# Patient Record
Sex: FEMALE | Race: WHITE | NOT HISPANIC OR LATINO | URBAN - METROPOLITAN AREA
[De-identification: names, ages, dates, MRNs, and addresses within clinical notes are randomized per-mention and may not be internally consistent; named-entity substitution may affect disease eponyms.]

---

## 2022-01-21 ENCOUNTER — INPATIENT (INPATIENT)
Facility: HOSPITAL | Age: 82
LOS: 23 days | Discharge: NURSING, REHAB OR LONG-TERM CARE | DRG: 177 | End: 2022-02-14
Attending: INTERNAL MEDICINE | Admitting: INTERNAL MEDICINE
Payer: COMMERCIAL

## 2022-01-21 VITALS
HEART RATE: 84 BPM | WEIGHT: 110.01 LBS | DIASTOLIC BLOOD PRESSURE: 52 MMHG | SYSTOLIC BLOOD PRESSURE: 148 MMHG | TEMPERATURE: 98 F | OXYGEN SATURATION: 98 % | HEIGHT: 62 IN | RESPIRATION RATE: 14 BRPM

## 2022-01-21 DIAGNOSIS — R06.00 DYSPNEA, UNSPECIFIED: ICD-10-CM

## 2022-01-21 LAB
ALBUMIN SERPL ELPH-MCNC: 3.3 G/DL — SIGNIFICANT CHANGE UP (ref 3.3–5)
ALP SERPL-CCNC: 55 U/L — SIGNIFICANT CHANGE UP (ref 40–120)
ALT FLD-CCNC: 19 U/L — SIGNIFICANT CHANGE UP (ref 10–45)
ANION GAP SERPL CALC-SCNC: 15 MMOL/L — SIGNIFICANT CHANGE UP (ref 5–17)
APPEARANCE UR: CLEAR — SIGNIFICANT CHANGE UP
APTT BLD: 35.6 SEC — HIGH (ref 27.5–35.5)
AST SERPL-CCNC: 44 U/L — HIGH (ref 10–40)
BACTERIA # UR AUTO: NEGATIVE — SIGNIFICANT CHANGE UP
BASE EXCESS BLDV CALC-SCNC: 6.5 MMOL/L — HIGH (ref -2–2)
BASOPHILS # BLD AUTO: 0.03 K/UL — SIGNIFICANT CHANGE UP (ref 0–0.2)
BASOPHILS NFR BLD AUTO: 0.4 % — SIGNIFICANT CHANGE UP (ref 0–2)
BILIRUB SERPL-MCNC: 0.9 MG/DL — SIGNIFICANT CHANGE UP (ref 0.2–1.2)
BILIRUB UR-MCNC: NEGATIVE — SIGNIFICANT CHANGE UP
BUN SERPL-MCNC: 14 MG/DL — SIGNIFICANT CHANGE UP (ref 7–23)
CA-I SERPL-SCNC: 1.11 MMOL/L — LOW (ref 1.15–1.33)
CALCIUM SERPL-MCNC: 8.7 MG/DL — SIGNIFICANT CHANGE UP (ref 8.4–10.5)
CHLORIDE BLDV-SCNC: 98 MMOL/L — SIGNIFICANT CHANGE UP (ref 96–108)
CHLORIDE SERPL-SCNC: 98 MMOL/L — SIGNIFICANT CHANGE UP (ref 96–108)
CO2 BLDV-SCNC: 33 MMOL/L — HIGH (ref 22–26)
CO2 SERPL-SCNC: 26 MMOL/L — SIGNIFICANT CHANGE UP (ref 22–31)
COLOR SPEC: YELLOW — SIGNIFICANT CHANGE UP
CREAT SERPL-MCNC: 0.54 MG/DL — SIGNIFICANT CHANGE UP (ref 0.5–1.3)
DIFF PNL FLD: ABNORMAL
DIGOXIN SERPL-MCNC: <0.4 NG/ML — LOW (ref 0.8–2)
EOSINOPHIL # BLD AUTO: 0.03 K/UL — SIGNIFICANT CHANGE UP (ref 0–0.5)
EOSINOPHIL NFR BLD AUTO: 0.4 % — SIGNIFICANT CHANGE UP (ref 0–6)
EPI CELLS # UR: 4 /HPF — SIGNIFICANT CHANGE UP
GAS PNL BLDV: 136 MMOL/L — SIGNIFICANT CHANGE UP (ref 136–145)
GAS PNL BLDV: SIGNIFICANT CHANGE UP
GAS PNL BLDV: SIGNIFICANT CHANGE UP
GLUCOSE BLDV-MCNC: 152 MG/DL — HIGH (ref 70–99)
GLUCOSE SERPL-MCNC: 158 MG/DL — HIGH (ref 70–99)
GLUCOSE UR QL: NEGATIVE — SIGNIFICANT CHANGE UP
HCO3 BLDV-SCNC: 32 MMOL/L — HIGH (ref 22–29)
HCT VFR BLD CALC: 37.8 % — SIGNIFICANT CHANGE UP (ref 34.5–45)
HCT VFR BLDA CALC: 37 % — SIGNIFICANT CHANGE UP (ref 34.5–46.5)
HGB BLD CALC-MCNC: 12.3 G/DL — SIGNIFICANT CHANGE UP (ref 11.7–16.1)
HGB BLD-MCNC: 12 G/DL — SIGNIFICANT CHANGE UP (ref 11.5–15.5)
HYALINE CASTS # UR AUTO: 5 /LPF — HIGH (ref 0–2)
IMM GRANULOCYTES NFR BLD AUTO: 0.4 % — SIGNIFICANT CHANGE UP (ref 0–1.5)
INR BLD: 1.21 RATIO — HIGH (ref 0.88–1.16)
KETONES UR-MCNC: ABNORMAL
LACTATE BLDV-MCNC: 2.1 MMOL/L — HIGH (ref 0.7–2)
LEUKOCYTE ESTERASE UR-ACNC: NEGATIVE — SIGNIFICANT CHANGE UP
LYMPHOCYTES # BLD AUTO: 0.61 K/UL — LOW (ref 1–3.3)
LYMPHOCYTES # BLD AUTO: 8.2 % — LOW (ref 13–44)
MAGNESIUM SERPL-MCNC: 2.1 MG/DL — SIGNIFICANT CHANGE UP (ref 1.6–2.6)
MCHC RBC-ENTMCNC: 27.8 PG — SIGNIFICANT CHANGE UP (ref 27–34)
MCHC RBC-ENTMCNC: 31.7 GM/DL — LOW (ref 32–36)
MCV RBC AUTO: 87.5 FL — SIGNIFICANT CHANGE UP (ref 80–100)
MONOCYTES # BLD AUTO: 0.72 K/UL — SIGNIFICANT CHANGE UP (ref 0–0.9)
MONOCYTES NFR BLD AUTO: 9.7 % — SIGNIFICANT CHANGE UP (ref 2–14)
NEUTROPHILS # BLD AUTO: 6 K/UL — SIGNIFICANT CHANGE UP (ref 1.8–7.4)
NEUTROPHILS NFR BLD AUTO: 80.9 % — HIGH (ref 43–77)
NITRITE UR-MCNC: NEGATIVE — SIGNIFICANT CHANGE UP
NRBC # BLD: 0 /100 WBCS — SIGNIFICANT CHANGE UP (ref 0–0)
NT-PROBNP SERPL-SCNC: HIGH PG/ML (ref 0–300)
PCO2 BLDV: 48 MMHG — HIGH (ref 39–42)
PH BLDV: 7.43 — SIGNIFICANT CHANGE UP (ref 7.32–7.43)
PH UR: 6 — SIGNIFICANT CHANGE UP (ref 5–8)
PLATELET # BLD AUTO: 151 K/UL — SIGNIFICANT CHANGE UP (ref 150–400)
PO2 BLDV: 28 MMHG — SIGNIFICANT CHANGE UP (ref 25–45)
POTASSIUM BLDV-SCNC: 3.5 MMOL/L — SIGNIFICANT CHANGE UP (ref 3.5–5.1)
POTASSIUM SERPL-MCNC: 4.1 MMOL/L — SIGNIFICANT CHANGE UP (ref 3.5–5.3)
POTASSIUM SERPL-SCNC: 4.1 MMOL/L — SIGNIFICANT CHANGE UP (ref 3.5–5.3)
PROT SERPL-MCNC: 7 G/DL — SIGNIFICANT CHANGE UP (ref 6–8.3)
PROT UR-MCNC: ABNORMAL
PROTHROM AB SERPL-ACNC: 14.4 SEC — HIGH (ref 10.6–13.6)
RBC # BLD: 4.32 M/UL — SIGNIFICANT CHANGE UP (ref 3.8–5.2)
RBC # FLD: 14.3 % — SIGNIFICANT CHANGE UP (ref 10.3–14.5)
RBC CASTS # UR COMP ASSIST: 2 /HPF — SIGNIFICANT CHANGE UP (ref 0–4)
SAO2 % BLDV: 38.2 % — LOW (ref 67–88)
SARS-COV-2 RNA SPEC QL NAA+PROBE: DETECTED
SODIUM SERPL-SCNC: 139 MMOL/L — SIGNIFICANT CHANGE UP (ref 135–145)
SP GR SPEC: 1.02 — SIGNIFICANT CHANGE UP (ref 1.01–1.02)
TROPONIN T, HIGH SENSITIVITY RESULT: 42 NG/L — SIGNIFICANT CHANGE UP (ref 0–51)
TROPONIN T, HIGH SENSITIVITY RESULT: 44 NG/L — SIGNIFICANT CHANGE UP (ref 0–51)
TSH SERPL-MCNC: 0.47 UIU/ML — SIGNIFICANT CHANGE UP (ref 0.27–4.2)
UROBILINOGEN FLD QL: ABNORMAL
WBC # BLD: 7.42 K/UL — SIGNIFICANT CHANGE UP (ref 3.8–10.5)
WBC # FLD AUTO: 7.42 K/UL — SIGNIFICANT CHANGE UP (ref 3.8–10.5)
WBC UR QL: 4 /HPF — SIGNIFICANT CHANGE UP (ref 0–5)

## 2022-01-21 PROCEDURE — 71045 X-RAY EXAM CHEST 1 VIEW: CPT | Mod: 26

## 2022-01-21 PROCEDURE — 71275 CT ANGIOGRAPHY CHEST: CPT | Mod: 26,MA

## 2022-01-21 PROCEDURE — 99285 EMERGENCY DEPT VISIT HI MDM: CPT

## 2022-01-21 RX ORDER — METOPROLOL TARTRATE 50 MG
25 TABLET ORAL DAILY
Refills: 0 | Status: DISCONTINUED | OUTPATIENT
Start: 2022-01-21 | End: 2022-01-25

## 2022-01-21 RX ORDER — FUROSEMIDE 40 MG
20 TABLET ORAL DAILY
Refills: 0 | Status: DISCONTINUED | OUTPATIENT
Start: 2022-01-21 | End: 2022-01-22

## 2022-01-21 RX ORDER — ACETAMINOPHEN 500 MG
650 TABLET ORAL EVERY 6 HOURS
Refills: 0 | Status: DISCONTINUED | OUTPATIENT
Start: 2022-01-21 | End: 2022-02-14

## 2022-01-21 RX ORDER — REMDESIVIR 5 MG/ML
200 INJECTION INTRAVENOUS EVERY 24 HOURS
Refills: 0 | Status: COMPLETED | OUTPATIENT
Start: 2022-01-21 | End: 2022-01-21

## 2022-01-21 RX ORDER — PANTOPRAZOLE SODIUM 20 MG/1
40 TABLET, DELAYED RELEASE ORAL
Refills: 0 | Status: COMPLETED | OUTPATIENT
Start: 2022-01-21 | End: 2022-01-31

## 2022-01-21 RX ORDER — REMDESIVIR 5 MG/ML
INJECTION INTRAVENOUS
Refills: 0 | Status: COMPLETED | OUTPATIENT
Start: 2022-01-21 | End: 2022-01-25

## 2022-01-21 RX ORDER — ENOXAPARIN SODIUM 100 MG/ML
40 INJECTION SUBCUTANEOUS DAILY
Refills: 0 | Status: DISCONTINUED | OUTPATIENT
Start: 2022-01-21 | End: 2022-01-25

## 2022-01-21 RX ORDER — REMDESIVIR 5 MG/ML
100 INJECTION INTRAVENOUS EVERY 24 HOURS
Refills: 0 | Status: COMPLETED | OUTPATIENT
Start: 2022-01-22 | End: 2022-01-25

## 2022-01-21 RX ORDER — DEXAMETHASONE 0.5 MG/5ML
6 ELIXIR ORAL DAILY
Refills: 0 | Status: COMPLETED | OUTPATIENT
Start: 2022-01-21 | End: 2022-01-31

## 2022-01-21 RX ADMIN — REMDESIVIR 200 MILLIGRAM(S): 5 INJECTION INTRAVENOUS at 18:56

## 2022-01-21 NOTE — H&P ADULT - NSHPLABSRESULTS_GEN_ALL_CORE
LABS:                        12.0   7.42  )-----------( 151      ( 2022 12:11 )             37.8         139  |  98  |  14  ----------------------------<  158<H>  4.1   |  26  |  0.54    Ca    8.7      2022 12:11  Mg     2.1         TPro  7.0  /  Alb  3.3  /  TBili  0.9  /  DBili  x   /  AST  44<H>  /  ALT  19  /  AlkPhos  55      PT/INR - ( 2022 12:11 )   PT: 14.4 sec;   INR: 1.21 ratio         PTT - ( 2022 12:11 )  PTT:35.6 sec      Urinalysis Basic - ( 2022 12:09 )    Color: Yellow / Appearance: Clear / S.021 / pH: x  Gluc: x / Ketone: Small  / Bili: Negative / Urobili: 8 mg/dL   Blood: x / Protein: 100 mg/dL / Nitrite: Negative   Leuk Esterase: Negative / RBC: 2 /hpf / WBC 4 /HPF   Sq Epi: x / Non Sq Epi: 4 /hpf / Bacteria: Negative           @ 12:09  3.5  28

## 2022-01-21 NOTE — H&P ADULT - ASSESSMENT
2 yo F     h/o   dementia and   ? CHF per EMS,/  no othe r hx  is  available      presenting via EMS from home for few days of weakness, followed by development of SOB and episode of substernal CP this morning.     Pt is a poor historian and unable to provide significant history    all ROS negative upon questioning.     Lives at home with brother - he reports concern for pt's increased work of breathing.      8  weakness/  sob  from  covid/  pna   CT  chest, with  GOO   on decadron  ID  eval   *  Dementia   *    HTN,/  >  chf  on   lasix  on  dvt ppx.  bmi  is  20     rad< from: CT Angio Chest PE Protocol w/ IV Cont (01.21.22 @ 16:01) >  IMPRESSION:  No main, right, left, lobar, or proximal segmental pulmonary embolus.  Patchy bilateral groundglass opacities noted throughout both lungs likely   representing infection or alveolar component of edema. COVID 19 can also   have this appearance. Correlate with RT PCR    --- End of Report --  < end of copied text >     2 yo F     h/o   dementia and   ? CHF per EMS,/  no othe r hx  is  available      presenting via EMS from home for few days of weakness, followed by development of SOB and episode of substernal CP this morning.     Pt is a poor historian and unable to provide significant history    all ROS negative upon questioning.     Lives at home with brother - he reports concern for pt's increased work of breathing.      8  weakness/  sob  from  covid/  pna   CT  chest, with  GOO   on decadron/  remdisivir  ID  eval   *  Dementia   *    HTN,/  ?  chf  on   lasix  card  d r susan    echo  on  dvt ppx.  bmi  is  20     rad< from: CT Angio Chest PE Protocol w/ IV Cont (01.21.22 @ 16:01) >  IMPRESSION:  No main, right, left, lobar, or proximal segmental pulmonary embolus.  Patchy bilateral groundglass opacities noted throughout both lungs likely   representing infection or alveolar component of edema. COVID 19 can also   have this appearance. Correlate with RT PCR    --- End of Report --  < end of copied text >

## 2022-01-21 NOTE — ED PROVIDER NOTE - PROGRESS NOTE DETAILS
Kenny Gill PGY2: Work up thus far suggestive of pna, viral vs bacterial. CTA negative for PE. Given hypoxia and increased work of breathing, will admit for further mangment. Accepted for admission to Dr. Araujo. Attending MD Washburn: CTA Chest without PE, will admit.

## 2022-01-21 NOTE — CONSULT NOTE ADULT - ASSESSMENT
80 yo F  h/o   dementia and  <CHF per EMS,/  no othe r hx  is  available presenting via EMS from home for few days of weakness, followed by development of SOB and episode of substernal CP this morning.     Pt is a poor historian and unable to provide significant history    all ROS negative upon questioning.   Lives at home with brother - he reports concern for pt's increased work of breathing.   pt is a 80 yo female with hx of htn, chf, cad, with increasing sob and COVID with  abnormal  cta and chest x ray  cta negative for PE  echo to evaluated ef  repeat ecg  trop to r/o ischemia, pt with chest pain.  lipid panel  continue covid therapy  fu renal function

## 2022-01-21 NOTE — ED PROVIDER NOTE - OBJECTIVE STATEMENT
80 yo F of dementia, CHF per EMS 80 yo F of dementia and CHF per EMS, presenting via EMS from home for few days of weakness, followed by development of SOB and episode of substernal CP this morning. Pt is a poor historian and unable to provide significant PMHx - all ROS negative upon questioning. Lives at home with brother - he reports concern for pt's increased work of breathing. Unknown if fevers. Med list provided by EMS includes digoxin and lasix.

## 2022-01-21 NOTE — ED ADULT NURSE NOTE - OBJECTIVE STATEMENT
Pt was BIBEMS from home for chest pain and SOB. PMH dementia, HTN, CHF as per EMS. Pt is AOx1. Breathing labored, abdominal breathing symmetrical rise and fall of the chest, lung sounds clear bilaterally. Abdomen is soft and nondistended. Skin is warm and dry to touch. Pulses strong in all extremities. EMS said family member were not good historian. Pt was BIBEMS from home for chest pain and SOB. PMH dementia, HTN, CHF as per EMS. Pt is AOx1. Breathing labored, abdominal breathing symmetrical rise and fall of the chest, lung sounds clear bilaterally on RA [id number]%, on 2l nasal cannula 100% . Abdomen is soft and nondistended. Skin is warm and dry to touch. Pulses strong in all extremities. EMS said family member were not good historian. Pt was BIBEMS from home for chest pain and SOB. PMH dementia, HTN, CHF as per EMS. Pt is AOx1. Breathing labored, abdominal breathing symmetrical rise and fall of the chest, lung sounds clear bilaterally on RA [id number]%, on 2l nasal cannula 100% . Abdomen is soft and nondistended. Skin is warm and dry to touch. Left lower extremity swollen, no pitting edema. EMS said family member were not good historian.

## 2022-01-21 NOTE — ED PROVIDER NOTE - ATTENDING CONTRIBUTION TO CARE
I, Marty Jordan, performed a history and physical exam of the patient and discussed their management with the resident and /or advanced care provider. I reviewed the resident and /or ACP's note and agree with the documented findings and plan of care. I was present and available for all procedures.  Patient is a poor historian with report of sob and history of CHF. Will evaluate labx, cxr, ekg and awaiting further history from family member. Patient most likely requires diuresis.

## 2022-01-21 NOTE — H&P ADULT - NSHPPHYSICALEXAM_GEN_ALL_CORE
PHYSICAL EXAMINATION:  Vital Signs Last 24 Hrs  T(C): 37.6 (21 Jan 2022 17:39), Max: 37.6 (21 Jan 2022 12:17)  T(F): 99.6 (21 Jan 2022 17:39), Max: 99.6 (21 Jan 2022 12:17)  HR: 69 (21 Jan 2022 17:39) (69 - 84)  BP: 154/55 (21 Jan 2022 17:39) (123/103 - 159/72)  BP(mean): --  RR: 24 (21 Jan 2022 17:39) (14 - 27)  SpO2: 98% (21 Jan 2022 17:39) (98% - 100%)  CAPILLARY BLOOD GLUCOSE            GENERAL: NAD, well-groomed,  HEAD:  atraumatic, normocephalic  EYES: sclera anicteric  ENMT: mucous membranes moist  NECK: supple, No JVD  CHEST/LUNG: clear to auscultation bilaterally;    no      rales   ,   no rhonchi,   HEART: normal S1, S2  ABDOMEN: BS+, soft, ND, NT   EXTREMITIES:    no    edema    b/l LEs  NEURO: awake, ,     SKIN: no     rash

## 2022-01-21 NOTE — ED ADULT NURSE REASSESSMENT NOTE - NS ED NURSE REASSESS COMMENT FT1
Pt found in position of comfort, vitals signs reassessed, within normal limits. Pt on 2L NC, spO2 98%. Bed in lowest position, appropriate siderails up, wheels locked. Call bell and belongins within reach. Safety maintained, isolation precautions maintained, will continue to monitor.

## 2022-01-21 NOTE — ED PROVIDER NOTE - CLINICAL SUMMARY MEDICAL DECISION MAKING FREE TEXT BOX
80 yo F of dementia and CHF per EMS, presenting via EMS from home for few days of weakness, followed by development of SOB and episode of substernal CP this morning. Pt is a poor historian and unable to provide significant PMHx - all ROS negative upon questioning. Lives at home with brother. Med list provided by EMS includes digoxin and lasix. Exam as above. No focal neuro deficits appreciated. Consider cardiac CP, PE, CHF exacerbation, pna viral vs bacterial, occult infectious process. Labs, CTA chest, ecg, will reassess.

## 2022-01-21 NOTE — ED PROVIDER NOTE - PHYSICAL EXAMINATION
Gen: Alert, oriented to self  HEENT: Atraumatic. Mucous membranes moist, no scleral icterus.  CV: RRR. No significant lower extremity edema.   Resp: Respirations unlabored. Scattered crackles bilaterally, no wheezing.  GI: Abdomen non tender to palpation, soft and non-distended.   Skin/MSK: Pelvis stable. Extremities atraumatic x 4. No open wounds appreciated. Spine non ttp in midline throughout w/o deformity.   Neuro: Following commands. No facial drooping. Moving extremities spontaneously x 4. 1+ edema of LLE - lower leg initially in bandages - removed and no wounds appreciated.   Psych: Appropriate mood, cooperative

## 2022-01-21 NOTE — ED ADULT NURSE NOTE - NSIMPLEMENTINTERV_GEN_ALL_ED
Implemented All Fall with Harm Risk Interventions:  [place] to call system. Call bell, personal items and telephone within reach. Instruct patient to call for assistance. Room bathroom lighting operational. Non-slip footwear when patient is off stretcher. Physically safe environment: no spills, clutter or unnecessary equipment. Stretcher in lowest position, wheels locked, appropriate side rails in place. Provide visual cue, wrist band, yellow gown, etc. Monitor gait and stability. Monitor for mental status changes and reorient to person, place, and time. Review medications for side effects contributing to fall risk. Reinforce activity limits and safety measures with patient and family. Provide visual clues: red socks.

## 2022-01-21 NOTE — H&P ADULT - HISTORY OF PRESENT ILLNESS
80 yo F     h/o   dementia and  <CHF per EMS,/  no othe r hx  is  available      presenting via EMS from home for few days of weakness, followed by development of SOB and episode of substernal CP this morning.     Pt is a poor historian and unable to provide significant history    all ROS negative upon questioning.     Lives at home with brother - he reports concern for pt's increased work of breathing.

## 2022-01-21 NOTE — ED ADULT TRIAGE NOTE - HISTORY OF COVID-19 VACCINATION
How Severe Is Your Skin Lesion?: moderate Have Your Skin Lesions Been Treated?: not been treated Is This A New Presentation, Or A Follow-Up?: Skin Lesions Vaccine status unknown

## 2022-01-22 LAB
ALBUMIN SERPL ELPH-MCNC: 2.9 G/DL — LOW (ref 3.3–5)
ALP SERPL-CCNC: 46 U/L — SIGNIFICANT CHANGE UP (ref 40–120)
ALT FLD-CCNC: 14 U/L — SIGNIFICANT CHANGE UP (ref 10–45)
ANION GAP SERPL CALC-SCNC: 16 MMOL/L — SIGNIFICANT CHANGE UP (ref 5–17)
AST SERPL-CCNC: 24 U/L — SIGNIFICANT CHANGE UP (ref 10–40)
BILIRUB DIRECT SERPL-MCNC: 0.3 MG/DL — SIGNIFICANT CHANGE UP (ref 0–0.3)
BILIRUB INDIRECT FLD-MCNC: 0.4 MG/DL — SIGNIFICANT CHANGE UP (ref 0.2–1)
BILIRUB SERPL-MCNC: 0.7 MG/DL — SIGNIFICANT CHANGE UP (ref 0.2–1.2)
BUN SERPL-MCNC: 14 MG/DL — SIGNIFICANT CHANGE UP (ref 7–23)
CALCIUM SERPL-MCNC: 8.2 MG/DL — LOW (ref 8.4–10.5)
CHLORIDE SERPL-SCNC: 101 MMOL/L — SIGNIFICANT CHANGE UP (ref 96–108)
CO2 SERPL-SCNC: 24 MMOL/L — SIGNIFICANT CHANGE UP (ref 22–31)
CREAT SERPL-MCNC: 0.67 MG/DL — SIGNIFICANT CHANGE UP (ref 0.5–1.3)
CREAT SERPL-MCNC: 0.67 MG/DL — SIGNIFICANT CHANGE UP (ref 0.5–1.3)
GLUCOSE SERPL-MCNC: 102 MG/DL — HIGH (ref 70–99)
HCT VFR BLD CALC: 34.8 % — SIGNIFICANT CHANGE UP (ref 34.5–45)
HGB BLD-MCNC: 11.4 G/DL — LOW (ref 11.5–15.5)
INR BLD: SIGNIFICANT CHANGE UP RATIO (ref 0.88–1.16)
MCHC RBC-ENTMCNC: 28 PG — SIGNIFICANT CHANGE UP (ref 27–34)
MCHC RBC-ENTMCNC: 32.8 GM/DL — SIGNIFICANT CHANGE UP (ref 32–36)
MCV RBC AUTO: 85.5 FL — SIGNIFICANT CHANGE UP (ref 80–100)
NRBC # BLD: 0 /100 WBCS — SIGNIFICANT CHANGE UP (ref 0–0)
PLATELET # BLD AUTO: 116 K/UL — LOW (ref 150–400)
POTASSIUM SERPL-MCNC: 2.8 MMOL/L — CRITICAL LOW (ref 3.5–5.3)
POTASSIUM SERPL-SCNC: 2.8 MMOL/L — CRITICAL LOW (ref 3.5–5.3)
PROT SERPL-MCNC: 6.1 G/DL — SIGNIFICANT CHANGE UP (ref 6–8.3)
PROTHROM AB SERPL-ACNC: >200 SEC — HIGH (ref 10.6–13.6)
RBC # BLD: 4.07 M/UL — SIGNIFICANT CHANGE UP (ref 3.8–5.2)
RBC # FLD: 14.1 % — SIGNIFICANT CHANGE UP (ref 10.3–14.5)
SODIUM SERPL-SCNC: 141 MMOL/L — SIGNIFICANT CHANGE UP (ref 135–145)
WBC # BLD: 4.26 K/UL — SIGNIFICANT CHANGE UP (ref 3.8–10.5)
WBC # FLD AUTO: 4.26 K/UL — SIGNIFICANT CHANGE UP (ref 3.8–10.5)

## 2022-01-22 PROCEDURE — 99223 1ST HOSP IP/OBS HIGH 75: CPT

## 2022-01-22 RX ORDER — FUROSEMIDE 40 MG
20 TABLET ORAL
Refills: 0 | Status: DISCONTINUED | OUTPATIENT
Start: 2022-01-22 | End: 2022-01-25

## 2022-01-22 RX ORDER — POTASSIUM CHLORIDE 20 MEQ
10 PACKET (EA) ORAL
Refills: 0 | Status: COMPLETED | OUTPATIENT
Start: 2022-01-22 | End: 2022-01-22

## 2022-01-22 RX ORDER — POTASSIUM CHLORIDE 20 MEQ
40 PACKET (EA) ORAL ONCE
Refills: 0 | Status: COMPLETED | OUTPATIENT
Start: 2022-01-22 | End: 2022-01-22

## 2022-01-22 RX ADMIN — PANTOPRAZOLE SODIUM 40 MILLIGRAM(S): 20 TABLET, DELAYED RELEASE ORAL at 05:11

## 2022-01-22 RX ADMIN — Medication 6 MILLIGRAM(S): at 05:11

## 2022-01-22 RX ADMIN — REMDESIVIR 500 MILLIGRAM(S): 5 INJECTION INTRAVENOUS at 17:11

## 2022-01-22 RX ADMIN — Medication 100 MILLIEQUIVALENT(S): at 08:56

## 2022-01-22 RX ADMIN — Medication 20 MILLIGRAM(S): at 17:11

## 2022-01-22 RX ADMIN — Medication 100 MILLIEQUIVALENT(S): at 11:16

## 2022-01-22 RX ADMIN — Medication 20 MILLIGRAM(S): at 05:11

## 2022-01-22 RX ADMIN — Medication 100 MILLIEQUIVALENT(S): at 10:08

## 2022-01-22 RX ADMIN — Medication 40 MILLIEQUIVALENT(S): at 08:56

## 2022-01-22 RX ADMIN — Medication 25 MILLIGRAM(S): at 05:11

## 2022-01-22 RX ADMIN — ENOXAPARIN SODIUM 40 MILLIGRAM(S): 100 INJECTION SUBCUTANEOUS at 11:16

## 2022-01-22 NOTE — PROGRESS NOTE ADULT - ASSESSMENT
2 yo F     h/o   dementia and   ? CHF per EMS,/  no othe r hx  is  available      presenting via EMS from home for few days of weakness, followed by development of SOB and episode of substernal CP this morning.     Pt is a poor historian and unable to provide significant history    all ROS negative upon questioning.     Lives at home with brother - he reports concern for pt's increased work of breathing.      8*  weakness/  sob  from  covid/  pna   CT  chest, with  GOO   on decadron/  remdisivir   *  Dementia   *    HTN,/  ?  chf  on   lasix  card  d r golyan    echo  on  dvt ppx.  bmi  is  20  continue  supportive care      rad< from: CT Angio Chest PE Protocol w/ IV Cont (01.21.22 @ 16:01) >  IMPRESSION:  No main, right, left, lobar, or proximal segmental pulmonary embolus.  Patchy bilateral groundglass opacities noted throughout both lungs likely   representing infection or alveolar component of edema. COVID 19 can also   have this appearance. Correlate with RT PCR    --- End of Report --  < end of copied text >

## 2022-01-22 NOTE — PATIENT PROFILE ADULT - FALL HARM RISK - HARM RISK INTERVENTIONS
Assistance OOB with selected safe patient handling equipment/Communicate Risk of Fall with Harm to all staff/Discuss with provider need for PT consult/Monitor for mental status changes/Monitor gait and stability/Move patient closer to nurses' station/Provide patient with walking aids - walker, cane, crutches/Reinforce activity limits and safety measures with patient and family/Reorient to person, place and time as needed/Tailored Fall Risk Interventions/Toileting schedule using arm’s reach rule for commode and bathroom/Use of alarms - bed, chair and/or voice tab/Visual Cue: Yellow wristband and red socks/Bed in lowest position, wheels locked, appropriate side rails in place/Call bell, personal items and telephone in reach/Instruct patient to call for assistance before getting out of bed or chair/Non-slip footwear when patient is out of bed/[place] to call system/Physically safe environment - no spills, clutter or unnecessary equipment/Purposeful Proactive Rounding/Room/bathroom lighting operational, light cord in reach

## 2022-01-22 NOTE — PROGRESS NOTE ADULT - SUBJECTIVE AND OBJECTIVE BOX
febrile    REVIEW OF SYSTEMS:  GEN: no fever,    no chills  RESP: no SOB,   no cough  CVS: no chest pain,   no palpitations  GI: no abdominal pain,   no nausea,   no vomiting,   no constipation,   no diarrhea  : no dysuria,   no frequency  NEURO: no headache,   no dizziness  PSYCH: no depression,   not anxious  Derm : no rash    MEDICATIONS  (STANDING):  dexAMETHasone     Tablet 6 milliGRAM(s) Oral daily  enoxaparin Injectable 40 milliGRAM(s) SubCutaneous daily  furosemide    Tablet 20 milliGRAM(s) Oral daily  metoprolol succinate ER 25 milliGRAM(s) Oral daily  pantoprazole    Tablet 40 milliGRAM(s) Oral before breakfast  remdesivir  IVPB   IV Intermittent   remdesivir  IVPB 100 milliGRAM(s) IV Intermittent every 24 hours    MEDICATIONS  (PRN):  acetaminophen     Tablet .. 650 milliGRAM(s) Oral every 6 hours PRN Temp greater or equal to 38C (100.4F), Mild Pain (1 - 3), Moderate Pain (4 - 6), Severe Pain (7 - 10)      Vital Signs Last 24 Hrs  T(C): 36.7 (2022 05:07), Max: 38.2 (2022 21:43)  T(F): 98.1 (2022 05:07), Max: 100.8 (2022 21:43)  HR: 66 (2022 05:07) (65 - 84)  BP: 155/74 (2022 05:07) (123/103 - 159/72)  BP(mean): --  RR: 20 (2022 05:07) (14 - 27)  SpO2: 98% (2022 05:07) (96% - 100%)  CAPILLARY BLOOD GLUCOSE        I&O's Summary    2022 07:01  -  2022 07:00  --------------------------------------------------------  IN: 360 mL / OUT: 250 mL / NET: 110 mL        PHYSICAL EXAM:  HEAD:  Atraumatic, Normocephalic  NECK: Supple, No   JVD  CHEST/LUNG:   no     rales,     no,    rhonchi  HEART: Regular rate and rhythm;         murmur  ABDOMEN: Soft, Nontender, ;   EXTREMITIES:    no    edema  NEUROLOGY:  alert    LABS:                        12.0   7.42  )-----------( 151      ( 2022 12:11 )             37.8         139  |  98  |  14  ----------------------------<  158<H>  4.1   |  26  |  0.54    Ca    8.7      2022 12:11  Mg     2.1         TPro  7.0  /  Alb  3.3  /  TBili  0.9  /  DBili  x   /  AST  44<H>  /  ALT  19  /  AlkPhos  55      PT/INR - ( 2022 12:11 )   PT: 14.4 sec;   INR: 1.21 ratio         PTT - ( 2022 12:11 )  PTT:35.6 sec      Urinalysis Basic - ( 2022 12:09 )    Color: Yellow / Appearance: Clear / S.021 / pH: x  Gluc: x / Ketone: Small  / Bili: Negative / Urobili: 8 mg/dL   Blood: x / Protein: 100 mg/dL / Nitrite: Negative   Leuk Esterase: Negative / RBC: 2 /hpf / WBC 4 /HPF   Sq Epi: x / Non Sq Epi: 4 /hpf / Bacteria: Negative           @ 12:09  3.5  28      Thyroid Stimulating Hormone, Serum: 0.47 uIU/mL ( @ 17:02)          Consultant(s) Notes Reviewed:      Care Discussed with Consultants/Other Providers:

## 2022-01-22 NOTE — PROGRESS NOTE ADULT - SUBJECTIVE AND OBJECTIVE BOX
CARDIOLOGY     PROGRESS  NOTE   ________________________________________________    CHIEF COMPLAINT:Patient is a 81y old  Female who presents with a chief complaint of Dyspnea (22 Jan 2022 08:51)  no complain.  	  REVIEW OF SYSTEMS:  CONSTITUTIONAL: No fever, weight loss, or fatigue  EYES: No eye pain, visual disturbances, or discharge  ENT:  No difficulty hearing, tinnitus, vertigo; No sinus or throat pain  NECK: No pain or stiffness  RESPIRATORY: No cough, wheezing, chills or hemoptysis; No Shortness of Breath  CARDIOVASCULAR: No chest pain, palpitations, passing out, dizziness, or leg swelling  GASTROINTESTINAL: No abdominal or epigastric pain. No nausea, vomiting, or hematemesis; No diarrhea or constipation. No melena or hematochezia.  GENITOURINARY: No dysuria, frequency, hematuria, or incontinence  NEUROLOGICAL: No headaches, memory loss, loss of strength, numbness, or tremors  SKIN: No itching, burning, rashes, or lesions   LYMPH Nodes: No enlarged glands  ENDOCRINE: No heat or cold intolerance; No hair loss  MUSCULOSKELETAL: No joint pain or swelling; No muscle, back, or extremity pain  PSYCHIATRIC: No depression, anxiety, mood swings, or difficulty sleeping  HEME/LYMPH: No easy bruising, or bleeding gums  ALLERGY AND IMMUNOLOGIC: No hives or eczema	    [ ] All others negative	  [ ] Unable to obtain    PHYSICAL EXAM:  T(C): 36.7 (01-22-22 @ 13:06), Max: 38.2 (01-21-22 @ 21:43)  HR: 55 (01-22-22 @ 13:06) (52 - 76)  BP: 133/73 (01-22-22 @ 13:06) (133/73 - 159/72)  RR: 20 (01-22-22 @ 13:06) (20 - 24)  SpO2: 94% (01-22-22 @ 13:06) (94% - 98%)  Wt(kg): --  I&O's Summary    21 Jan 2022 07:01  -  22 Jan 2022 07:00  --------------------------------------------------------  IN: 360 mL / OUT: 250 mL / NET: 110 mL    22 Jan 2022 07:01  -  22 Jan 2022 13:33  --------------------------------------------------------  IN: 240 mL / OUT: 450 mL / NET: -210 mL        Appearance: Normal	  HEENT:   Normal oral mucosa, PERRL, EOMI	  Lymphatic: No lymphadenopathy  Cardiovascular: Normal S1 S2, No JVD, + murmurs, No edema  Respiratory: rhonchi  Psychiatry: A & O x 3, Mood & affect appropriate  Gastrointestinal:  Soft, Non-tender, + BS	  Skin: No rashes, No ecchymoses, No cyanosis	  Neurologic: Non-focal  Extremities: Normal range of motion, No clubbing, cyanosis or edema  Vascular: Peripheral pulses palpable 2+ bilaterally    MEDICATIONS  (STANDING):  dexAMETHasone     Tablet 6 milliGRAM(s) Oral daily  enoxaparin Injectable 40 milliGRAM(s) SubCutaneous daily  furosemide    Tablet 20 milliGRAM(s) Oral daily  metoprolol succinate ER 25 milliGRAM(s) Oral daily  pantoprazole    Tablet 40 milliGRAM(s) Oral before breakfast  remdesivir  IVPB 100 milliGRAM(s) IV Intermittent every 24 hours  remdesivir  IVPB   IV Intermittent       TELEMETRY: 	    ECG:  	  RADIOLOGY:  OTHER: 	  	  LABS:	 	    CARDIAC MARKERS:                                11.4   4.26  )-----------( 116      ( 22 Jan 2022 06:55 )             34.8     01-22    141  |  101  |  14  ----------------------------<  102<H>  2.8<LL>   |  24  |  0.67    Ca    8.2<L>      22 Jan 2022 06:56  Mg     2.1     01-21    TPro  6.1  /  Alb  2.9<L>  /  TBili  0.7  /  DBili  0.3  /  AST  24  /  ALT  14  /  AlkPhos  46  01-22    proBNP: Serum Pro-Brain Natriuretic Peptide: 14598 pg/mL (01-21 @ 12:11)    Lipid Profile:   HgA1c:   TSH: Thyroid Stimulating Hormone, Serum: 0.47 uIU/mL (01-21 @ 17:02)    PT/INR - ( 22 Jan 2022 06:54 )   PT: >200.0 sec;   INR: SEE NOTE ratio         PTT - ( 21 Jan 2022 12:11 )  PTT:35.6 sec    < from: CT Angio Chest PE Protocol w/ IV Cont (01.21.22 @ 16:01) >  No main, right, left, lobar, or proximal segmental pulmonary embolus.    Patchy bilateral groundglass opacities noted throughout both lungs likely   representing infection or alveolar component of edema. COVID 19 can also   have this appearance. Correlate with RT PCR.        Assessment and plan  ---------------------------  82 yo F  h/o   dementia and  <CHF per EMS,/  no othe r hx  is  available presenting via EMS from home for few days of weakness, followed by development of SOB and episode of substernal CP this morning.     Pt is a poor historian and unable to provide significant history    all ROS negative upon questioning.   Lives at home with brother - he reports concern for pt's increased work of breathing.   pt is a 82 yo female with hx of htn, chf, cad, with increasing sob and COVID with  abnormal  cta and chest x ray  cta negative for PE  echo to evaluated ef  repeat ecg  trop to r/o ischemia, pt with chest pain.  lipid panel  continue covid therapy  fu renal function  increase lasix to bid  keep K>4

## 2022-01-22 NOTE — CONSULT NOTE ADULT - ASSESSMENT
Assessment:  The patient is an 81 year old female w/ PMHx of dementia and CHF who presented from home for several days of weakness followed by development of dyspnea and episode of substernal chest pain in the morning. Patient is a poor historian and history is obtained from chart review. She lives at home w/ her brother and he was concerned for patient's increased work of breathing. He called EMS and patient was subsequently brought to [facility]. Tmax was 100.8. She was found to be positive for COVID-19 and was subsequently admitted for COVID-19 pneumonia.      Plan:  # COVID-19 disease  - C/w intravenous remdesevir total 5 days  - C/w PO dexamethasone total 10 days  - Trend CRP, D-dimer, and Ferritin q48 hours  - Recommend proning for further oxygenation  - F/U UCx w/ sensitivities   - Trend CBC and CMP daily   - Monitor fever curve  - Maintain airborne and contact precautions  - ID will continue to follow    Case not yet discussed w/ attending      Rodolfo Sorto MD PGY-5  Fellow, Infectious Diseases   Pager: [phone]  If no response, after 5pm and on weekends: Call [phone]   Assessment:  The patient is an 81 year old female w/ PMHx of dementia and CHF who presented from home for several days of weakness followed by development of dyspnea and episode of substernal chest pain in the morning. Patient is a poor historian and history is obtained from chart review. She lives at home w/ her brother and he was concerned for patient's increased work of breathing. He called EMS and patient was subsequently brought to [facility]. Tmax was 100.8. She was found to be positive for COVID-19 and was subsequently admitted for COVID-19 pneumonia.      Plan:  # COVID-19 disease  - c/w intravenous remdesevir total 5 days  - c/w PO dexamethasone total 10 days  - trend CRP, D-dimer, and Ferritin q48 hours, check in AM   - recommend proning for further oxygenation  - f/u UCx w/ sensitivities   - trend CBC and CMP daily   - monitor fever curve  - maintain airborne and contact precautions  - ID will continue to follow    Case discussed w/ attending MD Rodolfo Ferrer MD PGY-5  Fellow, Infectious Diseases   Pager: [phone]  If no response, after 5pm and on weekends: Call [phone]   Assessment:  The patient is an 81 year old female w/ PMHx of dementia and CHF who presented from home for several days of weakness followed by development of dyspnea and episode of substernal chest pain in the morning. Patient is a poor historian and history is obtained from chart review. She lives at home w/ her brother and he was concerned for patient's increased work of breathing. He called EMS and patient was subsequently brought to [facility]. Tmax was 100.8. She was found to be positive for COVID-19 and was subsequently admitted for COVID-19 pneumonia.      Plan:,   # COVID-19 disease, Fever, Hypoxic Respiratory Failure   - c/w intravenous remdesevir total 5 days  - c/w PO dexamethasone total 10 days  - trend CRP, D-dimer, and Ferritin q48 hours, check in AM   - f/u UCx w/ sensitivities   - trend CBC and CMP daily   - monitor fever curve  - maintain airborne and contact precautions  - ID will continue to follow    Case discussed w/ attending MD Rodolfo Ferrer MD PGY-5  Fellow, Infectious Diseases   Pager: [phone]  If no response, after 5pm and on weekends: Call [phone]

## 2022-01-22 NOTE — CONSULT NOTE ADULT - SUBJECTIVE AND OBJECTIVE BOX
Patient is an 81 year old female who presents with a chief complaint of dyspnea (2022 07:28)    HPI:  The patient is an 81 year old female w/ PMHx of dementia and CHF who presented from home for several days of weakness followed by development of dyspnea and episode of substernal chest pain in the morning. Patient is a poor historian and history is obtained from chart review. She lives at home w/ her brother and he was concerned for patient's increased work of breathing. He called EMS and patient was subsequently brought to [facility]. Tmax was 100.8. She is w/o chills, dizziness, palpitations, abdominal pain, N/V/D/C or urinary changes.    Labs showed Hgb 11.4, PLT 116K w/ lymphopenia w/ INR 1.21. K+ 2.6, glucose 102 w/ normal renal function. BNP was . UA showed hematuria w/ proteinuria. COVID-19 PCR was positive. CXR showed bilateral hilar prominence likely related to enlarged pulmonary arteries w/ degenerative changes. CTPA w/ IV contrast showed patchy bilateral ground-glass opacities noted throughout both lungs likely representing infection or alveolar component of edema. She was started on IV remdesevir and PO dexamethasone UCx in process. TTE was ordered.      prior hospital charts reviewed [x]  primary team notes reviewed [x]  other consultant notes reviewed [x]    PAST MEDICAL & SURGICAL HISTORY:  Dementia  CHF      Allergies  No Known Allergies    ANTIMICROBIALS (past 90 days)  MEDICATIONS  (STANDING):  remdesivir  IVPB   200 milliGRAM(s) IV Intermittent (22 @ 18:56)        remdesivir  IVPB 100 every 24 hours  remdesivir  IVPB      OTHER MEDS: MEDICATIONS  (STANDING):  acetaminophen     Tablet .. 650 every 6 hours PRN  dexAMETHasone     Tablet 6 daily  enoxaparin Injectable 40 daily  furosemide    Tablet 20 daily  metoprolol succinate ER 25 daily  pantoprazole    Tablet 40 before breakfast    SOCIAL HISTORY:  unknown    FAMILY HISTORY: unknown     REVIEW OF SYSTEMS  [x] ROS unobtainable because:  dementia   [  ] All other systems negative except as noted below:	    Constitutional:  [x] fever [ ] chills  [ ] weight loss  [x] weakness  Skin:  [ ] rash [ ] phlebitis	  Eyes: [ ] icterus [ ] pain  [ ] discharge	  ENMT: [ ] sore throat  [ ] thrush [ ] ulcers [ ] exudates  Respiratory: [x] dyspnea [ ] hemoptysis [x] cough [ ] sputum	  Cardiovascular:  [ ] chest pain [ ] palpitations [ ] edema	  Gastrointestinal:  [ ] nausea [ ] vomiting [ ] diarrhea [ ] constipation [ ] pain	  Genitourinary:  [ ] dysuria [ ] frequency [ ] hematuria [ ] discharge [ ] flank pain  [ ] incontinence  Musculoskeletal:  [ ] myalgias [ ] arthralgias [ ] arthritis  [ ] back pain  Neurological:  [ ] headache [ ] seizures  [ ] confusion/altered mental status  Psychiatric:  [ ] anxiety [ ] depression	  Hematology/Lymphatics:  [ ] lymphadenopathy  Endocrine:  [ ] adrenal [ ] thyroid  Allergic/Immunologic:	 [ ] transplant [ ] seasonal    Vital Signs Last 24 Hrs  T(F): 98.1 (22 @ 05:07), Max: 100.8 (22 @ 21:43)  Vital Signs Last 24 Hrs  HR: 66 (22 @ 05:07) (65 - 84)  BP: 155/74 (22 @ 05:07) (123/103 - 159/72)  RR: 20 (22 @ 05:07)  SpO2: 98% (22 @ 05:07) (96% - 100%)  Wt(kg): --    PHYSICAL EXAM:  Constitutional: non-toxic, no distress  HEAD/EYES: anicteric, no conjunctival injection  ENT:  supple, no thrush  Cardiovascular:   normal S1, S2, no murmur, + b/l LE edema   Respiratory: decreased BS bilaterally  GI:  soft, non-tender, normal bowel sounds  :  no calvin  Musculoskeletal:  decreased ROM d/t weakness   Neurologic: awake and alert, decreased strength   Skin:  no rash, no erythema, no phlebitis  Heme/Onc: no lymphadenopathy                             11.4   4.26  )-----------( 116      ( 2022 06:55 )             34.8       141  |  101  |  14  ----------------------------<  102<H>  2.8<LL>   |  24  |  0.67    Ca    8.2<L>      2022 06:56  Mg     2.1         TPro  6.1  /  Alb  2.9<L>  /  TBili  0.7  /  DBili  0.3  /  AST  24  /  ALT  14  /  AlkPhos  46      Urinalysis Basic - ( 2022 12:09 )    Color: Yellow / Appearance: Clear / S.021 / pH: x  Gluc: x / Ketone: Small  / Bili: Negative / Urobili: 8 mg/dL   Blood: x / Protein: 100 mg/dL / Nitrite: Negative   Leuk Esterase: Negative / RBC: 2 /hpf / WBC 4 /HPF   Sq Epi: x / Non Sq Epi: 4 /hpf / Bacteria: Negative    MICROBIOLOGY:    + COVID-19 PCR     UCx in process           RADIOLOGY:    r< from: CT Angio Chest PE Protocol w/ IV Cont (22 @ 16:01) >    IMPRESSION:    No main, right, left, lobar, or proximal segmental pulmonary embolus.    Patchy bilateral groundglass opacities noted throughout both lungs likely   representing infection or alveolar component of edema. COVID 19 can also   have this appearance. Correlate with RT PCR.      --- End of Report ---    < end of copied text >    < from: Xray Chest 1 View- PORTABLE-Urgent (Xray Chest 1 View- PORTABLE-Urgent .) (22 @ 12:49) >    FINDINGS:  The heart is enlarged. Bilateral hilar prominence likely related to   enlarged pulmonary arteries. No focal consolidation.. The apices and   hemidiaphragms are unremarkable. Degenerative changes.        IMPRESSION:  No focal consolidation    --- End of Report ---    < end of copied text >   Patient is an 81 year old female who presents with a chief complaint of dyspnea (2022 07:28)    HPI:  The patient is an 81 year old female w/ PMHx of dementia and CHF who presented from home for several days of weakness followed by development of dyspnea and episode of substernal chest pain in the morning. Patient is a poor historian and history is obtained from chart review. She lives at home w/ her brother and he was concerned for patient's increased work of breathing. He called EMS and patient was subsequently brought to [facility]. She denies obtaining the COVID-19 vaccine. Tmax was 100.8. She is w/o chills, dizziness, palpitations, abdominal pain, N/V/D/C or urinary changes.    Labs showed Hgb 11.4, PLT 116K w/ lymphopenia w/ INR 1.21. K+ 2.6, glucose 102 w/ normal renal function. BNP was 92245. UA showed hematuria w/ proteinuria. COVID-19 PCR was positive. CXR showed bilateral hilar prominence likely related to enlarged pulmonary arteries w/ degenerative changes. CTPA w/ IV contrast showed patchy bilateral ground-glass opacities noted throughout both lungs likely representing infection or alveolar component of edema. She was started on IV remdesevir and PO dexamethasone UCx in process. TTE was ordered.      prior hospital charts reviewed [x]  primary team notes reviewed [x]  other consultant notes reviewed [x]    PAST MEDICAL & SURGICAL HISTORY:  Dementia  CHF      Allergies  No Known Allergies    ANTIMICROBIALS (past 90 days)  MEDICATIONS  (STANDING):  remdesivir  IVPB   200 milliGRAM(s) IV Intermittent (22 @ 18:56)        remdesivir  IVPB 100 every 24 hours  remdesivir  IVPB      OTHER MEDS: MEDICATIONS  (STANDING):  acetaminophen     Tablet .. 650 every 6 hours PRN  dexAMETHasone     Tablet 6 daily  enoxaparin Injectable 40 daily  furosemide    Tablet 20 daily  metoprolol succinate ER 25 daily  pantoprazole    Tablet 40 before breakfast    SOCIAL HISTORY:  denies smoking, alcohol, or recreational drug use, born in [facility], lives with brother     FAMILY HISTORY: unknown     REVIEW OF SYSTEMS  [  ] ROS unobtainable because:   [x] All other systems negative except as noted below:	    Constitutional:  [x] fever [ ] chills  [ ] weight loss  [x] weakness  Skin:  [ ] rash [ ] phlebitis	  Eyes: [ ] icterus [ ] pain  [ ] discharge	  ENMT: [ ] sore throat  [ ] thrush [ ] ulcers [ ] exudates  Respiratory: [x] dyspnea [ ] hemoptysis [x] cough [x] sputum	  Cardiovascular:  [ ] chest pain [ ] palpitations [ ] edema	  Gastrointestinal:  [ ] nausea [ ] vomiting [ ] diarrhea [ ] constipation [ ] pain	  Genitourinary:  [ ] dysuria [ ] frequency [ ] hematuria [ ] discharge [ ] flank pain  [ ] incontinence  Musculoskeletal:  [ ] myalgias [ ] arthralgias [ ] arthritis  [ ] back pain  Neurological:  [ ] headache [ ] seizures  [ ] confusion/altered mental status  Psychiatric:  [ ] anxiety [ ] depression	  Hematology/Lymphatics:  [ ] lymphadenopathy  Endocrine:  [ ] adrenal [ ] thyroid  Allergic/Immunologic:	 [ ] transplant [ ] seasonal    Vital Signs Last 24 Hrs  T(F): 98.1 (22 @ 05:07), Max: 100.8 (22 @ 21:43)  Vital Signs Last 24 Hrs  HR: 66 (22 @ 05:07) (65 - 84)  BP: 155/74 (22 @ 05:07) (123/103 - 159/72)  RR: 20 (22 @ 05:07)  SpO2: 98% (22 @ 05:07) (96% - 100%)  Wt(kg): --    PHYSICAL EXAM:  Constitutional: non-toxic, in mild respiratory distress   HEAD/EYES: anicteric, no conjunctival injection  ENT:  supple, no thrush, + nasal cannula in place   Cardiovascular:   normal S1, S2, no murmur, + b/l LE edema   Respiratory: decreased BS bilaterally  GI:  soft, non-tender, normal bowel sounds  :  no calvin  Musculoskeletal:  decreased ROM d/t weakness   Neurologic: awake and alert, responds to commands, decreased strength   Skin:  no rash, no erythema, no phlebitis  Heme/Onc: no lymphadenopathy                             11.4   4.26  )-----------( 116      ( 2022 06:55 )             34.8       141  |  101  |  14  ----------------------------<  102<H>  2.8<LL>   |  24  |  0.67    Ca    8.2<L>      2022 06:56  Mg     2.1         TPro  6.1  /  Alb  2.9<L>  /  TBili  0.7  /  DBili  0.3  /  AST  24  /  ALT  14  /  AlkPhos  46      Urinalysis Basic - ( 2022 12:09 )    Color: Yellow / Appearance: Clear / S.021 / pH: x  Gluc: x / Ketone: Small  / Bili: Negative / Urobili: 8 mg/dL   Blood: x / Protein: 100 mg/dL / Nitrite: Negative   Leuk Esterase: Negative / RBC: 2 /hpf / WBC 4 /HPF   Sq Epi: x / Non Sq Epi: 4 /hpf / Bacteria: Negative    MICROBIOLOGY:    + COVID-19 PCR     UCx in process           RADIOLOGY:    r< from: CT Angio Chest PE Protocol w/ IV Cont (22 @ 16:01) >    IMPRESSION:    No main, right, left, lobar, or proximal segmental pulmonary embolus.    Patchy bilateral groundglass opacities noted throughout both lungs likely   representing infection or alveolar component of edema. COVID 19 can also   have this appearance. Correlate with RT PCR.      --- End of Report ---    < end of copied text >    < from: Xray Chest 1 View- PORTABLE-Urgent (Xray Chest 1 View- PORTABLE-Urgent .) (22 @ 12:49) >    FINDINGS:  The heart is enlarged. Bilateral hilar prominence likely related to   enlarged pulmonary arteries. No focal consolidation.. The apices and   hemidiaphragms are unremarkable. Degenerative changes.        IMPRESSION:  No focal consolidation    --- End of Report ---    < end of copied text >

## 2022-01-23 ENCOUNTER — TRANSCRIPTION ENCOUNTER (OUTPATIENT)
Age: 82
End: 2022-01-23

## 2022-01-23 LAB
ALBUMIN SERPL ELPH-MCNC: 3 G/DL — LOW (ref 3.3–5)
ALP SERPL-CCNC: 46 U/L — SIGNIFICANT CHANGE UP (ref 40–120)
ALT FLD-CCNC: 12 U/L — SIGNIFICANT CHANGE UP (ref 10–45)
ANION GAP SERPL CALC-SCNC: 12 MMOL/L — SIGNIFICANT CHANGE UP (ref 5–17)
ANION GAP SERPL CALC-SCNC: 14 MMOL/L — SIGNIFICANT CHANGE UP (ref 5–17)
AST SERPL-CCNC: 19 U/L — SIGNIFICANT CHANGE UP (ref 10–40)
BILIRUB DIRECT SERPL-MCNC: 0.1 MG/DL — SIGNIFICANT CHANGE UP (ref 0–0.3)
BILIRUB INDIRECT FLD-MCNC: 0.4 MG/DL — SIGNIFICANT CHANGE UP (ref 0.2–1)
BILIRUB SERPL-MCNC: 0.5 MG/DL — SIGNIFICANT CHANGE UP (ref 0.2–1.2)
BUN SERPL-MCNC: 26 MG/DL — HIGH (ref 7–23)
BUN SERPL-MCNC: 34 MG/DL — HIGH (ref 7–23)
CALCIUM SERPL-MCNC: 8.5 MG/DL — SIGNIFICANT CHANGE UP (ref 8.4–10.5)
CALCIUM SERPL-MCNC: 8.6 MG/DL — SIGNIFICANT CHANGE UP (ref 8.4–10.5)
CHLORIDE SERPL-SCNC: 100 MMOL/L — SIGNIFICANT CHANGE UP (ref 96–108)
CHLORIDE SERPL-SCNC: 97 MMOL/L — SIGNIFICANT CHANGE UP (ref 96–108)
CO2 SERPL-SCNC: 28 MMOL/L — SIGNIFICANT CHANGE UP (ref 22–31)
CO2 SERPL-SCNC: 28 MMOL/L — SIGNIFICANT CHANGE UP (ref 22–31)
CREAT SERPL-MCNC: 0.72 MG/DL — SIGNIFICANT CHANGE UP (ref 0.5–1.3)
CREAT SERPL-MCNC: 0.74 MG/DL — SIGNIFICANT CHANGE UP (ref 0.5–1.3)
CREAT SERPL-MCNC: 0.86 MG/DL — SIGNIFICANT CHANGE UP (ref 0.5–1.3)
CRP SERPL-MCNC: 94 MG/L — HIGH (ref 0–4)
CULTURE RESULTS: SIGNIFICANT CHANGE UP
D DIMER BLD IA.RAPID-MCNC: 443 NG/ML DDU — HIGH
FERRITIN SERPL-MCNC: 608 NG/ML — HIGH (ref 15–150)
GLUCOSE SERPL-MCNC: 113 MG/DL — HIGH (ref 70–99)
GLUCOSE SERPL-MCNC: 220 MG/DL — HIGH (ref 70–99)
INR BLD: 1.16 RATIO — SIGNIFICANT CHANGE UP (ref 0.88–1.16)
MAGNESIUM SERPL-MCNC: 2 MG/DL — SIGNIFICANT CHANGE UP (ref 1.6–2.6)
POTASSIUM SERPL-MCNC: 3.4 MMOL/L — LOW (ref 3.5–5.3)
POTASSIUM SERPL-MCNC: 4.9 MMOL/L — SIGNIFICANT CHANGE UP (ref 3.5–5.3)
POTASSIUM SERPL-SCNC: 3.4 MMOL/L — LOW (ref 3.5–5.3)
POTASSIUM SERPL-SCNC: 4.9 MMOL/L — SIGNIFICANT CHANGE UP (ref 3.5–5.3)
PROCALCITONIN SERPL-MCNC: 0.11 NG/ML — HIGH (ref 0.02–0.1)
PROT SERPL-MCNC: 6.1 G/DL — SIGNIFICANT CHANGE UP (ref 6–8.3)
PROTHROM AB SERPL-ACNC: 13.8 SEC — HIGH (ref 10.6–13.6)
SODIUM SERPL-SCNC: 137 MMOL/L — SIGNIFICANT CHANGE UP (ref 135–145)
SODIUM SERPL-SCNC: 142 MMOL/L — SIGNIFICANT CHANGE UP (ref 135–145)
SPECIMEN SOURCE: SIGNIFICANT CHANGE UP

## 2022-01-23 RX ORDER — POTASSIUM CHLORIDE 20 MEQ
40 PACKET (EA) ORAL ONCE
Refills: 0 | Status: DISCONTINUED | OUTPATIENT
Start: 2022-01-23 | End: 2022-01-23

## 2022-01-23 RX ORDER — POTASSIUM CHLORIDE 20 MEQ
40 PACKET (EA) ORAL
Refills: 0 | Status: COMPLETED | OUTPATIENT
Start: 2022-01-23 | End: 2022-01-23

## 2022-01-23 RX ORDER — ASPIRIN/CALCIUM CARB/MAGNESIUM 324 MG
81 TABLET ORAL DAILY
Refills: 0 | Status: DISCONTINUED | OUTPATIENT
Start: 2022-01-23 | End: 2022-02-14

## 2022-01-23 RX ADMIN — PANTOPRAZOLE SODIUM 40 MILLIGRAM(S): 20 TABLET, DELAYED RELEASE ORAL at 05:47

## 2022-01-23 RX ADMIN — Medication 81 MILLIGRAM(S): at 13:14

## 2022-01-23 RX ADMIN — Medication 6 MILLIGRAM(S): at 05:46

## 2022-01-23 RX ADMIN — Medication 25 MILLIGRAM(S): at 05:47

## 2022-01-23 RX ADMIN — Medication 40 MILLIEQUIVALENT(S): at 13:14

## 2022-01-23 RX ADMIN — Medication 40 MILLIEQUIVALENT(S): at 08:36

## 2022-01-23 RX ADMIN — ENOXAPARIN SODIUM 40 MILLIGRAM(S): 100 INJECTION SUBCUTANEOUS at 11:05

## 2022-01-23 RX ADMIN — REMDESIVIR 500 MILLIGRAM(S): 5 INJECTION INTRAVENOUS at 17:11

## 2022-01-23 RX ADMIN — Medication 20 MILLIGRAM(S): at 17:11

## 2022-01-23 RX ADMIN — Medication 20 MILLIGRAM(S): at 05:47

## 2022-01-23 NOTE — CONSULT NOTE ADULT - SUBJECTIVE AND OBJECTIVE BOX
CHIEF COMPLAINT:Patient is a 81y old  Female who presents with a chief complaint of sob/cp (23 Jan 2022 10:59)      HPI:  82 yo F h/o   dementia and CHF   no other    hx  is  available presenting via EMS from home for few days of weakness, followed by development of SOB and episode of substernal CP this morning.     Pt is a poor historian and unable to provide significant history    all ROS negative upon questioning.     Lives at home with brother - he reports concern for pt's increased work of breathing.    PMHX  Dementia  HTN        MEDICATIONS  (STANDING):  aspirin enteric coated 81 milliGRAM(s) Oral daily  dexAMETHasone     Tablet 6 milliGRAM(s) Oral daily  enoxaparin Injectable 40 milliGRAM(s) SubCutaneous daily  furosemide   Injectable 20 milliGRAM(s) IV Push two times a day  metoprolol succinate ER 25 milliGRAM(s) Oral daily  pantoprazole    Tablet 40 milliGRAM(s) Oral before breakfast  potassium chloride    Tablet ER 40 milliEquivalent(s) Oral four times a day  remdesivir  IVPB 100 milliGRAM(s) IV Intermittent every 24 hours  remdesivir  IVPB   IV Intermittent     MEDICATIONS  (PRN):  acetaminophen     Tablet .. 650 milliGRAM(s) Oral every 6 hours PRN Temp greater or equal to 38C (100.4F), Mild Pain (1 - 3), Moderate Pain (4 - 6), Severe Pain (7 - 10)      FAMILY HISTORY:      SOCIAL HISTORY:    [x ] Non-smoker  [ ] Smoker  [ ] Alcohol    Allergies    No Known Allergies    Intolerances    	    REVIEW OF SYSTEMS:  CONSTITUTIONAL: No fever, weight loss, or fatigue  EYES: No eye pain, visual disturbances, or discharge  ENT:  No difficulty hearing, tinnitus, vertigo; No sinus or throat pain  NECK: No pain or stiffness  RESPIRATORY: No cough, wheezing, chills or hemoptysis; + Shortness of Breath  CARDIOVASCULAR: + chest pain, no palpitations, passing out, dizziness, or leg swelling  GASTROINTESTINAL: No abdominal or epigastric pain. No nausea, vomiting, or hematemesis; No diarrhea or constipation. No melena or hematochezia.  GENITOURINARY: No dysuria, frequency, hematuria, or incontinence  NEUROLOGICAL: No headaches, memory loss, loss of strength, numbness, or tremors  SKIN: No itching, burning, rashes, or lesions   LYMPH Nodes: No enlarged glands  ENDOCRINE: No heat or cold intolerance; No hair loss  MUSCULOSKELETAL: No joint pain or swelling; No muscle, back, or extremity pain  PSYCHIATRIC: No depression, anxiety, mood swings, or difficulty sleeping  HEME/LYMPH: No easy bruising, or bleeding gums  ALLERGY AND IMMUNOLOGIC: No hives or eczema	    [ ] All others negative	  [ ] Unable to obtain    PHYSICAL EXAM:  T(C): 36.9 (01-23-22 @ 09:05), Max: 36.9 (01-22-22 @ 21:45)  HR: 63 (01-23-22 @ 09:05) (55 - 85)  BP: 128/67 (01-23-22 @ 09:05) (117/63 - 140/64)  RR: 16 (01-23-22 @ 09:05) (16 - 20)  SpO2: 99% (01-23-22 @ 09:05) (94% - 99%)  Wt(kg): --  I&O's Summary    22 Jan 2022 07:01  -  23 Jan 2022 07:00  --------------------------------------------------------  IN: 920 mL / OUT: 1400 mL / NET: -480 mL        Appearance: Normal	  HEENT:   Normal oral mucosa, PERRL, EOMI	  Lymphatic: No lymphadenopathy  Cardiovascular: Normal S1 S2, No JVD, + murmurs, No edema  Respiratory: rhonchi  Psychiatry: A & O x 3, Mood & affect appropriate  Gastrointestinal:  Soft, Non-tender, + BS	  Skin: No rashes, No ecchymoses, No cyanosis	  Neurologic: Non-focal  Extremities: Normal range of motion, No clubbing, cyanosis or edema  Vascular: Peripheral pulses palpable 2+ bilaterally    TELEMETRY: 	    ECG:  	  RADIOLOGY:  OTHER: 	  	  LABS:	 	    CARDIAC MARKERS:                              11.4   4.26  )-----------( 116      ( 22 Jan 2022 06:55 )             34.8     01-23    142  |  100  |  26<H>  ----------------------------<  113<H>  3.4<L>   |  28  |  0.74    Ca    8.5      23 Jan 2022 07:07  Mg     2.0     01-23    TPro  6.1  /  Alb  3.0<L>  /  TBili  0.5  /  DBili  0.1  /  AST  19  /  ALT  12  /  AlkPhos  46  01-23    proBNP:   Lipid Profile:   HgA1c:   TSH:   PT/INR - ( 23 Jan 2022 07:09 )   PT: 13.8 sec;   INR: 1.16 ratio         PTT - ( 21 Jan 2022 12:11 )  PTT:35.6 sec    PREVIOUS DIAGNOSTIC TESTING:    < from: 12 Lead ECG (01.21.22 @ 18:43) >  Diagnosis Line SINUS RHYTHM WITH PREMATURE SUPRAVENTRICULAR COMPLEXES AND WITH OCCASIONAL PREMATURE VENTRICULAR COMPLEXES  *** POOR DATA QUALITY, INTERPRETATION MAY BE ADVERSELY AFFECTED  LEFT AXIS DEVIATION  LEFT BUNDLE BRANCH BLOCK  type IVCD  MARKEDLY PROLONGED QT  ABNORMAL ECG  WHEN COMPARED WITH ECG OF 21-JAN-2022 11:31, (UNCONFIRMED)  QT prolonged, VPD's and APD's now present    < from: CT Angio Chest PE Protocol w/ IV Cont (01.21.22 @ 16:01) >  No main, right, left, lobar, or proximal segmental pulmonary embolus.    Patchy bilateral groundglass opacities noted throughout both lungs likely   representing infection or alveolar component of edema. COVID 19 can also   have this appearance. Correlate with RT PCR.    COVID-19 PCR (01.21.22 @ 12:08)    COVID-19 PCR: Detected: You can help in the fight against COVID-19. [facility] may contact  you to see if you are interested in voluntarily participating in one of  our clinical trials.  Testing is performed using polymerase chain reaction (PCR) or  transcription mediated amplification (TMA). This COVID-19 (SARS-CoV-2)  nucleic acid amplification test was validated by [employer] and is  in use under the FDA Emergency Use Authorization (EUA) for clinical labs  CLIA-certified to perform high complexity testing. Test results should be  correlated with clinical presentation, patient history, and epidemiology.    Troponin T, High Sensitivity (01.21.22 @ 23:02)    Troponin T, High Sensitivity Result: 44: Specimen not hemolyzed  *  *  Rapid upward or downward changes in high-sensitivity troponin levels  suggest acute myocardial injury. Renal impairment may cause sustained  troponin elevations.  Normal: <6 - 14 ng/L  Indeterminate: 15-51 ng/L  Elevated: > 51 ng/L  See http://labs/test/[email or web address] on the [facility] intranet for more  information ng/L

## 2022-01-23 NOTE — DISCHARGE NOTE PROVIDER - NSDCCPCAREPLAN_GEN_ALL_CORE_FT
PRINCIPAL DISCHARGE DIAGNOSIS  Diagnosis: Pneumonia due to COVID-19 virus  Assessment and Plan of Treatment: COVID19 Positive with hypoxic respiratory failure  Unvaccinated for COVID19  CTA Chest (1/21) with no PE and GGO consistent with COVID19  Completed 5 days of Remdesivir 1/21 - 1/25  Completed 10 days of Dexamethasone 1/22 - 1/31        SECONDARY DISCHARGE DIAGNOSES  Diagnosis: E coli bacteremia  Assessment and Plan of Treatment: Secondary to UTI - completed course of Cefriaxone    Diagnosis: NSVT (nonsustained ventricular tachycardia)  Assessment and Plan of Treatment: NSVT- continue Lopressor,   No plan for cath  Follow up with cardiology    Diagnosis: Acute diastolic congestive heart failure  Assessment and Plan of Treatment: Treated with IV Lasix  TTE normal EF/no WMA  Follow up with cardiology      Diagnosis: Atrial fibrillation  Assessment and Plan of Treatment: New onset AFib- s/p Heparin gtt;   Now on Eliquis low dose and metoprolol    Diagnosis: Dementia  Assessment and Plan of Treatment: Supportive care  Fall precaution    Diagnosis: Hypertension  Assessment and Plan of Treatment: Conitnue Metoprol, Statin  Eat a heart healthy diet that is low in saturated fats and salt, and includes whole grains, fruits, vegetables and lean protein   Exercise regularly (consult with your physician or cardiologist first); maintain a heart healthy weight.   Continue to follow with your primary physician or cardiologist.   Follow up with your medical doctor to establish long term blood pressure treatment goals.      Diagnosis: Hematoma  Assessment and Plan of Treatment: Right thigh Hematoma  H/H stable  Monitor on Eliquis   CBC in 2 - 3 days    Diagnosis: Anemia  Assessment and Plan of Treatment: Anemia/rectal bleeding- Hydrocortisone suppository  Conitnue PO PPI

## 2022-01-23 NOTE — DISCHARGE NOTE PROVIDER - HOSPITAL COURSE
81  YR o F     h/o   dementia and   ? CHF per EMS,/  no othe r hx  is  available      presenting via EMS from home for few days of weakness, followed by development of SOB and episode of substernal CP this morning.     Pt is a poor historian and unable to provide significant history    all ROS negative upon questioning.     Lives at home with brother - he reports concern for pt's increased work of breathing.      8*  weakness/  sob  from  covid/  pna   CT  chest, with  GOO   on decadron/  remdisivir   *  Dementia   *    HTN,/   acute diastolic  chf,  chf  on   lasix  card  d wisam davis     on  dvt ppx.  bmi  is  20  continue  supportive care    f/p  pmd  allie lane     81  YR o F     h/o   dementia and   ? CHF per EMS,/  no othe r hx  is  available      presenting via EMS from home for few days of weakness, followed by development of SOB and episode of substernal CP this morning.     Pt is a poor historian and unable to provide significant history    all ROS negative upon questioning.     Lives at home with brother - he reports concern for pt's increased work of breathing.      8*  weakness/  sob  from  covid/  pna   CT  chest, with  GOO   on decadron/  remdisivir   *  Dementia   *    HTN,/   acute diastolic  chf,  chf  on   lasix  card  d r susan     on  dvt ppx.  bmi  is  20  continue  supportive care    f/p  pmd  allie lane    COVID PNA (+1/21)- s/p RDV (1/21 - 1/25)  E. coli bacteremia likely 2/2 UTI- s/p Ceftriaxone  Acute diastolic  CHF - S/P on   lasix  Chest pain- HsT 42/44, resolved; TTE normal EF/no WMA  NSVT- monitor on tele, c/w Lopressor, no plan for cath  New onset AFib- s/p Heparin gtt; now on Eliquis       Right thigh hematoma- no surgical intervention - H/H stable on Eliquis  Anemia/rectal bleeding- hydrocortisone suppository, PO PPI

## 2022-01-23 NOTE — PROGRESS NOTE ADULT - SUBJECTIVE AND OBJECTIVE BOX
afberile    REVIEW OF SYSTEMS:  GEN: no fever,    no chills  RESP: no SOB,   no cough  CVS: no chest pain,   no palpitations  GI: no abdominal pain,   no nausea,   no vomiting,   no constipation,   no diarrhea  : no dysuria,   no frequency  NEURO: no headache,   no dizziness  PSYCH: no depression,   not anxious  Derm : no rash    MEDICATIONS  (STANDING):  dexAMETHasone     Tablet 6 milliGRAM(s) Oral daily  enoxaparin Injectable 40 milliGRAM(s) SubCutaneous daily  furosemide   Injectable 20 milliGRAM(s) IV Push two times a day  metoprolol succinate ER 25 milliGRAM(s) Oral daily  pantoprazole    Tablet 40 milliGRAM(s) Oral before breakfast  remdesivir  IVPB 100 milliGRAM(s) IV Intermittent every 24 hours  remdesivir  IVPB   IV Intermittent     MEDICATIONS  (PRN):  acetaminophen     Tablet .. 650 milliGRAM(s) Oral every 6 hours PRN Temp greater or equal to 38C (100.4F), Mild Pain (1 - 3), Moderate Pain (4 - 6), Severe Pain (7 - 10)      Vital Signs Last 24 Hrs  T(C): 36.7 (2022 05:50), Max: 36.9 (2022 21:45)  T(F): 98 (2022 05:50), Max: 98.5 (2022 21:45)  HR: 63 (2022 05:50) (52 - 85)  BP: 135/70 (2022 05:50) (117/63 - 144/69)  BP(mean): --  RR: 18 (2022 05:50) (18 - 20)  SpO2: 98% (2022 05:50) (94% - 98%)  CAPILLARY BLOOD GLUCOSE        I&O's Summary    2022 07:01  -  2022 07:00  --------------------------------------------------------  IN: 920 mL / OUT: 1400 mL / NET: -480 mL        PHYSICAL EXAM:  HEAD:  Atraumatic, Normocephalic  NECK: Supple, No   JVD  CHEST/LUNG:   no     rales,     no,    rhonchi  HEART: Regular rate and rhythm;         murmur  ABDOMEN: Soft, Nontender, ;   EXTREMITIES:    no    edema  NEUROLOGY:  alert    LABS:                        11.4   4.26  )-----------( 116      ( 2022 06:55 )             34.8         141  |  101  |  14  ----------------------------<  102<H>  2.8<LL>   |  24  |  0.67    Ca    8.2<L>      2022 06:56  Mg     2.1         TPro  6.1  /  Alb  2.9<L>  /  TBili  0.7  /  DBili  0.3  /  AST  24  /  ALT  14  /  AlkPhos  46      PT/INR - ( 2022 06:54 )   PT: >200.0 sec;   INR: SEE NOTE ratio         PTT - ( 2022 12:11 )  PTT:35.6 sec      Urinalysis Basic - ( 2022 12:09 )    Color: Yellow / Appearance: Clear / S.021 / pH: x  Gluc: x / Ketone: Small  / Bili: Negative / Urobili: 8 mg/dL   Blood: x / Protein: 100 mg/dL / Nitrite: Negative   Leuk Esterase: Negative / RBC: 2 /hpf / WBC 4 /HPF   Sq Epi: x / Non Sq Epi: 4 /hpf / Bacteria: Negative           @ 12:09  3.5  28      Thyroid Stimulating Hormone, Serum: 0.47 uIU/mL ( @ 17:02)          Consultant(s) Notes Reviewed:      Care Discussed with Consultants/Other Providers:

## 2022-01-23 NOTE — DISCHARGE NOTE PROVIDER - NSDCMRMEDTOKEN_GEN_ALL_CORE_FT
acetaminophen 325 mg oral tablet: 2 tab(s) orally every 6 hours, As needed, Temp greater or equal to 38C (100.4F), Mild Pain (1 - 3), Moderate Pain (4 - 6), Severe Pain (7 - 10)  apixaban 2.5 mg oral tablet: 1 tab(s) orally every 12 hours  aspirin 81 mg oral delayed release tablet: 1 tab(s) orally once a day  atorvastatin 10 mg oral tablet: 1 tab(s) orally once a day (at bedtime)  metoprolol tartrate 50 mg oral tablet: 1 tab(s) orally 2 times a day

## 2022-01-23 NOTE — CONSULT NOTE ADULT - ASSESSMENT
80 yo F h/o   dementia and CHF   no other    hx  is  available presenting via EMS from home for few days of weakness, followed by development of SOB and episode of substernal CP this morning.     Pt is a poor historian and unable to provide significant history    all ROS negative upon questioning.  pt wuith hx of htn ?cad, LBBB with chest pain and sob and COVID.  cherst x ray noted  pt on 1/21/2022, with negative trop  asa daily  echo  tx for covid  lipid panel  will call Dr Blank about her other cardiac hx  dvt prophylaxis  fu markers  add beta blocker as tolerated

## 2022-01-23 NOTE — PROGRESS NOTE ADULT - ASSESSMENT
2 yo F     h/o   dementia and   ? CHF per EMS,/  no othe r hx  is  available      presenting via EMS from home for few days of weakness, followed by development of SOB and episode of substernal CP this morning.     Pt is a poor historian and unable to provide significant history    all ROS negative upon questioning.     Lives at home with brother - he reports concern for pt's increased work of breathing.      8*  weakness/  sob  from  covid/  pna   CT  chest, with  GOO   on decadron/  remdisivir   *  Dementia   *    HTN,/  ?  chf  on   lasix  card  d r golyan    echo  on  dvt ppx.  bmi  is  20  continue  supportive care   on iv remdisivir    hypokalemia     rad< from: CT Angio Chest PE Protocol w/ IV Cont (01.21.22 @ 16:01) >  IMPRESSION:  No main, right, left, lobar, or proximal segmental pulmonary embolus.  Patchy bilateral groundglass opacities noted throughout both lungs likely   representing infection or alveolar component of edema. COVID 19 can also   have this appearance. Correlate with RT PCR    --- End of Report --  < end of copied text >     82 yo F     h/o   dementia and   ? CHF per EMS,/  no othe r hx  is  available      presenting via EMS from home for few days of weakness, followed by development of SOB and episode of substernal CP this morning.     Pt is a poor historian and unable to provide significant history    all ROS negative upon questioning.     Lives at home with brother - he reports concern for pt's increased work of breathing.      8*  weakness/  sob  from  covid/  pna   CT  chest, with  GOO   on decadron/  remdisivir   *  Dementia   *    HTN,  acute  diastolic chf  on   lasix  card  d r golyan    echo  on  dvt ppx.  bmi  is  20  continue  supportive care   on iv remdisivir    hypokalemia    pmd  dr sheppard, allie     rad< from: CT Angio Chest PE Protocol w/ IV Cont (01.21.22 @ 16:01) >  IMPRESSION:  No main, right, left, lobar, or proximal segmental pulmonary embolus.  Patchy bilateral groundglass opacities noted throughout both lungs likely   representing infection or alveolar component of edema. COVID 19 can also   have this appearance. Correlate with RT PCR    --- End of Report --  < end of copied text >

## 2022-01-24 LAB
ALBUMIN SERPL ELPH-MCNC: 3 G/DL — LOW (ref 3.3–5)
ALP SERPL-CCNC: 45 U/L — SIGNIFICANT CHANGE UP (ref 40–120)
ALT FLD-CCNC: 13 U/L — SIGNIFICANT CHANGE UP (ref 10–45)
ANION GAP SERPL CALC-SCNC: 17 MMOL/L — SIGNIFICANT CHANGE UP (ref 5–17)
AST SERPL-CCNC: 20 U/L — SIGNIFICANT CHANGE UP (ref 10–40)
BILIRUB DIRECT SERPL-MCNC: 0.1 MG/DL — SIGNIFICANT CHANGE UP (ref 0–0.3)
BILIRUB INDIRECT FLD-MCNC: 0.3 MG/DL — SIGNIFICANT CHANGE UP (ref 0.2–1)
BILIRUB SERPL-MCNC: 0.4 MG/DL — SIGNIFICANT CHANGE UP (ref 0.2–1.2)
BUN SERPL-MCNC: 37 MG/DL — HIGH (ref 7–23)
CALCIUM SERPL-MCNC: 8.6 MG/DL — SIGNIFICANT CHANGE UP (ref 8.4–10.5)
CHLORIDE SERPL-SCNC: 95 MMOL/L — LOW (ref 96–108)
CO2 SERPL-SCNC: 28 MMOL/L — SIGNIFICANT CHANGE UP (ref 22–31)
CREAT SERPL-MCNC: 0.85 MG/DL — SIGNIFICANT CHANGE UP (ref 0.5–1.3)
CREAT SERPL-MCNC: 0.86 MG/DL — SIGNIFICANT CHANGE UP (ref 0.5–1.3)
GLUCOSE SERPL-MCNC: 112 MG/DL — HIGH (ref 70–99)
HCT VFR BLD CALC: 37.4 % — SIGNIFICANT CHANGE UP (ref 34.5–45)
HGB BLD-MCNC: 12 G/DL — SIGNIFICANT CHANGE UP (ref 11.5–15.5)
INR BLD: 1.11 RATIO — SIGNIFICANT CHANGE UP (ref 0.88–1.16)
MCHC RBC-ENTMCNC: 27.5 PG — SIGNIFICANT CHANGE UP (ref 27–34)
MCHC RBC-ENTMCNC: 32.1 GM/DL — SIGNIFICANT CHANGE UP (ref 32–36)
MCV RBC AUTO: 85.6 FL — SIGNIFICANT CHANGE UP (ref 80–100)
NRBC # BLD: 0 /100 WBCS — SIGNIFICANT CHANGE UP (ref 0–0)
PLATELET # BLD AUTO: 204 K/UL — SIGNIFICANT CHANGE UP (ref 150–400)
POTASSIUM SERPL-MCNC: 4.4 MMOL/L — SIGNIFICANT CHANGE UP (ref 3.5–5.3)
POTASSIUM SERPL-SCNC: 4.4 MMOL/L — SIGNIFICANT CHANGE UP (ref 3.5–5.3)
PROT SERPL-MCNC: 6.3 G/DL — SIGNIFICANT CHANGE UP (ref 6–8.3)
PROTHROM AB SERPL-ACNC: 13.3 SEC — SIGNIFICANT CHANGE UP (ref 10.6–13.6)
RBC # BLD: 4.37 M/UL — SIGNIFICANT CHANGE UP (ref 3.8–5.2)
RBC # FLD: 14 % — SIGNIFICANT CHANGE UP (ref 10.3–14.5)
SODIUM SERPL-SCNC: 140 MMOL/L — SIGNIFICANT CHANGE UP (ref 135–145)
WBC # BLD: 8.13 K/UL — SIGNIFICANT CHANGE UP (ref 3.8–10.5)
WBC # FLD AUTO: 8.13 K/UL — SIGNIFICANT CHANGE UP (ref 3.8–10.5)

## 2022-01-24 PROCEDURE — 99232 SBSQ HOSP IP/OBS MODERATE 35: CPT

## 2022-01-24 RX ADMIN — Medication 20 MILLIGRAM(S): at 17:33

## 2022-01-24 RX ADMIN — REMDESIVIR 500 MILLIGRAM(S): 5 INJECTION INTRAVENOUS at 17:04

## 2022-01-24 RX ADMIN — PANTOPRAZOLE SODIUM 40 MILLIGRAM(S): 20 TABLET, DELAYED RELEASE ORAL at 05:25

## 2022-01-24 RX ADMIN — Medication 25 MILLIGRAM(S): at 05:26

## 2022-01-24 RX ADMIN — Medication 6 MILLIGRAM(S): at 05:25

## 2022-01-24 RX ADMIN — Medication 81 MILLIGRAM(S): at 12:17

## 2022-01-24 RX ADMIN — ENOXAPARIN SODIUM 40 MILLIGRAM(S): 100 INJECTION SUBCUTANEOUS at 12:17

## 2022-01-24 RX ADMIN — Medication 20 MILLIGRAM(S): at 05:25

## 2022-01-24 NOTE — PROGRESS NOTE ADULT - ASSESSMENT
82 yo F     h/o   dementia and   ? CHF per EMS,/  no othe r hx  is  available      presenting via EMS from home for few days of weakness, followed by development of SOB and episode of substernal CP this morning.     Pt is a poor historian and unable to provide significant history    all ROS negative upon questioning.     Lives at home with brother - he reports concern for pt's increased work of breathing.      pt with   weakness/  sob  from  covid/  pna   CT  chest, with  GOO   on decadron/  remdisivir   *  Dementia   *    HTN,  acute  diastolic chf  on   lasix  card  d r golyan  on  dvt ppx.  bmi  is  20  continue  supportive care   on iv remdisivir    hypokalemia. pt  doing  better  ambulate  and check O2  sat    pmd  dr sheppard, allie chan< from: CT Angio Chest PE Protocol w/ IV Cont (01.21.22 @ 16:01) >  IMPRESSION:  No main, right, left, lobar, or proximal segmental pulmonary embolus.  Patchy bilateral groundglass opacities noted throughout both lungs likely   representing infection or alveolar component of edema. COVID 19 can also   have this appearance. Correlate with RT PCR    --- End of Report --  < end of copied text >     82 yo F     h/o   dementia and   ? CHF per EMS,/  no othe r hx  is  available      presenting via EMS from home for few days of weakness, followed by development of SOB and episode of substernal CP this morning.     Pt is a poor historian and unable to provide significant history    all ROS negative upon questioning.     Lives at home with brother - he reports concern for pt's increased work of breathing.      pt with   weakness/  sob  from  covid/  pna   CT  chest, with  GOO   on decadron/  remdisivir   *  Dementia   *    HTN,  acute  diastolic chf  on   lasix  card  d r golyan  on  dvt ppx.  bmi  is  20  continue  supportive care   on iv remdisivir    hypokalemia. pt  doing  better  ambulate  and check O2  sat  tele, vtach/ on  toprol  obsevre  on tele    pmd  allie longoria< from: CT Angio Chest PE Protocol w/ IV Cont (01.21.22 @ 16:01) >  IMPRESSION:  No main, right, left, lobar, or proximal segmental pulmonary embolus.  Patchy bilateral groundglass opacities noted throughout both lungs likely   representing infection or alveolar component of edema. COVID 19 can also   have this appearance. Correlate with RT PCR    --- End of Report --  < end of copied text >

## 2022-01-24 NOTE — PROGRESS NOTE ADULT - SUBJECTIVE AND OBJECTIVE BOX
Follow Up:  COVID19    Interval History:    REVIEW OF SYSTEMS  [  ] ROS unobtainable because:    [  ] All other systems negative except as noted below:     Constitutional:  [ ] fever [ ] chills  [ ] weight loss  [ ] weakness  Skin:  [ ] rash [ ] phlebitis	  Eyes: [ ] icterus [ ] pain  [ ] discharge	  ENMT: [ ] sore throat  [ ] thrush [ ] ulcers [ ] exudates  Respiratory: [  ] dyspnea [ ] hemoptysis [ ] cough [ ] sputum	  Cardiovascular:  [ ] chest pain [ ] palpitations [ ] edema	  Gastrointestinal:  [ ] nausea [ ] vomiting [ ] diarrhea [ ] constipation [ ] pain	  Genitourinary:  [ ] dysuria [ ] frequency [ ] hematuria [ ] discharge [ ] flank pain  [ ] incontinence  Musculoskeletal:  [ ] myalgias [ ] arthralgias [ ] arthritis  [ ] back pain  Neurological:  [ ] headache [ ] seizures  [ ] confusion/altered mental status    Allergies  No Known Allergies        ANTIMICROBIALS:  remdesivir  IVPB 100 every 24 hours  remdesivir  IVPB        OTHER MEDS:  MEDICATIONS  (STANDING):  acetaminophen     Tablet .. 650 every 6 hours PRN  aspirin enteric coated 81 daily  dexAMETHasone     Tablet 6 daily  enoxaparin Injectable 40 daily  furosemide   Injectable 20 two times a day  metoprolol succinate ER 25 daily  pantoprazole    Tablet 40 before breakfast      Vital Signs Last 24 Hrs  T(C): 36.8 (24 Jan 2022 17:29), Max: 36.8 (24 Jan 2022 17:29)  T(F): 98.3 (24 Jan 2022 17:29), Max: 98.3 (24 Jan 2022 17:29)  HR: 68 (24 Jan 2022 18:21) (60 - 82)  BP: 113/91 (24 Jan 2022 17:29) (113/91 - 135/70)  BP(mean): --  RR: 18 (24 Jan 2022 17:29) (17 - 18)  SpO2: 94% (24 Jan 2022 18:21) (90% - 98%)    PHYSICAL EXAMINATION:    General: Alert and Awake, NAD  HEENT: PERRL, EOMI  Neck: Supple  Cardiac: RRR, No M/R/G  Resp: CTAB, No Wh/Rh/Ra  Abdomen: NBS, NT/ND, No HSM, No rigidity or guarding  MSK: No LE edema. No Calf tenderness  : No calvin  Skin: No rashes or lesions. Skin is warm and dry to the touch.   Neuro: Alert and Awake. CN 2-12 Grossly intact. Moves all four extremities spontaneously.  Psych: Calm, Pleasant, Cooperative    LABORATORY:                          12.0   8.13  )-----------( 204      ( 24 Jan 2022 07:12 )             37.4       01-24    140  |  95<L>  |  37<H>  ----------------------------<  112<H>  4.4   |  28  |  0.85    Ca    8.6      24 Jan 2022 07:06  Mg     2.0     01-23    TPro  6.3  /  Alb  3.0<L>  /  TBili  0.4  /  DBili  0.1  /  AST  20  /  ALT  13  /  AlkPhos  45  01-24    C-Reactive Protein, Serum: 94 mg/L (01-23-22 @ 07:07)    Ferritin, Serum: 608 ng/mL (01-23-22 @ 09:27)    D-Dimer Assay, Quantitative: 443 ng/mL DDU (01-23-22 @ 07:08)    Procalcitonin, Serum: 0.11 ng/mL (01-23-22 @ 07:07)      MICROBIOLOGY:  v  Clean Catch Clean Catch (Midstream)  01-21-22   >=3 organisms. Probable collection contamination.  --  --                RADIOLOGY:    <The imaging below has been reviewed and visualized by me independently. Findings as detailed in report below> Follow Up:  COVID19    Interval History: deescalated to room air today. afebrile.     REVIEW OF SYSTEMS  [  ] ROS unobtainable because:    [ x ] All other systems negative except as noted below:     Constitutional:  [ ] fever [ ] chills  [ ] weight loss  [ ] weakness  Skin:  [ ] rash [ ] phlebitis	  Eyes: [ ] icterus [ ] pain  [ ] discharge	  ENMT: [ ] sore throat  [ ] thrush [ ] ulcers [ ] exudates  Respiratory: [ x ] dyspnea [ ] hemoptysis [ x] cough [ ] sputum	  Cardiovascular:  [ ] chest pain [ ] palpitations [ ] edema	  Gastrointestinal:  [ ] nausea [ ] vomiting [ ] diarrhea [ ] constipation [ ] pain	  Genitourinary:  [ ] dysuria [ ] frequency [ ] hematuria [ ] discharge [ ] flank pain  [ ] incontinence  Musculoskeletal:  [ ] myalgias [ ] arthralgias [ ] arthritis  [ ] back pain  Neurological:  [ ] headache [ ] seizures  [ ] confusion/altered mental status    Allergies  No Known Allergies        ANTIMICROBIALS:  remdesivir  IVPB 100 every 24 hours  remdesivir  IVPB        OTHER MEDS:  MEDICATIONS  (STANDING):  acetaminophen     Tablet .. 650 every 6 hours PRN  aspirin enteric coated 81 daily  dexAMETHasone     Tablet 6 daily  enoxaparin Injectable 40 daily  furosemide   Injectable 20 two times a day  metoprolol succinate ER 25 daily  pantoprazole    Tablet 40 before breakfast      Vital Signs Last 24 Hrs  T(C): 36.8 (24 Jan 2022 17:29), Max: 36.8 (24 Jan 2022 17:29)  T(F): 98.3 (24 Jan 2022 17:29), Max: 98.3 (24 Jan 2022 17:29)  HR: 68 (24 Jan 2022 18:21) (60 - 82)  BP: 113/91 (24 Jan 2022 17:29) (113/91 - 135/70)  BP(mean): --  RR: 18 (24 Jan 2022 17:29) (17 - 18)  SpO2: 94% (24 Jan 2022 18:21) (90% - 98%)    PHYSICAL EXAMINATION:    General: Alert and Awake, NAD  Cardiac: RRR, No M/R/G  Resp: Rales at lung bases, no wheezes or rhonchi  Abdomen: NBS, NT/ND, No HSM, No rigidity or guarding  MSK: No LE edema. No Calf tenderness  : No calvin  Skin: No rashes or lesions. Skin is warm and dry to the touch.   Neuro: Alert and Awake. CN 2-12 Grossly intact. Moves all four extremities spontaneously.  Psych: Calm, Pleasant, Cooperative    LABORATORY:                          12.0   8.13  )-----------( 204      ( 24 Jan 2022 07:12 )             37.4       01-24    140  |  95<L>  |  37<H>  ----------------------------<  112<H>  4.4   |  28  |  0.85    Ca    8.6      24 Jan 2022 07:06  Mg     2.0     01-23    TPro  6.3  /  Alb  3.0<L>  /  TBili  0.4  /  DBili  0.1  /  AST  20  /  ALT  13  /  AlkPhos  45  01-24    C-Reactive Protein, Serum: 94 mg/L (01-23-22 @ 07:07)    Ferritin, Serum: 608 ng/mL (01-23-22 @ 09:27)    D-Dimer Assay, Quantitative: 443 ng/mL DDU (01-23-22 @ 07:08)    Procalcitonin, Serum: 0.11 ng/mL (01-23-22 @ 07:07)      MICROBIOLOGY:    Clean Catch Clean Catch (Midstream)  01-21-22   >=3 organisms. Probable collection contamination.  --  --    RADIOLOGY:    <The imaging below has been reviewed and visualized by me independently. Findings as detailed in report below>    < from: CT Angio Chest PE Protocol w/ IV Cont (01.21.22 @ 16:01) >  IMPRESSION:    No main, right, left, lobar, or proximal segmental pulmonary embolus.    Patchy bilateral groundglass opacities noted throughout both lungs likely   representing infection or alveolar component of edema. COVID 19 can also   have this appearance. Correlate with RT PCR.    < end of copied text >

## 2022-01-24 NOTE — CHART NOTE - NSCHARTNOTEFT_GEN_A_CORE
Informed by RN. WCT 7 beats.  per RN. VS stable. Pt on Toprol.  Dw Dr. Andrade. BB as tolerated. Echo pending. Continue on Tele. Informed by RN. WCT 7 beats.  per RN. VS stable. Pt on Toprol.  Dw Dr. Andrade. BB as tolerated. Echo pending. Continue on Tele.  D/w Marty.

## 2022-01-24 NOTE — PROGRESS NOTE ADULT - ASSESSMENT
81 year old female w/ PMHx of dementia and CHF who presented from home for several days of weakness followed by development of dyspnea and episode of substernal chest pain.    COVID19 Positive with hypoxic respiratory failure  Unvaccinated for COVID19  CTA Chest (1/21) with no PE and GGO consistent with COVID19    Doubt bacterial pneumonia / superinfection as WBC not elevated, CT Chest infiltrates not consistent with bacterial PNA    # COVID-19 disease, Fever, Hypoxic Respiratory Failure   --Favor completing 5 day course of Remdesivir given unvaccinated status and patient age  --Continue Dexamethasone 6 mg IV/PO Q24H x 10 days total   --Maintain COVID19 Isolation Precautions    I will sign off at this time. Please feel free to contact me with any further questions or concerns.    Sincere Camp M.D.  [facility] Division of Infectious Disease  8AM-5PM: Pager Number [phone]  After Hours (or if no response): Please contact the Infectious Diseases Office at [phone]

## 2022-01-24 NOTE — PROGRESS NOTE ADULT - SUBJECTIVE AND OBJECTIVE BOX
CARDIOLOGY     PROGRESS  NOTE   ________________________________________________    CHIEF COMPLAINT:Patient is a 81y old  Female who presents with a chief complaint of sob/cp (24 Jan 2022 07:43)  no complain.  	  REVIEW OF SYSTEMS:  CONSTITUTIONAL: No fever, weight loss, or fatigue  EYES: No eye pain, visual disturbances, or discharge  ENT:  No difficulty hearing, tinnitus, vertigo; No sinus or throat pain  NECK: No pain or stiffness  RESPIRATORY: No cough, wheezing, chills or hemoptysis; No Shortness of Breath  CARDIOVASCULAR: No chest pain, palpitations, passing out, dizziness, or leg swelling  GASTROINTESTINAL: No abdominal or epigastric pain. No nausea, vomiting, or hematemesis; No diarrhea or constipation. No melena or hematochezia.  GENITOURINARY: No dysuria, frequency, hematuria, or incontinence  NEUROLOGICAL: No headaches, memory loss, loss of strength, numbness, or tremors  SKIN: No itching, burning, rashes, or lesions   LYMPH Nodes: No enlarged glands  ENDOCRINE: No heat or cold intolerance; No hair loss  MUSCULOSKELETAL: No joint pain or swelling; No muscle, back, or extremity pain  PSYCHIATRIC: No depression, anxiety, mood swings, or difficulty sleeping  HEME/LYMPH: No easy bruising, or bleeding gums  ALLERGY AND IMMUNOLOGIC: No hives or eczema	    [ ] All others negative	  [ ] Unable to obtain    PHYSICAL EXAM:  T(C): 36.5 (01-24-22 @ 08:50), Max: 36.8 (01-23-22 @ 17:10)  HR: 66 (01-24-22 @ 08:50) (60 - 82)  BP: 120/63 (01-24-22 @ 08:50) (100/58 - 142/74)  RR: 18 (01-24-22 @ 08:50) (17 - 18)  SpO2: 95% (01-24-22 @ 08:50) (95% - 100%)  Wt(kg): --  I&O's Summary    23 Jan 2022 07:01  -  24 Jan 2022 07:00  --------------------------------------------------------  IN: 1200 mL / OUT: 1800 mL / NET: -600 mL    24 Jan 2022 07:01  -  24 Jan 2022 10:04  --------------------------------------------------------  IN: 300 mL / OUT: 700 mL / NET: -400 mL        Appearance: Normal	  HEENT:   Normal oral mucosa, PERRL, EOMI	  Lymphatic: No lymphadenopathy  Cardiovascular: Normal S1 S2, No JVD, + murmurs, No edema  Respiratory: rhonchi  Psychiatry: A & O x 3, Mood & affect appropriate  Gastrointestinal:  Soft, Non-tender, + BS	  Skin: No rashes, No ecchymoses, No cyanosis	  Neurologic: Non-focal  Extremities: Normal range of motion, No clubbing, cyanosis or edema  Vascular: Peripheral pulses palpable 2+ bilaterally    MEDICATIONS  (STANDING):  aspirin enteric coated 81 milliGRAM(s) Oral daily  dexAMETHasone     Tablet 6 milliGRAM(s) Oral daily  enoxaparin Injectable 40 milliGRAM(s) SubCutaneous daily  furosemide   Injectable 20 milliGRAM(s) IV Push two times a day  metoprolol succinate ER 25 milliGRAM(s) Oral daily  pantoprazole    Tablet 40 milliGRAM(s) Oral before breakfast  remdesivir  IVPB 100 milliGRAM(s) IV Intermittent every 24 hours  remdesivir  IVPB   IV Intermittent       TELEMETRY: 	    ECG:  	  RADIOLOGY:  OTHER: 	  	  LABS:	 	    CARDIAC MARKERS:                                12.0   8.13  )-----------( 204      ( 24 Jan 2022 07:12 )             37.4     01-24    140  |  95<L>  |  37<H>  ----------------------------<  112<H>  4.4   |  28  |  0.85    Ca    8.6      24 Jan 2022 07:06  Mg     2.0     01-23    TPro  6.3  /  Alb  3.0<L>  /  TBili  0.4  /  DBili  0.1  /  AST  20  /  ALT  13  /  AlkPhos  45  01-24    proBNP: Serum Pro-Brain Natriuretic Peptide: 74583 pg/mL (01-21 @ 12:11)    Lipid Profile:   HgA1c:   TSH: Thyroid Stimulating Hormone, Serum: 0.47 uIU/mL (01-21 @ 17:02)    PT/INR - ( 24 Jan 2022 07:08 )   PT: 13.3 sec;   INR: 1.11 ratio               Assessment and plan  ---------------------------  80 yo F  h/o   dementia and  <CHF per EMS,/  no othe r hx  is  available presenting via EMS from home for few days of weakness, followed by development of SOB and episode of substernal CP this morning.     Pt is a poor historian and unable to provide significant history    all ROS negative upon questioning.   Lives at home with brother - he reports concern for pt's increased work of breathing.   pt is a 80 yo female with hx of htn, chf, cad, with increasing sob and COVID with  abnormal  cta and chest x ray  cta negative for PE  echo to evaluated ef  trop to r/o ischemia, pt with chest pain.  lipid panel  continue covid therapy  fu renal function  increase lasix to bid  keep K>4  ecg with LBBB, r/o carduiomyopathy  WCT on tele  will add beta blocker as tolerated  echo,, awaiting

## 2022-01-25 LAB
ALBUMIN SERPL ELPH-MCNC: 3.3 G/DL — SIGNIFICANT CHANGE UP (ref 3.3–5)
ALBUMIN SERPL ELPH-MCNC: 3.4 G/DL — SIGNIFICANT CHANGE UP (ref 3.3–5)
ALP SERPL-CCNC: 53 U/L — SIGNIFICANT CHANGE UP (ref 40–120)
ALP SERPL-CCNC: 56 U/L — SIGNIFICANT CHANGE UP (ref 40–120)
ALT FLD-CCNC: 17 U/L — SIGNIFICANT CHANGE UP (ref 10–45)
ALT FLD-CCNC: 17 U/L — SIGNIFICANT CHANGE UP (ref 10–45)
ANION GAP SERPL CALC-SCNC: 18 MMOL/L — HIGH (ref 5–17)
AST SERPL-CCNC: 22 U/L — SIGNIFICANT CHANGE UP (ref 10–40)
AST SERPL-CCNC: 25 U/L — SIGNIFICANT CHANGE UP (ref 10–40)
BILIRUB DIRECT SERPL-MCNC: <0.1 MG/DL — SIGNIFICANT CHANGE UP (ref 0–0.3)
BILIRUB INDIRECT FLD-MCNC: >0.2 MG/DL — SIGNIFICANT CHANGE UP (ref 0.2–1)
BILIRUB SERPL-MCNC: 0.3 MG/DL — SIGNIFICANT CHANGE UP (ref 0.2–1.2)
BILIRUB SERPL-MCNC: 0.4 MG/DL — SIGNIFICANT CHANGE UP (ref 0.2–1.2)
BUN SERPL-MCNC: 44 MG/DL — HIGH (ref 7–23)
CALCIUM SERPL-MCNC: 8.9 MG/DL — SIGNIFICANT CHANGE UP (ref 8.4–10.5)
CHLORIDE SERPL-SCNC: 89 MMOL/L — LOW (ref 96–108)
CO2 SERPL-SCNC: 26 MMOL/L — SIGNIFICANT CHANGE UP (ref 22–31)
CREAT SERPL-MCNC: 0.92 MG/DL — SIGNIFICANT CHANGE UP (ref 0.5–1.3)
CREAT SERPL-MCNC: 0.93 MG/DL — SIGNIFICANT CHANGE UP (ref 0.5–1.3)
GLUCOSE SERPL-MCNC: 236 MG/DL — HIGH (ref 70–99)
HCT VFR BLD CALC: 41.3 % — SIGNIFICANT CHANGE UP (ref 34.5–45)
HGB BLD-MCNC: 13.2 G/DL — SIGNIFICANT CHANGE UP (ref 11.5–15.5)
INR BLD: 1.09 RATIO — SIGNIFICANT CHANGE UP (ref 0.88–1.16)
MCHC RBC-ENTMCNC: 27.4 PG — SIGNIFICANT CHANGE UP (ref 27–34)
MCHC RBC-ENTMCNC: 32 GM/DL — SIGNIFICANT CHANGE UP (ref 32–36)
MCV RBC AUTO: 85.9 FL — SIGNIFICANT CHANGE UP (ref 80–100)
NRBC # BLD: 0 /100 WBCS — SIGNIFICANT CHANGE UP (ref 0–0)
PLATELET # BLD AUTO: 273 K/UL — SIGNIFICANT CHANGE UP (ref 150–400)
POTASSIUM SERPL-MCNC: 4.2 MMOL/L — SIGNIFICANT CHANGE UP (ref 3.5–5.3)
POTASSIUM SERPL-SCNC: 4.2 MMOL/L — SIGNIFICANT CHANGE UP (ref 3.5–5.3)
PROT SERPL-MCNC: 6.8 G/DL — SIGNIFICANT CHANGE UP (ref 6–8.3)
PROT SERPL-MCNC: 7.1 G/DL — SIGNIFICANT CHANGE UP (ref 6–8.3)
PROTHROM AB SERPL-ACNC: 13 SEC — SIGNIFICANT CHANGE UP (ref 10.6–13.6)
RBC # BLD: 4.81 M/UL — SIGNIFICANT CHANGE UP (ref 3.8–5.2)
RBC # FLD: 14 % — SIGNIFICANT CHANGE UP (ref 10.3–14.5)
SODIUM SERPL-SCNC: 133 MMOL/L — LOW (ref 135–145)
WBC # BLD: 9.85 K/UL — SIGNIFICANT CHANGE UP (ref 3.8–10.5)
WBC # FLD AUTO: 9.85 K/UL — SIGNIFICANT CHANGE UP (ref 3.8–10.5)

## 2022-01-25 PROCEDURE — 93321 DOPPLER ECHO F-UP/LMTD STD: CPT | Mod: 26

## 2022-01-25 PROCEDURE — 71045 X-RAY EXAM CHEST 1 VIEW: CPT | Mod: 26

## 2022-01-25 PROCEDURE — 93010 ELECTROCARDIOGRAM REPORT: CPT

## 2022-01-25 PROCEDURE — 93308 TTE F-UP OR LMTD: CPT | Mod: 26

## 2022-01-25 RX ORDER — FUROSEMIDE 40 MG
20 TABLET ORAL DAILY
Refills: 0 | Status: DISCONTINUED | OUTPATIENT
Start: 2022-01-25 | End: 2022-01-26

## 2022-01-25 RX ORDER — ENOXAPARIN SODIUM 100 MG/ML
50 INJECTION SUBCUTANEOUS
Refills: 0 | Status: DISCONTINUED | OUTPATIENT
Start: 2022-01-25 | End: 2022-01-30

## 2022-01-25 RX ORDER — LANOLIN ALCOHOL/MO/W.PET/CERES
3 CREAM (GRAM) TOPICAL ONCE
Refills: 0 | Status: COMPLETED | OUTPATIENT
Start: 2022-01-25 | End: 2022-01-25

## 2022-01-25 RX ORDER — METOPROLOL TARTRATE 50 MG
25 TABLET ORAL
Refills: 0 | Status: DISCONTINUED | OUTPATIENT
Start: 2022-01-25 | End: 2022-01-28

## 2022-01-25 RX ORDER — METOPROLOL TARTRATE 50 MG
5 TABLET ORAL ONCE
Refills: 0 | Status: COMPLETED | OUTPATIENT
Start: 2022-01-25 | End: 2022-01-25

## 2022-01-25 RX ORDER — METOPROLOL TARTRATE 50 MG
25 TABLET ORAL THREE TIMES A DAY
Refills: 0 | Status: DISCONTINUED | OUTPATIENT
Start: 2022-01-25 | End: 2022-01-25

## 2022-01-25 RX ADMIN — Medication 25 MILLIGRAM(S): at 17:25

## 2022-01-25 RX ADMIN — Medication 5 MILLIGRAM(S): at 11:06

## 2022-01-25 RX ADMIN — ENOXAPARIN SODIUM 50 MILLIGRAM(S): 100 INJECTION SUBCUTANEOUS at 17:09

## 2022-01-25 RX ADMIN — REMDESIVIR 500 MILLIGRAM(S): 5 INJECTION INTRAVENOUS at 17:08

## 2022-01-25 RX ADMIN — Medication 3 MILLIGRAM(S): at 21:57

## 2022-01-25 RX ADMIN — Medication 6 MILLIGRAM(S): at 05:24

## 2022-01-25 RX ADMIN — Medication 81 MILLIGRAM(S): at 11:06

## 2022-01-25 RX ADMIN — Medication 25 MILLIGRAM(S): at 05:23

## 2022-01-25 RX ADMIN — PANTOPRAZOLE SODIUM 40 MILLIGRAM(S): 20 TABLET, DELAYED RELEASE ORAL at 05:23

## 2022-01-25 RX ADMIN — Medication 20 MILLIGRAM(S): at 05:23

## 2022-01-25 NOTE — PHYSICAL THERAPY INITIAL EVALUATION ADULT - BED MOBILITY LIMITATIONS, REHAB EVAL
sat EOB x 6 min cg of 1/decreased ability to use arms for pushing/pulling/decreased ability to use legs for bridging/pushing

## 2022-01-25 NOTE — PHYSICAL THERAPY INITIAL EVALUATION ADULT - LEVEL OF INDEPENDENCE: SIT/SUPINE, REHAB EVAL
Annel is the caller,    She is calling looking for our tax id# as she is filling out insurance paperwork for the patient.    Ph#[phone]   contact guard/minimum assist (75% patients effort)

## 2022-01-25 NOTE — PHYSICAL THERAPY INITIAL EVALUATION ADULT - ADDITIONAL COMMENTS
pt lives alone in apt + elevator access and uses std cane to amb independent PTA ; pt brother Willie Lovell ID as emergency contact [phone] , recent pvt hire [employer] 2 days x 4 hrs each day pt lives alone in apt + elevator access and uses no AD PTA independent PTA ; pt owns a std cane but has not needed to use per pt ; pt brother Willie Lovell ID as emergency contact [phone] , recent pvt hire [employer] 2 days x 4 hrs each day , pt w/ dementia per brother , brother agreeable for pt to go to [facility] per PT conversation with pt brother (per pt ok to speak with brother re session and eval and d/c planning ); pt has a tub shower

## 2022-01-25 NOTE — PHYSICAL THERAPY INITIAL EVALUATION ADULT - IMPAIRMENTS CONTRIBUTING IMPAIRED BED MOBILITY, REHAB EVAL
vc to sit upright , decreased endurance pulse ox 94% RA/impaired postural control/decreased strength

## 2022-01-25 NOTE — PHYSICAL THERAPY INITIAL EVALUATION ADULT - IMPAIRMENTS CONTRIBUTING TO GAIT DEVIATIONS, PT EVAL
decreased endurance ; see gait comments above in GAIT/impaired balance/cognition/impaired postural control/decreased strength

## 2022-01-25 NOTE — PROGRESS NOTE ADULT - SUBJECTIVE AND OBJECTIVE BOX
afberile    REVIEW OF SYSTEMS:  GEN: no fever,    no chills  RESP: no SOB,   no cough  CVS: no chest pain,   no palpitations  GI: no abdominal pain,   no nausea,   no vomiting,   no constipation,   no diarrhea  : no dysuria,   no frequency  NEURO: no headache,   no dizziness  PSYCH: no depression,   not anxious  Derm : no rash    MEDICATIONS  (STANDING):  aspirin enteric coated 81 milliGRAM(s) Oral daily  dexAMETHasone     Tablet 6 milliGRAM(s) Oral daily  enoxaparin Injectable 40 milliGRAM(s) SubCutaneous daily  furosemide   Injectable 20 milliGRAM(s) IV Push two times a day  metoprolol succinate ER 25 milliGRAM(s) Oral daily  pantoprazole    Tablet 40 milliGRAM(s) Oral before breakfast  remdesivir  IVPB 100 milliGRAM(s) IV Intermittent every 24 hours  remdesivir  IVPB   IV Intermittent     MEDICATIONS  (PRN):  acetaminophen     Tablet .. 650 milliGRAM(s) Oral every 6 hours PRN Temp greater or equal to 38C (100.4F), Mild Pain (1 - 3), Moderate Pain (4 - 6), Severe Pain (7 - 10)      Vital Signs Last 24 Hrs  T(C): 36.7 (25 Jan 2022 05:00), Max: 36.9 (24 Jan 2022 21:50)  T(F): 98.1 (25 Jan 2022 05:00), Max: 98.4 (24 Jan 2022 21:50)  HR: 89 (25 Jan 2022 05:00) (63 - 89)  BP: 132/90 (25 Jan 2022 05:00) (113/91 - 153/76)  BP(mean): --  RR: 16 (25 Jan 2022 05:00) (16 - 18)  SpO2: 94% (25 Jan 2022 05:00) (90% - 97%)  CAPILLARY BLOOD GLUCOSE        I&O's Summary    24 Jan 2022 07:01  -  25 Jan 2022 07:00  --------------------------------------------------------  IN: 1390 mL / OUT: 3060 mL / NET: -1670 mL        PHYSICAL EXAM:  HEAD:  Atraumatic, Normocephalic  NECK: Supple, No   JVD  CHEST/LUNG:   no     rales,     no,    rhonchi  HEART: Regular rate and rhythm;         murmur  ABDOMEN: Soft, Nontender, ;   EXTREMITIES:   no    edema  NEUROLOGY:  alert    LABS:                        12.0   8.13  )-----------( 204      ( 24 Jan 2022 07:12 )             37.4     01-25    x   |  x   |  x   ----------------------------<  x   x    |  x   |  0.92    Ca    8.6      24 Jan 2022 07:06    TPro  6.8  /  Alb  3.3  /  TBili  0.3  /  DBili  <0.1  /  AST  25  /  ALT  17  /  AlkPhos  53  01-25    PT/INR - ( 25 Jan 2022 06:56 )   PT: 13.0 sec;   INR: 1.09 ratio                         Thyroid Stimulating Hormone, Serum: 0.47 uIU/mL (01-21 @ 17:02)          Consultant(s) Notes Reviewed:      Care Discussed with Consultants/Other Providers:

## 2022-01-25 NOTE — PHYSICAL THERAPY INITIAL EVALUATION ADULT - PERSONAL SAFETY AND JUDGMENT, REHAB EVAL
pt looking to hold onto furntiure for support when sit-stand and amb w/ PT mod unsteady/at risk behaviors demonstrated

## 2022-01-25 NOTE — CHART NOTE - NSCHARTNOTEFT_GEN_A_CORE
pt converted to iris johns this am aprox 0530, currently 108, on metoprolol 25mg po, will give 5mg IV X 1 to improve rate control, lovenox increased to full AC, TTE pending, D/W Dr Andrade

## 2022-01-25 NOTE — PHYSICAL THERAPY INITIAL EVALUATION ADULT - ASSISTIVE DEVICE FOR TRANSFER: GAIT, REHAB EVAL
pt initailly try to amb w/o AD with mod of1 mod unsteady holding onto bed and desk in room short distance then amb with rolling walker 25-30 ft w/ min unsteadiness to mod w/ min/mod of 1 vc for proper hand placement on walker

## 2022-01-25 NOTE — PHYSICAL THERAPY INITIAL EVALUATION ADULT - IMPAIRED TRANSFERS: SIT/STAND, REHAB EVAL
decreased endurance , sit-stand x 2 once w/o Ad mod of 1 mod unsteady , at RW mod/min of1 mod/min unsteady/impaired balance/cognition/impaired postural control/decreased strength

## 2022-01-25 NOTE — PHYSICAL THERAPY INITIAL EVALUATION ADULT - GENERAL OBSERVATIONS, REHAB EVAL
pt received in bed all siderails up hOB 35 degrees call bell,phone and table in reach bed alarm active and bed in lowest position + primafit to wall suction and pt tele box intact w/ pulse oximeter

## 2022-01-25 NOTE — PHYSICAL THERAPY INITIAL EVALUATION ADULT - GAIT DEVIATIONS NOTED, PT EVAL
decreased bon/increased time in double stance/decreased step length/decreased stride length/decreased weight-shifting ability Psych/Behavioral (Pediatric)

## 2022-01-25 NOTE — PHYSICAL THERAPY INITIAL EVALUATION ADULT - PERTINENT HX OF CURRENT PROBLEM, REHAB EVAL
NOTE : 1/24/22 on tele 7 beats WCT HR up to 159 ; 1/25/22 pt in [facility] since 5:40 per provider note fluctuate HR 90's-120

## 2022-01-25 NOTE — PHYSICAL THERAPY INITIAL EVALUATION ADULT - GAIT TRAINING, PT EVAL
GOAL; pt will amb with rolling walker with supervision for 200 ft x 2 in 3 weeks w/ improved steadiness

## 2022-01-25 NOTE — PHYSICAL THERAPY INITIAL EVALUATION ADULT - IMPAIRMENTS FOUND, PT EVAL
pulse ox 94 % at Rest on Room air 92-93 % RA when amb w/ assist of 1/aerobic capacity/endurance/cognitive impairment/gait, locomotion, and balance/muscle strength

## 2022-01-25 NOTE — PROGRESS NOTE ADULT - SUBJECTIVE AND OBJECTIVE BOX
CARDIOLOGY     PROGRESS  NOTE   ________________________________________________    CHIEF COMPLAINT:Patient is a 81y old  Female who presents with a chief complaint of sob/cp (25 Jan 2022 08:08)    	  REVIEW OF SYSTEMS:  CONSTITUTIONAL: No fever, weight loss, or fatigue  EYES: No eye pain, visual disturbances, or discharge  ENT:  No difficulty hearing, tinnitus, vertigo; No sinus or throat pain  NECK: No pain or stiffness  RESPIRATORY: No cough, wheezing, chills or hemoptysis; No Shortness of Breath  CARDIOVASCULAR: No chest pain, palpitations, passing out, dizziness, or leg swelling  GASTROINTESTINAL: No abdominal or epigastric pain. No nausea, vomiting, or hematemesis; No diarrhea or constipation. No melena or hematochezia.  GENITOURINARY: No dysuria, frequency, hematuria, or incontinence  NEUROLOGICAL: No headaches, memory loss, loss of strength, numbness, or tremors  SKIN: No itching, burning, rashes, or lesions   LYMPH Nodes: No enlarged glands  ENDOCRINE: No heat or cold intolerance; No hair loss  MUSCULOSKELETAL: No joint pain or swelling; No muscle, back, or extremity pain  PSYCHIATRIC: No depression, anxiety, mood swings, or difficulty sleeping  HEME/LYMPH: No easy bruising, or bleeding gums  ALLERGY AND IMMUNOLOGIC: No hives or eczema	    [ ] All others negative	  [ ] Unable to obtain    PHYSICAL EXAM:  T(C): 36.9 (01-25-22 @ 09:14), Max: 36.9 (01-24-22 @ 21:50)  HR: 93 (01-25-22 @ 09:14) (66 - 93)  BP: 152/86 (01-25-22 @ 09:14) (113/91 - 153/76)  RR: 17 (01-25-22 @ 09:14) (16 - 18)  SpO2: 97% (01-25-22 @ 09:14) (90% - 97%)  Wt(kg): --  I&O's Summary    24 Jan 2022 07:01  -  25 Jan 2022 07:00  --------------------------------------------------------  IN: 1390 mL / OUT: 3060 mL / NET: -1670 mL        Appearance: Normal	  HEENT:   Normal oral mucosa, PERRL, EOMI	  Lymphatic: No lymphadenopathy  Cardiovascular: Normal S1 S2, No JVD, + murmurs, No edema  Respiratory: rhonchi  Psychiatry: A & O x 3, Mood & affect appropriate  Gastrointestinal:  Soft, Non-tender, + BS	  Skin: No rashes, No ecchymoses, No cyanosis	  Neurologic: Non-focal  Extremities: Normal range of motion, No clubbing, cyanosis or edema  Vascular: Peripheral pulses palpable 2+ bilaterally    MEDICATIONS  (STANDING):  aspirin enteric coated 81 milliGRAM(s) Oral daily  dexAMETHasone     Tablet 6 milliGRAM(s) Oral daily  enoxaparin Injectable 40 milliGRAM(s) SubCutaneous daily  furosemide   Injectable 20 milliGRAM(s) IV Push two times a day  metoprolol succinate ER 25 milliGRAM(s) Oral daily  pantoprazole    Tablet 40 milliGRAM(s) Oral before breakfast  remdesivir  IVPB 100 milliGRAM(s) IV Intermittent every 24 hours  remdesivir  IVPB   IV Intermittent       TELEMETRY: 	    ECG:  	  RADIOLOGY:  OTHER: 	  	  LABS:	 	    CARDIAC MARKERS:                                12.0   8.13  )-----------( 204      ( 24 Jan 2022 07:12 )             37.4     01-25    x   |  x   |  x   ----------------------------<  x   x    |  x   |  0.92    Ca    8.6      24 Jan 2022 07:06    TPro  6.8  /  Alb  3.3  /  TBili  0.3  /  DBili  <0.1  /  AST  25  /  ALT  17  /  AlkPhos  53  01-25    proBNP: Serum Pro-Brain Natriuretic Peptide: 21308 pg/mL (01-21 @ 12:11)    Lipid Profile:   HgA1c:   TSH: Thyroid Stimulating Hormone, Serum: 0.47 uIU/mL (01-21 @ 17:02)    PT/INR - ( 25 Jan 2022 06:56 )   PT: 13.0 sec;   INR: 1.09 ratio          Assessment and plan  ---------------------------  82 yo F  h/o   dementia and  <CHF per EMS,/  no othe r hx  is  available presenting via EMS from home for few days of weakness, followed by development of SOB and episode of substernal CP this morning.     Pt is a poor historian and unable to provide significant history    all ROS negative upon questioning.   Lives at home with brother - he reports concern for pt's increased work of breathing.   pt is a 82 yo female with hx of htn, chf, cad, with increasing sob and COVID with  abnormal  cta and chest x ray  cta negative for PE  echo to evaluated ef  trop to r/o ischemia, pt with chest pain.  lipid panel  continue covid therapy  fu renal function  increase lasix to bid  keep K>4  ecg with LBBB, r/o carduiomyopathy  WCT on tele yesterday pt converted to a. flutter this am  will increase beta blocker as tolerated  ac  echo,, awaiting still  needs lytes stat

## 2022-01-25 NOTE — PHYSICAL THERAPY INITIAL EVALUATION ADULT - DISCHARGE DISPOSITION, PT EVAL
Subacute rehab to increase fxl mobility , strength, balance, endurance , fall prevention education continued ; if pt and brother decide to take pt home pt needs assist all fxl activity and adl's brother understood and could beenfit from Home PT would need rolling walker , shower seat with tub extension , 3:1 commode and w/c for longer distances/rehabilitation facility

## 2022-01-25 NOTE — PHYSICAL THERAPY INITIAL EVALUATION ADULT - PRECAUTIONS/LIMITATIONS, REHAB EVAL
81 y.o F history of dementia & HTN. Patient admitted 2/2 c/o weakness & SOC, found to be COVID positive 1/21/22 Remdesivir to be completed 1/25/22 , DDimer 443, Ferritin 608 1/23/22 ; EKG SR with PSVC w/ occasional PVCs /LAD/LBBB; CTA CHEST 1/21/22 w/o pulmonary embolism , w/ground glass opacities ; serum pro brain 20071/fall precautions

## 2022-01-25 NOTE — PHYSICAL THERAPY INITIAL EVALUATION ADULT - REFERRING PHYSICIAN, REHAB EVAL
Tawny Scott MD ORDER PT EVAl and TREAT Tawny Scott MD ORDER PT EVAl and TREAT [spoke with rn Ria prior to session as pt Afib /Aflutter in 80's per rn can work with pt for PT EVAL ]

## 2022-01-25 NOTE — PROGRESS NOTE ADULT - ASSESSMENT
82 yo F     h/o   dementia and   ? CHF per EMS,/  no othe r hx  is  available      presenting via EMS from home for few days of weakness, followed by development of SOB and episode of substernal CP this morning.     Pt is a poor historian and unable to provide significant history    all ROS negative upon questioning.     Lives at home with brother - he reports concern for pt's increased work of breathing.      pt with   weakness/  sob  from  covid/  pna   CT  chest, with  GOO   on decadron/  remdisivir   *  Dementia   *    HTN,  acute  diastolic chf  on   lasix  card  d r golyan  on  dvt ppx.  bmi  is  20  continue  supportive care   on iv remdisivir    hypokalemia. pt  doing  better  ambulate  and check O2  sat  tele,   s/p vtach/ on  toprol,  card to  f/p      pmd  allie longoria< from: CT Angio Chest PE Protocol w/ IV Cont (01.21.22 @ 16:01) >  IMPRESSION:  No main, right, left, lobar, or proximal segmental pulmonary embolus.  Patchy bilateral groundglass opacities noted throughout both lungs likely   representing infection or alveolar component of edema. COVID 19 can also   have this appearance. Correlate with RT PCR    --- End of Report --  < end of copied text >     82 yo F     h/o   dementia and   ? CHF per EMS,/  no othe r hx  is  available      presenting via EMS from home for few days of weakness, followed by development of SOB and episode of substernal CP this morning.     Pt is a poor historian and unable to provide significant history    all ROS negative upon questioning.     Lives at home with brother - he reports concern for pt's increased work of breathing.      pt with   weakness/  sob  from  covid/  pna   CT  chest, with  GOO   on decadron/  remdisivir   *  Dementia   *    HTN,  acute  diastolic chf  on   lasix  card  d r golyan  on  dvt ppx.  bmi  is  20  continue  supportive care   on iv remdisivir  ambulate  and check O2  sat  tele,   s/p vtach/ on  toprol,  card to  f/p  afib, on lovenox  bid/  switch to noac  in am      pmd  dr sheppard, allie chan< from: CT Angio Chest PE Protocol w/ IV Cont (01.21.22 @ 16:01) >  IMPRESSION:  No main, right, left, lobar, or proximal segmental pulmonary embolus.  Patchy bilateral groundglass opacities noted throughout both lungs likely   representing infection or alveolar component of edema. COVID 19 can also   have this appearance. Correlate with RT PCR    --- End of Report --  < end of copied text >

## 2022-01-26 DIAGNOSIS — E11.9 TYPE 2 DIABETES MELLITUS WITHOUT COMPLICATIONS: ICD-10-CM

## 2022-01-26 DIAGNOSIS — I10 ESSENTIAL (PRIMARY) HYPERTENSION: ICD-10-CM

## 2022-01-26 DIAGNOSIS — F03.90 UNSPECIFIED DEMENTIA WITHOUT BEHAVIORAL DISTURBANCE: ICD-10-CM

## 2022-01-26 DIAGNOSIS — U07.1 COVID-19: ICD-10-CM

## 2022-01-26 LAB
A1C WITH ESTIMATED AVERAGE GLUCOSE RESULT: 6.5 % — HIGH (ref 4–5.6)
ALBUMIN SERPL ELPH-MCNC: 3.1 G/DL — LOW (ref 3.3–5)
ALBUMIN SERPL ELPH-MCNC: 3.1 G/DL — LOW (ref 3.3–5)
ALP SERPL-CCNC: 48 U/L — SIGNIFICANT CHANGE UP (ref 40–120)
ALP SERPL-CCNC: 49 U/L — SIGNIFICANT CHANGE UP (ref 40–120)
ALT FLD-CCNC: 17 U/L — SIGNIFICANT CHANGE UP (ref 10–45)
ALT FLD-CCNC: 17 U/L — SIGNIFICANT CHANGE UP (ref 10–45)
ANION GAP SERPL CALC-SCNC: 15 MMOL/L — SIGNIFICANT CHANGE UP (ref 5–17)
APTT BLD: 28.7 SEC — SIGNIFICANT CHANGE UP (ref 27.5–35.5)
AST SERPL-CCNC: 20 U/L — SIGNIFICANT CHANGE UP (ref 10–40)
AST SERPL-CCNC: 20 U/L — SIGNIFICANT CHANGE UP (ref 10–40)
BILIRUB DIRECT SERPL-MCNC: <0.1 MG/DL — SIGNIFICANT CHANGE UP (ref 0–0.3)
BILIRUB DIRECT SERPL-MCNC: <0.1 MG/DL — SIGNIFICANT CHANGE UP (ref 0–0.3)
BILIRUB INDIRECT FLD-MCNC: >0.2 MG/DL — SIGNIFICANT CHANGE UP (ref 0.2–1)
BILIRUB INDIRECT FLD-MCNC: >0.2 MG/DL — SIGNIFICANT CHANGE UP (ref 0.2–1)
BILIRUB SERPL-MCNC: 0.3 MG/DL — SIGNIFICANT CHANGE UP (ref 0.2–1.2)
BILIRUB SERPL-MCNC: 0.3 MG/DL — SIGNIFICANT CHANGE UP (ref 0.2–1.2)
BUN SERPL-MCNC: 42 MG/DL — HIGH (ref 7–23)
CALCIUM SERPL-MCNC: 8.8 MG/DL — SIGNIFICANT CHANGE UP (ref 8.4–10.5)
CHLORIDE SERPL-SCNC: 98 MMOL/L — SIGNIFICANT CHANGE UP (ref 96–108)
CO2 SERPL-SCNC: 27 MMOL/L — SIGNIFICANT CHANGE UP (ref 22–31)
CREAT SERPL-MCNC: 0.7 MG/DL — SIGNIFICANT CHANGE UP (ref 0.5–1.3)
CREAT SERPL-MCNC: 0.72 MG/DL — SIGNIFICANT CHANGE UP (ref 0.5–1.3)
ESTIMATED AVERAGE GLUCOSE: 140 MG/DL — HIGH (ref 68–114)
GLUCOSE BLDC GLUCOMTR-MCNC: 109 MG/DL — HIGH (ref 70–99)
GLUCOSE BLDC GLUCOMTR-MCNC: 145 MG/DL — HIGH (ref 70–99)
GLUCOSE BLDC GLUCOMTR-MCNC: 189 MG/DL — HIGH (ref 70–99)
GLUCOSE BLDC GLUCOMTR-MCNC: 233 MG/DL — HIGH (ref 70–99)
GLUCOSE SERPL-MCNC: 117 MG/DL — HIGH (ref 70–99)
HCT VFR BLD CALC: 40.7 % — SIGNIFICANT CHANGE UP (ref 34.5–45)
HGB BLD-MCNC: 13 G/DL — SIGNIFICANT CHANGE UP (ref 11.5–15.5)
INR BLD: 1.14 RATIO — SIGNIFICANT CHANGE UP (ref 0.88–1.16)
MAGNESIUM SERPL-MCNC: 2 MG/DL — SIGNIFICANT CHANGE UP (ref 1.6–2.6)
MCHC RBC-ENTMCNC: 27.5 PG — SIGNIFICANT CHANGE UP (ref 27–34)
MCHC RBC-ENTMCNC: 31.9 GM/DL — LOW (ref 32–36)
MCV RBC AUTO: 86.2 FL — SIGNIFICANT CHANGE UP (ref 80–100)
NRBC # BLD: 0 /100 WBCS — SIGNIFICANT CHANGE UP (ref 0–0)
PHOSPHATE SERPL-MCNC: 3.3 MG/DL — SIGNIFICANT CHANGE UP (ref 2.5–4.5)
PLATELET # BLD AUTO: 235 K/UL — SIGNIFICANT CHANGE UP (ref 150–400)
POTASSIUM SERPL-MCNC: 3.7 MMOL/L — SIGNIFICANT CHANGE UP (ref 3.5–5.3)
POTASSIUM SERPL-SCNC: 3.7 MMOL/L — SIGNIFICANT CHANGE UP (ref 3.5–5.3)
PROT SERPL-MCNC: 6.3 G/DL — SIGNIFICANT CHANGE UP (ref 6–8.3)
PROT SERPL-MCNC: 6.4 G/DL — SIGNIFICANT CHANGE UP (ref 6–8.3)
PROTHROM AB SERPL-ACNC: 13.6 SEC — SIGNIFICANT CHANGE UP (ref 10.6–13.6)
RBC # BLD: 4.72 M/UL — SIGNIFICANT CHANGE UP (ref 3.8–5.2)
RBC # FLD: 14.1 % — SIGNIFICANT CHANGE UP (ref 10.3–14.5)
SODIUM SERPL-SCNC: 140 MMOL/L — SIGNIFICANT CHANGE UP (ref 135–145)
WBC # BLD: 9.81 K/UL — SIGNIFICANT CHANGE UP (ref 3.8–10.5)
WBC # FLD AUTO: 9.81 K/UL — SIGNIFICANT CHANGE UP (ref 3.8–10.5)

## 2022-01-26 RX ORDER — INSULIN LISPRO 100/ML
VIAL (ML) SUBCUTANEOUS AT BEDTIME
Refills: 0 | Status: DISCONTINUED | OUTPATIENT
Start: 2022-01-26 | End: 2022-02-14

## 2022-01-26 RX ORDER — DEXTROSE 50 % IN WATER 50 %
12.5 SYRINGE (ML) INTRAVENOUS ONCE
Refills: 0 | Status: DISCONTINUED | OUTPATIENT
Start: 2022-01-26 | End: 2022-02-14

## 2022-01-26 RX ORDER — GLUCAGON INJECTION, SOLUTION 0.5 MG/.1ML
1 INJECTION, SOLUTION SUBCUTANEOUS ONCE
Refills: 0 | Status: DISCONTINUED | OUTPATIENT
Start: 2022-01-26 | End: 2022-02-14

## 2022-01-26 RX ORDER — DEXTROSE 50 % IN WATER 50 %
15 SYRINGE (ML) INTRAVENOUS ONCE
Refills: 0 | Status: DISCONTINUED | OUTPATIENT
Start: 2022-01-26 | End: 2022-02-14

## 2022-01-26 RX ORDER — INSULIN LISPRO 100/ML
VIAL (ML) SUBCUTANEOUS
Refills: 0 | Status: DISCONTINUED | OUTPATIENT
Start: 2022-01-26 | End: 2022-02-14

## 2022-01-26 RX ORDER — DEXTROSE 50 % IN WATER 50 %
25 SYRINGE (ML) INTRAVENOUS ONCE
Refills: 0 | Status: DISCONTINUED | OUTPATIENT
Start: 2022-01-26 | End: 2022-02-14

## 2022-01-26 RX ADMIN — Medication 6 MILLIGRAM(S): at 05:13

## 2022-01-26 RX ADMIN — Medication 2: at 13:08

## 2022-01-26 RX ADMIN — Medication 25 MILLIGRAM(S): at 05:12

## 2022-01-26 RX ADMIN — ENOXAPARIN SODIUM 50 MILLIGRAM(S): 100 INJECTION SUBCUTANEOUS at 18:04

## 2022-01-26 RX ADMIN — PANTOPRAZOLE SODIUM 40 MILLIGRAM(S): 20 TABLET, DELAYED RELEASE ORAL at 05:13

## 2022-01-26 RX ADMIN — ENOXAPARIN SODIUM 50 MILLIGRAM(S): 100 INJECTION SUBCUTANEOUS at 05:12

## 2022-01-26 RX ADMIN — Medication 81 MILLIGRAM(S): at 11:30

## 2022-01-26 RX ADMIN — Medication 25 MILLIGRAM(S): at 18:02

## 2022-01-26 NOTE — PROGRESS NOTE ADULT - SUBJECTIVE AND OBJECTIVE BOX
afberile    REVIEW OF SYSTEMS:  GEN: no fever,    no chills  RESP: no SOB,   no cough  CVS: no chest pain,   no palpitations  GI: no abdominal pain,   no nausea,   no vomiting,   no constipation,   no diarrhea  : no dysuria,   no frequency  NEURO: no headache,   no dizziness  PSYCH: no depression,   not anxious  Derm : no rash    MEDICATIONS  (STANDING):  aspirin enteric coated 81 milliGRAM(s) Oral daily  dexAMETHasone     Tablet 6 milliGRAM(s) Oral daily  enoxaparin Injectable 50 milliGRAM(s) SubCutaneous two times a day  metoprolol tartrate 25 milliGRAM(s) Oral two times a day  pantoprazole    Tablet 40 milliGRAM(s) Oral before breakfast    MEDICATIONS  (PRN):  acetaminophen     Tablet .. 650 milliGRAM(s) Oral every 6 hours PRN Temp greater or equal to 38C (100.4F), Mild Pain (1 - 3), Moderate Pain (4 - 6), Severe Pain (7 - 10)      Vital Signs Last 24 Hrs  T(C): 36.2 (26 Jan 2022 05:10), Max: 36.9 (25 Jan 2022 09:14)  T(F): 97.2 (26 Jan 2022 05:10), Max: 98.5 (25 Jan 2022 09:14)  HR: 62 (26 Jan 2022 05:10) (62 - 93)  BP: 148/83 (26 Jan 2022 05:10) (108/67 - 152/86)  BP(mean): --  RR: 16 (26 Jan 2022 05:10) (16 - 18)  SpO2: 98% (26 Jan 2022 05:10) (93% - 98%)  CAPILLARY BLOOD GLUCOSE        I&O's Summary    24 Jan 2022 07:01  -  25 Jan 2022 07:00  --------------------------------------------------------  IN: 1390 mL / OUT: 3060 mL / NET: -1670 mL    25 Jan 2022 07:01  -  26 Jan 2022 06:51  --------------------------------------------------------  IN: 1080 mL / OUT: 826 mL / NET: 254 mL        PHYSICAL EXAM:  HEAD:  Atraumatic, Normocephalic  NECK: Supple, No   JVD  CHEST/LUNG:   no     rales,     no,    rhonchi  HEART: Regular rate and rhythm;         murmur  ABDOMEN: Soft, Nontender, ;   EXTREMITIES:   no     edema  NEUROLOGY:  alert    LABS:                        13.2   9.85  )-----------( 273      ( 25 Jan 2022 11:16 )             41.3     01-25    133<L>  |  89<L>  |  44<H>  ----------------------------<  236<H>  4.2   |  26  |  0.93    Ca    8.9      25 Jan 2022 11:18    TPro  7.1  /  Alb  3.4  /  TBili  0.4  /  DBili  x   /  AST  22  /  ALT  17  /  AlkPhos  56  01-25    PT/INR - ( 25 Jan 2022 06:56 )   PT: 13.0 sec;   INR: 1.09 ratio                         Thyroid Stimulating Hormone, Serum: 0.47 uIU/mL (01-21 @ 17:02)          Consultant(s) Notes Reviewed:      Care Discussed with Consultants/Other Providers:

## 2022-01-26 NOTE — PROGRESS NOTE ADULT - ASSESSMENT
80 yo F     h/o   dementia and   ? CHF per EMS,/  no othe r hx  is  available      presenting via EMS from home for few days of weakness, followed by development of SOB and episode of substernal CP this morning.     Pt is a poor historian and unable to provide significant history    all ROS negative upon questioning.     Lives at home with brother - he reports concern for pt's increased work of breathing.      pt with   weakness/  sob  from  covid/  pna   CT  chest, with  GOO   on decadron/  remdisivir   *  Dementia   *    HTN,  acute  diastolic chf  on   lasix  card  d r susan  on  dvt ppx.  bmi  is  20  continue  supportive care   on iv remdisivir  ambulate  and check O2  sat  tele,   s/p vtach/ on  toprol,  card to  f/p  afib, on lovenox  bid/  vtach  on tele.  card following    pmd  allie longoria< from: CT Angio Chest PE Protocol w/ IV Cont (01.21.22 @ 16:01) >  IMPRESSION:  No main, right, left, lobar, or proximal segmental pulmonary embolus.  Patchy bilateral groundglass opacities noted throughout both lungs likely   representing infection or alveolar component of edema. COVID 19 can also   have this appearance. Correlate with RT PCR    --- End of Report --  < end of copied text >     80 yo F     h/o   dementia and   ? CHF per EMS,/  no othe r hx  is  available      presenting via EMS from home for few days of weakness, followed by development of SOB and episode of substernal CP this morning.     Pt is a poor historian and unable to provide significant history    all ROS negative upon questioning.     Lives at home with brother - he reports concern for pt's increased work of breathing.      pt with   weakness/  sob  from  covid/  pna   CT  chest, with  GOO   on decadron/  remdisivir   *  Dementia   *    HTN,  acute  diastolic chf  on   lasix  card  d r susan  on  dvt ppx.  bmi  is  20  continue  supportive care   on iv remdisivir  ambulate  and check O2  sat  tele,   s/p vtach/ on  toprol,  card to  f/p  afib, on lovenox  bid/  vtach  on tele.  card following  will need  cardiac cath, on lovenox  bid    pmd  dr sheppard, allie chan< from: CT Angio Chest PE Protocol w/ IV Cont (01.21.22 @ 16:01) >  IMPRESSION:  No main, right, left, lobar, or proximal segmental pulmonary embolus.  Patchy bilateral groundglass opacities noted throughout both lungs likely   representing infection or alveolar component of edema. COVID 19 can also   have this appearance. Correlate with RT PCR    --- End of Report --  < end of copied text >

## 2022-01-26 NOTE — CONSULT NOTE ADULT - ASSESSMENT
Assessment  DMT2: 81y Female with DM T2 with hyperglycemia, A1C 6.5%, she is unsure of her outpatient medications, now on insulin coverage, blood sugars are fluctuating/running slightly high this afternoon, no hypoglycemias, on dexamethasone, diet orders noted to be nondiabetic.  Covid: on medications,  stable, monitored.  HTN: Controlled,  on antihypertensive medications.  Dementia: stable, monitored.      Brian Huynh MD  Cell: [phone]  Office: [phone]     Assessment  DMT2: 81y Female with DM T2 with hyperglycemia, A1C 6.5%, she is unsure of her outpatient medications, now on insulin coverage,  blood sugars are fluctuating/running slightly high this afternoon, no hypoglycemias, on dexamethasone, diet orders noted to be nondiabetic.  Covid: on medications,  stable, monitored.  HTN: Controlled,  on antihypertensive medications.  Dementia: stable, monitored.      Brian Huynh MD  Cell: [phone]  Office: [phone]

## 2022-01-26 NOTE — PROVIDER CONTACT NOTE (OTHER) - ASSESSMENT
pt currently resting conformably, with no complaints of chest pain or any other symptoms. pt currently resting conformably, with no complaints of chest pain or any other symptoms. pt vitals stable with /82, temp 98.2, HR 68 and o2 saturation 99%

## 2022-01-26 NOTE — CHART NOTE - NSCHARTNOTEFT_GEN_A_CORE
Called by RN for 12 beats of WCT.  No first event.  Sustained for 2.5 seconds.  VS as documented. Pt was sleeping at the time.  Offers no complaints.  Pt ordered for STAT labs. Called by RN for 12 beats of WCT.  Sustained for 2.5 seconds.  VS as documented. Pt was sleeping at the time.  Offers no complaints.  Pt ordered for STAT labs. f/u AM labs    ~Betty LINN-C

## 2022-01-26 NOTE — CONSULT NOTE ADULT - PROBLEM SELECTOR RECOMMENDATION 9
Will adjust diet orders to be consistent carb.  Will continue current insulin regimen for now. Will continue monitoring FS, log, and FU.  Patient counseled for compliance with consistent low carb diet.

## 2022-01-26 NOTE — PROGRESS NOTE ADULT - SUBJECTIVE AND OBJECTIVE BOX
CARDIOLOGY     PROGRESS  NOTE   ________________________________________________    CHIEF COMPLAINT:Patient is a 81y old  Female who presents with a chief complaint of sob/cp (25 Jan 2022 10:09)  no complain.  	  REVIEW OF SYSTEMS:  CONSTITUTIONAL: No fever, weight loss, or fatigue  EYES: No eye pain, visual disturbances, or discharge  ENT:  No difficulty hearing, tinnitus, vertigo; No sinus or throat pain  NECK: No pain or stiffness  RESPIRATORY: No cough, wheezing, chills or hemoptysis; No Shortness of Breath  CARDIOVASCULAR: No chest pain, palpitations, passing out, dizziness, or leg swelling  GASTROINTESTINAL: No abdominal or epigastric pain. No nausea, vomiting, or hematemesis; No diarrhea or constipation. No melena or hematochezia.  GENITOURINARY: No dysuria, frequency, hematuria, or incontinence  NEUROLOGICAL: No headaches, memory loss, loss of strength, numbness, or tremors  SKIN: No itching, burning, rashes, or lesions   LYMPH Nodes: No enlarged glands  ENDOCRINE: No heat or cold intolerance; No hair loss  MUSCULOSKELETAL: No joint pain or swelling; No muscle, back, or extremity pain  PSYCHIATRIC: No depression, anxiety, mood swings, or difficulty sleeping  HEME/LYMPH: No easy bruising, or bleeding gums  ALLERGY AND IMMUNOLOGIC: No hives or eczema	    [ ] All others negative	  [ ] Unable to obtain    PHYSICAL EXAM:  T(C): 36.4 (01-26-22 @ 01:35), Max: 36.9 (01-25-22 @ 09:14)  HR: 72 (01-26-22 @ 01:35) (72 - 93)  BP: 136/74 (01-26-22 @ 01:35) (108/67 - 152/86)  RR: 18 (01-26-22 @ 01:35) (16 - 18)  SpO2: 93% (01-26-22 @ 01:35) (93% - 97%)  Wt(kg): --  I&O's Summary    24 Jan 2022 07:01  -  25 Jan 2022 07:00  --------------------------------------------------------  IN: 1390 mL / OUT: 3060 mL / NET: -1670 mL    25 Jan 2022 07:01  -  26 Jan 2022 04:55  --------------------------------------------------------  IN: 1080 mL / OUT: 826 mL / NET: 254 mL        Appearance: Normal	  HEENT:   Normal oral mucosa, PERRL, EOMI	  Lymphatic: No lymphadenopathy  Cardiovascular: Normal S1 S2, No JVD, + murmurs, No edema  Respiratory: Lungs clear to auscultation	  Psychiatry: A & O x 3, Mood & affect appropriate  Gastrointestinal:  Soft, Non-tender, + BS	  Skin: No rashes, No ecchymoses, No cyanosis	  Neurologic: Non-focal  Extremities: Normal range of motion, No clubbing, cyanosis or edema  Vascular: Peripheral pulses palpable 2+ bilaterally    MEDICATIONS  (STANDING):  aspirin enteric coated 81 milliGRAM(s) Oral daily  dexAMETHasone     Tablet 6 milliGRAM(s) Oral daily  enoxaparin Injectable 50 milliGRAM(s) SubCutaneous two times a day  furosemide   Injectable 20 milliGRAM(s) IV Push daily  metoprolol tartrate 25 milliGRAM(s) Oral two times a day  pantoprazole    Tablet 40 milliGRAM(s) Oral before breakfast      TELEMETRY: 	    ECG:  	  RADIOLOGY:  OTHER: 	  	  LABS:	 	    CARDIAC MARKERS:                                13.2   9.85  )-----------( 273      ( 25 Jan 2022 11:16 )             41.3     01-25    133<L>  |  89<L>  |  44<H>  ----------------------------<  236<H>  4.2   |  26  |  0.93    Ca    8.9      25 Jan 2022 11:18    TPro  7.1  /  Alb  3.4  /  TBili  0.4  /  DBili  x   /  AST  22  /  ALT  17  /  AlkPhos  56  01-25    proBNP: Serum Pro-Brain Natriuretic Peptide: 20975 pg/mL (01-21 @ 12:11)    Lipid Profile:   HgA1c:   TSH: Thyroid Stimulating Hormone, Serum: 0.47 uIU/mL (01-21 @ 17:02)    PT/INR - ( 25 Jan 2022 06:56 )   PT: 13.0 sec;   INR: 1.09 ratio       < from: Limited Transthoracic Echo (01.25.22 @ 13:20) >  Mitral Valve: Mitral annular calcification and calcified  mitral leaflets.  Aortic Valve/Aorta: Aortic valve not well visualized;  appears calcified.  Normal aortic root, aortic arch and descending thoracic  aorta.  Left Atrium: Normal left atrium.  Left Ventricle: Endocardium not well visualized; grossly  normal left ventricular systolic function. Normal left  ventricular internal dimensions and wall thicknesses.  Right Heart: Normal right atrium. The right ventricle is  not well visualized; grossly normal right ventricular  systolic function. Tricuspid valve not well visualized,  probably normal.  Pericardium/Pleura: Normal pericardium with no pericardial  effusion.  Hemodynamic: Estimated right atrial pressure is 8 mm Hg.  ------------------------------------------------------------------------  Conclusions:  1. Endocardium not well visualized; grossly normal left  ventricular systolic function.  2. The right ventricle is not well visualized; grossly  normal right ventricular systolic function.    Assessment and plan  ---------------------------  82 yo F  h/o   dementia and  <CHF per EMS,/  no othe r hx  is  available presenting via EMS from home for few days of weakness, followed by development of SOB and episode of substernal CP this morning.     Pt is a poor historian and unable to provide significant history    all ROS negative upon questioning.   Lives at home with brother - he reports concern for pt's increased work of breathing.   pt is a 82 yo female with hx of htn, chf, cad, with increasing sob and COVID with  abnormal  cta and chest x ray  cta negative for PE  lipid panel  continue covid therapy  fu renal function  keep K>4  WCT on tele yesterday pt converted to a. flutter this am  will increase beta blocker as tolerated  ac  echo noted with normal EF, no WMA  needs lytes stat  dc lasix  continue beta blocker  needs ischemia work up, observe on tele

## 2022-01-26 NOTE — CONSULT NOTE ADULT - SUBJECTIVE AND OBJECTIVE BOX
HPI:  82 yo F     h/o   dementia and  <CHF per EMS,/  no othe r hx  is  available      presenting via EMS from home for few days of weakness, followed by development of SOB and episode of substernal CP this morning.     Pt is a poor historian and unable to provide significant history    all ROS negative upon questioning.     Lives at home with brother - he reports concern for pt's increased work of breathing. (21 Jan 2022 18:07)    Patient is a poor historian, has ?history of diabetes, A1C 6.5%, unsure of her outpatient medications.  Endo was consulted for glycemic control.    PAST MEDICAL & SURGICAL HISTORY:  Dementia        FAMILY HISTORY:      Social History:    Outpatient Medications:    MEDICATIONS  (STANDING):  aspirin enteric coated 81 milliGRAM(s) Oral daily  dexAMETHasone     Tablet 6 milliGRAM(s) Oral daily  dextrose 40% Gel 15 Gram(s) Oral once  dextrose 50% Injectable 25 Gram(s) IV Push once  dextrose 50% Injectable 12.5 Gram(s) IV Push once  dextrose 50% Injectable 25 Gram(s) IV Push once  enoxaparin Injectable 50 milliGRAM(s) SubCutaneous two times a day  glucagon  Injectable 1 milliGRAM(s) IntraMuscular once  insulin lispro (ADMELOG) corrective regimen sliding scale   SubCutaneous three times a day before meals  insulin lispro (ADMELOG) corrective regimen sliding scale   SubCutaneous at bedtime  metoprolol tartrate 25 milliGRAM(s) Oral two times a day  pantoprazole    Tablet 40 milliGRAM(s) Oral before breakfast    MEDICATIONS  (PRN):  acetaminophen     Tablet .. 650 milliGRAM(s) Oral every 6 hours PRN Temp greater or equal to 38C (100.4F), Mild Pain (1 - 3), Moderate Pain (4 - 6), Severe Pain (7 - 10)      Allergies    No Known Allergies    Intolerances      Review of Systems:  Constitutional: No fever, no chills  Eyes: No blurry vision  Neuro: No tremors  HEENT: No pain, no neck swelling  Cardiovascular: No chest pain, no palpitations  Respiratory: Has SOB, no cough  GI: No nausea, vomiting, abdominal pain  : No dysuria  Skin: no rash  MSK: Has leg swelling.  Psych: no depression  Endocrine: no polyuria, polydipsia    ALL OTHER SYSTEMS REVIEWED AND NEGATIVE    UNABLE TO OBTAIN    PHYSICAL EXAM:  VITALS: T(C): 36.4 (01-26-22 @ 11:13)  T(F): 97.6 (01-26-22 @ 11:13), Max: 98 (01-25-22 @ 16:51)  HR: 73 (01-26-22 @ 12:38) (57 - 81)  BP: 111/65 (01-26-22 @ 11:13) (108/67 - 148/83)  RR:  (16 - 18)  SpO2:  (93% - 98%)  Wt(kg): --  GENERAL: NAD, well-groomed, well-developed  EYES: No proptosis, no lid lag  HEENT:  Atraumatic, Normocephalic  THYROID: Normal size, no palpable nodules  RESPIRATORY: Clear to auscultation bilaterally; No rales, rhonchi, wheezing  CARDIOVASCULAR: Si S2, No murmurs;  GI: Soft, non distended, normal bowel sounds  SKIN: Dry, intact, No rashes or lesions  MUSCULOSKELETAL: Has BL lower extremity edema.  NEURO:  no tremor, sensation decreased in feet BL,    POCT Blood Glucose.: 233 mg/dL (01-26-22 @ 12:19)  POCT Blood Glucose.: 109 mg/dL (01-26-22 @ 08:26)                            13.0   9.81  )-----------( 235      ( 26 Jan 2022 07:26 )             40.7       01-26    140  |  98  |  42<H>  ----------------------------<  117<H>  3.7   |  27  |  0.72    EGFR if : 91  EGFR if non : 79    Ca    8.8      01-26  Mg     2.0     01-26  Phos  3.3     01-26    TPro  6.4  /  Alb  3.1<L>  /  TBili  0.3  /  DBili  <0.1  /  AST  20  /  ALT  17  /  AlkPhos  49  01-26      Thyroid Function Tests:  01-21 @ 17:02 TSH 0.47 FreeT4 -- T3 -- Anti TPO -- Anti Thyroglobulin Ab -- TSI --              Radiology:                HPI:  82 yo F     h/o   dementia and  <CHF per EMS,/  no othe r hx  is  available      presenting via EMS from home for few days of weakness, followed by development of SOB and episode of substernal CP this morning.     Pt is a poor historian and unable to provide significant history    all ROS negative upon questioning.     Lives at home with brother - he reports concern for pt's increased work of breathing. (21 Jan 2022 18:07)    Patient is a poor historian, has ?history of diabetes, A1C 6.5%, unsure of her outpatient medications.  Endo was consulted for glycemic control.    PAST MEDICAL & SURGICAL HISTORY:  Dementia        FAMILY HISTORY:      Social History:    Outpatient Medications:    MEDICATIONS  (STANDING):  aspirin enteric coated 81 milliGRAM(s) Oral daily  dexAMETHasone     Tablet 6 milliGRAM(s) Oral daily  dextrose 40% Gel 15 Gram(s) Oral once  dextrose 50% Injectable 25 Gram(s) IV Push once  dextrose 50% Injectable 12.5 Gram(s) IV Push once  dextrose 50% Injectable 25 Gram(s) IV Push once  enoxaparin Injectable 50 milliGRAM(s) SubCutaneous two times a day  glucagon  Injectable 1 milliGRAM(s) IntraMuscular once  insulin lispro (ADMELOG) corrective regimen sliding scale   SubCutaneous three times a day before meals  insulin lispro (ADMELOG) corrective regimen sliding scale   SubCutaneous at bedtime  metoprolol tartrate 25 milliGRAM(s) Oral two times a day  pantoprazole    Tablet 40 milliGRAM(s) Oral before breakfast    MEDICATIONS  (PRN):  acetaminophen     Tablet .. 650 milliGRAM(s) Oral every 6 hours PRN Temp greater or equal to 38C (100.4F), Mild Pain (1 - 3), Moderate Pain (4 - 6), Severe Pain (7 - 10)      Allergies    No Known Allergies    Intolerances      Review of Systems:  Constitutional: No fever, no chills  Eyes: No blurry vision  Neuro: No tremors  HEENT: No pain, no neck swelling  Cardiovascular: No chest pain, no palpitations  Respiratory: Has SOB, no cough  GI: No nausea, vomiting, abdominal pain  : No dysuria  Skin: no rash  MSK: Has leg swelling.  Psych: no depression  Endocrine: no polyuria, polydipsia    ALL OTHER SYSTEMS REVIEWED AND NEGATIVE    UNABLE TO OBTAIN    PHYSICAL EXAM:  VITALS: T(C): 36.4 (01-26-22 @ 11:13)  T(F): 97.6 (01-26-22 @ 11:13), Max: 98 (01-25-22 @ 16:51)  HR: 73 (01-26-22 @ 12:38) (57 - 81)  BP: 111/65 (01-26-22 @ 11:13) (108/67 - 148/83)  RR:  (16 - 18)  SpO2:  (93% - 98%)  Wt(kg): --  GENERAL: NAD, well-groomed, well-developed  EYES: No proptosis, no lid lag  HEENT:  Atraumatic, Normocephalic  THYROID: Normal size, no palpable nodules  RESPIRATORY: Clear to auscultation bilaterally; No rales, rhonchi, wheezing  CARDIOVASCULAR: Si S2, No murmurs;  GI: Soft, non distended, normal bowel sounds  SKIN: Dry, intact, No rashes or lesions  MUSCULOSKELETAL: Has BL lower extremity edema.  NEURO:  no tremor, sensation decreased in feet BL,    POCT Blood Glucose.: 233 mg/dL (01-26-22 @ 12:19)  POCT Blood Glucose.: 109 mg/dL (01-26-22 @ 08:26)                            13.0   9.81  )-----------( 235      ( 26 Jan 2022 07:26 )             40.7       01-26    140  |  98  |  42<H>  ----------------------------<  117<H>  3.7   |  27  |  0.72    EGFR if : 91  EGFR if non : 79    Ca    8.8      01-26  Mg     2.0     01-26  Phos  3.3     01-26    TPro  6.4  /  Alb  3.1<L>  /  TBili  0.3  /  DBili  <0.1  /  AST  20  /  ALT  17  /  AlkPhos  49  01-26      Thyroid Function Tests:  01-21 @ 17:02 TSH 0.47 FreeT4 -- T3 -- Anti TPO -- Anti Thyroglobulin Ab -- TSI --              Radiology:

## 2022-01-27 LAB
A1C WITH ESTIMATED AVERAGE GLUCOSE RESULT: 6.3 % — HIGH (ref 4–5.6)
ALBUMIN SERPL ELPH-MCNC: 3.1 G/DL — LOW (ref 3.3–5)
ALP SERPL-CCNC: 45 U/L — SIGNIFICANT CHANGE UP (ref 40–120)
ALT FLD-CCNC: 17 U/L — SIGNIFICANT CHANGE UP (ref 10–45)
ANION GAP SERPL CALC-SCNC: 13 MMOL/L — SIGNIFICANT CHANGE UP (ref 5–17)
ANION GAP SERPL CALC-SCNC: 13 MMOL/L — SIGNIFICANT CHANGE UP (ref 5–17)
AST SERPL-CCNC: 17 U/L — SIGNIFICANT CHANGE UP (ref 10–40)
BILIRUB DIRECT SERPL-MCNC: <0.1 MG/DL — SIGNIFICANT CHANGE UP (ref 0–0.3)
BILIRUB INDIRECT FLD-MCNC: >0.2 MG/DL — SIGNIFICANT CHANGE UP (ref 0.2–1)
BILIRUB SERPL-MCNC: 0.3 MG/DL — SIGNIFICANT CHANGE UP (ref 0.2–1.2)
BUN SERPL-MCNC: 44 MG/DL — HIGH (ref 7–23)
BUN SERPL-MCNC: 50 MG/DL — HIGH (ref 7–23)
CALCIUM SERPL-MCNC: 8.5 MG/DL — SIGNIFICANT CHANGE UP (ref 8.4–10.5)
CALCIUM SERPL-MCNC: 9 MG/DL — SIGNIFICANT CHANGE UP (ref 8.4–10.5)
CHLORIDE SERPL-SCNC: 101 MMOL/L — SIGNIFICANT CHANGE UP (ref 96–108)
CHLORIDE SERPL-SCNC: 101 MMOL/L — SIGNIFICANT CHANGE UP (ref 96–108)
CO2 SERPL-SCNC: 26 MMOL/L — SIGNIFICANT CHANGE UP (ref 22–31)
CO2 SERPL-SCNC: 27 MMOL/L — SIGNIFICANT CHANGE UP (ref 22–31)
CREAT SERPL-MCNC: 0.72 MG/DL — SIGNIFICANT CHANGE UP (ref 0.5–1.3)
CREAT SERPL-MCNC: 0.73 MG/DL — SIGNIFICANT CHANGE UP (ref 0.5–1.3)
CREAT SERPL-MCNC: 1.01 MG/DL — SIGNIFICANT CHANGE UP (ref 0.5–1.3)
ESTIMATED AVERAGE GLUCOSE: 134 MG/DL — HIGH (ref 68–114)
GLUCOSE BLDC GLUCOMTR-MCNC: 136 MG/DL — HIGH (ref 70–99)
GLUCOSE BLDC GLUCOMTR-MCNC: 149 MG/DL — HIGH (ref 70–99)
GLUCOSE BLDC GLUCOMTR-MCNC: 156 MG/DL — HIGH (ref 70–99)
GLUCOSE BLDC GLUCOMTR-MCNC: 176 MG/DL — HIGH (ref 70–99)
GLUCOSE SERPL-MCNC: 115 MG/DL — HIGH (ref 70–99)
GLUCOSE SERPL-MCNC: 131 MG/DL — HIGH (ref 70–99)
INR BLD: 1.1 RATIO — SIGNIFICANT CHANGE UP (ref 0.88–1.16)
MAGNESIUM SERPL-MCNC: 2.2 MG/DL — SIGNIFICANT CHANGE UP (ref 1.6–2.6)
PHOSPHATE SERPL-MCNC: 3.3 MG/DL — SIGNIFICANT CHANGE UP (ref 2.5–4.5)
POTASSIUM SERPL-MCNC: 3.9 MMOL/L — SIGNIFICANT CHANGE UP (ref 3.5–5.3)
POTASSIUM SERPL-MCNC: 4.1 MMOL/L — SIGNIFICANT CHANGE UP (ref 3.5–5.3)
POTASSIUM SERPL-SCNC: 3.9 MMOL/L — SIGNIFICANT CHANGE UP (ref 3.5–5.3)
POTASSIUM SERPL-SCNC: 4.1 MMOL/L — SIGNIFICANT CHANGE UP (ref 3.5–5.3)
PROT SERPL-MCNC: 6.2 G/DL — SIGNIFICANT CHANGE UP (ref 6–8.3)
PROTHROM AB SERPL-ACNC: 13.1 SEC — SIGNIFICANT CHANGE UP (ref 10.6–13.6)
SODIUM SERPL-SCNC: 140 MMOL/L — SIGNIFICANT CHANGE UP (ref 135–145)
SODIUM SERPL-SCNC: 141 MMOL/L — SIGNIFICANT CHANGE UP (ref 135–145)

## 2022-01-27 RX ADMIN — Medication 81 MILLIGRAM(S): at 13:13

## 2022-01-27 RX ADMIN — Medication 1: at 13:13

## 2022-01-27 RX ADMIN — ENOXAPARIN SODIUM 50 MILLIGRAM(S): 100 INJECTION SUBCUTANEOUS at 17:43

## 2022-01-27 RX ADMIN — Medication 25 MILLIGRAM(S): at 17:46

## 2022-01-27 RX ADMIN — Medication 25 MILLIGRAM(S): at 05:57

## 2022-01-27 RX ADMIN — Medication 1: at 17:43

## 2022-01-27 RX ADMIN — ENOXAPARIN SODIUM 50 MILLIGRAM(S): 100 INJECTION SUBCUTANEOUS at 05:58

## 2022-01-27 RX ADMIN — Medication 6 MILLIGRAM(S): at 05:57

## 2022-01-27 RX ADMIN — PANTOPRAZOLE SODIUM 40 MILLIGRAM(S): 20 TABLET, DELAYED RELEASE ORAL at 05:57

## 2022-01-27 NOTE — PROGRESS NOTE ADULT - ASSESSMENT
Assessment  DMT2: 81y Female with DM T2 with hyperglycemia, A1C 6.5%, she is unsure of her outpatient medications, started on basal bolus insulin,  blood sugars are improving, no hypoglycemias, on dexamethasone, eating meals.  Covid: on medications,  stable, monitored.  HTN: Controlled,  on antihypertensive medications.  Dementia: stable, monitored.      Brian Huynh MD  Cell: [phone]  Office: [phone]     Assessment  DMT2: 81y Female with DM T2 with hyperglycemia, A1C 6.5%, she is unsure of her outpatient medications, now on insulin coverage only, blood sugars are improving and in acceptable range, no hypoglycemias, on dexamethasone, eating meals.  Covid: on medications,  stable, monitored.  HTN: Controlled,  on antihypertensive medications.  Dementia: stable, monitored.      Brian Huynh MD  Cell: [phone]  Office: [phone]     Assessment  DMT2: 81y Female with DM T2 with hyperglycemia, A1C 6.5%, she is unsure of her outpatient medications, now on insulin coverage only, blood sugars are improving and in  acceptable range, no hypoglycemias, on dexamethasone, eating meals.  Covid: on medications,  stable, monitored.  HTN: Controlled,  on antihypertensive medications.  Dementia: stable, monitored.      Brian Huynh MD  Cell: [phone]  Office: [phone]

## 2022-01-27 NOTE — PROVIDER CONTACT NOTE (OTHER) - ASSESSMENT
Pt A&Ox0-1. Pt comfortably sitting in chair at this time. Pt does not appear to be in any signs of distress or pain. Pt has been having multiple episodes of wide complex. VSS.

## 2022-01-27 NOTE — CHART NOTE - NSCHARTNOTEFT_GEN_A_CORE
Advanced Care Planning:  I have had goals of care discussion, advanced directive and code status with patient/family    and  in  coordinating   patient care.     all questions answered and expressed understanding of the plan.    pe r brother,  pt  is  full  code

## 2022-01-27 NOTE — PROGRESS NOTE ADULT - SUBJECTIVE AND OBJECTIVE BOX
afberile  REVIEW OF SYSTEMS:  GEN: no fever,    no chills  RESP: no SOB,   no cough  CVS: no chest pain,   no palpitations  GI: no abdominal pain,   no nausea,   no vomiting,   no constipation,   no diarrhea  : no dysuria,   no frequency  NEURO: no headache,   no dizziness  PSYCH: no depression,   not anxious  Derm : no rash    MEDICATIONS  (STANDING):  aspirin enteric coated 81 milliGRAM(s) Oral daily  dexAMETHasone     Tablet 6 milliGRAM(s) Oral daily  dextrose 40% Gel 15 Gram(s) Oral once  dextrose 50% Injectable 25 Gram(s) IV Push once  dextrose 50% Injectable 12.5 Gram(s) IV Push once  dextrose 50% Injectable 25 Gram(s) IV Push once  enoxaparin Injectable 50 milliGRAM(s) SubCutaneous two times a day  glucagon  Injectable 1 milliGRAM(s) IntraMuscular once  insulin lispro (ADMELOG) corrective regimen sliding scale   SubCutaneous three times a day before meals  insulin lispro (ADMELOG) corrective regimen sliding scale   SubCutaneous at bedtime  metoprolol tartrate 25 milliGRAM(s) Oral two times a day  pantoprazole    Tablet 40 milliGRAM(s) Oral before breakfast    MEDICATIONS  (PRN):  acetaminophen     Tablet .. 650 milliGRAM(s) Oral every 6 hours PRN Temp greater or equal to 38C (100.4F), Mild Pain (1 - 3), Moderate Pain (4 - 6), Severe Pain (7 - 10)      Vital Signs Last 24 Hrs  T(C): 36.6 (27 Jan 2022 05:21), Max: 36.8 (26 Jan 2022 22:04)  T(F): 97.8 (27 Jan 2022 05:21), Max: 98.3 (26 Jan 2022 22:04)  HR: 68 (27 Jan 2022 07:19) (57 - 84)  BP: 138/75 (27 Jan 2022 05:21) (111/65 - 138/75)  BP(mean): --  RR: 17 (27 Jan 2022 07:19) (17 - 18)  SpO2: 97% (27 Jan 2022 07:19) (96% - 99%)  CAPILLARY BLOOD GLUCOSE      POCT Blood Glucose.: 189 mg/dL (26 Jan 2022 21:26)  POCT Blood Glucose.: 145 mg/dL (26 Jan 2022 16:54)  POCT Blood Glucose.: 233 mg/dL (26 Jan 2022 12:19)  POCT Blood Glucose.: 109 mg/dL (26 Jan 2022 08:26)    I&O's Summary    26 Jan 2022 07:01  -  27 Jan 2022 07:00  --------------------------------------------------------  IN: 140 mL / OUT: 1350 mL / NET: -1210 mL        PHYSICAL EXAM:  HEAD:  Atraumatic, Normocephalic  NECK: Supple, No   JVD  CHEST/LUNG:   no     rales,     no,    rhonchi  HEART: Regular rate and rhythm;         murmur  ABDOMEN: Soft, Nontender, ;   EXTREMITIES:   no     edema  NEUROLOGY:  alert    LABS:                        13.0   9.81  )-----------( 235      ( 26 Jan 2022 07:26 )             40.7     01-26    140  |  98  |  42<H>  ----------------------------<  117<H>  3.7   |  27  |  0.72    Ca    8.8      26 Jan 2022 07:26  Phos  3.3     01-26  Mg     2.0     01-26    TPro  6.4  /  Alb  3.1<L>  /  TBili  0.3  /  DBili  <0.1  /  AST  20  /  ALT  17  /  AlkPhos  49  01-26    PT/INR - ( 26 Jan 2022 07:26 )   PT: 13.6 sec;   INR: 1.14 ratio         PTT - ( 26 Jan 2022 07:26 )  PTT:28.7 sec                Thyroid Stimulating Hormone, Serum: 0.47 uIU/mL (01-21 @ 17:02)          Consultant(s) Notes Reviewed:      Care Discussed with Consultants/Other Providers:

## 2022-01-27 NOTE — PROGRESS NOTE ADULT - SUBJECTIVE AND OBJECTIVE BOX
Chief complaint  Patient is a 81y old  Female who presents with a chief complaint of sob/cp (27 Jan 2022 07:36)   Review of systems  Patient in bed, looks comfortable, no hypoglycemic episodes.    Labs and Fingersticks  CAPILLARY BLOOD GLUCOSE      POCT Blood Glucose.: 176 mg/dL (27 Jan 2022 12:45)  POCT Blood Glucose.: 136 mg/dL (27 Jan 2022 09:09)  POCT Blood Glucose.: 189 mg/dL (26 Jan 2022 21:26)      Anion Gap, Serum: 13 (01-27 @ 07:05)  Anion Gap, Serum: 15 (01-26 @ 07:26)      Calcium, Total Serum: 9.0 (01-27 @ 07:05)  Calcium, Total Serum: 8.8 (01-26 @ 07:26)  Albumin, Serum: 3.1 *L* (01-27 @ 07:05)  Albumin, Serum: 3.1 *L* (01-26 @ 07:26)  Albumin, Serum: 3.1 *L* (01-26 @ 07:26)    Alanine Aminotransferase (ALT/SGPT): 17 (01-27 @ 07:05)  Alanine Aminotransferase (ALT/SGPT): 17 (01-26 @ 07:26)  Alanine Aminotransferase (ALT/SGPT): 17 (01-26 @ 07:26)  Alkaline Phosphatase, Serum: 45 (01-27 @ 07:05)  Alkaline Phosphatase, Serum: 49 (01-26 @ 07:26)  Alkaline Phosphatase, Serum: 48 (01-26 @ 07:26)  Aspartate Aminotransferase (AST/SGOT): 17 (01-27 @ 07:05)  Aspartate Aminotransferase (AST/SGOT): 20 (01-26 @ 07:26)  Aspartate Aminotransferase (AST/SGOT): 20 (01-26 @ 07:26)        01-27    141  |  101  |  44<H>  ----------------------------<  115<H>  3.9   |  27  |  0.72    Ca    9.0      27 Jan 2022 07:05  Phos  3.3     01-26  Mg     2.0     01-26    TPro  6.2  /  Alb  3.1<L>  /  TBili  0.3  /  DBili  <0.1  /  AST  17  /  ALT  17  /  AlkPhos  45  01-27                        13.0   9.81  )-----------( 235      ( 26 Jan 2022 07:26 )             40.7     Medications  MEDICATIONS  (STANDING):  aspirin enteric coated 81 milliGRAM(s) Oral daily  dexAMETHasone     Tablet 6 milliGRAM(s) Oral daily  dextrose 40% Gel 15 Gram(s) Oral once  dextrose 50% Injectable 25 Gram(s) IV Push once  dextrose 50% Injectable 12.5 Gram(s) IV Push once  dextrose 50% Injectable 25 Gram(s) IV Push once  enoxaparin Injectable 50 milliGRAM(s) SubCutaneous two times a day  glucagon  Injectable 1 milliGRAM(s) IntraMuscular once  insulin lispro (ADMELOG) corrective regimen sliding scale   SubCutaneous three times a day before meals  insulin lispro (ADMELOG) corrective regimen sliding scale   SubCutaneous at bedtime  metoprolol tartrate 25 milliGRAM(s) Oral two times a day  pantoprazole    Tablet 40 milliGRAM(s) Oral before breakfast      Physical Exam  General: Patient comfortable in bed  Vital Signs Last 12 Hrs  T(F): 97.5 (01-27-22 @ 13:22), Max: 97.8 (01-27-22 @ 05:21)  HR: 90 (01-27-22 @ 13:22) (68 - 90)  BP: 107/73 (01-27-22 @ 13:22) (102/65 - 138/75)  BP(mean): --  RR: 17 (01-27-22 @ 13:22) (17 - 17)  SpO2: 94% (01-27-22 @ 13:22) (94% - 97%)  Neck: No palpable thyroid nodules.  CVS: S1S2, No murmurs  Respiratory: No wheezing, no crepitations  GI: Abdomen soft, bowel sounds positive  Musculoskeletal:  edema lower extremities.   Skin: No skin rashes, no ecchymosis    Diagnostics    Diet, Regular:   Consistent Carbohydrate No Snacks (CSTCHO) (01-26 @ 15:27)           Chief complaint  Patient is a 81y old  Female who presents with a chief complaint of sob/cp (27 Jan 2022 07:36)   Review of systems  Patient in bed, looks comfortable, no hypoglycemic episodes.    Labs and Fingersticks  CAPILLARY BLOOD GLUCOSE      POCT Blood Glucose.: 176 mg/dL (27 Jan 2022 12:45)  POCT Blood Glucose.: 136 mg/dL (27 Jan 2022 09:09)  POCT Blood Glucose.: 189 mg/dL (26 Jan 2022 21:26)      Anion Gap, Serum: 13 (01-27 @ 07:05)  Anion Gap, Serum: 15 (01-26 @ 07:26)      Calcium, Total Serum: 9.0 (01-27 @ 07:05)  Calcium, Total Serum: 8.8 (01-26 @ 07:26)  Albumin, Serum: 3.1 *L* (01-27 @ 07:05)  Albumin, Serum: 3.1 *L* (01-26 @ 07:26)  Albumin, Serum: 3.1 *L* (01-26 @ 07:26)    Alanine Aminotransferase (ALT/SGPT): 17 (01-27 @ 07:05)  Alanine Aminotransferase (ALT/SGPT): 17 (01-26 @ 07:26)  Alanine Aminotransferase (ALT/SGPT): 17 (01-26 @ 07:26)  Alkaline Phosphatase, Serum: 45 (01-27 @ 07:05)  Alkaline Phosphatase, Serum: 49 (01-26 @ 07:26)  Alkaline Phosphatase, Serum: 48 (01-26 @ 07:26)  Aspartate Aminotransferase (AST/SGOT): 17 (01-27 @ 07:05)  Aspartate Aminotransferase (AST/SGOT): 20 (01-26 @ 07:26)  Aspartate Aminotransferase (AST/SGOT): 20 (01-26 @ 07:26)        01-27    141  |  101  |  44<H>  ----------------------------<  115<H>  3.9   |  27  |  0.72    Ca    9.0      27 Jan 2022 07:05  Phos  3.3     01-26  Mg     2.0     01-26    TPro  6.2  /  Alb  3.1<L>  /  TBili  0.3  /  DBili  <0.1  /  AST  17  /  ALT  17  /  AlkPhos  45  01-27                        13.0   9.81  )-----------( 235      ( 26 Jan 2022 07:26 )             40.7     Medications  MEDICATIONS  (STANDING):  aspirin enteric coated 81 milliGRAM(s) Oral daily  dexAMETHasone     Tablet 6 milliGRAM(s) Oral daily  dextrose 40% Gel 15 Gram(s) Oral once  dextrose 50% Injectable 25 Gram(s) IV Push once  dextrose 50% Injectable 12.5 Gram(s) IV Push once  dextrose 50% Injectable 25 Gram(s) IV Push once  enoxaparin Injectable 50 milliGRAM(s) SubCutaneous two times a day  glucagon  Injectable 1 milliGRAM(s) IntraMuscular once  insulin lispro (ADMELOG) corrective regimen sliding scale   SubCutaneous three times a day before meals  insulin lispro (ADMELOG) corrective regimen sliding scale   SubCutaneous at bedtime  metoprolol tartrate 25 milliGRAM(s) Oral two times a day  pantoprazole    Tablet 40 milliGRAM(s) Oral before breakfast      Physical Exam  General: Patient comfortable in bed  Vital Signs Last 12 Hrs  T(F): 97.5 (01-27-22 @ 13:22), Max: 97.8 (01-27-22 @ 05:21)  HR: 90 (01-27-22 @ 13:22) (68 - 90)  BP: 107/73 (01-27-22 @ 13:22) (102/65 - 138/75)  BP(mean): --  RR: 17 (01-27-22 @ 13:22) (17 - 17)  SpO2: 94% (01-27-22 @ 13:22) (94% - 97%)  Neck: No palpable thyroid nodules.  CVS: S1S2, No murmurs  Respiratory: No wheezing, no crepitations  GI: Abdomen soft, bowel sounds positive  Musculoskeletal:  edema lower extremities.   Skin: No skin rashes, no ecchymosis    Diagnostics    Diet, Regular:   Consistent Carbohydrate No Snacks (CSTCHO) (01-26 @ 15:27)

## 2022-01-27 NOTE — CHART NOTE - NSCHARTNOTEFT_GEN_A_CORE
· Assessment	  82 yo F h/o   dementia and   ? CHF per EMS,/  no other  hx  is  available      presenting via EMS from home for few days of weakness, followed by development of SOB and episode of substernal CP this morning.     Pt is a poor historian and unable to provide significant history    all ROS negative upon questioning.     Lives at home with brother - he reports concern for pt's increased work of breathing. Medicine NP note    CC:    80 yo F h/o   dementia and   ? CHF per EMS,/  no other  hx  is  available      presenting via EMS from home for few days of weakness, followed by development of SOB and episode of substernal CP this morning.     Pt is a poor historian and unable to provide significant history    all ROS negative upon questioning.     Lives at home with brother - he reports concern for pt's increased work of breathing. Medicine NP note    CC:  9 Beats of WCT  Pt asymptomatic    Vital Signs Last 24 Hrs  T(C): 36.4 (27 Jan 2022 21:21), Max: 36.6 (27 Jan 2022 05:21)  T(F): 97.5 (27 Jan 2022 21:21), Max: 97.8 (27 Jan 2022 05:21)  HR: 78 (27 Jan 2022 21:21) (68 - 90)  BP: 121/75 (27 Jan 2022 21:21) (102/65 - 138/75)  BP(mean): --  RR: 17 (27 Jan 2022 21:21) (17 - 18)  SpO2: 97% (27 Jan 2022 21:21) (94% - 99%)    A/P    82 yo F h/o dementia and  ? CHF admitted for covid NA  now with 9 beats of WCT, patient with unsustained WCT earlier also  Asymptomatic, currently  on metoprolol 25MG pO BID  Electrolytes stat resuled in range  Am dose of BB for now  Will contact Cardiology am   Consider increasing BB dose pending conversation with cardiology am  Keep k+> 4.0, Mg> 2.0, phos> 3.0  F/U cardiology am  C/W tele  Will continue to monitor    Romelia aguirre [facility] [place]  [id number]

## 2022-01-27 NOTE — PROGRESS NOTE ADULT - ASSESSMENT
82 yo F     h/o   dementia and   ? CHF per EMS,/  no othe r hx  is  available      presenting via EMS from home for few days of weakness, followed by development of SOB and episode of substernal CP this morning.     Pt is a poor historian and unable to provide significant history    all ROS negative upon questioning.     Lives at home with brother - he reports concern for pt's increased work of breathing.      pt with   weakness/  sob  from  covid/  pna   CT  chest, with  GOO   on decadron/  remdisivir   *  Dementia   *    HTN,   *  acute  diastolic chf  on   lasix  card  d r susan  on  dvt ppx.  bmi  is  20  continue  supportive care   on iv remdisivir  tele,   s/p vtach/ on  toprol,  card to  f/p   *  Afib,  on lovenox  bid  vtach  on tele.  card following  will need  cardiac cath,,  when stable   on lovenox  bid    pmd  allie longoria< from: CT Angio Chest PE Protocol w/ IV Cont (01.21.22 @ 16:01) >  IMPRESSION:  No main, right, left, lobar, or proximal segmental pulmonary embolus.  Patchy bilateral groundglass opacities noted throughout both lungs likely   representing infection or alveolar component of edema. COVID 19 can also   have this appearance. Correlate with RT PCR    --- End of Report --  < end of copied text >     80 yo F     h/o   dementia and   ? CHF per EMS,/  no other  hx  is  available      presenting via EMS from home for few days of weakness, followed by development of SOB and episode of substernal CP this morning.     Pt is a poor historian and unable to provide significant history    all ROS negative upon questioning.     Lives at home with brother - he reports concern for pt's increased work of breathing.      pt with   weakness/  sob  from  covid/  pna   CT  chest, with  GOO   on decadron/  remdisivir   *  Dementia   *    HTN,   *  acute  diastolic chf  on   lasix  card  dr davis  on  dvt ppx.  bmi  is  20  continue  supportive care   on iv remdisivir  tele,   s/p vtach/ on  toprol,  card to  f/p   *  Afib,  on lovenox  bid  v tach  on tele.  card following  will need  cardiac cath,,  when stable  on lovenox  bid     spoke  with  brothersammi/ pt  is , has  no  children/ states,   pt  is  full code    pmd  allie longoria     rad< from: CT Angio Chest PE Protocol w/ IV Cont (01.21.22 @ 16:01) >  IMPRESSION:  No main, right, left, lobar, or proximal segmental pulmonary embolus.  Patchy bilateral groundglass opacities noted throughout both lungs likely   representing infection or alveolar component of edema. COVID 19 can also   have this appearance. Correlate with RT PCR    --- End of Report --  < end of copied text >

## 2022-01-28 LAB
ALBUMIN SERPL ELPH-MCNC: 3 G/DL — LOW (ref 3.3–5)
ALP SERPL-CCNC: 44 U/L — SIGNIFICANT CHANGE UP (ref 40–120)
ALT FLD-CCNC: 15 U/L — SIGNIFICANT CHANGE UP (ref 10–45)
AST SERPL-CCNC: 15 U/L — SIGNIFICANT CHANGE UP (ref 10–40)
BILIRUB DIRECT SERPL-MCNC: <0.1 MG/DL — SIGNIFICANT CHANGE UP (ref 0–0.3)
BILIRUB INDIRECT FLD-MCNC: >0.2 MG/DL — SIGNIFICANT CHANGE UP (ref 0.2–1)
BILIRUB SERPL-MCNC: 0.3 MG/DL — SIGNIFICANT CHANGE UP (ref 0.2–1.2)
CREAT SERPL-MCNC: 0.76 MG/DL — SIGNIFICANT CHANGE UP (ref 0.5–1.3)
GLUCOSE BLDC GLUCOMTR-MCNC: 123 MG/DL — HIGH (ref 70–99)
GLUCOSE BLDC GLUCOMTR-MCNC: 135 MG/DL — HIGH (ref 70–99)
GLUCOSE BLDC GLUCOMTR-MCNC: 139 MG/DL — HIGH (ref 70–99)
GLUCOSE BLDC GLUCOMTR-MCNC: 167 MG/DL — HIGH (ref 70–99)
INR BLD: 1.07 RATIO — SIGNIFICANT CHANGE UP (ref 0.88–1.16)
PROT SERPL-MCNC: 5.9 G/DL — LOW (ref 6–8.3)
PROTHROM AB SERPL-ACNC: 12.8 SEC — SIGNIFICANT CHANGE UP (ref 10.6–13.6)

## 2022-01-28 RX ORDER — METOPROLOL TARTRATE 50 MG
25 TABLET ORAL ONCE
Refills: 0 | Status: COMPLETED | OUTPATIENT
Start: 2022-01-28 | End: 2022-01-28

## 2022-01-28 RX ORDER — METOPROLOL TARTRATE 50 MG
50 TABLET ORAL
Refills: 0 | Status: DISCONTINUED | OUTPATIENT
Start: 2022-01-28 | End: 2022-02-03

## 2022-01-28 RX ADMIN — Medication 1: at 12:46

## 2022-01-28 RX ADMIN — Medication 25 MILLIGRAM(S): at 01:13

## 2022-01-28 RX ADMIN — PANTOPRAZOLE SODIUM 40 MILLIGRAM(S): 20 TABLET, DELAYED RELEASE ORAL at 05:16

## 2022-01-28 RX ADMIN — ENOXAPARIN SODIUM 50 MILLIGRAM(S): 100 INJECTION SUBCUTANEOUS at 18:03

## 2022-01-28 RX ADMIN — Medication 25 MILLIGRAM(S): at 10:11

## 2022-01-28 RX ADMIN — Medication 6 MILLIGRAM(S): at 05:17

## 2022-01-28 RX ADMIN — Medication 50 MILLIGRAM(S): at 18:04

## 2022-01-28 RX ADMIN — Medication 81 MILLIGRAM(S): at 12:45

## 2022-01-28 RX ADMIN — ENOXAPARIN SODIUM 50 MILLIGRAM(S): 100 INJECTION SUBCUTANEOUS at 05:18

## 2022-01-28 NOTE — PROGRESS NOTE ADULT - ASSESSMENT
80 yo F     h/o   dementia and   ? CHF per EMS,/  no other  hx  is  available      presenting via EMS from home for few days of weakness, followed by development of SOB and episode of substernal CP this morning.     Pt is a poor historian and unable to provide significant history    all ROS negative upon questioning.     Lives at home with brother - he reports concern for pt's increased work of breathing.      pt with   weakness/  sob  from  covid/  pna   CT  chest, with  GOO   on decadron/  remdisivir   *  Dementia   *    HTN,   *  acute  diastolic chf  on   lasix  card  dr davis  on  dvt ppx.  bmi  is  20  continue  supportive care   on iv remdisivir  tele,   s/p vtach/ on  toprol,  card to  f/p   *  Afib,  on lovenox  bid  v tach  on tele.  card following  will need  cardiac cath,,  when stable  on lovenox  bid  tele, afib,  no  vtach     spoke  with  brother, rosasin/ pt  is , has  no  children/ states,   pt  is  full code    pmd  dr sheppard, allie     rad< from: CT Angio Chest PE Protocol w/ IV Cont (01.21.22 @ 16:01) >  IMPRESSION:  No main, right, left, lobar, or proximal segmental pulmonary embolus.  Patchy bilateral groundglass opacities noted throughout both lungs likely   representing infection or alveolar component of edema. COVID 19 can also   have this appearance. Correlate with RT PCR    --- End of Report --  < end of copied text >

## 2022-01-28 NOTE — PROGRESS NOTE ADULT - SUBJECTIVE AND OBJECTIVE BOX
Chief complaint  Patient is a 81y old  Female who presents with a chief complaint of sob/cp (28 Jan 2022 07:27)   Review of systems  Patient in bed, looks comfortable, no hypoglycemic episodes.    Labs and Fingersticks  CAPILLARY BLOOD GLUCOSE      POCT Blood Glucose.: 135 mg/dL (28 Jan 2022 08:56)  POCT Blood Glucose.: 149 mg/dL (27 Jan 2022 22:00)  POCT Blood Glucose.: 156 mg/dL (27 Jan 2022 17:42)  POCT Blood Glucose.: 176 mg/dL (27 Jan 2022 12:45)      Anion Gap, Serum: 13 (01-27 @ 23:09)  Anion Gap, Serum: 13 (01-27 @ 07:05)      Calcium, Total Serum: 8.5 (01-27 @ 23:09)  Calcium, Total Serum: 9.0 (01-27 @ 07:05)  Albumin, Serum: 3.0 *L* (01-28 @ 07:25)  Albumin, Serum: 3.1 *L* (01-27 @ 07:05)    Alanine Aminotransferase (ALT/SGPT): 15 (01-28 @ 07:25)  Alanine Aminotransferase (ALT/SGPT): 17 (01-27 @ 07:05)  Alkaline Phosphatase, Serum: 44 (01-28 @ 07:25)  Alkaline Phosphatase, Serum: 45 (01-27 @ 07:05)  Aspartate Aminotransferase (AST/SGOT): 15 (01-28 @ 07:25)  Aspartate Aminotransferase (AST/SGOT): 17 (01-27 @ 07:05)        01-28    x   |  x   |  x   ----------------------------<  x   x    |  x   |  0.76    Ca    8.5      27 Jan 2022 23:09  Phos  3.3     01-27  Mg     2.2     01-27    TPro  5.9<L>  /  Alb  3.0<L>  /  TBili  0.3  /  DBili  <0.1  /  AST  15  /  ALT  15  /  AlkPhos  44  01-28    Medications  MEDICATIONS  (STANDING):  aspirin enteric coated 81 milliGRAM(s) Oral daily  dexAMETHasone     Tablet 6 milliGRAM(s) Oral daily  dextrose 40% Gel 15 Gram(s) Oral once  dextrose 50% Injectable 25 Gram(s) IV Push once  dextrose 50% Injectable 12.5 Gram(s) IV Push once  dextrose 50% Injectable 25 Gram(s) IV Push once  enoxaparin Injectable 50 milliGRAM(s) SubCutaneous two times a day  glucagon  Injectable 1 milliGRAM(s) IntraMuscular once  insulin lispro (ADMELOG) corrective regimen sliding scale   SubCutaneous three times a day before meals  insulin lispro (ADMELOG) corrective regimen sliding scale   SubCutaneous at bedtime  metoprolol tartrate 50 milliGRAM(s) Oral two times a day  pantoprazole    Tablet 40 milliGRAM(s) Oral before breakfast      Physical Exam  General: Patient comfortable in bed  Vital Signs Last 12 Hrs  T(F): 98.1 (01-28-22 @ 10:20), Max: 98.3 (01-28-22 @ 01:02)  HR: 93 (01-28-22 @ 10:20) (69 - 93)  BP: 105/53 (01-28-22 @ 10:20) (105/53 - 129/70)  BP(mean): --  RR: 18 (01-28-22 @ 10:20) (18 - 18)  SpO2: 96% (01-28-22 @ 10:20) (95% - 96%)  Neck: No palpable thyroid nodules.  CVS: S1S2, No murmurs  Respiratory: No wheezing, no crepitations  GI: Abdomen soft, bowel sounds positive  Musculoskeletal:  edema lower extremities.   Skin: No skin rashes, no ecchymosis    Diagnostics    Diet, Regular:   Consistent Carbohydrate No Snacks (CSTCHO) (01-26 @ 15:27)           Chief complaint  Patient is a 81y old  Female who presents with a chief complaint of sob/cp (28 Jan 2022 07:27)   Review of systems  Patient in bed, looks comfortable, no hypoglycemic episodes.    Labs and Fingersticks  CAPILLARY BLOOD GLUCOSE      POCT Blood Glucose.: 135 mg/dL (28 Jan 2022 08:56)  POCT Blood Glucose.: 149 mg/dL (27 Jan 2022 22:00)  POCT Blood Glucose.: 156 mg/dL (27 Jan 2022 17:42)  POCT Blood Glucose.: 176 mg/dL (27 Jan 2022 12:45)      Anion Gap, Serum: 13 (01-27 @ 23:09)  Anion Gap, Serum: 13 (01-27 @ 07:05)      Calcium, Total Serum: 8.5 (01-27 @ 23:09)  Calcium, Total Serum: 9.0 (01-27 @ 07:05)  Albumin, Serum: 3.0 *L* (01-28 @ 07:25)  Albumin, Serum: 3.1 *L* (01-27 @ 07:05)    Alanine Aminotransferase (ALT/SGPT): 15 (01-28 @ 07:25)  Alanine Aminotransferase (ALT/SGPT): 17 (01-27 @ 07:05)  Alkaline Phosphatase, Serum: 44 (01-28 @ 07:25)  Alkaline Phosphatase, Serum: 45 (01-27 @ 07:05)  Aspartate Aminotransferase (AST/SGOT): 15 (01-28 @ 07:25)  Aspartate Aminotransferase (AST/SGOT): 17 (01-27 @ 07:05)        01-28    x   |  x   |  x   ----------------------------<  x   x    |  x   |  0.76    Ca    8.5      27 Jan 2022 23:09  Phos  3.3     01-27  Mg     2.2     01-27    TPro  5.9<L>  /  Alb  3.0<L>  /  TBili  0.3  /  DBili  <0.1  /  AST  15  /  ALT  15  /  AlkPhos  44  01-28    Medications  MEDICATIONS  (STANDING):  aspirin enteric coated 81 milliGRAM(s) Oral daily  dexAMETHasone     Tablet 6 milliGRAM(s) Oral daily  dextrose 40% Gel 15 Gram(s) Oral once  dextrose 50% Injectable 25 Gram(s) IV Push once  dextrose 50% Injectable 12.5 Gram(s) IV Push once  dextrose 50% Injectable 25 Gram(s) IV Push once  enoxaparin Injectable 50 milliGRAM(s) SubCutaneous two times a day  glucagon  Injectable 1 milliGRAM(s) IntraMuscular once  insulin lispro (ADMELOG) corrective regimen sliding scale   SubCutaneous three times a day before meals  insulin lispro (ADMELOG) corrective regimen sliding scale   SubCutaneous at bedtime  metoprolol tartrate 50 milliGRAM(s) Oral two times a day  pantoprazole    Tablet 40 milliGRAM(s) Oral before breakfast      Physical Exam  General: Patient comfortable in bed  Vital Signs Last 12 Hrs  T(F): 98.1 (01-28-22 @ 10:20), Max: 98.3 (01-28-22 @ 01:02)  HR: 93 (01-28-22 @ 10:20) (69 - 93)  BP: 105/53 (01-28-22 @ 10:20) (105/53 - 129/70)  BP(mean): --  RR: 18 (01-28-22 @ 10:20) (18 - 18)  SpO2: 96% (01-28-22 @ 10:20) (95% - 96%)  Neck: No palpable thyroid nodules.  CVS: S1S2, No murmurs  Respiratory: No wheezing, no crepitations  GI: Abdomen soft, bowel sounds positive  Musculoskeletal:  edema lower extremities.   Skin: No skin rashes, no ecchymosis    Diagnostics    Diet, Regular:   Consistent Carbohydrate No Snacks (CSTCHO) (01-26 @ 15:27)

## 2022-01-28 NOTE — PROGRESS NOTE ADULT - ASSESSMENT
Assessment  DMT2: 81y Female with DM T2 with hyperglycemia, A1C 6.3%, she is unsure of her outpatient medications, now on insulin coverage only, blood sugars are stable and trending within acceptable range, no hypoglycemias. Patient is eating meals, NAD, remains on dexamethasone.  Covid: on medications,  stable, monitored.  HTN: Controlled,  on antihypertensive medications.  Dementia: stable, monitored.      Brian Huynh MD  Cell: [phone]  Office: [phone]     Assessment  DMT2: 81y Female with DM T2 with hyperglycemia, A1C 6.3%, she is unsure of her outpatient medications, now on insulin coverage only, blood sugars are stable and trending within acceptable range,  no hypoglycemias. Patient is eating meals, NAD, remains on dexamethasone.  Covid: on medications,  stable, monitored.  HTN: Controlled,  on antihypertensive medications.  Dementia: stable, monitored.      Brian Huynh MD  Cell: [phone]  Office: [phone]

## 2022-01-28 NOTE — PROGRESS NOTE ADULT - SUBJECTIVE AND OBJECTIVE BOX
afberile    REVIEW OF SYSTEMS:  GEN: no fever,    no chills  RESP: no SOB,   no cough  CVS: no chest pain,   no palpitations  GI: no abdominal pain,   no nausea,   no vomiting,   no constipation,   no diarrhea  : no dysuria,   no frequency  NEURO: no headache,   no dizziness  PSYCH: no depression,   not anxious  Derm : no rash    MEDICATIONS  (STANDING):  aspirin enteric coated 81 milliGRAM(s) Oral daily  dexAMETHasone     Tablet 6 milliGRAM(s) Oral daily  dextrose 40% Gel 15 Gram(s) Oral once  dextrose 50% Injectable 25 Gram(s) IV Push once  dextrose 50% Injectable 12.5 Gram(s) IV Push once  dextrose 50% Injectable 25 Gram(s) IV Push once  enoxaparin Injectable 50 milliGRAM(s) SubCutaneous two times a day  glucagon  Injectable 1 milliGRAM(s) IntraMuscular once  insulin lispro (ADMELOG) corrective regimen sliding scale   SubCutaneous three times a day before meals  insulin lispro (ADMELOG) corrective regimen sliding scale   SubCutaneous at bedtime  metoprolol tartrate 25 milliGRAM(s) Oral two times a day  pantoprazole    Tablet 40 milliGRAM(s) Oral before breakfast    MEDICATIONS  (PRN):  acetaminophen     Tablet .. 650 milliGRAM(s) Oral every 6 hours PRN Temp greater or equal to 38C (100.4F), Mild Pain (1 - 3), Moderate Pain (4 - 6), Severe Pain (7 - 10)      Vital Signs Last 24 Hrs  T(C): 36.8 (28 Jan 2022 05:17), Max: 36.8 (28 Jan 2022 01:02)  T(F): 98.2 (28 Jan 2022 05:17), Max: 98.3 (28 Jan 2022 01:02)  HR: 71 (28 Jan 2022 05:17) (71 - 90)  BP: 116/63 (28 Jan 2022 05:17) (102/65 - 129/70)  BP(mean): --  RR: 18 (28 Jan 2022 05:17) (17 - 18)  SpO2: 95% (28 Jan 2022 05:17) (94% - 97%)  CAPILLARY BLOOD GLUCOSE      POCT Blood Glucose.: 149 mg/dL (27 Jan 2022 22:00)  POCT Blood Glucose.: 156 mg/dL (27 Jan 2022 17:42)  POCT Blood Glucose.: 176 mg/dL (27 Jan 2022 12:45)  POCT Blood Glucose.: 136 mg/dL (27 Jan 2022 09:09)    I&O's Summary    27 Jan 2022 07:01  -  28 Jan 2022 07:00  --------------------------------------------------------  IN: 690 mL / OUT: 950 mL / NET: -260 mL        PHYSICAL EXAM:  HEAD:  Atraumatic, Normocephalic  NECK: Supple, No   JVD  CHEST/LUNG:   no     rales,     no,    rhonchi  HEART: Regular rate and rhythm;         murmur  ABDOMEN: Soft, Nontender, ;   EXTREMITIES:    no    edema  NEUROLOGY:  alert    LABS:    01-27    140  |  101  |  50<H>  ----------------------------<  131<H>  4.1   |  26  |  1.01    Ca    8.5      27 Jan 2022 23:09  Phos  3.3     01-27  Mg     2.2     01-27    TPro  6.2  /  Alb  3.1<L>  /  TBili  0.3  /  DBili  <0.1  /  AST  17  /  ALT  17  /  AlkPhos  45  01-27    PT/INR - ( 27 Jan 2022 07:10 )   PT: 13.1 sec;   INR: 1.10 ratio                         Thyroid Stimulating Hormone, Serum: 0.47 uIU/mL (01-21 @ 17:02)          Consultant(s) Notes Reviewed:      Care Discussed with Consultants/Other Providers:

## 2022-01-29 LAB
ALBUMIN SERPL ELPH-MCNC: 3 G/DL — LOW (ref 3.3–5)
ALP SERPL-CCNC: 43 U/L — SIGNIFICANT CHANGE UP (ref 40–120)
ALT FLD-CCNC: 12 U/L — SIGNIFICANT CHANGE UP (ref 10–45)
ANION GAP SERPL CALC-SCNC: 12 MMOL/L — SIGNIFICANT CHANGE UP (ref 5–17)
AST SERPL-CCNC: 14 U/L — SIGNIFICANT CHANGE UP (ref 10–40)
BILIRUB DIRECT SERPL-MCNC: <0.1 MG/DL — SIGNIFICANT CHANGE UP (ref 0–0.3)
BILIRUB INDIRECT FLD-MCNC: >0.2 MG/DL — SIGNIFICANT CHANGE UP (ref 0.2–1)
BILIRUB SERPL-MCNC: 0.3 MG/DL — SIGNIFICANT CHANGE UP (ref 0.2–1.2)
BUN SERPL-MCNC: 42 MG/DL — HIGH (ref 7–23)
CALCIUM SERPL-MCNC: 8.8 MG/DL — SIGNIFICANT CHANGE UP (ref 8.4–10.5)
CHLORIDE SERPL-SCNC: 104 MMOL/L — SIGNIFICANT CHANGE UP (ref 96–108)
CO2 SERPL-SCNC: 24 MMOL/L — SIGNIFICANT CHANGE UP (ref 22–31)
CREAT SERPL-MCNC: 0.71 MG/DL — SIGNIFICANT CHANGE UP (ref 0.5–1.3)
CREAT SERPL-MCNC: 0.75 MG/DL — SIGNIFICANT CHANGE UP (ref 0.5–1.3)
GLUCOSE BLDC GLUCOMTR-MCNC: 116 MG/DL — HIGH (ref 70–99)
GLUCOSE BLDC GLUCOMTR-MCNC: 124 MG/DL — HIGH (ref 70–99)
GLUCOSE BLDC GLUCOMTR-MCNC: 141 MG/DL — HIGH (ref 70–99)
GLUCOSE BLDC GLUCOMTR-MCNC: 146 MG/DL — HIGH (ref 70–99)
GLUCOSE SERPL-MCNC: 105 MG/DL — HIGH (ref 70–99)
INR BLD: 1.08 RATIO — SIGNIFICANT CHANGE UP (ref 0.88–1.16)
MAGNESIUM SERPL-MCNC: 2.3 MG/DL — SIGNIFICANT CHANGE UP (ref 1.6–2.6)
PHOSPHATE SERPL-MCNC: 3.5 MG/DL — SIGNIFICANT CHANGE UP (ref 2.5–4.5)
POTASSIUM SERPL-MCNC: 3.8 MMOL/L — SIGNIFICANT CHANGE UP (ref 3.5–5.3)
POTASSIUM SERPL-SCNC: 3.8 MMOL/L — SIGNIFICANT CHANGE UP (ref 3.5–5.3)
PROT SERPL-MCNC: 5.9 G/DL — LOW (ref 6–8.3)
PROTHROM AB SERPL-ACNC: 12.9 SEC — SIGNIFICANT CHANGE UP (ref 10.6–13.6)
SODIUM SERPL-SCNC: 140 MMOL/L — SIGNIFICANT CHANGE UP (ref 135–145)

## 2022-01-29 RX ADMIN — Medication 50 MILLIGRAM(S): at 16:33

## 2022-01-29 RX ADMIN — Medication 6 MILLIGRAM(S): at 05:34

## 2022-01-29 RX ADMIN — Medication 50 MILLIGRAM(S): at 05:35

## 2022-01-29 RX ADMIN — PANTOPRAZOLE SODIUM 40 MILLIGRAM(S): 20 TABLET, DELAYED RELEASE ORAL at 05:34

## 2022-01-29 RX ADMIN — Medication 81 MILLIGRAM(S): at 13:36

## 2022-01-29 RX ADMIN — ENOXAPARIN SODIUM 50 MILLIGRAM(S): 100 INJECTION SUBCUTANEOUS at 05:35

## 2022-01-29 RX ADMIN — ENOXAPARIN SODIUM 50 MILLIGRAM(S): 100 INJECTION SUBCUTANEOUS at 16:33

## 2022-01-29 NOTE — PROGRESS NOTE ADULT - SUBJECTIVE AND OBJECTIVE BOX
Chief complaint    Patient is a 81y old  Female who presents with a chief complaint of sob/cp (29 Jan 2022 15:05)   Review of systems  Patient in bed, appears comfortable.    Labs and Fingersticks  CAPILLARY BLOOD GLUCOSE      POCT Blood Glucose.: 146 mg/dL (29 Jan 2022 21:35)  POCT Blood Glucose.: 116 mg/dL (29 Jan 2022 17:57)  POCT Blood Glucose.: 141 mg/dL (29 Jan 2022 13:30)  POCT Blood Glucose.: 124 mg/dL (29 Jan 2022 08:46)      Anion Gap, Serum: 12 (01-29 @ 07:20)  Anion Gap, Serum: 13 (01-27 @ 23:09)      Calcium, Total Serum: 8.8 (01-29 @ 07:20)  Calcium, Total Serum: 8.5 (01-27 @ 23:09)  Albumin, Serum: 3.0 *L* (01-29 @ 07:20)  Albumin, Serum: 3.0 *L* (01-28 @ 07:25)    Alanine Aminotransferase (ALT/SGPT): 12 (01-29 @ 07:20)  Alanine Aminotransferase (ALT/SGPT): 15 (01-28 @ 07:25)  Alkaline Phosphatase, Serum: 43 (01-29 @ 07:20)  Alkaline Phosphatase, Serum: 44 (01-28 @ 07:25)  Aspartate Aminotransferase (AST/SGOT): 14 (01-29 @ 07:20)  Aspartate Aminotransferase (AST/SGOT): 15 (01-28 @ 07:25)        01-29    140  |  104  |  42<H>  ----------------------------<  105<H>  3.8   |  24  |  0.71    Ca    8.8      29 Jan 2022 07:20  Phos  3.5     01-29  Mg     2.3     01-29    TPro  5.9<L>  /  Alb  3.0<L>  /  TBili  0.3  /  DBili  <0.1  /  AST  14  /  ALT  12  /  AlkPhos  43  01-29    Medications  MEDICATIONS  (STANDING):  aspirin enteric coated 81 milliGRAM(s) Oral daily  dexAMETHasone     Tablet 6 milliGRAM(s) Oral daily  dextrose 40% Gel 15 Gram(s) Oral once  dextrose 50% Injectable 25 Gram(s) IV Push once  dextrose 50% Injectable 12.5 Gram(s) IV Push once  dextrose 50% Injectable 25 Gram(s) IV Push once  enoxaparin Injectable 50 milliGRAM(s) SubCutaneous two times a day  glucagon  Injectable 1 milliGRAM(s) IntraMuscular once  insulin lispro (ADMELOG) corrective regimen sliding scale   SubCutaneous three times a day before meals  insulin lispro (ADMELOG) corrective regimen sliding scale   SubCutaneous at bedtime  metoprolol tartrate 50 milliGRAM(s) Oral two times a day  pantoprazole    Tablet 40 milliGRAM(s) Oral before breakfast      Physical Exam  General: Patient comfortable in bed  Vital Signs Last 12 Hrs  T(F): 97.3 (01-29-22 @ 18:44), Max: 97.5 (01-29-22 @ 13:20)  HR: 62 (01-29-22 @ 18:44) (62 - 72)  BP: 132/79 (01-29-22 @ 18:44) (131/62 - 132/79)  BP(mean): --  RR: 18 (01-29-22 @ 18:44) (18 - 18)  SpO2: 96% (01-29-22 @ 18:44) (96% - 96%)  Neck: No palpable thyroid nodules.  CVS: S1S2, No murmurs  Respiratory: No wheezing, no crepitations  GI: Abdomen soft, bowel sounds positive  Musculoskeletal:  edema lower extremities.     Diagnostics    A1C with Estimated Average Glucose: AM Sched. Collection: 27-Jan-2022 06:00 (01-26 @ 08:11)  A1C with Estimated Average Glucose: Routine (01-26 @ 08:11)

## 2022-01-29 NOTE — PROGRESS NOTE ADULT - ASSESSMENT
Assessment  DMT2: 81y Female with DM T2 with hyperglycemia, A1C 6.3%, she is unsure of her outpatient medications, now on insulin coverage only, blood sugars are improving,  no hypoglycemias. Patient is eating meals, NAD, remains on dexamethasone.  Covid: on medications,  stable, monitored.  HTN: Controlled,  on antihypertensive medications.  Dementia: stable, monitored.      Brian Huynh MD  Cell: [phone]  Office: [phone]

## 2022-01-29 NOTE — PROGRESS NOTE ADULT - ASSESSMENT
80 yo F     h/o   dementia and   ? CHF per EMS,/  no other  hx  is  available      presenting via EMS from home for few days of weakness, followed by development of SOB and episode of substernal CP this morning.     Pt is a poor historian and unable to provide significant history    all ROS negative upon questioning.     Lives at home with brother - he reports concern for pt's increased work of breathing.      pt with   weakness/  sob  from  covid/  pna   CT  chest, with  GOO   on decadron/  remdisivir   *  Dementia   *    HTN,   *  acute  diastolic chf  on   lasix  card  dr davis  on  dvt ppx.  bmi  is  20  tele,   s/p vtach/ on  toprol,  card to  f/p   *  Afib,  on lovenox  bid  v tach  on tele.  card following  will need  cardiac cath,,  when stable  on lovenox  bid  tele, afib,plan,  cardiac cath,  next  week     spoke  with  brothersammi/ pt  is , has  no  children/ states,   pt  is  full code    pmd  dr sheppard, allie     rad< from: CT Angio Chest PE Protocol w/ IV Cont (01.21.22 @ 16:01) >  IMPRESSION:  No main, right, left, lobar, or proximal segmental pulmonary embolus.  Patchy bilateral groundglass opacities noted throughout both lungs likely   representing infection or alveolar component of edema. COVID 19 can also   have this appearance. Correlate with RT PCR    --- End of Report --  < end of copied text >

## 2022-01-29 NOTE — PROGRESS NOTE ADULT - SUBJECTIVE AND OBJECTIVE BOX
afberile  REVIEW OF SYSTEMS:  GEN: no fever,    no chills  RESP: no SOB,   no cough  CVS: no chest pain,   no palpitations  GI: no abdominal pain,   no nausea,   no vomiting,   no constipation,   no diarrhea  : no dysuria,   no frequency  NEURO: no headache,   no dizziness  PSYCH: no depression,   not anxious  Derm : no rash    MEDICATIONS  (STANDING):  aspirin enteric coated 81 milliGRAM(s) Oral daily  dexAMETHasone     Tablet 6 milliGRAM(s) Oral daily  dextrose 40% Gel 15 Gram(s) Oral once  dextrose 50% Injectable 25 Gram(s) IV Push once  dextrose 50% Injectable 12.5 Gram(s) IV Push once  dextrose 50% Injectable 25 Gram(s) IV Push once  enoxaparin Injectable 50 milliGRAM(s) SubCutaneous two times a day  glucagon  Injectable 1 milliGRAM(s) IntraMuscular once  insulin lispro (ADMELOG) corrective regimen sliding scale   SubCutaneous three times a day before meals  insulin lispro (ADMELOG) corrective regimen sliding scale   SubCutaneous at bedtime  metoprolol tartrate 50 milliGRAM(s) Oral two times a day  pantoprazole    Tablet 40 milliGRAM(s) Oral before breakfast    MEDICATIONS  (PRN):  acetaminophen     Tablet .. 650 milliGRAM(s) Oral every 6 hours PRN Temp greater or equal to 38C (100.4F), Mild Pain (1 - 3), Moderate Pain (4 - 6), Severe Pain (7 - 10)      Vital Signs Last 24 Hrs  T(C): 36.4 (29 Jan 2022 13:20), Max: 36.8 (28 Jan 2022 16:55)  T(F): 97.5 (29 Jan 2022 13:20), Max: 98.2 (28 Jan 2022 16:55)  HR: 72 (29 Jan 2022 13:20) (54 - 73)  BP: 131/62 (29 Jan 2022 13:20) (111/59 - 149/73)  BP(mean): --  RR: 18 (29 Jan 2022 13:20) (18 - 18)  SpO2: 96% (29 Jan 2022 13:20) (94% - 97%)  CAPILLARY BLOOD GLUCOSE      POCT Blood Glucose.: 141 mg/dL (29 Jan 2022 13:30)  POCT Blood Glucose.: 124 mg/dL (29 Jan 2022 08:46)  POCT Blood Glucose.: 139 mg/dL (28 Jan 2022 21:37)  POCT Blood Glucose.: 123 mg/dL (28 Jan 2022 18:01)    I&O's Summary    28 Jan 2022 07:01  -  29 Jan 2022 07:00  --------------------------------------------------------  IN: 1250 mL / OUT: 625 mL / NET: 625 mL    29 Jan 2022 07:01  -  29 Jan 2022 15:05  --------------------------------------------------------  IN: 240 mL / OUT: 400 mL / NET: -160 mL        PHYSICAL EXAMnoo     rales,     no,    rhonchi  HEART: Regular rate and rhythm;         murmur  ABDOMEN: Soft, Nontender, ;   EXTREMITIES:        edema  NEUROLOGY:  alert    LABS:    01-29    140  |  104  |  42<H>  ----------------------------<  105<H>  3.8   |  24  |  0.71    Ca    8.8      29 Jan 2022 07:20  Phos  3.5     01-29  Mg     2.3     01-29    TPro  5.9<L>  /  Alb  3.0<L>  /  TBili  0.3  /  DBili  <0.1  /  AST  14  /  ALT  12  /  AlkPhos  43  01-29    PT/INR - ( 29 Jan 2022 07:22 )   PT: 12.9 sec;   INR: 1.08 ratio                         Thyroid Stimulating Hormone, Serum: 0.47 uIU/mL (01-21 @ 17:02)          Consultant(s) Notes Reviewed:      Care Discussed with Consultants/Other Providers:

## 2022-01-30 LAB
ALBUMIN SERPL ELPH-MCNC: 3 G/DL — LOW (ref 3.3–5)
ALP SERPL-CCNC: 43 U/L — SIGNIFICANT CHANGE UP (ref 40–120)
ALT FLD-CCNC: 13 U/L — SIGNIFICANT CHANGE UP (ref 10–45)
AST SERPL-CCNC: 12 U/L — SIGNIFICANT CHANGE UP (ref 10–40)
BILIRUB DIRECT SERPL-MCNC: <0.1 MG/DL — SIGNIFICANT CHANGE UP (ref 0–0.3)
BILIRUB INDIRECT FLD-MCNC: >0.3 MG/DL — SIGNIFICANT CHANGE UP (ref 0.2–1)
BILIRUB SERPL-MCNC: 0.4 MG/DL — SIGNIFICANT CHANGE UP (ref 0.2–1.2)
CREAT SERPL-MCNC: 0.85 MG/DL — SIGNIFICANT CHANGE UP (ref 0.5–1.3)
GLUCOSE BLDC GLUCOMTR-MCNC: 113 MG/DL — HIGH (ref 70–99)
GLUCOSE BLDC GLUCOMTR-MCNC: 134 MG/DL — HIGH (ref 70–99)
GLUCOSE BLDC GLUCOMTR-MCNC: 146 MG/DL — HIGH (ref 70–99)
GLUCOSE BLDC GLUCOMTR-MCNC: 147 MG/DL — HIGH (ref 70–99)
HCT VFR BLD CALC: 37.8 % — SIGNIFICANT CHANGE UP (ref 34.5–45)
HGB BLD-MCNC: 11.9 G/DL — SIGNIFICANT CHANGE UP (ref 11.5–15.5)
INR BLD: 1.02 RATIO — SIGNIFICANT CHANGE UP (ref 0.88–1.16)
MCHC RBC-ENTMCNC: 27.7 PG — SIGNIFICANT CHANGE UP (ref 27–34)
MCHC RBC-ENTMCNC: 31.5 GM/DL — LOW (ref 32–36)
MCV RBC AUTO: 88.1 FL — SIGNIFICANT CHANGE UP (ref 80–100)
NRBC # BLD: 0 /100 WBCS — SIGNIFICANT CHANGE UP (ref 0–0)
PLATELET # BLD AUTO: 309 K/UL — SIGNIFICANT CHANGE UP (ref 150–400)
PROT SERPL-MCNC: 5.8 G/DL — LOW (ref 6–8.3)
PROTHROM AB SERPL-ACNC: 12.2 SEC — SIGNIFICANT CHANGE UP (ref 10.6–13.6)
RBC # BLD: 4.29 M/UL — SIGNIFICANT CHANGE UP (ref 3.8–5.2)
RBC # FLD: 14.8 % — HIGH (ref 10.3–14.5)
SARS-COV-2 RNA SPEC QL NAA+PROBE: DETECTED
WBC # BLD: 13.38 K/UL — HIGH (ref 3.8–10.5)
WBC # FLD AUTO: 13.38 K/UL — HIGH (ref 3.8–10.5)

## 2022-01-30 RX ADMIN — Medication 50 MILLIGRAM(S): at 17:42

## 2022-01-30 RX ADMIN — ENOXAPARIN SODIUM 50 MILLIGRAM(S): 100 INJECTION SUBCUTANEOUS at 06:58

## 2022-01-30 RX ADMIN — Medication 6 MILLIGRAM(S): at 06:58

## 2022-01-30 RX ADMIN — Medication 81 MILLIGRAM(S): at 12:17

## 2022-01-30 RX ADMIN — Medication 50 MILLIGRAM(S): at 07:00

## 2022-01-30 RX ADMIN — PANTOPRAZOLE SODIUM 40 MILLIGRAM(S): 20 TABLET, DELAYED RELEASE ORAL at 06:57

## 2022-01-30 NOTE — PROGRESS NOTE ADULT - ASSESSMENT
82 yo F     h/o   dementia and   ? CHF per EMS,/  no other  hx  is  available      presenting via EMS from home for few days of weakness, followed by development of SOB and episode of substernal CP this morning.     Pt is a poor historian and unable to provide significant history    all ROS negative upon questioning.     Lives at home with brother - he reports concern for pt's increased work of breathing.      pt with   weakness/  sob  from  covid/  pna   CT  chest, with  GOO   on decadron/  remdisivir   *  Dementia   *    HTN,   *  acute  diastolic chf  on   lasix  card  dr davis  on  dvt ppx.  bmi  is  20  tele,   s/p vtach/ on  toprol,  card to  f/p   *  Afib,  on lovenox  bid  v tach  on tele.  card following  on lovenox  bid  tele, afib  ,plan,  cardiac cath,  next  week    will hold lovenox, in anticipation of cath per  card     spoke  with  brother, sammi/ pt  is , has  no  children/ states,   pt  is  full code    pmd  dr sheppard, allie     rad< from: CT Angio Chest PE Protocol w/ IV Cont (01.21.22 @ 16:01) >  IMPRESSION:  No main, right, left, lobar, or proximal segmental pulmonary embolus.  Patchy bilateral groundglass opacities noted throughout both lungs likely   representing infection or alveolar component of edema. COVID 19 can also   have this appearance. Correlate with RT PCR    --- End of Report --  < end of copied text >

## 2022-01-30 NOTE — PROGRESS NOTE ADULT - SUBJECTIVE AND OBJECTIVE BOX
Chief complaint    Patient is a 81y old  Female who presents with a chief complaint of sob/cp (30 Jan 2022 08:01)   Review of systems  Patient in bed, appears comfortable.    Labs and Fingersticks  CAPILLARY BLOOD GLUCOSE      POCT Blood Glucose.: 146 mg/dL (30 Jan 2022 22:03)  POCT Blood Glucose.: 134 mg/dL (30 Jan 2022 17:27)  POCT Blood Glucose.: 147 mg/dL (30 Jan 2022 12:13)  POCT Blood Glucose.: 113 mg/dL (30 Jan 2022 08:29)      Anion Gap, Serum: 12 (01-29 @ 07:20)      Calcium, Total Serum: 8.8 (01-29 @ 07:20)  Albumin, Serum: 3.0 *L* (01-30 @ 07:51)  Albumin, Serum: 3.0 *L* (01-29 @ 07:20)    Alanine Aminotransferase (ALT/SGPT): 13 (01-30 @ 07:51)  Alanine Aminotransferase (ALT/SGPT): 12 (01-29 @ 07:20)  Alkaline Phosphatase, Serum: 43 (01-30 @ 07:51)  Alkaline Phosphatase, Serum: 43 (01-29 @ 07:20)  Aspartate Aminotransferase (AST/SGOT): 12 (01-30 @ 07:51)  Aspartate Aminotransferase (AST/SGOT): 14 (01-29 @ 07:20)        01-30    x   |  x   |  x   ----------------------------<  x   x    |  x   |  0.85    Ca    8.8      29 Jan 2022 07:20  Phos  3.5     01-29  Mg     2.3     01-29    TPro  5.8<L>  /  Alb  3.0<L>  /  TBili  0.4  /  DBili  <0.1  /  AST  12  /  ALT  13  /  AlkPhos  43  01-30                        11.9   13.38 )-----------( 309      ( 30 Jan 2022 09:43 )             37.8     Medications  MEDICATIONS  (STANDING):  aspirin enteric coated 81 milliGRAM(s) Oral daily  dexAMETHasone     Tablet 6 milliGRAM(s) Oral daily  dextrose 40% Gel 15 Gram(s) Oral once  dextrose 50% Injectable 25 Gram(s) IV Push once  dextrose 50% Injectable 12.5 Gram(s) IV Push once  dextrose 50% Injectable 25 Gram(s) IV Push once  glucagon  Injectable 1 milliGRAM(s) IntraMuscular once  insulin lispro (ADMELOG) corrective regimen sliding scale   SubCutaneous three times a day before meals  insulin lispro (ADMELOG) corrective regimen sliding scale   SubCutaneous at bedtime  metoprolol tartrate 50 milliGRAM(s) Oral two times a day  pantoprazole    Tablet 40 milliGRAM(s) Oral before breakfast      Physical Exam  General: Patient comfortable in bed  Vital Signs Last 12 Hrs  T(F): 97.9 (01-30-22 @ 22:04), Max: 98 (01-30-22 @ 17:43)  HR: 82 (01-30-22 @ 22:04) (73 - 82)  BP: 129/58 (01-30-22 @ 22:04) (107/59 - 129/58)  BP(mean): --  RR: 20 (01-30-22 @ 22:04) (18 - 20)  SpO2: 96% (01-30-22 @ 22:04) (96% - 99%)  Neck: No palpable thyroid nodules.  CVS: S1S2, No murmurs  Respiratory: No wheezing, no crepitations  GI: Abdomen soft, bowel sounds positive  Musculoskeletal:  edema lower extremities.     Diagnostics    A1C with Estimated Average Glucose: AM Sched. Collection: 27-Jan-2022 06:00 (01-26 @ 08:11)  A1C with Estimated Average Glucose: Routine (01-26 @ 08:11)

## 2022-01-30 NOTE — PROGRESS NOTE ADULT - SUBJECTIVE AND OBJECTIVE BOX
afberile  REVIEW OF SYSTEMS:  GEN: no fever,    no chills  RESP: no SOB,   no cough  CVS: no chest pain,   no palpitations  GI: no abdominal pain,   no nausea,   no vomiting,   no constipation,   no diarrhea  : no dysuria,   no frequency  NEURO: no headache,   no dizziness  PSYCH: no depression,   not anxious  Derm : no rash    MEDICATIONS  (STANDING):  aspirin enteric coated 81 milliGRAM(s) Oral daily  dexAMETHasone     Tablet 6 milliGRAM(s) Oral daily  dextrose 40% Gel 15 Gram(s) Oral once  dextrose 50% Injectable 25 Gram(s) IV Push once  dextrose 50% Injectable 12.5 Gram(s) IV Push once  dextrose 50% Injectable 25 Gram(s) IV Push once  enoxaparin Injectable 50 milliGRAM(s) SubCutaneous two times a day  glucagon  Injectable 1 milliGRAM(s) IntraMuscular once  insulin lispro (ADMELOG) corrective regimen sliding scale   SubCutaneous three times a day before meals  insulin lispro (ADMELOG) corrective regimen sliding scale   SubCutaneous at bedtime  metoprolol tartrate 50 milliGRAM(s) Oral two times a day  pantoprazole    Tablet 40 milliGRAM(s) Oral before breakfast    MEDICATIONS  (PRN):  acetaminophen     Tablet .. 650 milliGRAM(s) Oral every 6 hours PRN Temp greater or equal to 38C (100.4F), Mild Pain (1 - 3), Moderate Pain (4 - 6), Severe Pain (7 - 10)      Vital Signs Last 24 Hrs  T(C): 36.4 (30 Jan 2022 02:00), Max: 36.4 (29 Jan 2022 13:20)  T(F): 97.6 (30 Jan 2022 02:00), Max: 97.6 (29 Jan 2022 21:38)  HR: 60 (30 Jan 2022 02:00) (60 - 72)  BP: 129/80 (30 Jan 2022 02:00) (129/80 - 132/79)  BP(mean): --  RR: 18 (30 Jan 2022 02:00) (18 - 18)  SpO2: 97% (30 Jan 2022 02:00) (96% - 97%)  CAPILLARY BLOOD GLUCOSE      POCT Blood Glucose.: 146 mg/dL (29 Jan 2022 21:35)  POCT Blood Glucose.: 116 mg/dL (29 Jan 2022 17:57)  POCT Blood Glucose.: 141 mg/dL (29 Jan 2022 13:30)  POCT Blood Glucose.: 124 mg/dL (29 Jan 2022 08:46)    I&O's Summary    29 Jan 2022 07:01  -  30 Jan 2022 07:00  --------------------------------------------------------  IN: 360 mL / OUT: 950 mL / NET: -590 mL        PHYSICAL EXAM:  HEAD:  Atraumatic, Normocephalic  NECK: Supple, No   JVD  CHEST/LUNG:   no     rales,     no,    rhonchi  HEART: Regular rate and rhythm;         murmur  ABDOMEN: Soft, Nontender, ;   EXTREMITIES:    no    edema  NEUROLOGY:  alert    LABS:    01-29    140  |  104  |  42<H>  ----------------------------<  105<H>  3.8   |  24  |  0.71    Ca    8.8      29 Jan 2022 07:20  Phos  3.5     01-29  Mg     2.3     01-29    TPro  5.9<L>  /  Alb  3.0<L>  /  TBili  0.3  /  DBili  <0.1  /  AST  14  /  ALT  12  /  AlkPhos  43  01-29    PT/INR - ( 29 Jan 2022 07:22 )   PT: 12.9 sec;   INR: 1.08 ratio                         Thyroid Stimulating Hormone, Serum: 0.47 uIU/mL (01-21 @ 17:02)          Consultant(s) Notes Reviewed:      Care Discussed with Consultants/Other Providers:

## 2022-01-30 NOTE — PROGRESS NOTE ADULT - ASSESSMENT
Assessment  DMT2: 81y Female with DM T2 with hyperglycemia, A1C 6.3%, she is unsure of her outpatient medications, now on insulin coverage, blood sugars  trending within acceptable range, no hypoglycemias. Patient is eating meals, NAD, remains on dexamethasone.  Covid: on medications,  stable, monitored.  HTN: Controlled,  on antihypertensive medications.  Dementia: stable, monitored.      Brian Huynh MD  Cell: [phone]  Office: [phone]

## 2022-01-31 LAB
ANION GAP SERPL CALC-SCNC: 10 MMOL/L — SIGNIFICANT CHANGE UP (ref 5–17)
BUN SERPL-MCNC: 43 MG/DL — HIGH (ref 7–23)
CALCIUM SERPL-MCNC: 8.5 MG/DL — SIGNIFICANT CHANGE UP (ref 8.4–10.5)
CHLORIDE SERPL-SCNC: 101 MMOL/L — SIGNIFICANT CHANGE UP (ref 96–108)
CO2 SERPL-SCNC: 24 MMOL/L — SIGNIFICANT CHANGE UP (ref 22–31)
CREAT SERPL-MCNC: 1.1 MG/DL — SIGNIFICANT CHANGE UP (ref 0.5–1.3)
GLUCOSE BLDC GLUCOMTR-MCNC: 103 MG/DL — HIGH (ref 70–99)
GLUCOSE BLDC GLUCOMTR-MCNC: 125 MG/DL — HIGH (ref 70–99)
GLUCOSE BLDC GLUCOMTR-MCNC: 133 MG/DL — HIGH (ref 70–99)
GLUCOSE BLDC GLUCOMTR-MCNC: 242 MG/DL — HIGH (ref 70–99)
GLUCOSE SERPL-MCNC: 126 MG/DL — HIGH (ref 70–99)
HCT VFR BLD CALC: 34.8 % — SIGNIFICANT CHANGE UP (ref 34.5–45)
HGB BLD-MCNC: 11.1 G/DL — LOW (ref 11.5–15.5)
MAGNESIUM SERPL-MCNC: 2 MG/DL — SIGNIFICANT CHANGE UP (ref 1.6–2.6)
MCHC RBC-ENTMCNC: 27.5 PG — SIGNIFICANT CHANGE UP (ref 27–34)
MCHC RBC-ENTMCNC: 31.9 GM/DL — LOW (ref 32–36)
MCV RBC AUTO: 86.1 FL — SIGNIFICANT CHANGE UP (ref 80–100)
NRBC # BLD: 0 /100 WBCS — SIGNIFICANT CHANGE UP (ref 0–0)
PHOSPHATE SERPL-MCNC: 3.4 MG/DL — SIGNIFICANT CHANGE UP (ref 2.5–4.5)
PLATELET # BLD AUTO: 312 K/UL — SIGNIFICANT CHANGE UP (ref 150–400)
POTASSIUM SERPL-MCNC: 4.3 MMOL/L — SIGNIFICANT CHANGE UP (ref 3.5–5.3)
POTASSIUM SERPL-SCNC: 4.3 MMOL/L — SIGNIFICANT CHANGE UP (ref 3.5–5.3)
RBC # BLD: 4.04 M/UL — SIGNIFICANT CHANGE UP (ref 3.8–5.2)
RBC # FLD: 14.9 % — HIGH (ref 10.3–14.5)
SODIUM SERPL-SCNC: 135 MMOL/L — SIGNIFICANT CHANGE UP (ref 135–145)
WBC # BLD: 13.52 K/UL — HIGH (ref 3.8–10.5)
WBC # FLD AUTO: 13.52 K/UL — HIGH (ref 3.8–10.5)

## 2022-01-31 RX ORDER — ENOXAPARIN SODIUM 100 MG/ML
50 INJECTION SUBCUTANEOUS ONCE
Refills: 0 | Status: COMPLETED | OUTPATIENT
Start: 2022-01-31 | End: 2022-01-31

## 2022-01-31 RX ORDER — ASPIRIN/CALCIUM CARB/MAGNESIUM 324 MG
81 TABLET ORAL ONCE
Refills: 0 | Status: COMPLETED | OUTPATIENT
Start: 2022-01-31 | End: 2022-01-31

## 2022-01-31 RX ORDER — ENOXAPARIN SODIUM 100 MG/ML
40 INJECTION SUBCUTANEOUS ONCE
Refills: 0 | Status: COMPLETED | OUTPATIENT
Start: 2022-01-31 | End: 2022-01-31

## 2022-01-31 RX ADMIN — Medication 50 MILLIGRAM(S): at 06:17

## 2022-01-31 RX ADMIN — ENOXAPARIN SODIUM 50 MILLIGRAM(S): 100 INJECTION SUBCUTANEOUS at 08:56

## 2022-01-31 RX ADMIN — Medication 50 MILLIGRAM(S): at 17:29

## 2022-01-31 RX ADMIN — PANTOPRAZOLE SODIUM 40 MILLIGRAM(S): 20 TABLET, DELAYED RELEASE ORAL at 06:17

## 2022-01-31 RX ADMIN — Medication 6 MILLIGRAM(S): at 06:17

## 2022-01-31 RX ADMIN — Medication 2: at 12:50

## 2022-01-31 RX ADMIN — Medication 81 MILLIGRAM(S): at 22:35

## 2022-01-31 RX ADMIN — ENOXAPARIN SODIUM 40 MILLIGRAM(S): 100 INJECTION SUBCUTANEOUS at 22:35

## 2022-01-31 NOTE — PROGRESS NOTE ADULT - SUBJECTIVE AND OBJECTIVE BOX
CARDIOLOGY     PROGRESS  NOTE   ________________________________________________    CHIEF COMPLAINT:Patient is a 81y old  Female who presents with a chief complaint of sob/cp (31 Jan 2022 16:21)  no complain.  	  REVIEW OF SYSTEMS:  CONSTITUTIONAL: No fever, weight loss, or fatigue  EYES: No eye pain, visual disturbances, or discharge  ENT:  No difficulty hearing, tinnitus, vertigo; No sinus or throat pain  NECK: No pain or stiffness  RESPIRATORY: No cough, wheezing, chills or hemoptysis; No Shortness of Breath  CARDIOVASCULAR: No chest pain, palpitations, passing out, dizziness, or leg swelling  GASTROINTESTINAL: No abdominal or epigastric pain. No nausea, vomiting, or hematemesis; No diarrhea or constipation. No melena or hematochezia.  GENITOURINARY: No dysuria, frequency, hematuria, or incontinence  NEUROLOGICAL: No headaches, memory loss, loss of strength, numbness, or tremors  SKIN: No itching, burning, rashes, or lesions   LYMPH Nodes: No enlarged glands  ENDOCRINE: No heat or cold intolerance; No hair loss  MUSCULOSKELETAL: No joint pain or swelling; No muscle, back, or extremity pain  PSYCHIATRIC: No depression, anxiety, mood swings, or difficulty sleeping  HEME/LYMPH: No easy bruising, or bleeding gums  ALLERGY AND IMMUNOLOGIC: No hives or eczema	    [ ] All others negative	  [ ] Unable to obtain    PHYSICAL EXAM:  T(C): 37 (01-31-22 @ 17:37), Max: 37 (01-31-22 @ 17:37)  HR: 80 (01-31-22 @ 17:37) (60 - 88)  BP: 108/64 (01-31-22 @ 17:37) (103/60 - 129/58)  RR: 20 (01-31-22 @ 17:37) (17 - 20)  SpO2: 95% (01-31-22 @ 17:37) (95% - 98%)  Wt(kg): --  I&O's Summary    30 Jan 2022 07:01  -  31 Jan 2022 07:00  --------------------------------------------------------  IN: 960 mL / OUT: 700 mL / NET: 260 mL    31 Jan 2022 07:01  -  31 Jan 2022 21:21  --------------------------------------------------------  IN: 240 mL / OUT: 200 mL / NET: 40 mL        Appearance: Normal	  HEENT:   Normal oral mucosa, PERRL, EOMI	  Lymphatic: No lymphadenopathy  Cardiovascular: Normal S1 S2, No JVD, + murmurs, No edema  Respiratory: Lungs clear to auscultation	  Psychiatry: A & O x 3, Mood & affect appropriate  Gastrointestinal:  Soft, Non-tender, + BS	  Skin: No rashes, No ecchymoses, No cyanosis	  Neurologic: Non-focal  Extremities: Normal range of motion, No clubbing, cyanosis or edema  Vascular: Peripheral pulses palpable 2+ bilaterally    MEDICATIONS  (STANDING):  aspirin enteric coated 81 milliGRAM(s) Oral daily  dextrose 40% Gel 15 Gram(s) Oral once  dextrose 50% Injectable 25 Gram(s) IV Push once  dextrose 50% Injectable 12.5 Gram(s) IV Push once  dextrose 50% Injectable 25 Gram(s) IV Push once  glucagon  Injectable 1 milliGRAM(s) IntraMuscular once  insulin lispro (ADMELOG) corrective regimen sliding scale   SubCutaneous three times a day before meals  insulin lispro (ADMELOG) corrective regimen sliding scale   SubCutaneous at bedtime  metoprolol tartrate 50 milliGRAM(s) Oral two times a day      TELEMETRY: 	    ECG:  	  RADIOLOGY:  OTHER: 	  	  LABS:	 	    CARDIAC MARKERS:                                11.1   13.52 )-----------( 312      ( 31 Jan 2022 07:14 )             34.8     01-31    135  |  101  |  43<H>  ----------------------------<  126<H>  4.3   |  24  |  1.10    Ca    8.5      31 Jan 2022 19:46  Phos  3.4     01-31  Mg     2.0     01-31    TPro  5.8<L>  /  Alb  3.0<L>  /  TBili  0.4  /  DBili  <0.1  /  AST  12  /  ALT  13  /  AlkPhos  43  01-30    proBNP: Serum Pro-Brain Natriuretic Peptide: 20071 pg/mL (01-21 @ 12:11)    Lipid Profile:   HgA1c:   TSH: Thyroid Stimulating Hormone, Serum: 0.47 uIU/mL (01-21 @ 17:02)    PT/INR - ( 30 Jan 2022 07:51 )   PT: 12.2 sec;   INR: 1.02 ratio       < from: Limited Transthoracic Echo (01.25.22 @ 13:20) >  Mitral Valve: Mitral annular calcification and calcified  mitral leaflets.  Aortic Valve/Aorta: Aortic valve not well visualized;  appears calcified.  Normal aortic root, aortic arch and descending thoracic  aorta.  Left Atrium: Normal left atrium.  Left Ventricle: Endocardium not well visualized; grossly  normal left ventricular systolic function. Normal left  ventricular internal dimensions and wall thicknesses.  Right Heart: Normal right atrium. The right ventricle is  not well visualized; grossly normal right ventricular  systolic function. Tricuspid valve not well visualized,  probably normal.  Pericardium/Pleura: Normal pericardium with no pericardial  effusion.  Hemodynamic: Estimated right atrial pressure is 8 mm Hg.  ------------------------------------------------------------------------  Conclusions:  1. Endocardium not well visualized; grossly normal left  ventricular systolic function.  2. The right ventricle is not well visualized; grossly  normal right ventricular systolic function.    < from: 12 Lead ECG (01.25.22 @ 09:55) >  Diagnosis Line ATRIAL FIBRILLATION WITH RAPID VENTRICULAR RESPONSE  LEFT AXIS DEVIATION  MINIMALVOLTAGE CRITERIA FOR LVH, MAY BE NORMAL VARIANT ( [employer] product )  SEPTAL INFARCT , AGE UNDETERMINED  MARKED ST ABNORMALITY, POSSIBLE LATERAL SUBENDOCARDIAL INJURY  ABNORMAL ECG  WHEN COMPARED WITH ECG OF 21-JAN-2022 18:43,  SIGNIFICANT CHANGES HAVE OCCURRED        Assessment and plan  ---------------------------  80 yo F  h/o   dementia and  <CHF per EMS,/  no othe r hx  is  available presenting via EMS from home for few days of weakness, followed by development of SOB and episode of substernal CP this morning.     Pt is a poor historian and unable to provide significant history    all ROS negative upon questioning.   Lives at home with brother - he reports concern for pt's increased work of breathing.   pt is a 80 yo female with hx of htn, chf, cad, with increasing sob and COVID with  abnormal  cta and chest x ray  cta negative for PE  lipid panel  continue covid therapy  fu renal function  keep K>  will increase beta blocker as tolerated  ac  echo noted with normal EF, no WMA  continue beta blocker  continued with WCT, plan for cath tomorrow if off isolation

## 2022-01-31 NOTE — PROGRESS NOTE ADULT - ASSESSMENT
82 yo F     h/o   dementia and   ? CHF per EMS,/  no other  hx  is  available      presenting via EMS from home for few days of weakness, followed by development of SOB and episode of substernal CP this morning.     Pt is a poor historian and unable to provide significant history    all ROS negative upon questioning.     Lives at home with brother - he reports concern for pt's increased work of breathing.      pt with   weakness/  sob  from  covid/  pna   CT  chest, with  GOO   on decadron/  remdisivir   *  Dementia   *    HTN,   *  acute  diastolic chf  on   lasix  card  dr davis  on  dvt ppx.  bmi  is  20  tele,   s/p vtach/ on  toprol,  card to  f/p   *  Afib,  on lovenox  bid  v tach  on tele.  card following  on lovenox  bid  tele, afib  ,plan,  cardiac cath,  in am   will hold  lovenox.  after am  dose     spoke  with  brothersammi/ pt  is , has  no  children/ states,   pt  is  full code    pmd  dr sheppard, allie     rad< from: CT Angio Chest PE Protocol w/ IV Cont (01.21.22 @ 16:01) >  IMPRESSION:  No main, right, left, lobar, or proximal segmental pulmonary embolus.  Patchy bilateral groundglass opacities noted throughout both lungs likely   representing infection or alveolar component of edema. COVID 19 can also   have this appearance. Correlate with RT PCR    --- End of Report --  < end of copied text >

## 2022-01-31 NOTE — PROGRESS NOTE ADULT - ASSESSMENT
Assessment  DMT2: 81y Female with DM T2 with hyperglycemia, A1C 6.3%, she is unsure of her outpatient medications, now on insulin coverage, blood sugars trending within overall acceptable range, no hypoglycemias, NAD, remains on dexamethasone, DC plan is for [facility].  Covid: on medications,  stable, monitored.  HTN: Controlled,  on antihypertensive medications.  Dementia: stable, monitored.      Brian Huynh MD  Cell: [phone]  Office: [phone]     Assessment  DMT2: 81y Female with DM T2 with hyperglycemia, A1C 6.3%, she is unsure of her outpatient medications, now on insulin coverage, blood sugars trending within overall  acceptable range, no hypoglycemias, NAD, remains on dexamethasone, DC plan is for [facility]RE[facility].  Covid: on medications,  stable, monitored.  HTN: Controlled,  on antihypertensive medications.  Dementia: stable, monitored.      Brian Huynh MD  Cell: [phone]  Office: [phone]

## 2022-01-31 NOTE — CHART NOTE - NSCHARTNOTEFT_GEN_A_CORE
Called by the nurse to report that patient has 13 beats WCT on Tele. Patient is asymptomatic. F/u BMP/mag/phos. Patient pending cardiac cath in AM. Dr Ferguson made aware

## 2022-01-31 NOTE — PROGRESS NOTE ADULT - SUBJECTIVE AND OBJECTIVE BOX
afberile  REVIEW OF SYSTEMS:  GEN: no fever,    no chills  RESP: no SOB,   no cough  CVS: no chest pain,   no palpitations  GI: no abdominal pain,   no nausea,   no vomiting,   no constipation,   no diarrhea  : no dysuria,   no frequency  NEURO: no headache,   no dizziness  PSYCH: no depression,   not anxious  Derm : no rash    MEDICATIONS  (STANDING):  aspirin enteric coated 81 milliGRAM(s) Oral daily  dextrose 40% Gel 15 Gram(s) Oral once  dextrose 50% Injectable 25 Gram(s) IV Push once  dextrose 50% Injectable 12.5 Gram(s) IV Push once  dextrose 50% Injectable 25 Gram(s) IV Push once  enoxaparin Injectable 50 milliGRAM(s) SubCutaneous once  glucagon  Injectable 1 milliGRAM(s) IntraMuscular once  insulin lispro (ADMELOG) corrective regimen sliding scale   SubCutaneous three times a day before meals  insulin lispro (ADMELOG) corrective regimen sliding scale   SubCutaneous at bedtime  metoprolol tartrate 50 milliGRAM(s) Oral two times a day    MEDICATIONS  (PRN):  acetaminophen     Tablet .. 650 milliGRAM(s) Oral every 6 hours PRN Temp greater or equal to 38C (100.4F), Mild Pain (1 - 3), Moderate Pain (4 - 6), Severe Pain (7 - 10)      Vital Signs Last 24 Hrs  T(C): 36.4 (31 Jan 2022 05:56), Max: 36.7 (30 Jan 2022 17:43)  T(F): 97.5 (31 Jan 2022 05:56), Max: 98 (30 Jan 2022 17:43)  HR: 76 (31 Jan 2022 05:56) (60 - 82)  BP: 117/75 (31 Jan 2022 05:56) (107/59 - 129/58)  BP(mean): --  RR: 18 (31 Jan 2022 05:56) (18 - 20)  SpO2: 98% (31 Jan 2022 05:56) (96% - 99%)  CAPILLARY BLOOD GLUCOSE      POCT Blood Glucose.: 146 mg/dL (30 Jan 2022 22:03)  POCT Blood Glucose.: 134 mg/dL (30 Jan 2022 17:27)  POCT Blood Glucose.: 147 mg/dL (30 Jan 2022 12:13)    I&O's Summary    30 Jan 2022 07:01  -  31 Jan 2022 07:00  --------------------------------------------------------  IN: 960 mL / OUT: 700 mL / NET: 260 mL        PHYSICAL EXAM:  HEAD:  Atraumatic, Normocephalic  NECK: Supple, No   JVD  CHEST/LUNG:   no     rales,     no,    rhonchi  HEART: Regular rate and rhythm;         murmur  ABDOMEN: Soft, Nontender, ;   EXTREMITIES:     no   edema  NEUROLOGY:  alert    LABS:                        11.1   13.52 )-----------( 312      ( 31 Jan 2022 07:14 )             34.8     01-30    x   |  x   |  x   ----------------------------<  x   x    |  x   |  0.85      TPro  5.8<L>  /  Alb  3.0<L>  /  TBili  0.4  /  DBili  <0.1  /  AST  12  /  ALT  13  /  AlkPhos  43  01-30    PT/INR - ( 30 Jan 2022 07:51 )   PT: 12.2 sec;   INR: 1.02 ratio                         Thyroid Stimulating Hormone, Serum: 0.47 uIU/mL (01-21 @ 17:02)          Consultant(s) Notes Reviewed:      Care Discussed with Consultants/Other Providers:

## 2022-01-31 NOTE — PROGRESS NOTE ADULT - SUBJECTIVE AND OBJECTIVE BOX
Chief complaint  Patient is a 81y old  Female who presents with a chief complaint of sob/cp (31 Jan 2022 08:29)   Review of systems  Patient in bed, looks comfortable, no hypoglycemic episodes.    Labs and Fingersticks  CAPILLARY BLOOD GLUCOSE      POCT Blood Glucose.: 242 mg/dL (31 Jan 2022 12:32)  POCT Blood Glucose.: 133 mg/dL (31 Jan 2022 08:38)  POCT Blood Glucose.: 146 mg/dL (30 Jan 2022 22:03)  POCT Blood Glucose.: 134 mg/dL (30 Jan 2022 17:27)          Albumin, Serum: 3.0 *L* (01-30 @ 07:51)    Alanine Aminotransferase (ALT/SGPT): 13 (01-30 @ 07:51)  Alkaline Phosphatase, Serum: 43 (01-30 @ 07:51)  Aspartate Aminotransferase (AST/SGOT): 12 (01-30 @ 07:51)        01-30    x   |  x   |  x   ----------------------------<  x   x    |  x   |  0.85      TPro  5.8<L>  /  Alb  3.0<L>  /  TBili  0.4  /  DBili  <0.1  /  AST  12  /  ALT  13  /  AlkPhos  43  01-30                        11.1   13.52 )-----------( 312      ( 31 Jan 2022 07:14 )             34.8     Medications  MEDICATIONS  (STANDING):  aspirin enteric coated 81 milliGRAM(s) Oral daily  dextrose 40% Gel 15 Gram(s) Oral once  dextrose 50% Injectable 25 Gram(s) IV Push once  dextrose 50% Injectable 12.5 Gram(s) IV Push once  dextrose 50% Injectable 25 Gram(s) IV Push once  glucagon  Injectable 1 milliGRAM(s) IntraMuscular once  insulin lispro (ADMELOG) corrective regimen sliding scale   SubCutaneous three times a day before meals  insulin lispro (ADMELOG) corrective regimen sliding scale   SubCutaneous at bedtime  metoprolol tartrate 50 milliGRAM(s) Oral two times a day      Physical Exam  General: Patient comfortable in bed  Vital Signs Last 12 Hrs  T(F): 97.8 (01-31-22 @ 14:08), Max: 98.2 (01-31-22 @ 11:47)  HR: 88 (01-31-22 @ 14:07) (74 - 88)  BP: 111/52 (01-31-22 @ 14:07) (103/60 - 117/75)  BP(mean): --  RR: 18 (01-31-22 @ 14:07) (17 - 18)  SpO2: 97% (01-31-22 @ 14:07) (95% - 98%)  Neck: No palpable thyroid nodules.  CVS: S1S2, No murmurs  Respiratory: No wheezing, no crepitations  GI: Abdomen soft, bowel sounds positive  Musculoskeletal:  edema lower extremities.   Skin: No skin rashes, no ecchymosis    Diagnostics    Diet, Regular:   Consistent Carbohydrate No Snacks (CSTCHO) (01-26 @ 15:27)           Chief complaint  Patient is a 81y old  Female who presents with a chief complaint of sob/cp (31 Jan 2022 08:29)   Review of systems  Patient in bed, looks comfortable, no hypoglycemic episodes.    Labs and Fingersticks  CAPILLARY BLOOD GLUCOSE      POCT Blood Glucose.: 242 mg/dL (31 Jan 2022 12:32)  POCT Blood Glucose.: 133 mg/dL (31 Jan 2022 08:38)  POCT Blood Glucose.: 146 mg/dL (30 Jan 2022 22:03)  POCT Blood Glucose.: 134 mg/dL (30 Jan 2022 17:27)          Albumin, Serum: 3.0 *L* (01-30 @ 07:51)    Alanine Aminotransferase (ALT/SGPT): 13 (01-30 @ 07:51)  Alkaline Phosphatase, Serum: 43 (01-30 @ 07:51)  Aspartate Aminotransferase (AST/SGOT): 12 (01-30 @ 07:51)        01-30    x   |  x   |  x   ----------------------------<  x   x    |  x   |  0.85      TPro  5.8<L>  /  Alb  3.0<L>  /  TBili  0.4  /  DBili  <0.1  /  AST  12  /  ALT  13  /  AlkPhos  43  01-30                        11.1   13.52 )-----------( 312      ( 31 Jan 2022 07:14 )             34.8     Medications  MEDICATIONS  (STANDING):  aspirin enteric coated 81 milliGRAM(s) Oral daily  dextrose 40% Gel 15 Gram(s) Oral once  dextrose 50% Injectable 25 Gram(s) IV Push once  dextrose 50% Injectable 12.5 Gram(s) IV Push once  dextrose 50% Injectable 25 Gram(s) IV Push once  glucagon  Injectable 1 milliGRAM(s) IntraMuscular once  insulin lispro (ADMELOG) corrective regimen sliding scale   SubCutaneous three times a day before meals  insulin lispro (ADMELOG) corrective regimen sliding scale   SubCutaneous at bedtime  metoprolol tartrate 50 milliGRAM(s) Oral two times a day      Physical Exam  General: Patient comfortable in bed  Vital Signs Last 12 Hrs  T(F): 97.8 (01-31-22 @ 14:08), Max: 98.2 (01-31-22 @ 11:47)  HR: 88 (01-31-22 @ 14:07) (74 - 88)  BP: 111/52 (01-31-22 @ 14:07) (103/60 - 117/75)  BP(mean): --  RR: 18 (01-31-22 @ 14:07) (17 - 18)  SpO2: 97% (01-31-22 @ 14:07) (95% - 98%)  Neck: No palpable thyroid nodules.  CVS: S1S2, No murmurs  Respiratory: No wheezing, no crepitations  GI: Abdomen soft, bowel sounds positive  Musculoskeletal:  edema lower extremities.   Skin: No skin rashes, no ecchymosis    Diagnostics    Diet, Regular:   Consistent Carbohydrate No Snacks (CSTCHO) (01-26 @ 15:27)

## 2022-02-01 LAB
ANION GAP SERPL CALC-SCNC: 12 MMOL/L — SIGNIFICANT CHANGE UP (ref 5–17)
APTT BLD: 55.8 SEC — HIGH (ref 27.5–35.5)
BUN SERPL-MCNC: 41 MG/DL — HIGH (ref 7–23)
CALCIUM SERPL-MCNC: 8.8 MG/DL — SIGNIFICANT CHANGE UP (ref 8.4–10.5)
CHLORIDE SERPL-SCNC: 104 MMOL/L — SIGNIFICANT CHANGE UP (ref 96–108)
CO2 SERPL-SCNC: 23 MMOL/L — SIGNIFICANT CHANGE UP (ref 22–31)
CREAT SERPL-MCNC: 0.88 MG/DL — SIGNIFICANT CHANGE UP (ref 0.5–1.3)
GLUCOSE BLDC GLUCOMTR-MCNC: 117 MG/DL — HIGH (ref 70–99)
GLUCOSE BLDC GLUCOMTR-MCNC: 125 MG/DL — HIGH (ref 70–99)
GLUCOSE BLDC GLUCOMTR-MCNC: 141 MG/DL — HIGH (ref 70–99)
GLUCOSE BLDC GLUCOMTR-MCNC: 148 MG/DL — HIGH (ref 70–99)
GLUCOSE SERPL-MCNC: 115 MG/DL — HIGH (ref 70–99)
HCT VFR BLD CALC: 33.3 % — LOW (ref 34.5–45)
HGB BLD-MCNC: 10.9 G/DL — LOW (ref 11.5–15.5)
MCHC RBC-ENTMCNC: 27.7 PG — SIGNIFICANT CHANGE UP (ref 27–34)
MCHC RBC-ENTMCNC: 32.7 GM/DL — SIGNIFICANT CHANGE UP (ref 32–36)
MCV RBC AUTO: 84.7 FL — SIGNIFICANT CHANGE UP (ref 80–100)
NRBC # BLD: 0 /100 WBCS — SIGNIFICANT CHANGE UP (ref 0–0)
PLATELET # BLD AUTO: 232 K/UL — SIGNIFICANT CHANGE UP (ref 150–400)
POTASSIUM SERPL-MCNC: 4 MMOL/L — SIGNIFICANT CHANGE UP (ref 3.5–5.3)
POTASSIUM SERPL-SCNC: 4 MMOL/L — SIGNIFICANT CHANGE UP (ref 3.5–5.3)
RBC # BLD: 3.93 M/UL — SIGNIFICANT CHANGE UP (ref 3.8–5.2)
RBC # FLD: 15.4 % — HIGH (ref 10.3–14.5)
SODIUM SERPL-SCNC: 139 MMOL/L — SIGNIFICANT CHANGE UP (ref 135–145)
WBC # BLD: 12.86 K/UL — HIGH (ref 3.8–10.5)
WBC # FLD AUTO: 12.86 K/UL — HIGH (ref 3.8–10.5)

## 2022-02-01 RX ORDER — ATORVASTATIN CALCIUM 80 MG/1
10 TABLET, FILM COATED ORAL AT BEDTIME
Refills: 0 | Status: DISCONTINUED | OUTPATIENT
Start: 2022-02-01 | End: 2022-02-14

## 2022-02-01 RX ORDER — HEPARIN SODIUM 5000 [USP'U]/ML
4000 INJECTION INTRAVENOUS; SUBCUTANEOUS EVERY 6 HOURS
Refills: 0 | Status: DISCONTINUED | OUTPATIENT
Start: 2022-02-01 | End: 2022-02-07

## 2022-02-01 RX ORDER — HEPARIN SODIUM 5000 [USP'U]/ML
2000 INJECTION INTRAVENOUS; SUBCUTANEOUS EVERY 6 HOURS
Refills: 0 | Status: DISCONTINUED | OUTPATIENT
Start: 2022-02-01 | End: 2022-02-07

## 2022-02-01 RX ORDER — ACETAMINOPHEN 500 MG
650 TABLET ORAL ONCE
Refills: 0 | Status: COMPLETED | OUTPATIENT
Start: 2022-02-01 | End: 2022-02-01

## 2022-02-01 RX ORDER — HEPARIN SODIUM 5000 [USP'U]/ML
INJECTION INTRAVENOUS; SUBCUTANEOUS
Qty: 25000 | Refills: 0 | Status: DISCONTINUED | OUTPATIENT
Start: 2022-02-01 | End: 2022-02-07

## 2022-02-01 RX ADMIN — HEPARIN SODIUM 900 UNIT(S)/HR: 5000 INJECTION INTRAVENOUS; SUBCUTANEOUS at 10:06

## 2022-02-01 RX ADMIN — Medication 650 MILLIGRAM(S): at 14:19

## 2022-02-01 RX ADMIN — Medication 81 MILLIGRAM(S): at 12:27

## 2022-02-01 RX ADMIN — Medication 260 MILLIGRAM(S): at 23:09

## 2022-02-01 RX ADMIN — Medication 50 MILLIGRAM(S): at 17:38

## 2022-02-01 RX ADMIN — HEPARIN SODIUM 1000 UNIT(S)/HR: 5000 INJECTION INTRAVENOUS; SUBCUTANEOUS at 17:43

## 2022-02-01 RX ADMIN — ATORVASTATIN CALCIUM 10 MILLIGRAM(S): 80 TABLET, FILM COATED ORAL at 21:31

## 2022-02-01 RX ADMIN — Medication 50 MILLIGRAM(S): at 06:44

## 2022-02-01 NOTE — PROGRESS NOTE ADULT - SUBJECTIVE AND OBJECTIVE BOX
afberile,  v tach  REVIEW OF SYSTEMS:  GEN: no fever,    no chills  RESP: no SOB,   no cough  CVS: no chest pain,   no palpitations  GI: no abdominal pain,   no nausea,   no vomiting,   no constipation,   no diarrhea  : no dysuria,   no frequency  NEURO: no headache,   no dizziness  PSYCH: no depression,   not anxious  Derm : no rash    MEDICATIONS  (STANDING):  aspirin enteric coated 81 milliGRAM(s) Oral daily  dextrose 40% Gel 15 Gram(s) Oral once  dextrose 50% Injectable 25 Gram(s) IV Push once  dextrose 50% Injectable 12.5 Gram(s) IV Push once  dextrose 50% Injectable 25 Gram(s) IV Push once  glucagon  Injectable 1 milliGRAM(s) IntraMuscular once  insulin lispro (ADMELOG) corrective regimen sliding scale   SubCutaneous three times a day before meals  insulin lispro (ADMELOG) corrective regimen sliding scale   SubCutaneous at bedtime  metoprolol tartrate 50 milliGRAM(s) Oral two times a day    MEDICATIONS  (PRN):  acetaminophen     Tablet .. 650 milliGRAM(s) Oral every 6 hours PRN Temp greater or equal to 38C (100.4F), Mild Pain (1 - 3), Moderate Pain (4 - 6), Severe Pain (7 - 10)      Vital Signs Last 24 Hrs  T(C): 37.1 (01 Feb 2022 05:55), Max: 37.1 (01 Feb 2022 05:55)  T(F): 98.7 (01 Feb 2022 05:55), Max: 98.7 (01 Feb 2022 05:55)  HR: 96 (01 Feb 2022 05:55) (67 - 96)  BP: 113/70 (01 Feb 2022 05:55) (100/63 - 113/70)  BP(mean): --  RR: 18 (01 Feb 2022 05:55) (17 - 20)  SpO2: 96% (01 Feb 2022 05:55) (95% - 98%)  CAPILLARY BLOOD GLUCOSE      POCT Blood Glucose.: 117 mg/dL (01 Feb 2022 08:47)  POCT Blood Glucose.: 125 mg/dL (31 Jan 2022 21:54)  POCT Blood Glucose.: 103 mg/dL (31 Jan 2022 17:15)  POCT Blood Glucose.: 242 mg/dL (31 Jan 2022 12:32)    I&O's Summary    31 Jan 2022 07:01  -  01 Feb 2022 07:00  --------------------------------------------------------  IN: 540 mL / OUT: 1050 mL / NET: -510 mL        PHYSICAL EXAM:  HEAD:  Atraumatic, Normocephalic  NECK: Supple, No   JVD  CHEST/LUNG:   no     rales,     no,    rhonchi  HEART: Regular rate and rhythm;         murmur  ABDOMEN: Soft, Nontender, ;   EXTREMITIES:   no     edema  NEUROLOGY:  alert    LABS:                        11.1   13.52 )-----------( 312      ( 31 Jan 2022 07:14 )             34.8     02-01    139  |  104  |  41<H>  ----------------------------<  115<H>  4.0   |  23  |  0.88    Ca    8.8      01 Feb 2022 07:56  Phos  3.4     01-31  Mg     2.0     01-31                      Thyroid Stimulating Hormone, Serum: 0.47 uIU/mL (01-21 @ 17:02)          Consultant(s) Notes Reviewed:      Care Discussed with Consultants/Other Providers:

## 2022-02-01 NOTE — PROVIDER CONTACT NOTE (OTHER) - ASSESSMENT
RN notified by tele tech that patient had 22 beats wide complex. Pt resting comfortably in bed, denies chest pain and SOB. VSS, see flowsheet.

## 2022-02-01 NOTE — PROGRESS NOTE ADULT - SUBJECTIVE AND OBJECTIVE BOX
CARDIOLOGY     PROGRESS  NOTE   ________________________________________________    CHIEF COMPLAINT:Patient is a 81y old  Female who presents with a chief complaint of sob/cp (01 Feb 2022 09:11)  sleeping.  	  REVIEW OF SYSTEMS:  CONSTITUTIONAL: No fever, weight loss, or fatigue  EYES: No eye pain, visual disturbances, or discharge  ENT:  No difficulty hearing, tinnitus, vertigo; No sinus or throat pain  NECK: No pain or stiffness  RESPIRATORY: No cough, wheezing, chills or hemoptysis; No Shortness of Breath  CARDIOVASCULAR: No chest pain, palpitations, passing out, dizziness, or leg swelling  GASTROINTESTINAL: No abdominal or epigastric pain. No nausea, vomiting, or hematemesis; No diarrhea or constipation. No melena or hematochezia.  GENITOURINARY: No dysuria, frequency, hematuria, or incontinence  NEUROLOGICAL: No headaches, memory loss, loss of strength, numbness, or tremors  SKIN: No itching, burning, rashes, or lesions   LYMPH Nodes: No enlarged glands  ENDOCRINE: No heat or cold intolerance; No hair loss  MUSCULOSKELETAL: No joint pain or swelling; No muscle, back, or extremity pain  PSYCHIATRIC: No depression, anxiety, mood swings, or difficulty sleeping  HEME/LYMPH: No easy bruising, or bleeding gums  ALLERGY AND IMMUNOLOGIC: No hives or eczema	    [ ] All others negative	  [ ] Unable to obtain    PHYSICAL EXAM:  T(C): 37.1 (02-01-22 @ 05:55), Max: 37.1 (02-01-22 @ 05:55)  HR: 96 (02-01-22 @ 05:55) (67 - 96)  BP: 113/70 (02-01-22 @ 05:55) (100/63 - 113/70)  RR: 18 (02-01-22 @ 05:55) (17 - 20)  SpO2: 96% (02-01-22 @ 05:55) (95% - 98%)  Wt(kg): --  I&O's Summary    31 Jan 2022 07:01  -  01 Feb 2022 07:00  --------------------------------------------------------  IN: 540 mL / OUT: 1050 mL / NET: -510 mL    01 Feb 2022 07:01  -  01 Feb 2022 10:40  --------------------------------------------------------  IN: 220 mL / OUT: 150 mL / NET: 70 mL        Appearance: Normal	  HEENT:   Normal oral mucosa, PERRL, EOMI	  Lymphatic: No lymphadenopathy  Cardiovascular: Normal S1 S2, No JVD, = murmurs, No edema  Respiratory: Lungs clear to auscultation	  Psychiatry: A & O x 3, Mood & affect appropriate  Gastrointestinal:  Soft, Non-tender, + BS	  Skin: No rashes, No ecchymoses, No cyanosis	  Neurologic: Non-focal  Extremities: Normal range of motion, No clubbing, cyanosis or edema  Vascular: Peripheral pulses palpable 2+ bilaterally    MEDICATIONS  (STANDING):  aspirin enteric coated 81 milliGRAM(s) Oral daily  atorvastatin 10 milliGRAM(s) Oral at bedtime  dextrose 40% Gel 15 Gram(s) Oral once  dextrose 50% Injectable 25 Gram(s) IV Push once  dextrose 50% Injectable 12.5 Gram(s) IV Push once  dextrose 50% Injectable 25 Gram(s) IV Push once  glucagon  Injectable 1 milliGRAM(s) IntraMuscular once  heparin  Infusion.  Unit(s)/Hr (9 mL/Hr) IV Continuous <Continuous>  insulin lispro (ADMELOG) corrective regimen sliding scale   SubCutaneous three times a day before meals  insulin lispro (ADMELOG) corrective regimen sliding scale   SubCutaneous at bedtime  metoprolol tartrate 50 milliGRAM(s) Oral two times a day      TELEMETRY: 	    ECG:  	  RADIOLOGY:  OTHER: 	  	  LABS:	 	    CARDIAC MARKERS:                                11.1   13.52 )-----------( 312      ( 31 Jan 2022 07:14 )             34.8     02-01    139  |  104  |  41<H>  ----------------------------<  115<H>  4.0   |  23  |  0.88    Ca    8.8      01 Feb 2022 07:56  Phos  3.4     01-31  Mg     2.0     01-31      proBNP: Serum Pro-Brain Natriuretic Peptide: 20071 pg/mL (01-21 @ 12:11)    Lipid Profile:   HgA1c:   TSH: Thyroid Stimulating Hormone, Serum: 0.47 uIU/mL (01-21 @ 17:02)          Assessment and plan  ---------------------------  82 yo F  h/o   dementia and  <CHF per EMS,/  no othe r hx  is  available presenting via EMS from home for few days of weakness, followed by development of SOB and episode of substernal CP this morning.     Pt is a poor historian and unable to provide significant history    all ROS negative upon questioning.   Lives at home with brother - he reports concern for pt's increased work of breathing.   pt is a 82 yo female with hx of htn, chf, cad, with increasing sob and COVID with  abnormal  cta and chest x ray  cta negative for PE  lipid panel  continue covid therapy  fu renal function  keep K>  will increase beta blocker as tolerated  ac  echo noted with normal EF, no WMA  continue beta blocker  continued with WCT, plan for cath today if off isolation

## 2022-02-01 NOTE — PROGRESS NOTE ADULT - ASSESSMENT
Assessment  DMT2: 81y Female with DM T2 with hyperglycemia, A1C 6.3%, she is unsure of her outpatient medications, now on insulin coverage, blood sugars trending within acceptable range, no hypoglycemias. Patient appears comfortable in NAD, eating meals, dexamethasone DC'd, planning DC to [facility].  Covid: on medications,  stable, monitored.  HTN: Controlled,  on antihypertensive medications.  Dementia: stable, monitored.      Brian Huynh MD  Cell: [phone]  Office: [phone]     Assessment  DMT2: 81y Female with DM T2 with hyperglycemia, A1C 6.3%, she is unsure of her outpatient medications, now on insulin coverage, blood sugars trending within acceptable range, no hypoglycemias. Patient appears comfortable in NAD, eating meals,  dexamethasone DC'd, planning DC to [facility].  Covid: on medications,  stable, monitored.  HTN: Controlled,  on antihypertensive medications.  Dementia: stable, monitored.      Brian Huynh MD  Cell: [phone]  Office: [phone]

## 2022-02-02 LAB
ANION GAP SERPL CALC-SCNC: 13 MMOL/L — SIGNIFICANT CHANGE UP (ref 5–17)
APPEARANCE UR: ABNORMAL
APTT BLD: 71.1 SEC — HIGH (ref 27.5–35.5)
APTT BLD: 72.3 SEC — HIGH (ref 27.5–35.5)
BACTERIA # UR AUTO: ABNORMAL
BILIRUB UR-MCNC: NEGATIVE — SIGNIFICANT CHANGE UP
BUN SERPL-MCNC: 32 MG/DL — HIGH (ref 7–23)
CALCIUM SERPL-MCNC: 8.7 MG/DL — SIGNIFICANT CHANGE UP (ref 8.4–10.5)
CHLORIDE SERPL-SCNC: 101 MMOL/L — SIGNIFICANT CHANGE UP (ref 96–108)
CO2 SERPL-SCNC: 23 MMOL/L — SIGNIFICANT CHANGE UP (ref 22–31)
COLOR SPEC: YELLOW — SIGNIFICANT CHANGE UP
CREAT SERPL-MCNC: 0.85 MG/DL — SIGNIFICANT CHANGE UP (ref 0.5–1.3)
D DIMER BLD IA.RAPID-MCNC: 399 NG/ML DDU — HIGH
DIFF PNL FLD: ABNORMAL
EPI CELLS # UR: 0 /HPF — SIGNIFICANT CHANGE UP
FERRITIN SERPL-MCNC: 347 NG/ML — HIGH (ref 15–150)
GLUCOSE BLDC GLUCOMTR-MCNC: 114 MG/DL — HIGH (ref 70–99)
GLUCOSE BLDC GLUCOMTR-MCNC: 119 MG/DL — HIGH (ref 70–99)
GLUCOSE BLDC GLUCOMTR-MCNC: 122 MG/DL — HIGH (ref 70–99)
GLUCOSE BLDC GLUCOMTR-MCNC: 131 MG/DL — HIGH (ref 70–99)
GLUCOSE SERPL-MCNC: 115 MG/DL — HIGH (ref 70–99)
GLUCOSE UR QL: NEGATIVE — SIGNIFICANT CHANGE UP
HCT VFR BLD CALC: 36.5 % — SIGNIFICANT CHANGE UP (ref 34.5–45)
HGB BLD-MCNC: 11.5 G/DL — SIGNIFICANT CHANGE UP (ref 11.5–15.5)
HYALINE CASTS # UR AUTO: 0 /LPF — SIGNIFICANT CHANGE UP (ref 0–2)
KETONES UR-MCNC: NEGATIVE — SIGNIFICANT CHANGE UP
LEUKOCYTE ESTERASE UR-ACNC: ABNORMAL
MCHC RBC-ENTMCNC: 28 PG — SIGNIFICANT CHANGE UP (ref 27–34)
MCHC RBC-ENTMCNC: 31.5 GM/DL — LOW (ref 32–36)
MCV RBC AUTO: 88.8 FL — SIGNIFICANT CHANGE UP (ref 80–100)
NITRITE UR-MCNC: POSITIVE
NRBC # BLD: 0 /100 WBCS — SIGNIFICANT CHANGE UP (ref 0–0)
PH UR: 6 — SIGNIFICANT CHANGE UP (ref 5–8)
PLATELET # BLD AUTO: 216 K/UL — SIGNIFICANT CHANGE UP (ref 150–400)
POTASSIUM SERPL-MCNC: 3.8 MMOL/L — SIGNIFICANT CHANGE UP (ref 3.5–5.3)
POTASSIUM SERPL-SCNC: 3.8 MMOL/L — SIGNIFICANT CHANGE UP (ref 3.5–5.3)
PROCALCITONIN SERPL-MCNC: 1.07 NG/ML — HIGH (ref 0.02–0.1)
PROT UR-MCNC: ABNORMAL
RBC # BLD: 4.11 M/UL — SIGNIFICANT CHANGE UP (ref 3.8–5.2)
RBC # FLD: 15.7 % — HIGH (ref 10.3–14.5)
RBC CASTS # UR COMP ASSIST: 1 /HPF — SIGNIFICANT CHANGE UP (ref 0–4)
SODIUM SERPL-SCNC: 137 MMOL/L — SIGNIFICANT CHANGE UP (ref 135–145)
SP GR SPEC: 1.02 — SIGNIFICANT CHANGE UP (ref 1.01–1.02)
UROBILINOGEN FLD QL: NEGATIVE — SIGNIFICANT CHANGE UP
WBC # BLD: 14.09 K/UL — HIGH (ref 3.8–10.5)
WBC # FLD AUTO: 14.09 K/UL — HIGH (ref 3.8–10.5)
WBC UR QL: 391 /HPF — HIGH (ref 0–5)

## 2022-02-02 PROCEDURE — 71045 X-RAY EXAM CHEST 1 VIEW: CPT | Mod: 26

## 2022-02-02 PROCEDURE — 93010 ELECTROCARDIOGRAM REPORT: CPT

## 2022-02-02 PROCEDURE — 99233 SBSQ HOSP IP/OBS HIGH 50: CPT

## 2022-02-02 RX ORDER — CEFTRIAXONE 500 MG/1
1000 INJECTION, POWDER, FOR SOLUTION INTRAMUSCULAR; INTRAVENOUS EVERY 24 HOURS
Refills: 0 | Status: COMPLETED | OUTPATIENT
Start: 2022-02-02 | End: 2022-02-11

## 2022-02-02 RX ORDER — ACETAMINOPHEN 500 MG
750 TABLET ORAL ONCE
Refills: 0 | Status: COMPLETED | OUTPATIENT
Start: 2022-02-02 | End: 2022-02-02

## 2022-02-02 RX ORDER — VANCOMYCIN HCL 1 G
750 VIAL (EA) INTRAVENOUS EVERY 24 HOURS
Refills: 0 | Status: DISCONTINUED | OUTPATIENT
Start: 2022-02-02 | End: 2022-02-03

## 2022-02-02 RX ADMIN — Medication 50 MILLIGRAM(S): at 17:15

## 2022-02-02 RX ADMIN — HEPARIN SODIUM 1000 UNIT(S)/HR: 5000 INJECTION INTRAVENOUS; SUBCUTANEOUS at 09:06

## 2022-02-02 RX ADMIN — Medication 250 MILLIGRAM(S): at 22:15

## 2022-02-02 RX ADMIN — CEFTRIAXONE 100 MILLIGRAM(S): 500 INJECTION, POWDER, FOR SOLUTION INTRAMUSCULAR; INTRAVENOUS at 18:49

## 2022-02-02 RX ADMIN — Medication 300 MILLIGRAM(S): at 15:36

## 2022-02-02 RX ADMIN — Medication 300 MILLIGRAM(S): at 23:11

## 2022-02-02 RX ADMIN — Medication 50 MILLIGRAM(S): at 06:05

## 2022-02-02 RX ADMIN — Medication 81 MILLIGRAM(S): at 12:33

## 2022-02-02 RX ADMIN — ATORVASTATIN CALCIUM 10 MILLIGRAM(S): 80 TABLET, FILM COATED ORAL at 21:52

## 2022-02-02 RX ADMIN — HEPARIN SODIUM 1000 UNIT(S)/HR: 5000 INJECTION INTRAVENOUS; SUBCUTANEOUS at 01:25

## 2022-02-02 NOTE — PROGRESS NOTE ADULT - ASSESSMENT
80 yo F     h/o   dementia and   ? CHF per EMS,/  no other  hx  is  available      presenting via EMS from home for few days of weakness, followed by development of SOB and episode of substernal CP this morning.     Pt is a poor historian and unable to provide significant history    all ROS negative upon questioning.     Lives at home with brother - he reports concern for pt's increased work of breathing.      pt with   weakness/  sob  from  covid/  pna   CT  chest, with  GOO   on decadron/  remdisivir   *  Dementia   *    HTN,   *  acute  diastolic chf  on   lasix  card  dr davis  on  dvt ppx.  bmi  is  20  tele,   s/p vtach/ on  toprol,  card to  f/p   *  Afib,  on lovenox  bid  v tach  on tele.  card following  tele, afib  tele, Vtach  22  beats.   afib, on iv heparin,   cardiac cath. pt  off  isolation per  ID        spoke  with  brothersammi/ pt  is , has  no  children/ states,   pt  is  full code    pmd  dr sheppard, allie     rad< from: CT Angio Chest PE Protocol w/ IV Cont (01.21.22 @ 16:01) >  IMPRESSION:  No main, right, left, lobar, or proximal segmental pulmonary embolus.  Patchy bilateral groundglass opacities noted throughout both lungs likely   representing infection or alveolar component of edema. COVID 19 can also   have this appearance. Correlate with RT PCR    --- End of Report --  < end of copied text >     80 yo F     h/o   dementia and   ? CHF per EMS,/  no other  hx  is  available      presenting via EMS from home for few days of weakness, followed by development of SOB and episode of substernal CP this morning.     Pt is a poor historian and unable to provide significant history    all ROS negative upon questioning.     Lives at home with brother - he reports concern for pt's increased work of breathing.      pt with   weakness/  sob  from  covid/  pna   CT  chest, with  GOO   on decadron/  remdisivir   *  Dementia   *    HTN,   *  acute  diastolic chf  on   lasix  card  dr davis  on  dvt ppx.  bmi  is  20  tele,   s/p vtach/ on  toprol,  card to  f/p   *  Afib,  on lovenox  bid  v tach  on tele.  card following  tele, afib  tele, Vtach  22  beats.   afib, on iv heparin,   cardiac cath. pt  off  isolation per  ID/  note  per iD  noted. pt cleraed for  cath        spoke  with  brothersammi/ pt  is , has  no  children/ states,   pt  is  full code    pmd  allie longoria< from: CT Angio Chest PE Protocol w/ IV Cont (01.21.22 @ 16:01) >  IMPRESSION:  No main, right, left, lobar, or proximal segmental pulmonary embolus.  Patchy bilateral groundglass opacities noted throughout both lungs likely   representing infection or alveolar component of edema. COVID 19 can also   have this appearance. Correlate with RT PCR    --- End of Report --  < end of copied text >

## 2022-02-02 NOTE — PROGRESS NOTE ADULT - SUBJECTIVE AND OBJECTIVE BOX
CARDIOLOGY     PROGRESS  NOTE   ________________________________________________    CHIEF COMPLAINT:Patient is a 81y old  Female who presents with a chief complaint of sob/cp (02 Feb 2022 07:33)  sleeping.  	  REVIEW OF SYSTEMS:  CONSTITUTIONAL: No fever, weight loss, or fatigue  EYES: No eye pain, visual disturbances, or discharge  ENT:  No difficulty hearing, tinnitus, vertigo; No sinus or throat pain  NECK: No pain or stiffness  RESPIRATORY: No cough, wheezing, chills or hemoptysis; No Shortness of Breath  CARDIOVASCULAR: No chest pain, palpitations, passing out, dizziness, or leg swelling  GASTROINTESTINAL: No abdominal or epigastric pain. No nausea, vomiting, or hematemesis; No diarrhea or constipation. No melena or hematochezia.  GENITOURINARY: No dysuria, frequency, hematuria, or incontinence  NEUROLOGICAL: No headaches, memory loss, loss of strength, numbness, or tremors  SKIN: No itching, burning, rashes, or lesions   LYMPH Nodes: No enlarged glands  ENDOCRINE: No heat or cold intolerance; No hair loss  MUSCULOSKELETAL: No joint pain or swelling; No muscle, back, or extremity pain  PSYCHIATRIC: No depression, anxiety, mood swings, or difficulty sleeping  HEME/LYMPH: No easy bruising, or bleeding gums  ALLERGY AND IMMUNOLOGIC: No hives or eczema	    [ ] All others negative	  [ ] Unable to obtain    PHYSICAL EXAM:  T(C): 37.1 (02-02-22 @ 09:58), Max: 37.8 (02-01-22 @ 13:45)  HR: 96 (02-02-22 @ 09:58) (84 - 120)  BP: 106/61 (02-02-22 @ 09:58) (94/57 - 156/98)  RR: 19 (02-02-22 @ 09:58) (18 - 26)  SpO2: 96% (02-02-22 @ 09:58) (95% - 100%)  Wt(kg): --  I&O's Summary    01 Feb 2022 07:01  -  02 Feb 2022 07:00  --------------------------------------------------------  IN: 971 mL / OUT: 1550 mL / NET: -579 mL    02 Feb 2022 07:01  -  02 Feb 2022 12:19  --------------------------------------------------------  IN: 280 mL / OUT: 250 mL / NET: 30 mL        Appearance: Normal	  HEENT:   Normal oral mucosa, PERRL, EOMI	  Lymphatic: No lymphadenopathy  Cardiovascular: Normal S1 S2, No JVD, + murmurs, No edema  Respiratory: Lungs clear to auscultation	  Psychiatry: A & O x 3, Mood & affect appropriate  Gastrointestinal:  Soft, Non-tender, + BS	  Skin: No rashes, No ecchymoses, No cyanosis	  Neurologic: Non-focal  Extremities: Normal range of motion, No clubbing, cyanosis or edema  Vascular: Peripheral pulses palpable 2+ bilaterally    MEDICATIONS  (STANDING):  aspirin enteric coated 81 milliGRAM(s) Oral daily  atorvastatin 10 milliGRAM(s) Oral at bedtime  dextrose 40% Gel 15 Gram(s) Oral once  dextrose 50% Injectable 25 Gram(s) IV Push once  dextrose 50% Injectable 12.5 Gram(s) IV Push once  dextrose 50% Injectable 25 Gram(s) IV Push once  glucagon  Injectable 1 milliGRAM(s) IntraMuscular once  heparin  Infusion.  Unit(s)/Hr (9 mL/Hr) IV Continuous <Continuous>  insulin lispro (ADMELOG) corrective regimen sliding scale   SubCutaneous at bedtime  insulin lispro (ADMELOG) corrective regimen sliding scale   SubCutaneous three times a day before meals  metoprolol tartrate 50 milliGRAM(s) Oral two times a day      TELEMETRY: 	    ECG:  	  RADIOLOGY:  OTHER: 	  	  LABS:	 	    CARDIAC MARKERS:                                11.5   14.09 )-----------( 216      ( 02 Feb 2022 07:20 )             36.5     02-02    137  |  101  |  32<H>  ----------------------------<  115<H>  3.8   |  23  |  0.85    Ca    8.7      02 Feb 2022 07:24  Phos  3.4     01-31  Mg     2.0     01-31      proBNP: Serum Pro-Brain Natriuretic Peptide: 20071 pg/mL (01-21 @ 12:11)    Lipid Profile:   HgA1c:   TSH: Thyroid Stimulating Hormone, Serum: 0.47 uIU/mL (01-21 @ 17:02)    PTT - ( 02 Feb 2022 07:20 )  PTT:72.3 sec  < from: Limited Transthoracic Echo (01.25.22 @ 13:20) >  Mitral Valve: Mitral annular calcification and calcified  mitral leaflets.  Aortic Valve/Aorta: Aortic valve not well visualized;  appears calcified.  Normal aortic root, aortic arch and descending thoracic  aorta.  Left Atrium: Normal left atrium.  Left Ventricle: Endocardium not well visualized; grossly  normal left ventricular systolic function. Normal left  ventricular internal dimensions and wall thicknesses.  Right Heart: Normal right atrium. The right ventricle is  not well visualized; grossly normal right ventricular  systolic function. Tricuspid valve not well visualized,  probably normal.  Pericardium/Pleura: Normal pericardium with no pericardial  effusion.  Hemodynamic: Estimated right atrial pressure is 8 mm Hg.  ------------------------------------------------------------------------  Conclusions:  1. Endocardium not well visualized; grossly normal left  ventricular systolic function.  2. The right ventricle is not well visualized; grossly  normal right ventricular systolic function.    < from: Xray Chest 1 View- PORTABLE-Urgent (Xray Chest 1 View- PORTABLE-Urgent .) (01.25.22 @ 12:02) >  Cannot accurately assess cardiac size in this projection.  The lungs are clear.  Trace left pleural effusion. No pneumothorax.    IMPRESSION:  Clear lungs.      Assessment and plan  ---------------------------  80 yo F  h/o   dementia and  <CHF per EMS,/  no othe r hx  is  available presenting via EMS from home for few days of weakness, followed by development of SOB and episode of substernal CP this morning.     Pt is a poor historian and unable to provide significant history    all ROS negative upon questioning.   Lives at home with brother - he reports concern for pt's increased work of breathing.   pt is a 80 yo female with hx of htn, chf, cad, with increasing sob and COVID with  abnormal  cta and chest x ray  cta negative for PE  lipid panel  continue covid therapy  fu renal function  keep K>  will increase beta blocker as tolerated  ac  echo noted with normal EF, no WMA  continue beta blocker  continued with WCT, plan for cath , cleared by ID  will consider Wilmer/ cardioversion post cath

## 2022-02-02 NOTE — PROGRESS NOTE ADULT - ASSESSMENT
81 year old female w/ PMHx of dementia and CHF who presented from home for several days of weakness followed by development of dyspnea and episode of substernal chest pain.    COVID19 Positive with hypoxic respiratory failure  Unvaccinated for COVID19  CTA Chest (1/21) with no PE and GGO consistent with COVID19    Completed 5 days of Remdesivir 1/21 - 1/25  Completed 10 days of Dexamethasone 1/22 - 1/31    Now with high grade fever and elevated procalcitonin  Concerning for bacterial infection, UTI vs. superimposed bacterial pneumonia    #Fever, Leukocytosis  --Recommend U/A and UCx  --Recommend MRSA/MSSA Nasal PCR  --After obtaining UCx would start Vancomycin 750 mg IV Q24H and Ceftriaxone 1g IV Q24H  --Follow up on prelim BCx    #COVID19  s/p Remdesivir 1/21 - 1/25  s/p Dexamethasone 1/22 - 1/31  Completed isolation for COVID19    I will continue to follow. Please feel free to contact me with any further questions.    Sincere Camp M.D.  [facility] Division of Infectious Disease  8AM-5PM: Pager Number [phone]  After Hours (or if no response): Please contact the Infectious Diseases Office at [phone]     The above assessment and plan were discussed with medicine NP

## 2022-02-02 NOTE — PROVIDER CONTACT NOTE (OTHER) - ASSESSMENT
6 Beats Wide complex tachycardia, vss. no s/s of chest pain/discomfort noted. satting well on room air.

## 2022-02-02 NOTE — PROGRESS NOTE ADULT - SUBJECTIVE AND OBJECTIVE BOX
afberile  REVIEW OF SYSTEMS:  GEN: no fever,    no chills  RESP: no SOB,   no cough  CVS: no chest pain,   no palpitations  GI: no abdominal pain,   no nausea,   no vomiting,   no constipation,   no diarrhea  : no dysuria,   no frequency  NEURO: no headache,   no dizziness  PSYCH: no depression,   not anxious  Derm : no rash    MEDICATIONS  (STANDING):  aspirin enteric coated 81 milliGRAM(s) Oral daily  atorvastatin 10 milliGRAM(s) Oral at bedtime  dextrose 40% Gel 15 Gram(s) Oral once  dextrose 50% Injectable 25 Gram(s) IV Push once  dextrose 50% Injectable 12.5 Gram(s) IV Push once  dextrose 50% Injectable 25 Gram(s) IV Push once  glucagon  Injectable 1 milliGRAM(s) IntraMuscular once  heparin  Infusion.  Unit(s)/Hr (9 mL/Hr) IV Continuous <Continuous>  insulin lispro (ADMELOG) corrective regimen sliding scale   SubCutaneous at bedtime  insulin lispro (ADMELOG) corrective regimen sliding scale   SubCutaneous three times a day before meals  metoprolol tartrate 50 milliGRAM(s) Oral two times a day    MEDICATIONS  (PRN):  acetaminophen     Tablet .. 650 milliGRAM(s) Oral every 6 hours PRN Temp greater or equal to 38C (100.4F), Mild Pain (1 - 3), Moderate Pain (4 - 6), Severe Pain (7 - 10)  heparin   Injectable 4000 Unit(s) IV Push every 6 hours PRN For aPTT less than 40  heparin   Injectable 2000 Unit(s) IV Push every 6 hours PRN For aPTT between 40 - 57      Vital Signs Last 24 Hrs  T(C): 37.2 (02 Feb 2022 06:04), Max: 37.8 (01 Feb 2022 13:45)  T(F): 99 (02 Feb 2022 06:04), Max: 100 (01 Feb 2022 13:45)  HR: 88 (02 Feb 2022 06:04) (84 - 120)  BP: 136/78 (02 Feb 2022 06:04) (94/57 - 156/98)  BP(mean): --  RR: 19 (02 Feb 2022 06:04) (18 - 26)  SpO2: 100% (02 Feb 2022 06:04) (95% - 100%)  CAPILLARY BLOOD GLUCOSE      POCT Blood Glucose.: 125 mg/dL (01 Feb 2022 21:30)  POCT Blood Glucose.: 141 mg/dL (01 Feb 2022 17:26)  POCT Blood Glucose.: 148 mg/dL (01 Feb 2022 12:26)  POCT Blood Glucose.: 117 mg/dL (01 Feb 2022 08:47)    I&O's Summary    01 Feb 2022 07:01  -  02 Feb 2022 07:00  --------------------------------------------------------  IN: 971 mL / OUT: 1550 mL / NET: -579 mL        PHYSICAL EXAM:  HEAD:  Atraumatic, Normocephalic  NECK: Supple, No   JVD  CHEST/LUNG:   no     rales,     no,    rhonchi  HEART: Regular rate and rhythm;         murmur  ABDOMEN: Soft, Nontender, ;   EXTREMITIES:    no    edema  NEUROLOGY:  alert    LABS:                        10.9   12.86 )-----------( 232      ( 01 Feb 2022 16:33 )             33.3     02-01    139  |  104  |  41<H>  ----------------------------<  115<H>  4.0   |  23  |  0.88    Ca    8.8      01 Feb 2022 07:56  Phos  3.4     01-31  Mg     2.0     01-31      PTT - ( 02 Feb 2022 00:34 )  PTT:71.1 sec                Thyroid Stimulating Hormone, Serum: 0.47 uIU/mL (01-21 @ 17:02)          Consultant(s) Notes Reviewed:      Care Discussed with Consultants/Other Providers:

## 2022-02-02 NOTE — PROGRESS NOTE ADULT - ASSESSMENT
Assessment  DMT2: 81y Female with DM T2 with hyperglycemia, A1C 6.3%, she is unsure of her outpatient medications, on low-scale insulin coverage only, blood sugars are stable and trending at target, no hypoglycemias, eating meals in NAD.  Covid: on medications,  stable, monitored.  HTN: Controlled,  on antihypertensive medications.  Dementia: stable, monitored.      Brian Huynh MD  Cell: [phone]  Office: [phone]     Assessment  DMT2: 81y Female with DM T2 with hyperglycemia, A1C 6.3%, she is unsure of her outpatient medications, on low-scale insulin coverage only, blood sugars are stable  and trending at target, no hypoglycemias, eating meals in NAD.  Covid: on medications,  stable, monitored.  HTN: Controlled,  on antihypertensive medications.  Dementia: stable, monitored.      Brian Huynh MD  Cell: [phone]  Office: [phone]

## 2022-02-02 NOTE — CHART NOTE - NSCHARTNOTEFT_GEN_A_CORE
Patient reported with temp of 102, Cardiac cath cancelled . Spoke with Dr Clark and requested in addition to blood cxs , procalcitonin and inflammatory markers .   Patient is alert and not toxic looking  ICU Vital Signs Last 24 Hrs  T(C): 39.2 (02 Feb 2022 14:31), Max: 39.2 (02 Feb 2022 14:31)  T(F): 102.5 (02 Feb 2022 14:31), Max: 102.5 (02 Feb 2022 14:31)  HR: 107 (02 Feb 2022 14:31) (67 - 120)  BP: 116/59 (02 Feb 2022 14:31) (94/57 - 156/98)  BP(mean): --  ABP: --  ABP(mean): --  RR: 18 (02 Feb 2022 14:31) (18 - 26)  SpO2: 95% (02 Feb 2022 14:31) (95% - 100%)  A/P 81 yrs old female with frebile , cath unable to be done due to fevers  blood cxs x2   cxr   inflammatory markers  Procalcitonin level  tylenol after cxs obtained

## 2022-02-02 NOTE — PROGRESS NOTE ADULT - SUBJECTIVE AND OBJECTIVE BOX
Chief complaint  Patient is a 81y old  Female who presents with a chief complaint of sob/cp (02 Feb 2022 12:19)   Review of systems  Patient in bed, looks comfortable, no hypoglycemic episodes.    Labs and Fingersticks  CAPILLARY BLOOD GLUCOSE      POCT Blood Glucose.: 114 mg/dL (02 Feb 2022 12:25)  POCT Blood Glucose.: 119 mg/dL (02 Feb 2022 08:14)  POCT Blood Glucose.: 125 mg/dL (01 Feb 2022 21:30)  POCT Blood Glucose.: 141 mg/dL (01 Feb 2022 17:26)      Anion Gap, Serum: 13 (02-02 @ 07:24)  Anion Gap, Serum: 12 (02-01 @ 07:56)  Anion Gap, Serum: 10 (01-31 @ 19:46)      Calcium, Total Serum: 8.7 (02-02 @ 07:24)  Calcium, Total Serum: 8.8 (02-01 @ 07:56)  Calcium, Total Serum: 8.5 (01-31 @ 19:46)          02-02    137  |  101  |  32<H>  ----------------------------<  115<H>  3.8   |  23  |  0.85    Ca    8.7      02 Feb 2022 07:24  Phos  3.4     01-31  Mg     2.0     01-31                          11.5   14.09 )-----------( 216      ( 02 Feb 2022 07:20 )             36.5     Medications  MEDICATIONS  (STANDING):  aspirin enteric coated 81 milliGRAM(s) Oral daily  atorvastatin 10 milliGRAM(s) Oral at bedtime  dextrose 40% Gel 15 Gram(s) Oral once  dextrose 50% Injectable 25 Gram(s) IV Push once  dextrose 50% Injectable 12.5 Gram(s) IV Push once  dextrose 50% Injectable 25 Gram(s) IV Push once  glucagon  Injectable 1 milliGRAM(s) IntraMuscular once  heparin  Infusion.  Unit(s)/Hr (9 mL/Hr) IV Continuous <Continuous>  insulin lispro (ADMELOG) corrective regimen sliding scale   SubCutaneous at bedtime  insulin lispro (ADMELOG) corrective regimen sliding scale   SubCutaneous three times a day before meals  metoprolol tartrate 50 milliGRAM(s) Oral two times a day      Physical Exam  General: Patient comfortable in bed  Vital Signs Last 12 Hrs  T(F): 102.5 (02-02-22 @ 14:31), Max: 102.5 (02-02-22 @ 14:31)  HR: 107 (02-02-22 @ 14:31) (67 - 107)  BP: 116/59 (02-02-22 @ 14:31) (106/61 - 136/78)  BP(mean): --  RR: 18 (02-02-22 @ 14:31) (18 - 19)  SpO2: 95% (02-02-22 @ 14:31) (95% - 100%)  Neck: No palpable thyroid nodules.  CVS: S1S2, No murmurs  Respiratory: No wheezing, no crepitations  GI: Abdomen soft, bowel sounds positive  Musculoskeletal:  edema lower extremities.   Skin: No skin rashes, no ecchymosis    Diagnostics    Diet, Regular:   Consistent Carbohydrate No Snacks (CSTCHO) (01-26 @ 15:27)               Chief complaint  Patient is a 81y old  Female who presents with a chief complaint of sob/cp (02 Feb 2022 12:19)   Review of systems  Patient in bed, looks comfortable, no hypoglycemic episodes.    Labs and Fingersticks  CAPILLARY BLOOD GLUCOSE      POCT Blood Glucose.: 114 mg/dL (02 Feb 2022 12:25)  POCT Blood Glucose.: 119 mg/dL (02 Feb 2022 08:14)  POCT Blood Glucose.: 125 mg/dL (01 Feb 2022 21:30)  POCT Blood Glucose.: 141 mg/dL (01 Feb 2022 17:26)      Anion Gap, Serum: 13 (02-02 @ 07:24)  Anion Gap, Serum: 12 (02-01 @ 07:56)  Anion Gap, Serum: 10 (01-31 @ 19:46)      Calcium, Total Serum: 8.7 (02-02 @ 07:24)  Calcium, Total Serum: 8.8 (02-01 @ 07:56)  Calcium, Total Serum: 8.5 (01-31 @ 19:46)          02-02    137  |  101  |  32<H>  ----------------------------<  115<H>  3.8   |  23  |  0.85    Ca    8.7      02 Feb 2022 07:24  Phos  3.4     01-31  Mg     2.0     01-31                          11.5   14.09 )-----------( 216      ( 02 Feb 2022 07:20 )             36.5     Medications  MEDICATIONS  (STANDING):  aspirin enteric coated 81 milliGRAM(s) Oral daily  atorvastatin 10 milliGRAM(s) Oral at bedtime  dextrose 40% Gel 15 Gram(s) Oral once  dextrose 50% Injectable 25 Gram(s) IV Push once  dextrose 50% Injectable 12.5 Gram(s) IV Push once  dextrose 50% Injectable 25 Gram(s) IV Push once  glucagon  Injectable 1 milliGRAM(s) IntraMuscular once  heparin  Infusion.  Unit(s)/Hr (9 mL/Hr) IV Continuous <Continuous>  insulin lispro (ADMELOG) corrective regimen sliding scale   SubCutaneous at bedtime  insulin lispro (ADMELOG) corrective regimen sliding scale   SubCutaneous three times a day before meals  metoprolol tartrate 50 milliGRAM(s) Oral two times a day      Physical Exam  General: Patient comfortable in bed  Vital Signs Last 12 Hrs  T(F): 102.5 (02-02-22 @ 14:31), Max: 102.5 (02-02-22 @ 14:31)  HR: 107 (02-02-22 @ 14:31) (67 - 107)  BP: 116/59 (02-02-22 @ 14:31) (106/61 - 136/78)  BP(mean): --  RR: 18 (02-02-22 @ 14:31) (18 - 19)  SpO2: 95% (02-02-22 @ 14:31) (95% - 100%)  Neck: No palpable thyroid nodules.  CVS: S1S2, No murmurs  Respiratory: No wheezing, no crepitations  GI: Abdomen soft, bowel sounds positive  Musculoskeletal:  edema lower extremities.   Skin: No skin rashes, no ecchymosis    Diagnostics    Diet, Regular:   Consistent Carbohydrate No Snacks (CSTCHO) (01-26 @ 15:27)

## 2022-02-02 NOTE — CHART NOTE - NSCHARTNOTEFT_GEN_A_CORE
81 year old female w/ PMHx of dementia and CHF who presented from home for several days of weakness followed by development of dyspnea and episode of substernal chest pain.    COVID19 Positive with hypoxic respiratory failure  Unvaccinated for COVID19  CTA Chest (1/21) with no PE and GGO consistent with COVID19    Patient swabbed positive for COVID19 on 1/21/22  Patient requires cath procedure  At this time patient is off of isolation since 1/30/22 (10 days from positive PCR)  Patient symptomatically improved and is NOT immunocompromised    Patient should proceed with her cath as planned and no longer requires isolation precautions for COVID19    Sincere Camp M.D.  [facility] Division of Infectious Disease  8AM-5PM: Pager Number [phone]  After Hours (or if no response): Please contact the Infectious Diseases Office at [phone]    The above assessment and plan were discussed with Dr Ferguson

## 2022-02-02 NOTE — PROGRESS NOTE ADULT - SUBJECTIVE AND OBJECTIVE BOX
Follow Up:      Interval History:    REVIEW OF SYSTEMS  [  ] ROS unobtainable because:    [  ] All other systems negative except as noted below    Constitutional:  [ ] fever [ ] chills  [ ] weight loss  [ ] weakness  Skin:  [ ] rash [ ] phlebitis	  Eyes: [ ] icterus [ ] pain  [ ] discharge	  ENMT: [ ] sore throat  [ ] thrush [ ] ulcers [ ] exudates  Respiratory: [ ] dyspnea [ ] hemoptysis [ ] cough [ ] sputum	  Cardiovascular:  [ ] chest pain [ ] palpitations [ ] edema	  Gastrointestinal:  [ ] nausea [ ] vomiting [ ] diarrhea [ ] constipation [ ] pain	  Genitourinary:  [ ] dysuria [ ] frequency [ ] hematuria [ ] discharge [ ] flank pain  [ ] incontinence  Musculoskeletal:  [ ] myalgias [ ] arthralgias [ ] arthritis  [ ] back pain  Neurological:  [ ] headache [ ] seizures  [ ] confusion/altered mental status    Allergies  No Known Allergies        ANTIMICROBIALS:      OTHER MEDS:  MEDICATIONS  (STANDING):  acetaminophen     Tablet .. 650 every 6 hours PRN  aspirin enteric coated 81 daily  atorvastatin 10 at bedtime  dextrose 40% Gel 15 once  dextrose 50% Injectable 25 once  dextrose 50% Injectable 12.5 once  dextrose 50% Injectable 25 once  glucagon  Injectable 1 once  heparin   Injectable 4000 every 6 hours PRN  heparin   Injectable 2000 every 6 hours PRN  heparin  Infusion.  <Continuous>  insulin lispro (ADMELOG) corrective regimen sliding scale  three times a day before meals  insulin lispro (ADMELOG) corrective regimen sliding scale  at bedtime  metoprolol tartrate 50 two times a day      Vital Signs Last 24 Hrs  T(C): 38.7 (02 Feb 2022 17:13), Max: 39.2 (02 Feb 2022 14:31)  T(F): 101.6 (02 Feb 2022 17:13), Max: 102.5 (02 Feb 2022 14:31)  HR: 98 (02 Feb 2022 17:13) (67 - 120)  BP: 119/79 (02 Feb 2022 17:13) (94/57 - 156/98)  BP(mean): --  RR: 18 (02 Feb 2022 17:13) (18 - 26)  SpO2: 96% (02 Feb 2022 17:13) (95% - 100%)    PHYSICAL EXAMINATION:  General: Alert and Awake, NAD  HEENT: PERRL, EOMI  Neck: Supple  Cardiac: RRR, No M/R/G  Resp: CTAB, No Wh/Rh/Ra  Abdomen: NBS, NT/ND, No HSM, No rigidity or guarding  MSK: No LE edema. No Calf tenderness  : No calvin  Skin: No rashes or lesions. Skin is warm and dry to the touch.   Neuro: Alert and Awake. CN 2-12 Grossly intact. Moves all four extremities spontaneously.  Psych: Calm, Pleasant, Cooperative                          11.5   14.09 )-----------( 216      ( 02 Feb 2022 07:20 )             36.5       02-02    137  |  101  |  32<H>  ----------------------------<  115<H>  3.8   |  23  |  0.85    Ca    8.7      02 Feb 2022 07:24  Phos  3.4     01-31  Mg     2.0     01-31            MICROBIOLOGY:  v  Clean Catch Clean Catch (Midstream)  01-21-22   >=3 organisms. Probable collection contamination.  --  --                RADIOLOGY:    <The imaging below has been reviewed and visualized by me independently. Findings as detailed in report below> Follow Up:  fever    Interval History: went to cath lab today and was found to have high grade fever. Patient's mental status is limiting but she denies cough. denies dysuria. denies acute events.     REVIEW OF SYSTEMS  [  ] ROS unobtainable because:    [ x ] All other systems negative except as noted below    Constitutional:  [x ] fever [ ] chills  [ ] weight loss  [ ] weakness  Skin:  [ ] rash [ ] phlebitis	  Eyes: [ ] icterus [ ] pain  [ ] discharge	  ENMT: [ ] sore throat  [ ] thrush [ ] ulcers [ ] exudates  Respiratory: [ ] dyspnea [ ] hemoptysis [ ] cough [ ] sputum	  Cardiovascular:  [ ] chest pain [ ] palpitations [ ] edema	  Gastrointestinal:  [ ] nausea [ ] vomiting [ ] diarrhea [ ] constipation [ ] pain	  Genitourinary:  [ ] dysuria [ ] frequency [ ] hematuria [ ] discharge [ ] flank pain  [ ] incontinence  Musculoskeletal:  [ ] myalgias [ ] arthralgias [ ] arthritis  [ ] back pain  Neurological:  [ ] headache [ ] seizures  [ ] confusion/altered mental status    Allergies  No Known Allergies        ANTIMICROBIALS:      OTHER MEDS:  MEDICATIONS  (STANDING):  acetaminophen     Tablet .. 650 every 6 hours PRN  aspirin enteric coated 81 daily  atorvastatin 10 at bedtime  dextrose 40% Gel 15 once  dextrose 50% Injectable 25 once  dextrose 50% Injectable 12.5 once  dextrose 50% Injectable 25 once  glucagon  Injectable 1 once  heparin   Injectable 4000 every 6 hours PRN  heparin   Injectable 2000 every 6 hours PRN  heparin  Infusion.  <Continuous>  insulin lispro (ADMELOG) corrective regimen sliding scale  three times a day before meals  insulin lispro (ADMELOG) corrective regimen sliding scale  at bedtime  metoprolol tartrate 50 two times a day      Vital Signs Last 24 Hrs  T(C): 38.7 (02 Feb 2022 17:13), Max: 39.2 (02 Feb 2022 14:31)  T(F): 101.6 (02 Feb 2022 17:13), Max: 102.5 (02 Feb 2022 14:31)  HR: 98 (02 Feb 2022 17:13) (67 - 120)  BP: 119/79 (02 Feb 2022 17:13) (94/57 - 156/98)  BP(mean): --  RR: 18 (02 Feb 2022 17:13) (18 - 26)  SpO2: 96% (02 Feb 2022 17:13) (95% - 100%)    PHYSICAL EXAMINATION:  General: Alert and Awake, NAD  Cardiac: RRR, No M/R/G  Resp: Rales at bilateral mid lung and lung bases. No wheezes or rhonchi.   Abdomen: NBS, NT/ND, No HSM, No rigidity or guarding  MSK: No LE edema. No Calf tenderness  Skin: No rashes or lesions. Skin is warm and dry to the touch.   Neuro: Alert and Awake. CN 2-12 Grossly intact. Moves all four extremities spontaneously.  Psych: Calm, Pleasant, Cooperative                          11.5   14.09 )-----------( 216      ( 02 Feb 2022 07:20 )             36.5       02-02    137  |  101  |  32<H>  ----------------------------<  115<H>  3.8   |  23  |  0.85    Ca    8.7      02 Feb 2022 07:24  Phos  3.4     01-31  Mg     2.0     01-31    MICROBIOLOGY:    Clean Catch Clean Catch (Midstream)  01-21-22   >=3 organisms. Probable collection contamination.  --  --    RADIOLOGY:    <The imaging below has been reviewed and visualized by me independently. Findings as detailed in report below>    < from: Xray Chest 1 View- PORTABLE-Urgent (Xray Chest 1 View- PORTABLE-Urgent .) (01.25.22 @ 12:02) >  IMPRESSION:  Clear lungs.    < end of copied text >

## 2022-02-03 LAB
ALBUMIN SERPL ELPH-MCNC: 2.9 G/DL — LOW (ref 3.3–5)
ALP SERPL-CCNC: 56 U/L — SIGNIFICANT CHANGE UP (ref 40–120)
ALT FLD-CCNC: 14 U/L — SIGNIFICANT CHANGE UP (ref 10–45)
ANION GAP SERPL CALC-SCNC: 12 MMOL/L — SIGNIFICANT CHANGE UP (ref 5–17)
APTT BLD: 58.5 SEC — HIGH (ref 27.5–35.5)
AST SERPL-CCNC: 22 U/L — SIGNIFICANT CHANGE UP (ref 10–40)
BILIRUB SERPL-MCNC: 0.4 MG/DL — SIGNIFICANT CHANGE UP (ref 0.2–1.2)
BUN SERPL-MCNC: 24 MG/DL — HIGH (ref 7–23)
CALCIUM SERPL-MCNC: 9 MG/DL — SIGNIFICANT CHANGE UP (ref 8.4–10.5)
CHLORIDE SERPL-SCNC: 96 MMOL/L — SIGNIFICANT CHANGE UP (ref 96–108)
CO2 SERPL-SCNC: 22 MMOL/L — SIGNIFICANT CHANGE UP (ref 22–31)
CREAT SERPL-MCNC: 0.81 MG/DL — SIGNIFICANT CHANGE UP (ref 0.5–1.3)
D DIMER BLD IA.RAPID-MCNC: 429 NG/ML DDU — HIGH
E COLI DNA BLD POS QL NAA+NON-PROBE: SIGNIFICANT CHANGE UP
FERRITIN SERPL-MCNC: 379 NG/ML — HIGH (ref 15–150)
GLUCOSE BLDC GLUCOMTR-MCNC: 113 MG/DL — HIGH (ref 70–99)
GLUCOSE BLDC GLUCOMTR-MCNC: 117 MG/DL — HIGH (ref 70–99)
GLUCOSE BLDC GLUCOMTR-MCNC: 122 MG/DL — HIGH (ref 70–99)
GLUCOSE BLDC GLUCOMTR-MCNC: 123 MG/DL — HIGH (ref 70–99)
GLUCOSE SERPL-MCNC: 114 MG/DL — HIGH (ref 70–99)
GRAM STN FLD: SIGNIFICANT CHANGE UP
GRAM STN FLD: SIGNIFICANT CHANGE UP
HCT VFR BLD CALC: 33.4 % — LOW (ref 34.5–45)
HGB BLD-MCNC: 10.9 G/DL — LOW (ref 11.5–15.5)
MCHC RBC-ENTMCNC: 28.1 PG — SIGNIFICANT CHANGE UP (ref 27–34)
MCHC RBC-ENTMCNC: 32.6 GM/DL — SIGNIFICANT CHANGE UP (ref 32–36)
MCV RBC AUTO: 86.1 FL — SIGNIFICANT CHANGE UP (ref 80–100)
METHOD TYPE: SIGNIFICANT CHANGE UP
MRSA PCR RESULT.: SIGNIFICANT CHANGE UP
NRBC # BLD: 0 /100 WBCS — SIGNIFICANT CHANGE UP (ref 0–0)
PLATELET # BLD AUTO: 176 K/UL — SIGNIFICANT CHANGE UP (ref 150–400)
POTASSIUM SERPL-MCNC: 3.7 MMOL/L — SIGNIFICANT CHANGE UP (ref 3.5–5.3)
POTASSIUM SERPL-SCNC: 3.7 MMOL/L — SIGNIFICANT CHANGE UP (ref 3.5–5.3)
PROT SERPL-MCNC: 6.3 G/DL — SIGNIFICANT CHANGE UP (ref 6–8.3)
RBC # BLD: 3.88 M/UL — SIGNIFICANT CHANGE UP (ref 3.8–5.2)
RBC # FLD: 15.7 % — HIGH (ref 10.3–14.5)
S AUREUS DNA NOSE QL NAA+PROBE: SIGNIFICANT CHANGE UP
SODIUM SERPL-SCNC: 130 MMOL/L — LOW (ref 135–145)
SPECIMEN SOURCE: SIGNIFICANT CHANGE UP
WBC # BLD: 10.49 K/UL — SIGNIFICANT CHANGE UP (ref 3.8–10.5)
WBC # FLD AUTO: 10.49 K/UL — SIGNIFICANT CHANGE UP (ref 3.8–10.5)

## 2022-02-03 PROCEDURE — 99233 SBSQ HOSP IP/OBS HIGH 50: CPT

## 2022-02-03 RX ORDER — SODIUM CHLORIDE 9 MG/ML
1000 INJECTION, SOLUTION INTRAVENOUS
Refills: 0 | Status: DISCONTINUED | OUTPATIENT
Start: 2022-02-03 | End: 2022-02-08

## 2022-02-03 RX ORDER — METOPROLOL TARTRATE 50 MG
25 TABLET ORAL
Refills: 0 | Status: DISCONTINUED | OUTPATIENT
Start: 2022-02-03 | End: 2022-02-07

## 2022-02-03 RX ADMIN — Medication 650 MILLIGRAM(S): at 12:01

## 2022-02-03 RX ADMIN — Medication 81 MILLIGRAM(S): at 11:42

## 2022-02-03 RX ADMIN — CEFTRIAXONE 100 MILLIGRAM(S): 500 INJECTION, POWDER, FOR SOLUTION INTRAMUSCULAR; INTRAVENOUS at 17:34

## 2022-02-03 RX ADMIN — SODIUM CHLORIDE 50 MILLILITER(S): 9 INJECTION, SOLUTION INTRAVENOUS at 11:42

## 2022-02-03 RX ADMIN — ATORVASTATIN CALCIUM 10 MILLIGRAM(S): 80 TABLET, FILM COATED ORAL at 22:28

## 2022-02-03 RX ADMIN — Medication 25 MILLIGRAM(S): at 17:33

## 2022-02-03 RX ADMIN — Medication 750 MILLIGRAM(S): at 00:39

## 2022-02-03 RX ADMIN — HEPARIN SODIUM 1000 UNIT(S)/HR: 5000 INJECTION INTRAVENOUS; SUBCUTANEOUS at 07:14

## 2022-02-03 NOTE — PROGRESS NOTE ADULT - SUBJECTIVE AND OBJECTIVE BOX
Chief complaint  Patient is a 81y old  Female who presents with a chief complaint of sob/cp (03 Feb 2022 10:13)   Review of systems  Patient in bed, looks comfortable, no hypoglycemic episodes.    Labs and Fingersticks  CAPILLARY BLOOD GLUCOSE      POCT Blood Glucose.: 123 mg/dL (03 Feb 2022 11:59)  POCT Blood Glucose.: 113 mg/dL (03 Feb 2022 08:01)  POCT Blood Glucose.: 131 mg/dL (02 Feb 2022 21:55)  POCT Blood Glucose.: 122 mg/dL (02 Feb 2022 17:12)  POCT Blood Glucose.: 114 mg/dL (02 Feb 2022 12:25)      Anion Gap, Serum: 12 (02-03 @ 06:23)  Anion Gap, Serum: 13 (02-02 @ 07:24)      Calcium, Total Serum: 9.0 (02-03 @ 06:23)  Calcium, Total Serum: 8.7 (02-02 @ 07:24)  Albumin, Serum: 2.9 *L* (02-03 @ 06:23)    Alanine Aminotransferase (ALT/SGPT): 14 (02-03 @ 06:23)  Alkaline Phosphatase, Serum: 56 (02-03 @ 06:23)  Aspartate Aminotransferase (AST/SGOT): 22 (02-03 @ 06:23)        02-03    130<L>  |  96  |  24<H>  ----------------------------<  114<H>  3.7   |  22  |  0.81    Ca    9.0      03 Feb 2022 06:23    TPro  6.3  /  Alb  2.9<L>  /  TBili  0.4  /  DBili  x   /  AST  22  /  ALT  14  /  AlkPhos  56  02-03                        10.9   10.49 )-----------( 176      ( 03 Feb 2022 06:22 )             33.4     Medications  MEDICATIONS  (STANDING):  aspirin enteric coated 81 milliGRAM(s) Oral daily  atorvastatin 10 milliGRAM(s) Oral at bedtime  cefTRIAXone   IVPB 1000 milliGRAM(s) IV Intermittent every 24 hours  dextrose 40% Gel 15 Gram(s) Oral once  dextrose 50% Injectable 25 Gram(s) IV Push once  dextrose 50% Injectable 12.5 Gram(s) IV Push once  dextrose 50% Injectable 25 Gram(s) IV Push once  glucagon  Injectable 1 milliGRAM(s) IntraMuscular once  heparin  Infusion.  Unit(s)/Hr (9 mL/Hr) IV Continuous <Continuous>  insulin lispro (ADMELOG) corrective regimen sliding scale   SubCutaneous three times a day before meals  insulin lispro (ADMELOG) corrective regimen sliding scale   SubCutaneous at bedtime  lactated ringers. 1000 milliLiter(s) (50 mL/Hr) IV Continuous <Continuous>  metoprolol tartrate 25 milliGRAM(s) Oral two times a day      Physical Exam  Culture - Blood (collected 02-02-22 @ 19:26)  Source: .Blood Blood  Gram Stain (02-03-22 @ 10:10):    Growth in aerobic bottle: Gram Negative Rods  Preliminary Report (02-03-22 @ 10:10):    Growth in aerobic bottle: Gram Negative Rods    ***Blood Panel PCR results on this specimen are available    approximately 3 hours after the Gram stain result.***    Gram stain, PCR, and/or culture results may not always    correspond due to difference in methodologies.    ************************************************************    This PCR assay was performed by multiplex PCR. This    Assay tests for 66 bacterial and resistance gene targets.    Please refer to the [facility] test directory    at [email or web address] for details.  Organism: Blood Culture PCR (02-03-22 @ 12:09)  Organism: Blood Culture PCR (02-03-22 @ 12:09)      -  Escherichia coli: Detec      Method Type: PCR      General: Patient comfortable in bed  Vital Signs Last 12 Hrs  T(F): 100.7 (02-03-22 @ 11:55), Max: 101 (02-03-22 @ 00:39)  HR: 100 (02-03-22 @ 11:50) (95 - 100)  BP: 102/55 (02-03-22 @ 11:50) (93/60 - 106/64)  BP(mean): --  RR: 20 (02-03-22 @ 11:50) (18 - 20)  SpO2: 96% (02-03-22 @ 11:50) (96% - 98%)  Neck: No palpable thyroid nodules.  CVS: S1S2, No murmurs  Respiratory: No wheezing, no crepitations  GI: Abdomen soft, bowel sounds positive  Musculoskeletal:  edema lower extremities.   Skin: No skin rashes, no ecchymosis    Diagnostics    Diet, Regular:   Consistent Carbohydrate No Snacks (CSTCHO) (01-26 @ 15:27)               Chief complaint  Patient is a 81y old  Female who presents with a chief complaint of sob/cp (03 Feb 2022 10:13)   Review of systems  Patient in bed, looks comfortable, no hypoglycemic episodes.    Labs and Fingersticks  CAPILLARY BLOOD GLUCOSE      POCT Blood Glucose.: 123 mg/dL (03 Feb 2022 11:59)  POCT Blood Glucose.: 113 mg/dL (03 Feb 2022 08:01)  POCT Blood Glucose.: 131 mg/dL (02 Feb 2022 21:55)  POCT Blood Glucose.: 122 mg/dL (02 Feb 2022 17:12)  POCT Blood Glucose.: 114 mg/dL (02 Feb 2022 12:25)      Anion Gap, Serum: 12 (02-03 @ 06:23)  Anion Gap, Serum: 13 (02-02 @ 07:24)      Calcium, Total Serum: 9.0 (02-03 @ 06:23)  Calcium, Total Serum: 8.7 (02-02 @ 07:24)  Albumin, Serum: 2.9 *L* (02-03 @ 06:23)    Alanine Aminotransferase (ALT/SGPT): 14 (02-03 @ 06:23)  Alkaline Phosphatase, Serum: 56 (02-03 @ 06:23)  Aspartate Aminotransferase (AST/SGOT): 22 (02-03 @ 06:23)        02-03    130<L>  |  96  |  24<H>  ----------------------------<  114<H>  3.7   |  22  |  0.81    Ca    9.0      03 Feb 2022 06:23    TPro  6.3  /  Alb  2.9<L>  /  TBili  0.4  /  DBili  x   /  AST  22  /  ALT  14  /  AlkPhos  56  02-03                        10.9   10.49 )-----------( 176      ( 03 Feb 2022 06:22 )             33.4     Medications  MEDICATIONS  (STANDING):  aspirin enteric coated 81 milliGRAM(s) Oral daily  atorvastatin 10 milliGRAM(s) Oral at bedtime  cefTRIAXone   IVPB 1000 milliGRAM(s) IV Intermittent every 24 hours  dextrose 40% Gel 15 Gram(s) Oral once  dextrose 50% Injectable 25 Gram(s) IV Push once  dextrose 50% Injectable 12.5 Gram(s) IV Push once  dextrose 50% Injectable 25 Gram(s) IV Push once  glucagon  Injectable 1 milliGRAM(s) IntraMuscular once  heparin  Infusion.  Unit(s)/Hr (9 mL/Hr) IV Continuous <Continuous>  insulin lispro (ADMELOG) corrective regimen sliding scale   SubCutaneous three times a day before meals  insulin lispro (ADMELOG) corrective regimen sliding scale   SubCutaneous at bedtime  lactated ringers. 1000 milliLiter(s) (50 mL/Hr) IV Continuous <Continuous>  metoprolol tartrate 25 milliGRAM(s) Oral two times a day      Physical Exam  Culture - Blood (collected 02-02-22 @ 19:26)  Source: .Blood Blood  Gram Stain (02-03-22 @ 10:10):    Growth in aerobic bottle: Gram Negative Rods  Preliminary Report (02-03-22 @ 10:10):    Growth in aerobic bottle: Gram Negative Rods    ***Blood Panel PCR results on this specimen are available    approximately 3 hours after the Gram stain result.***    Gram stain, PCR, and/or culture results may not always    correspond due to difference in methodologies.    ************************************************************    This PCR assay was performed by multiplex PCR. This    Assay tests for 66 bacterial and resistance gene targets.    Please refer to the [facility] test directory    at [email or web address] for details.  Organism: Blood Culture PCR (02-03-22 @ 12:09)  Organism: Blood Culture PCR (02-03-22 @ 12:09)      -  Escherichia coli: Detec      Method Type: PCR      General: Patient comfortable in bed  Vital Signs Last 12 Hrs  T(F): 100.7 (02-03-22 @ 11:55), Max: 101 (02-03-22 @ 00:39)  HR: 100 (02-03-22 @ 11:50) (95 - 100)  BP: 102/55 (02-03-22 @ 11:50) (93/60 - 106/64)  BP(mean): --  RR: 20 (02-03-22 @ 11:50) (18 - 20)  SpO2: 96% (02-03-22 @ 11:50) (96% - 98%)  Neck: No palpable thyroid nodules.  CVS: S1S2, No murmurs  Respiratory: No wheezing, no crepitations  GI: Abdomen soft, bowel sounds positive  Musculoskeletal:  edema lower extremities.   Skin: No skin rashes, no ecchymosis    Diagnostics    Diet, Regular:   Consistent Carbohydrate No Snacks (CSTCHO) (01-26 @ 15:27)

## 2022-02-03 NOTE — PROVIDER CONTACT NOTE (CRITICAL VALUE NOTIFICATION) - ASSESSMENT
Blood Cultures from 2nd preliminary growth in aerobic bottle gram negative rods Blood Cultures from 2nd preliminary growth in aerobic bottle gram negative rods. pt currently on iv abx, last set of vital signs stable. PA aware blood pressure on lower side today.

## 2022-02-03 NOTE — PROGRESS NOTE ADULT - SUBJECTIVE AND OBJECTIVE BOX
Follow Up:  bacteremia    Interval History: blood cultures resulted with E coli. febrile overnight.     REVIEW OF SYSTEMS  [  ] ROS unobtainable because:    [ x ] All other systems negative except as noted below    Constitutional:  [ ] fever [ ] chills  [ ] weight loss  [ ] weakness  Skin:  [ ] rash [ ] phlebitis	  Eyes: [ ] icterus [ ] pain  [ ] discharge	  ENMT: [ ] sore throat  [ ] thrush [ ] ulcers [ ] exudates  Respiratory: [ ] dyspnea [ ] hemoptysis [ ] cough [ ] sputum	  Cardiovascular:  [ ] chest pain [ ] palpitations [ ] edema	  Gastrointestinal:  [ ] nausea [ ] vomiting [ ] diarrhea [ ] constipation [ ] pain	  Genitourinary:  [ ] dysuria [ ] frequency [ ] hematuria [ ] discharge [ ] flank pain  [ ] incontinence  Musculoskeletal:  [ ] myalgias [ ] arthralgias [ ] arthritis  [ ] back pain  Neurological:  [ ] headache [ ] seizures  [x ] confusion/altered mental status    Allergies  No Known Allergies        ANTIMICROBIALS:  cefTRIAXone   IVPB 1000 every 24 hours      OTHER MEDS:  MEDICATIONS  (STANDING):  acetaminophen     Tablet .. 650 every 6 hours PRN  aspirin enteric coated 81 daily  atorvastatin 10 at bedtime  dextrose 40% Gel 15 once  dextrose 50% Injectable 25 once  dextrose 50% Injectable 12.5 once  dextrose 50% Injectable 25 once  glucagon  Injectable 1 once  heparin   Injectable 2000 every 6 hours PRN  heparin   Injectable 4000 every 6 hours PRN  heparin  Infusion.  <Continuous>  insulin lispro (ADMELOG) corrective regimen sliding scale  three times a day before meals  insulin lispro (ADMELOG) corrective regimen sliding scale  at bedtime  metoprolol tartrate 25 two times a day      Vital Signs Last 24 Hrs  T(C): 36.4 (2022 17:01), Max: 38.9 (2022 21:32)  T(F): 97.6 (2022 17:01), Max: 102.1 (2022 21:32)  HR: 102 (2022 17:01) (95 - 104)  BP: 103/70 (2022 17:01) (93/60 - 113/78)  BP(mean): --  RR: 19 (2022 17:01) (18 - 20)  SpO2: 95% (2022 17:01) (95% - 99%)    PHYSICAL EXAMINATION:  General: Alert and Awake, NAD  Cardiac: RRR, No M/R/G  Resp: Rales at bilateral mid lung and lung bases. No wheezes or rhonchi.   Abdomen: NBS, NT/ND, No HSM, No rigidity or guarding  MSK: No LE edema. No Calf tenderness  Skin: No rashes or lesions. Skin is warm and dry to the touch.   Neuro: Alert and Awake. CN 2-12 Grossly intact. Moves all four extremities spontaneously.  Psych: Calm, Pleasant, Cooperative                            10.9   10.49 )-----------( 176      ( 2022 06:22 )             33.4           130<L>  |  96  |  24<H>  ----------------------------<  114<H>  3.7   |  22  |  0.81    Ca    9.0      2022 06:23    TPro  6.3  /  Alb  2.9<L>  /  TBili  0.4  /  DBili  x   /  AST  22  /  ALT  14  /  AlkPhos  56        Urinalysis Basic - ( 2022 22:10 )    Color: Yellow / Appearance: Slightly Turbid / S.024 / pH: x  Gluc: x / Ketone: Negative  / Bili: Negative / Urobili: Negative   Blood: x / Protein: 30 mg/dL / Nitrite: Positive   Leuk Esterase: Large / RBC: 1 /hpf /  /HPF   Sq Epi: x / Non Sq Epi: 0 /hpf / Bacteria: Few        MICROBIOLOGY:  v  .Blood Blood  22   Growth in aerobic bottle: Gram Negative Rods  ***Blood Panel PCR results on this specimen are available  approximately 3 hours after the Gram stain result.***  Gram stain, PCR, and/or culture results may not always  correspond due to difference in methodologies.  ************************************************************  This PCR assay was performed by multiplex PCR. This  Assay tests for 66 bacterial and resistance gene targets.  Please refer to the [facility] Labs test directory  at [email or web address] for details.  --  Blood Culture PCR      Clean Catch Clean Catch (Midstream)  22   >=3 organisms. Probable collection contamination.  --  --    RADIOLOGY:    <The imaging below has been reviewed and visualized by me independently. Findings as detailed in report below>    < from: Xray Chest 1 View- PORTABLE-Urgent (Xray Chest 1 View- PORTABLE-Urgent .) (22 @ 15:13) >  IMPRESSION:  Mild, diffuse bilateral patchy opacities with peripheral and basilar   predilection most consistent with atypical pneumonia.    Mild pulmonary vascular congestion.    < end of copied text >

## 2022-02-03 NOTE — PROGRESS NOTE ADULT - ASSESSMENT
80 yo F     h/o   dementia and   ? CHF per EMS,/  no other  hx  is  available      presenting via EMS from home for few days of weakness, followed by development of SOB and episode of substernal CP this morning.     Pt is a poor historian and unable to provide significant history    all ROS negative upon questioning.     Lives at home with brother - he reports concern for pt's increased work of breathing.      pt with   weakness/  sob  from  covid/  pna   CT  chest, with  GOO   on decadron/  remdisivir   *  Dementia   *    HTN,   *  acute  diastolic chf  on   lasix  card  dr davis  on  dvt ppx.  bmi  is  20  tele,   s/p vtach/ on  toprol,  card to  f/p   *  Afib,  on lovenox  bid  v tach  on tele.  card following  tele, afib  tele, Vtach  22  beats. , had  subsequent vtach also  afib, on iv heparin,   cardiac cath defererd due to  fevers. per  ID on iv  rocephin/ iv vanco,  follow  cx        spoke  with  brothersammi/ pt  is , has  no  children/ states,   pt  is  full code    pmd  dr sheppard, allie     rad< from: CT Angio Chest PE Protocol w/ IV Cont (01.21.22 @ 16:01) >  IMPRESSION:  No main, right, left, lobar, or proximal segmental pulmonary embolus.  Patchy bilateral groundglass opacities noted throughout both lungs likely   representing infection or alveolar component of edema. COVID 19 can also   have this appearance. Correlate with RT PCR    --- End of Report --  < end of copied text >     80 yo F     h/o   dementia and   ? CHF per EMS,/  no other  hx  is  available      presenting via EMS from home for few days of weakness, followed by development of SOB and episode of substernal CP this morning.     Pt is a poor historian and unable to provide significant history    all ROS negative upon questioning.     Lives at home with brother - he reports concern for pt's increased work of breathing.      pt with   weakness/  sob  from  covid/  pna   CT  chest, with  GOO   on decadron/  remdisivir   *  Dementia   *    HTN,   *  acute  diastolic chf  on   lasix  card  dr davis  on  dvt ppx.  bmi  is  20  tele,   s/p vtach/ on  toprol,  card to  f/p   *  Afib, WAS  on lovenox  bid  v tach  on tele.  card following  tele, afib  tele, Vtach  22  beats. , had  subsequent vtach also  Afib, on iv heparin,   cardiac cath deferred  due to  fevers  now with  gram negative  bacteremia, on iv rocephin,  follow  cx        spoke  with  brothersammi/ pt  is , has  no  children/ states,   pt  is  full code    pmd  dr sheppard, allie     rad< from: CT Angio Chest PE Protocol w/ IV Cont (01.21.22 @ 16:01) >  IMPRESSION:  No main, right, left, lobar, or proximal segmental pulmonary embolus.  Patchy bilateral groundglass opacities noted throughout both lungs likely   representing infection or alveolar component of edema. COVID 19 can also   have this appearance. Correlate with RT PCR    --- End of Report --  < end of copied text >     82 yo F     h/o   dementia and   ? CHF per EMS,/  no other  hx  is  available      presenting via EMS from home for few days of weakness, followed by development of SOB and episode of substernal CP this morning.     Pt is a poor historian and unable to provide significant history    all ROS negative upon questioning.     Lives at home with brother - he reports concern for pt's increased work of breathing.      pt with   weakness/  sob  from  covid/  pna   CT  chest, with  GOO   on decadron/  remdisivir   *  Dementia   *    HTN,   *  acute  diastolic chf , was  on   lasix  card  dr davis  on  dvt ppx.  bmi  is  20  tele,   s/p vtach/ on  toprol,  card to  f/p   *  Afib, WAS  on lovenox  bid  v tach  on tele.  card following  tele, afib  tele, Vtach  22  beats. , had  subsequent vtach also  Afib, on iv heparin,   cardiac cath deferred  due to  fevers  now with  gram negative  bacteremia, on iv rocephin,  follow  cx  follow hb, anemia, gi dr issa, no rectal  bleed        spoke  with  brother, sammi/ pt  is , has  no  children/ states,   pt  is  full code    pmd  dr sheppard, allie     rad< from: CT Angio Chest PE Protocol w/ IV Cont (01.21.22 @ 16:01) >  IMPRESSION:  No main, right, left, lobar, or proximal segmental pulmonary embolus.  Patchy bilateral groundglass opacities noted throughout both lungs likely   representing infection or alveolar component of edema. COVID 19 can also   have this appearance. Correlate with RT PCR    --- End of Report --  < end of copied text >

## 2022-02-03 NOTE — PROGRESS NOTE ADULT - SUBJECTIVE AND OBJECTIVE BOX
febrile  REVIEW OF SYSTEMS:  GEN:  fever,    no chills  RESP: no SOB,   no cough  CVS: no chest pain,   no palpitations  GI: no abdominal pain,   no nausea,   no vomiting,   no constipation,   no diarrhea  : no dysuria,   no frequency  NEURO: no headache,   no dizziness  PSYCH: no depression,   not anxious  Derm : no rash    MEDICATIONS  (STANDING):  aspirin enteric coated 81 milliGRAM(s) Oral daily  atorvastatin 10 milliGRAM(s) Oral at bedtime  cefTRIAXone   IVPB 1000 milliGRAM(s) IV Intermittent every 24 hours  dextrose 40% Gel 15 Gram(s) Oral once  dextrose 50% Injectable 25 Gram(s) IV Push once  dextrose 50% Injectable 12.5 Gram(s) IV Push once  dextrose 50% Injectable 25 Gram(s) IV Push once  glucagon  Injectable 1 milliGRAM(s) IntraMuscular once  heparin  Infusion.  Unit(s)/Hr (9 mL/Hr) IV Continuous <Continuous>  insulin lispro (ADMELOG) corrective regimen sliding scale   SubCutaneous three times a day before meals  insulin lispro (ADMELOG) corrective regimen sliding scale   SubCutaneous at bedtime  metoprolol tartrate 50 milliGRAM(s) Oral two times a day  vancomycin  IVPB 750 milliGRAM(s) IV Intermittent every 24 hours    MEDICATIONS  (PRN):  acetaminophen     Tablet .. 650 milliGRAM(s) Oral every 6 hours PRN Temp greater or equal to 38C (100.4F), Mild Pain (1 - 3), Moderate Pain (4 - 6), Severe Pain (7 - 10)  heparin   Injectable 2000 Unit(s) IV Push every 6 hours PRN For aPTT between 40 - 57  heparin   Injectable 4000 Unit(s) IV Push every 6 hours PRN For aPTT less than 40      Vital Signs Last 24 Hrs  T(C): 37.5 (2022 08:43), Max: 39.2 (2022 14:31)  T(F): 99.5 (2022 08:43), Max: 102.5 (2022 14:31)  HR: 97 (2022 08:43) (67 - 107)  BP: 96/60 (2022 08:43) (93/60 - 119/79)  BP(mean): --  RR: 20 (2022 08:43) (18 - 20)  SpO2: 96% (2022 08:43) (95% - 98%)  CAPILLARY BLOOD GLUCOSE      POCT Blood Glucose.: 113 mg/dL (2022 08:01)  POCT Blood Glucose.: 131 mg/dL (2022 21:55)  POCT Blood Glucose.: 122 mg/dL (2022 17:12)  POCT Blood Glucose.: 114 mg/dL (2022 12:25)    I&O's Summary    2022 07:01  -  2022 07:00  --------------------------------------------------------  IN: 1250 mL / OUT: 675 mL / NET: 575 mL    2022 07:01  -  2022 09:47  --------------------------------------------------------  IN: 140 mL / OUT: 150 mL / NET: -10 mL        PHYSICAL EXAM:  HEAD:  Atraumatic, Normocephalic  NECK: Supple, No   JVD  CHEST/LUNG:   no     rales,     no,    rhonchi  HEART: Regular rate and rhythm;         murmur  ABDOMEN: Soft, Nontender, ;   EXTREMITIES:   no     edema  NEUROLOGY:  alert    LABS:                        10.9   10.49 )-----------( 176      ( 2022 06:22 )             33.4     02-03    130<L>  |  96  |  24<H>  ----------------------------<  114<H>  3.7   |  22  |  0.81    Ca    9.0      2022 06:23    TPro  6.3  /  Alb  2.9<L>  /  TBili  0.4  /  DBili  x   /  AST  22  /  ALT  14  /  AlkPhos  56  02-03    PTT - ( 2022 06:22 )  PTT:58.5 sec      Urinalysis Basic - ( 2022 22:10 )    Color: Yellow / Appearance: Slightly Turbid / S.024 / pH: x  Gluc: x / Ketone: Negative  / Bili: Negative / Urobili: Negative   Blood: x / Protein: 30 mg/dL / Nitrite: Positive   Leuk Esterase: Large / RBC: 1 /hpf /  /HPF   Sq Epi: x / Non Sq Epi: 0 /hpf / Bacteria: Few              Thyroid Stimulating Hormone, Serum: 0.47 uIU/mL ( @ 17:02)          Consultant(s) Notes Reviewed:      Care Discussed with Consultants/Other Providers:

## 2022-02-03 NOTE — PROGRESS NOTE ADULT - ASSESSMENT
81 year old female w/ PMHx of dementia and CHF who presented from home for several days of weakness followed by development of dyspnea and episode of substernal chest pain.    COVID19 Positive with hypoxic respiratory failure  Unvaccinated for COVID19  CTA Chest (1/21) with no PE and GGO consistent with COVID19    Completed 5 days of Remdesivir 1/21 - 1/25  Completed 10 days of Dexamethasone 1/22 - 1/31    High grade fever on 2/2  Blood Cultures (2/2) with E coli (based on BCID PCR)  U/A (2/2) with pyuria    Likely E coli bacteremia secondary to UTI / ?Pyelo    #Fever (persistent), Leukocytosis, E coli Bacteremia  --Recommend Renal / Bladder Ultrasound  --Stop Vancomycin  --Continue Ceftriaxone 1g IV Q24H (BCID PCR did not  ESBL target so Ceftriaxone should be sufficient)  --If clinical status changes can broaden Ceftriaxone to Ertapenem 1g IV Q24H  --Follow up on susceptibilities of E coli  --Follow up on urine culture     #COVID19  s/p Remdesivir 1/21 - 1/25  s/p Dexamethasone 1/22 - 1/31  Completed isolation for COVID19    I will continue to follow. Please feel free to contact me with any further questions.    Sincere Camp M.D.  [facility] Division of Infectious Disease  8AM-5PM: Pager Number [phone]  After Hours (or if no response): Please contact the Infectious Diseases Office at [phone]     The above assessment and plan were discussed with antonino Berry

## 2022-02-03 NOTE — PROGRESS NOTE ADULT - SUBJECTIVE AND OBJECTIVE BOX
CARDIOLOGY     PROGRESS  NOTE   ________________________________________________    CHIEF COMPLAINT:Patient is a 81y old  Female who presents with a chief complaint of sob/cp (03 Feb 2022 09:46)  sleeping.  	  REVIEW OF SYSTEMS:  CONSTITUTIONAL: No fever, weight loss, or fatigue  EYES: No eye pain, visual disturbances, or discharge  ENT:  No difficulty hearing, tinnitus, vertigo; No sinus or throat pain  NECK: No pain or stiffness  RESPIRATORY: No cough, wheezing, chills or hemoptysis; No Shortness of Breath  CARDIOVASCULAR: No chest pain, palpitations, passing out, dizziness, or leg swelling  GASTROINTESTINAL: No abdominal or epigastric pain. No nausea, vomiting, or hematemesis; No diarrhea or constipation. No melena or hematochezia.  GENITOURINARY: No dysuria, frequency, hematuria, or incontinence  NEUROLOGICAL: No headaches, memory loss, loss of strength, numbness, or tremors  SKIN: No itching, burning, rashes, or lesions   LYMPH Nodes: No enlarged glands  ENDOCRINE: No heat or cold intolerance; No hair loss  MUSCULOSKELETAL: No joint pain or swelling; No muscle, back, or extremity pain  PSYCHIATRIC: No depression, anxiety, mood swings, or difficulty sleeping  HEME/LYMPH: No easy bruising, or bleeding gums  ALLERGY AND IMMUNOLOGIC: No hives or eczema	    [ ] All others negative	  [x ] Unable to obtain    PHYSICAL EXAM:  T(C): 37.5 (02-03-22 @ 08:43), Max: 39.2 (02-02-22 @ 14:31)  HR: 97 (02-03-22 @ 08:43) (67 - 107)  BP: 96/60 (02-03-22 @ 08:43) (93/60 - 119/79)  RR: 20 (02-03-22 @ 08:43) (18 - 20)  SpO2: 96% (02-03-22 @ 08:43) (95% - 98%)  Wt(kg): --  I&O's Summary    02 Feb 2022 07:01  -  03 Feb 2022 07:00  --------------------------------------------------------  IN: 1250 mL / OUT: 675 mL / NET: 575 mL    03 Feb 2022 07:01  -  03 Feb 2022 10:14  --------------------------------------------------------  IN: 140 mL / OUT: 150 mL / NET: -10 mL        Appearance: Normal	  HEENT:   Normal oral mucosa, PERRL, EOMI	  Lymphatic: No lymphadenopathy  Cardiovascular: Normal S1 S2, No JVD, + murmurs, No edema  Respiratory: Lungs clear to auscultation	  Psychiatry: A & O x 3, Mood & affect appropriate  Gastrointestinal:  Soft, Non-tender, + BS	  Skin: No rashes, No ecchymoses, No cyanosis	  Neurologic: Non-focal  Extremities: Normal range of motion, No clubbing, cyanosis or edema  Vascular: Peripheral pulses palpable 2+ bilaterally    MEDICATIONS  (STANDING):  aspirin enteric coated 81 milliGRAM(s) Oral daily  atorvastatin 10 milliGRAM(s) Oral at bedtime  cefTRIAXone   IVPB 1000 milliGRAM(s) IV Intermittent every 24 hours  dextrose 40% Gel 15 Gram(s) Oral once  dextrose 50% Injectable 25 Gram(s) IV Push once  dextrose 50% Injectable 12.5 Gram(s) IV Push once  dextrose 50% Injectable 25 Gram(s) IV Push once  glucagon  Injectable 1 milliGRAM(s) IntraMuscular once  heparin  Infusion.  Unit(s)/Hr (9 mL/Hr) IV Continuous <Continuous>  insulin lispro (ADMELOG) corrective regimen sliding scale   SubCutaneous three times a day before meals  insulin lispro (ADMELOG) corrective regimen sliding scale   SubCutaneous at bedtime  metoprolol tartrate 50 milliGRAM(s) Oral two times a day  vancomycin  IVPB 750 milliGRAM(s) IV Intermittent every 24 hours      TELEMETRY: 	    ECG:  	  RADIOLOGY:  OTHER: 	  	  LABS:	 	    CARDIAC MARKERS:                                10.9   10.49 )-----------( 176      ( 03 Feb 2022 06:22 )             33.4     02-03    130<L>  |  96  |  24<H>  ----------------------------<  114<H>  3.7   |  22  |  0.81    Ca    9.0      03 Feb 2022 06:23    TPro  6.3  /  Alb  2.9<L>  /  TBili  0.4  /  DBili  x   /  AST  22  /  ALT  14  /  AlkPhos  56  02-03    proBNP: Serum Pro-Brain Natriuretic Peptide: 20071 pg/mL (01-21 @ 12:11)    Lipid Profile:   HgA1c:   TSH: Thyroid Stimulating Hormone, Serum: 0.47 uIU/mL (01-21 @ 17:02)    PTT - ( 03 Feb 2022 06:22 )  PTT:58.5 sec      Assessment and plan  ---------------------------  82 yo F  h/o   dementia and  <CHF per EMS,/  no othe r hx  is  available presenting via EMS from home for few days of weakness, followed by development of SOB and episode of substernal CP this morning.     Pt is a poor historian and unable to provide significant history    all ROS negative upon questioning.   Lives at home with brother - he reports concern for pt's increased work of breathing.   pt is a 82 yo female with hx of htn, chf, cad, with increasing sob and COVID with  abnormal  cta and chest x ray  cta negative for PE  lipid panel  continue covid therapy  fu renal function  keep K>  will increase beta blocker as tolerated  ac  echo noted with normal EF, no WMA  continue beta blocker  continued with WCT, plan for cath , cleared by ID  will consider Wilmer/ cardioversion post cath  cath was delayed sec to fever  no vt last 24 hours

## 2022-02-03 NOTE — PROGRESS NOTE ADULT - ASSESSMENT
Assessment  DMT2: 81y Female with DM T2 with hyperglycemia, A1C 6.3%, she is unsure of her outpatient medications, on low-scale insulin coverage only, blood sugars are stable and trending at target, no hypoglycemias, eating, febrile.  Covid: on medications,  stable, monitored.  HTN: Controlled,  on antihypertensive medications.  Dementia: stable, monitored.      Brian Huynh MD  Cell: [phone]  Office: [phone]     Assessment  DMT2: 81y Female with DM T2 with hyperglycemia, A1C 6.3%, she is unsure of her outpatient medications, on low-scale insulin coverage only,  blood sugars are stable and trending at target, no hypoglycemias, eating, febrile.  Covid: on medications,  stable, monitored.  HTN: Controlled,  on antihypertensive medications.  Dementia: stable, monitored.      Brian Huynh MD  Cell: [phone]  Office: [phone]

## 2022-02-04 LAB
ALBUMIN SERPL ELPH-MCNC: 2.7 G/DL — LOW (ref 3.3–5)
ALP SERPL-CCNC: 53 U/L — SIGNIFICANT CHANGE UP (ref 40–120)
ALT FLD-CCNC: 14 U/L — SIGNIFICANT CHANGE UP (ref 10–45)
ANION GAP SERPL CALC-SCNC: 12 MMOL/L — SIGNIFICANT CHANGE UP (ref 5–17)
APTT BLD: 54.5 SEC — HIGH (ref 27.5–35.5)
APTT BLD: 61.1 SEC — HIGH (ref 27.5–35.5)
APTT BLD: 67.2 SEC — HIGH (ref 27.5–35.5)
AST SERPL-CCNC: 15 U/L — SIGNIFICANT CHANGE UP (ref 10–40)
BILIRUB SERPL-MCNC: 0.3 MG/DL — SIGNIFICANT CHANGE UP (ref 0.2–1.2)
BUN SERPL-MCNC: 21 MG/DL — SIGNIFICANT CHANGE UP (ref 7–23)
CALCIUM SERPL-MCNC: 8.3 MG/DL — LOW (ref 8.4–10.5)
CHLORIDE SERPL-SCNC: 102 MMOL/L — SIGNIFICANT CHANGE UP (ref 96–108)
CO2 SERPL-SCNC: 23 MMOL/L — SIGNIFICANT CHANGE UP (ref 22–31)
CREAT SERPL-MCNC: 0.65 MG/DL — SIGNIFICANT CHANGE UP (ref 0.5–1.3)
GLUCOSE BLDC GLUCOMTR-MCNC: 111 MG/DL — HIGH (ref 70–99)
GLUCOSE BLDC GLUCOMTR-MCNC: 119 MG/DL — HIGH (ref 70–99)
GLUCOSE BLDC GLUCOMTR-MCNC: 128 MG/DL — HIGH (ref 70–99)
GLUCOSE BLDC GLUCOMTR-MCNC: 96 MG/DL — SIGNIFICANT CHANGE UP (ref 70–99)
GLUCOSE SERPL-MCNC: 96 MG/DL — SIGNIFICANT CHANGE UP (ref 70–99)
GRAM STN FLD: SIGNIFICANT CHANGE UP
HCT VFR BLD CALC: 29.6 % — LOW (ref 34.5–45)
HGB BLD-MCNC: 9.4 G/DL — LOW (ref 11.5–15.5)
MCHC RBC-ENTMCNC: 27.7 PG — SIGNIFICANT CHANGE UP (ref 27–34)
MCHC RBC-ENTMCNC: 31.8 GM/DL — LOW (ref 32–36)
MCV RBC AUTO: 87.3 FL — SIGNIFICANT CHANGE UP (ref 80–100)
NRBC # BLD: 0 /100 WBCS — SIGNIFICANT CHANGE UP (ref 0–0)
PLATELET # BLD AUTO: 175 K/UL — SIGNIFICANT CHANGE UP (ref 150–400)
POTASSIUM SERPL-MCNC: 3.7 MMOL/L — SIGNIFICANT CHANGE UP (ref 3.5–5.3)
POTASSIUM SERPL-SCNC: 3.7 MMOL/L — SIGNIFICANT CHANGE UP (ref 3.5–5.3)
PROT SERPL-MCNC: 6 G/DL — SIGNIFICANT CHANGE UP (ref 6–8.3)
RBC # BLD: 3.39 M/UL — LOW (ref 3.8–5.2)
RBC # FLD: 15.8 % — HIGH (ref 10.3–14.5)
SODIUM SERPL-SCNC: 137 MMOL/L — SIGNIFICANT CHANGE UP (ref 135–145)
WBC # BLD: 7.09 K/UL — SIGNIFICANT CHANGE UP (ref 3.8–10.5)
WBC # FLD AUTO: 7.09 K/UL — SIGNIFICANT CHANGE UP (ref 3.8–10.5)

## 2022-02-04 PROCEDURE — 99232 SBSQ HOSP IP/OBS MODERATE 35: CPT

## 2022-02-04 PROCEDURE — 76775 US EXAM ABDO BACK WALL LIM: CPT | Mod: 26

## 2022-02-04 RX ADMIN — Medication 25 MILLIGRAM(S): at 05:39

## 2022-02-04 RX ADMIN — Medication 81 MILLIGRAM(S): at 11:53

## 2022-02-04 RX ADMIN — HEPARIN SODIUM 1100 UNIT(S)/HR: 5000 INJECTION INTRAVENOUS; SUBCUTANEOUS at 14:35

## 2022-02-04 RX ADMIN — ATORVASTATIN CALCIUM 10 MILLIGRAM(S): 80 TABLET, FILM COATED ORAL at 23:05

## 2022-02-04 RX ADMIN — HEPARIN SODIUM 1100 UNIT(S)/HR: 5000 INJECTION INTRAVENOUS; SUBCUTANEOUS at 08:09

## 2022-02-04 RX ADMIN — HEPARIN SODIUM 2000 UNIT(S): 5000 INJECTION INTRAVENOUS; SUBCUTANEOUS at 08:13

## 2022-02-04 RX ADMIN — HEPARIN SODIUM 1100 UNIT(S)/HR: 5000 INJECTION INTRAVENOUS; SUBCUTANEOUS at 21:52

## 2022-02-04 RX ADMIN — Medication 25 MILLIGRAM(S): at 17:23

## 2022-02-04 RX ADMIN — CEFTRIAXONE 100 MILLIGRAM(S): 500 INJECTION, POWDER, FOR SOLUTION INTRAMUSCULAR; INTRAVENOUS at 18:02

## 2022-02-04 NOTE — PROGRESS NOTE ADULT - ASSESSMENT
81 year old female w/ PMHx of dementia and CHF who presented from home for several days of weakness followed by development of dyspnea and episode of substernal chest pain.    COVID19 Positive with hypoxic respiratory failure  Unvaccinated for COVID19  CTA Chest (1/21) with no PE and GGO consistent with COVID19    Completed 5 days of Remdesivir 1/21 - 1/25  Completed 10 days of Dexamethasone 1/22 - 1/31    High grade fever on 2/2  Blood Cultures (2/2) with E coli (based on BCID PCR)  U/A (2/2) with pyuria  UCx (2/2) with >3 organisms  Renal US with no hydronephrosis    Likely E coli bacteremia secondary to UTI / ?Pyelo    #Fever, Leukocytosis, E coli Bacteremia  --Continue Ceftriaxone 1g IV Q24H (BCID PCR did not  ESBL target so Ceftriaxone should be sufficient)  --Follow up on susceptibilities of E coli    #COVID19  s/p Remdesivir 1/21 - 1/25  s/p Dexamethasone 1/22 - 1/31  Completed isolation for COVID19    I will be away over this upcoming weekend. Please contact the Infectious Diseases Office with any further questions or concerns.     Sincere Camp M.D.  [facility] Division of Infectious Disease  8AM-5PM Monday - Friday: Available on [employer] Teams  After Hours and Holidays (or if no response on [employer] Teams): Please contact the Infectious Diseases Office at [phone]

## 2022-02-04 NOTE — PROVIDER CONTACT NOTE (CRITICAL VALUE NOTIFICATION) - ACTION/TREATMENT ORDERED:
JESSIKA Simmons aware; pt currently on iv abx, repeat blood cultures were sent, no interventions at this time
Clemencia Dickens NP aware- pending intervention.
PA aware, additional blood work to be ordered.

## 2022-02-04 NOTE — PROGRESS NOTE ADULT - ASSESSMENT
Assessment  DMT2: 81y Female with DM T2 with hyperglycemia, A1C 6.3%, she is unsure of her outpatient medications, on low-scale insulin coverage only,  blood sugars are stable and trending at target, no hypoglycemias, eating, NAD, DC planning for SAMREEN.  Covid: on medications,  stable, monitored.  HTN: Controlled,  on antihypertensive medications.  Dementia: stable, monitored.      Brian Huynh MD  Cell: [phone]  Office: [phone]     Assessment  DMT2: 81y Female with DM T2 with hyperglycemia, A1C 6.3%, she is unsure of her outpatient medications, on low-scale insulin coverage only,  blood sugars are stable and trending at target,  no hypoglycemias, eating, NAD, DC planning for SAMREEN.  Covid: on medications,  stable, monitored.  HTN: Controlled,  on antihypertensive medications.  Dementia: stable, monitored.      Brian Huynh MD  Cell: [phone]  Office: [phone]

## 2022-02-04 NOTE — PROGRESS NOTE ADULT - SUBJECTIVE AND OBJECTIVE BOX
afberile  REVIEW OF SYSTEMS:  GEN: no fever,    no chills  RESP: no SOB,   no cough  CVS: no chest pain,   no palpitations  GI: no abdominal pain,   no nausea,   no vomiting,   no constipation,   no diarrhea  : no dysuria,   no frequency  NEURO: no headache,   no dizziness  PSYCH: no depression,   not anxious  Derm : no rash    MEDICATIONS  (STANDING):  aspirin enteric coated 81 milliGRAM(s) Oral daily  atorvastatin 10 milliGRAM(s) Oral at bedtime  cefTRIAXone   IVPB 1000 milliGRAM(s) IV Intermittent every 24 hours  dextrose 40% Gel 15 Gram(s) Oral once  dextrose 50% Injectable 25 Gram(s) IV Push once  dextrose 50% Injectable 12.5 Gram(s) IV Push once  dextrose 50% Injectable 25 Gram(s) IV Push once  glucagon  Injectable 1 milliGRAM(s) IntraMuscular once  heparin  Infusion.  Unit(s)/Hr (9 mL/Hr) IV Continuous <Continuous>  insulin lispro (ADMELOG) corrective regimen sliding scale   SubCutaneous three times a day before meals  insulin lispro (ADMELOG) corrective regimen sliding scale   SubCutaneous at bedtime  lactated ringers. 1000 milliLiter(s) (50 mL/Hr) IV Continuous <Continuous>  metoprolol tartrate 25 milliGRAM(s) Oral two times a day    MEDICATIONS  (PRN):  acetaminophen     Tablet .. 650 milliGRAM(s) Oral every 6 hours PRN Temp greater or equal to 38C (100.4F), Mild Pain (1 - 3), Moderate Pain (4 - 6), Severe Pain (7 - 10)  heparin   Injectable 4000 Unit(s) IV Push every 6 hours PRN For aPTT less than 40  heparin   Injectable 2000 Unit(s) IV Push every 6 hours PRN For aPTT between 40 - 57      Vital Signs Last 24 Hrs  T(C): 36.9 (2022 05:29), Max: 38.2 (2022 11:55)  T(F): 98.5 (2022 05:29), Max: 100.7 (2022 11:55)  HR: 100 (2022 05:29) (95 - 106)  BP: 118/74 (2022 05:29) (96/60 - 118/74)  BP(mean): --  RR: 18 (2022 05:29) (18 - 20)  SpO2: 96% (2022 05:29) (94% - 99%)  CAPILLARY BLOOD GLUCOSE      POCT Blood Glucose.: 117 mg/dL (2022 22:01)  POCT Blood Glucose.: 122 mg/dL (2022 17:42)  POCT Blood Glucose.: 123 mg/dL (2022 11:59)  POCT Blood Glucose.: 113 mg/dL (2022 08:01)    I&O's Summary    2022 07:01  -  2022 07:00  --------------------------------------------------------  IN: 1780 mL / OUT: 675 mL / NET: 1105 mL        PHYSICAL EXAM:  HEAD:  Atraumatic, Normocephalic  NECK: Supple, No   JVD  CHEST/LUNG:   no     rales,     no,    rhonchi  HEART: Regular rate and rhythm;         murmur  ABDOMEN: Soft, Nontender, ;   EXTREMITIES:   no     edema  NEUROLOGY:  alert    LABS:                        10.9   10.49 )-----------( 176      ( 2022 06:22 )             33.4     02-03    130<L>  |  96  |  24<H>  ----------------------------<  114<H>  3.7   |  22  |  0.81    Ca    9.0      2022 06:23    TPro  6.3  /  Alb  2.9<L>  /  TBili  0.4  /  DBili  x   /  AST  22  /  ALT  14  /  AlkPhos  56  02-03    PTT - ( 2022 07:21 )  PTT:54.5 sec      Urinalysis Basic - ( 2022 22:10 )    Color: Yellow / Appearance: Slightly Turbid / S.024 / pH: x  Gluc: x / Ketone: Negative  / Bili: Negative / Urobili: Negative   Blood: x / Protein: 30 mg/dL / Nitrite: Positive   Leuk Esterase: Large / RBC: 1 /hpf /  /HPF   Sq Epi: x / Non Sq Epi: 0 /hpf / Bacteria: Few              Thyroid Stimulating Hormone, Serum: 0.47 uIU/mL ( @ 17:02)          Consultant(s) Notes Reviewed:      Care Discussed with Consultants/Other Providers:

## 2022-02-04 NOTE — PROVIDER CONTACT NOTE (CRITICAL VALUE NOTIFICATION) - PERSON GIVING RESULT:
dory vila , [facility]
Remberto Duarte/ [facility]
Eliazar Kaur
Lety Stone/ [facility]

## 2022-02-04 NOTE — PROVIDER CONTACT NOTE (CRITICAL VALUE NOTIFICATION) - SITUATION
RN notified by Lety from lab; blood cultures collected on 2/2/22 resulted gram negative rods in anaerobic bottle
 notifying RN of K+ 2.8
Blood Cultures from 2nd preliminary growth in aerobic bottle gram negative rods

## 2022-02-04 NOTE — PROGRESS NOTE ADULT - SUBJECTIVE AND OBJECTIVE BOX
Follow Up:      Interval History:    REVIEW OF SYSTEMS  [  ] ROS unobtainable because:    [  ] All other systems negative except as noted below    Constitutional:  [ ] fever [ ] chills  [ ] weight loss  [ ] weakness  Skin:  [ ] rash [ ] phlebitis	  Eyes: [ ] icterus [ ] pain  [ ] discharge	  ENMT: [ ] sore throat  [ ] thrush [ ] ulcers [ ] exudates  Respiratory: [ ] dyspnea [ ] hemoptysis [ ] cough [ ] sputum	  Cardiovascular:  [ ] chest pain [ ] palpitations [ ] edema	  Gastrointestinal:  [ ] nausea [ ] vomiting [ ] diarrhea [ ] constipation [ ] pain	  Genitourinary:  [ ] dysuria [ ] frequency [ ] hematuria [ ] discharge [ ] flank pain  [ ] incontinence  Musculoskeletal:  [ ] myalgias [ ] arthralgias [ ] arthritis  [ ] back pain  Neurological:  [ ] headache [ ] seizures  [ ] confusion/altered mental status    Allergies  No Known Allergies        ANTIMICROBIALS:  cefTRIAXone   IVPB 1000 every 24 hours      OTHER MEDS:  MEDICATIONS  (STANDING):  acetaminophen     Tablet .. 650 every 6 hours PRN  aspirin enteric coated 81 daily  atorvastatin 10 at bedtime  dextrose 40% Gel 15 once  dextrose 50% Injectable 25 once  dextrose 50% Injectable 12.5 once  dextrose 50% Injectable 25 once  glucagon  Injectable 1 once  heparin   Injectable 4000 every 6 hours PRN  heparin   Injectable 2000 every 6 hours PRN  heparin  Infusion.  <Continuous>  insulin lispro (ADMELOG) corrective regimen sliding scale  three times a day before meals  insulin lispro (ADMELOG) corrective regimen sliding scale  at bedtime  metoprolol tartrate 25 two times a day      Vital Signs Last 24 Hrs  T(C): 37.6 (2022 16:22), Max: 37.6 (2022 16:22)  T(F): 99.7 (2022 16:22), Max: 99.7 (2022 16:22)  HR: 94 (2022 17:25) (86 - 106)  BP: 103/70 (2022 17:25) (91/48 - 118/74)  BP(mean): --  RR: 18 (2022 16:22) (18 - 18)  SpO2: 95% (2022 16:22) (94% - 97%)    PHYSICAL EXAMINATION:  General: Alert and Awake, NAD  HEENT: PERRL, EOMI  Neck: Supple  Cardiac: RRR, No M/R/G  Resp: CTAB, No Wh/Rh/Ra  Abdomen: NBS, NT/ND, No HSM, No rigidity or guarding  MSK: No LE edema. No Calf tenderness  : No calvin  Skin: No rashes or lesions. Skin is warm and dry to the touch.   Neuro: Alert and Awake. CN 2-12 Grossly intact. Moves all four extremities spontaneously.  Psych: Calm, Pleasant, Cooperative                          9.4    7.09  )-----------( 175      ( 2022 07:19 )             29.6       02-    137  |  102  |  21  ----------------------------<  96  3.7   |  23  |  0.65    Ca    8.3<L>      2022 07:19    TPro  6.0  /  Alb  2.7<L>  /  TBili  0.3  /  DBili  x   /  AST  15  /  ALT  14  /  AlkPhos  53  02-04      Urinalysis Basic - ( 2022 22:10 )    Color: Yellow / Appearance: Slightly Turbid / S.024 / pH: x  Gluc: x / Ketone: Negative  / Bili: Negative / Urobili: Negative   Blood: x / Protein: 30 mg/dL / Nitrite: Positive   Leuk Esterase: Large / RBC: 1 /hpf /  /HPF   Sq Epi: x / Non Sq Epi: 0 /hpf / Bacteria: Few        MICROBIOLOGY:  v  .Blood Blood-Peripheral  22   No growth to date.  --  --      .Blood Blood  22   Growth in anaerobic bottle: Escherichia coli  See previous culture [id number]  --  Blood Culture PCR      Clean Catch Clean Catch (Midstream)  22   >=3 organisms. Probable collection contamination.  --  --                RADIOLOGY:    <The imaging below has been reviewed and visualized by me independently. Findings as detailed in report below> Follow Up:  bacteremia    Interval History: afebrile overnight. denies acute complaints.     REVIEW OF SYSTEMS  [  ] ROS unobtainable because:    [ x ] All other systems negative except as noted below    Constitutional:  [ ] fever [ ] chills  [ ] weight loss  [ ] weakness  Skin:  [ ] rash [ ] phlebitis	  Eyes: [ ] icterus [ ] pain  [ ] discharge	  ENMT: [ ] sore throat  [ ] thrush [ ] ulcers [ ] exudates  Respiratory: [ ] dyspnea [ ] hemoptysis [ ] cough [ ] sputum	  Cardiovascular:  [ ] chest pain [ ] palpitations [ ] edema	  Gastrointestinal:  [ ] nausea [ ] vomiting [ ] diarrhea [ ] constipation [ ] pain	  Genitourinary:  [ ] dysuria [ ] frequency [ ] hematuria [ ] discharge [ ] flank pain  [ ] incontinence  Musculoskeletal:  [ ] myalgias [ ] arthralgias [ ] arthritis  [ ] back pain  Neurological:  [ ] headache [ ] seizures  [ ] confusion/altered mental status    Allergies  No Known Allergies        ANTIMICROBIALS:  cefTRIAXone   IVPB 1000 every 24 hours      OTHER MEDS:  MEDICATIONS  (STANDING):  acetaminophen     Tablet .. 650 every 6 hours PRN  aspirin enteric coated 81 daily  atorvastatin 10 at bedtime  dextrose 40% Gel 15 once  dextrose 50% Injectable 25 once  dextrose 50% Injectable 12.5 once  dextrose 50% Injectable 25 once  glucagon  Injectable 1 once  heparin   Injectable 4000 every 6 hours PRN  heparin   Injectable 2000 every 6 hours PRN  heparin  Infusion.  <Continuous>  insulin lispro (ADMELOG) corrective regimen sliding scale  three times a day before meals  insulin lispro (ADMELOG) corrective regimen sliding scale  at bedtime  metoprolol tartrate 25 two times a day      Vital Signs Last 24 Hrs  T(C): 37.6 (2022 16:22), Max: 37.6 (2022 16:22)  T(F): 99.7 (2022 16:22), Max: 99.7 (2022 16:22)  HR: 94 (2022 17:25) (86 - 106)  BP: 103/70 (2022 17:25) (91/48 - 118/74)  BP(mean): --  RR: 18 (2022 16:22) (18 - 18)  SpO2: 95% (2022 16:22) (94% - 97%)    PHYSICAL EXAMINATION:  General: Alert and Awake, NAD  Cardiac: RRR, No M/R/G  Resp: Rales at bilateral mid lung and lung bases. No wheezes or rhonchi.   Abdomen: NBS, NT/ND, No HSM, No rigidity or guarding  MSK: No LE edema. No Calf tenderness  Skin: No rashes or lesions. Skin is warm and dry to the touch.   Neuro: Alert and Awake. CN 2-12 Grossly intact. Moves all four extremities spontaneously.  Psych: Calm, Pleasant, Cooperative                        9.4    7.09  )-----------( 175      ( 2022 07:19 )             29.6           137  |  102  |  21  ----------------------------<  96  3.7   |  23  |  0.65    Ca    8.3<L>      2022 07:19    TPro  6.0  /  Alb  2.7<L>  /  TBili  0.3  /  DBili  x   /  AST  15  /  ALT  14  /  AlkPhos  53  0204      Urinalysis Basic - ( 2022 22:10 )    Color: Yellow / Appearance: Slightly Turbid / S.024 / pH: x  Gluc: x / Ketone: Negative  / Bili: Negative / Urobili: Negative   Blood: x / Protein: 30 mg/dL / Nitrite: Positive   Leuk Esterase: Large / RBC: 1 /hpf /  /HPF   Sq Epi: x / Non Sq Epi: 0 /hpf / Bacteria: Few        MICROBIOLOGY:  v  .Blood Blood-Peripheral  22   No growth to date.  --  --      .Blood Blood  22   Growth in anaerobic bottle: Escherichia coli  See previous culture [id number]  --  Blood Culture PCR      Clean Catch Clean Catch (Midstream)  22   >=3 organisms. Probable collection contamination.  --  --    RADIOLOGY:    <The imaging below has been reviewed and visualized by me independently. Findings as detailed in report below>    < from: US Renal (22 @ 13:39) >  IMPRESSION:  No hydronephrosis.    Left renal cysts.    < end of copied text >

## 2022-02-04 NOTE — PROGRESS NOTE ADULT - ASSESSMENT
80 yo F     h/o   dementia and   ? CHF per EMS,/  no other  hx  is  available      presenting via EMS from home for few days of weakness, followed by development of SOB and episode of substernal CP this morning.     Pt is a poor historian and unable to provide significant history    all ROS negative upon questioning.     Lives at home with brother - he reports concern for pt's increased work of breathing.      pt with   weakness/  sob  from  covid/  pna   CT  chest, with  GOO   on decadron/  remdisivir   *  Dementia   *    HTN,   *  acute  diastolic chf  on   lasix  card  dr davis  on  dvt ppx.  bmi  is  20  tele,   s/p vtach/ on  toprol,  card to  f/p   *  Afib, WAS  on lovenox  bid  v tach  on tele.  card following  tele, afib  tele, Vtach  22  beats. , had  subsequent vtach also  Afib, on iv heparin,   cardiac cath deferred  due to  fevers  *  E  coli  bacteremia,   on iv rocephin,  afebrile   today   cath  deferred  for  now, will perform at  a later date,  when cleared by  ID        spoke  with  brothersammi/ pt  is , has  no  children/ states,   pt  is  full code    pmd  dr sheppard, allie chan< from: CT Angio Chest PE Protocol w/ IV Cont (01.21.22 @ 16:01) >  IMPRESSION:  No main, right, left, lobar, or proximal segmental pulmonary embolus.  Patchy bilateral groundglass opacities noted throughout both lungs likely   representing infection or alveolar component of edema. COVID 19 can also   have this appearance. Correlate with RT PCR    --- End of Report --  < end of copied text >

## 2022-02-04 NOTE — PROGRESS NOTE ADULT - SUBJECTIVE AND OBJECTIVE BOX
CARDIOLOGY     PROGRESS  NOTE   ________________________________________________    CHIEF COMPLAINT:Patient is a 81y old  Female who presents with a chief complaint of sob/cp (04 Feb 2022 07:56)  no complain.  	  REVIEW OF SYSTEMS:  CONSTITUTIONAL: No fever, weight loss, or fatigue  EYES: No eye pain, visual disturbances, or discharge  ENT:  No difficulty hearing, tinnitus, vertigo; No sinus or throat pain  NECK: No pain or stiffness  RESPIRATORY: No cough, wheezing, chills or hemoptysis; No Shortness of Breath  CARDIOVASCULAR: No chest pain, palpitations, passing out, dizziness, or leg swelling  GASTROINTESTINAL: No abdominal or epigastric pain. No nausea, vomiting, or hematemesis; No diarrhea or constipation. No melena or hematochezia.  GENITOURINARY: No dysuria, frequency, hematuria, or incontinence  NEUROLOGICAL: No headaches, memory loss, loss of strength, numbness, or tremors  SKIN: No itching, burning, rashes, or lesions   LYMPH Nodes: No enlarged glands  ENDOCRINE: No heat or cold intolerance; No hair loss  MUSCULOSKELETAL: No joint pain or swelling; No muscle, back, or extremity pain  PSYCHIATRIC: No depression, anxiety, mood swings, or difficulty sleeping  HEME/LYMPH: No easy bruising, or bleeding gums  ALLERGY AND IMMUNOLOGIC: No hives or eczema	    [ ] All others negative	  [ x] Unable to obtain    PHYSICAL EXAM:  T(C): 36.9 (02-04-22 @ 05:29), Max: 38.2 (02-03-22 @ 11:55)  HR: 100 (02-04-22 @ 05:29) (95 - 106)  BP: 118/74 (02-04-22 @ 05:29) (100/64 - 118/74)  RR: 18 (02-04-22 @ 05:29) (18 - 20)  SpO2: 96% (02-04-22 @ 05:29) (94% - 99%)  Wt(kg): --  I&O's Summary    03 Feb 2022 07:01  -  04 Feb 2022 07:00  --------------------------------------------------------  IN: 1780 mL / OUT: 675 mL / NET: 1105 mL        Appearance: Normal	  HEENT:   Normal oral mucosa, PERRL, EOMI	  Lymphatic: No lymphadenopathy  Cardiovascular: Normal S1 S2, No JVD, No murmurs, No edema  Respiratory: Lungs clear to auscultation	  Psychiatry: A & O x 3, Mood & affect appropriate  Gastrointestinal:  Soft, Non-tender, + BS	  Skin: No rashes, No ecchymoses, No cyanosis	  Neurologic: Non-focal  Extremities: Normal range of motion, No clubbing, cyanosis or edema  Vascular: Peripheral pulses palpable 2+ bilaterally    MEDICATIONS  (STANDING):  aspirin enteric coated 81 milliGRAM(s) Oral daily  atorvastatin 10 milliGRAM(s) Oral at bedtime  cefTRIAXone   IVPB 1000 milliGRAM(s) IV Intermittent every 24 hours  dextrose 40% Gel 15 Gram(s) Oral once  dextrose 50% Injectable 25 Gram(s) IV Push once  dextrose 50% Injectable 12.5 Gram(s) IV Push once  dextrose 50% Injectable 25 Gram(s) IV Push once  glucagon  Injectable 1 milliGRAM(s) IntraMuscular once  heparin  Infusion.  Unit(s)/Hr (9 mL/Hr) IV Continuous <Continuous>  insulin lispro (ADMELOG) corrective regimen sliding scale   SubCutaneous three times a day before meals  insulin lispro (ADMELOG) corrective regimen sliding scale   SubCutaneous at bedtime  lactated ringers. 1000 milliLiter(s) (50 mL/Hr) IV Continuous <Continuous>  metoprolol tartrate 25 milliGRAM(s) Oral two times a day      TELEMETRY: 	    ECG:  	  RADIOLOGY:  OTHER: 	  	  LABS:	 	    CARDIAC MARKERS:                                9.4    7.09  )-----------( 175      ( 04 Feb 2022 07:19 )             29.6     02-04    137  |  102  |  21  ----------------------------<  96  3.7   |  23  |  0.65    Ca    8.3<L>      04 Feb 2022 07:19    TPro  6.0  /  Alb  2.7<L>  /  TBili  0.3  /  DBili  x   /  AST  15  /  ALT  14  /  AlkPhos  53  02-04    proBNP: Serum Pro-Brain Natriuretic Peptide: 20071 pg/mL (01-21 @ 12:11)    Lipid Profile:   HgA1c:   TSH: Thyroid Stimulating Hormone, Serum: 0.47 uIU/mL (01-21 @ 17:02)    PTT - ( 04 Feb 2022 07:21 )  PTT:54.5 sec    #Fever (persistent), Leukocytosis, E coli Bacteremia  --Recommend Renal / Bladder Ultrasound  --Stop Vancomycin  --Continue Ceftriaxone 1g IV Q24H (BCID PCR did not  ESBL target so Ceftriaxone should be sufficient)  --If clinical status changes can broaden Ceftriaxone to Ertapenem 1g IV Q24H  --Follow up on susceptibilities of E coli  --Follow up on urine culture     #COVID19  s/p Remdesivir 1/21 - 1/25  s/p Dexamethasone 1/22 - 1/31  Completed isolation for COVID19    Assessment and plan  ---------------------------  82 yo F  h/o   dementia and  <CHF per EMS,/  no othe r hx  is  available presenting via EMS from home for few days of weakness, followed by development of SOB and episode of substernal CP this morning.     Pt is a poor historian and unable to provide significant history    all ROS negative upon questioning.   Lives at home with brother - he reports concern for pt's increased work of breathing.   pt is a 82 yo female with hx of htn, chf, cad, with increasing sob and COVID with  abnormal  cta and chest x ray  cta negative for PE  lipid panel  continue covid therapy  fu renal function  keep K>  will increase beta blocker as tolerated  ac, a.fib rate is controlled  echo noted with normal EF, no WMA  continue beta blocker  continued with WCT, plan for cath , cleared by ID  will consider Wilmer/ cardioversion post cath  cath was delayed sec to fever  ID noted

## 2022-02-04 NOTE — PROVIDER CONTACT NOTE (CRITICAL VALUE NOTIFICATION) - TEST AND RESULT REPORTED:
Blood Cultures from 2nd preliminary growth in aerobic bottle gram negative rods
blood cultures obtained 2/2 + growth in both bottles gram - rods
Blood culture from 2/2/22; resulted gram negative rods growth in anaerobic bottle
K+ 2.8

## 2022-02-04 NOTE — PROGRESS NOTE ADULT - SUBJECTIVE AND OBJECTIVE BOX
Chief complaint  Patient is a 81y old  Female who presents with a chief complaint of sob/cp (04 Feb 2022 09:28)   Review of systems  Patient in bed, looks comfortable, no hypoglycemic episodes.    Labs and Fingersticks  CAPILLARY BLOOD GLUCOSE      POCT Blood Glucose.: 111 mg/dL (04 Feb 2022 12:19)  POCT Blood Glucose.: 96 mg/dL (04 Feb 2022 08:31)  POCT Blood Glucose.: 117 mg/dL (03 Feb 2022 22:01)  POCT Blood Glucose.: 122 mg/dL (03 Feb 2022 17:42)      Anion Gap, Serum: 12 (02-04 @ 07:19)  Anion Gap, Serum: 12 (02-03 @ 06:23)      Calcium, Total Serum: 8.3 *L* (02-04 @ 07:19)  Calcium, Total Serum: 9.0 (02-03 @ 06:23)  Albumin, Serum: 2.7 *L* (02-04 @ 07:19)  Albumin, Serum: 2.9 *L* (02-03 @ 06:23)    Alanine Aminotransferase (ALT/SGPT): 14 (02-04 @ 07:19)  Alanine Aminotransferase (ALT/SGPT): 14 (02-03 @ 06:23)  Alkaline Phosphatase, Serum: 53 (02-04 @ 07:19)  Alkaline Phosphatase, Serum: 56 (02-03 @ 06:23)  Aspartate Aminotransferase (AST/SGOT): 15 (02-04 @ 07:19)  Aspartate Aminotransferase (AST/SGOT): 22 (02-03 @ 06:23)        02-04    137  |  102  |  21  ----------------------------<  96  3.7   |  23  |  0.65    Ca    8.3<L>      04 Feb 2022 07:19    TPro  6.0  /  Alb  2.7<L>  /  TBili  0.3  /  DBili  x   /  AST  15  /  ALT  14  /  AlkPhos  53  02-04                        9.4    7.09  )-----------( 175      ( 04 Feb 2022 07:19 )             29.6     Medications  MEDICATIONS  (STANDING):  aspirin enteric coated 81 milliGRAM(s) Oral daily  atorvastatin 10 milliGRAM(s) Oral at bedtime  cefTRIAXone   IVPB 1000 milliGRAM(s) IV Intermittent every 24 hours  dextrose 40% Gel 15 Gram(s) Oral once  dextrose 50% Injectable 25 Gram(s) IV Push once  dextrose 50% Injectable 12.5 Gram(s) IV Push once  dextrose 50% Injectable 25 Gram(s) IV Push once  glucagon  Injectable 1 milliGRAM(s) IntraMuscular once  heparin  Infusion.  Unit(s)/Hr (9 mL/Hr) IV Continuous <Continuous>  insulin lispro (ADMELOG) corrective regimen sliding scale   SubCutaneous three times a day before meals  insulin lispro (ADMELOG) corrective regimen sliding scale   SubCutaneous at bedtime  lactated ringers. 1000 milliLiter(s) (50 mL/Hr) IV Continuous <Continuous>  metoprolol tartrate 25 milliGRAM(s) Oral two times a day      Physical Exam  General: Patient comfortable in bed  Vital Signs Last 12 Hrs  T(F): 98.9 (02-04-22 @ 09:37), Max: 98.9 (02-04-22 @ 09:37)  HR: 86 (02-04-22 @ 09:37) (86 - 100)  BP: 103/69 (02-04-22 @ 09:37) (103/69 - 118/74)  BP(mean): --  RR: 18 (02-04-22 @ 09:37) (18 - 18)  SpO2: 97% (02-04-22 @ 09:37) (96% - 97%)  Neck: No palpable thyroid nodules.  CVS: S1S2, No murmurs  Respiratory: No wheezing, no crepitations  GI: Abdomen soft, bowel sounds positive  Musculoskeletal:  edema lower extremities.   Skin: No skin rashes, no ecchymosis    Diagnostics    Diet, Regular:   Consistent Carbohydrate No Snacks (CSTCHO) (01-26 @ 15:27)           Chief complaint  Patient is a 81y old  Female who presents with a chief complaint of sob/cp (04 Feb 2022 09:28)   Review of systems  Patient in bed, looks comfortable, no hypoglycemic episodes.    Labs and Fingersticks  CAPILLARY BLOOD GLUCOSE      POCT Blood Glucose.: 111 mg/dL (04 Feb 2022 12:19)  POCT Blood Glucose.: 96 mg/dL (04 Feb 2022 08:31)  POCT Blood Glucose.: 117 mg/dL (03 Feb 2022 22:01)  POCT Blood Glucose.: 122 mg/dL (03 Feb 2022 17:42)      Anion Gap, Serum: 12 (02-04 @ 07:19)  Anion Gap, Serum: 12 (02-03 @ 06:23)      Calcium, Total Serum: 8.3 *L* (02-04 @ 07:19)  Calcium, Total Serum: 9.0 (02-03 @ 06:23)  Albumin, Serum: 2.7 *L* (02-04 @ 07:19)  Albumin, Serum: 2.9 *L* (02-03 @ 06:23)    Alanine Aminotransferase (ALT/SGPT): 14 (02-04 @ 07:19)  Alanine Aminotransferase (ALT/SGPT): 14 (02-03 @ 06:23)  Alkaline Phosphatase, Serum: 53 (02-04 @ 07:19)  Alkaline Phosphatase, Serum: 56 (02-03 @ 06:23)  Aspartate Aminotransferase (AST/SGOT): 15 (02-04 @ 07:19)  Aspartate Aminotransferase (AST/SGOT): 22 (02-03 @ 06:23)        02-04    137  |  102  |  21  ----------------------------<  96  3.7   |  23  |  0.65    Ca    8.3<L>      04 Feb 2022 07:19    TPro  6.0  /  Alb  2.7<L>  /  TBili  0.3  /  DBili  x   /  AST  15  /  ALT  14  /  AlkPhos  53  02-04                        9.4    7.09  )-----------( 175      ( 04 Feb 2022 07:19 )             29.6     Medications  MEDICATIONS  (STANDING):  aspirin enteric coated 81 milliGRAM(s) Oral daily  atorvastatin 10 milliGRAM(s) Oral at bedtime  cefTRIAXone   IVPB 1000 milliGRAM(s) IV Intermittent every 24 hours  dextrose 40% Gel 15 Gram(s) Oral once  dextrose 50% Injectable 25 Gram(s) IV Push once  dextrose 50% Injectable 12.5 Gram(s) IV Push once  dextrose 50% Injectable 25 Gram(s) IV Push once  glucagon  Injectable 1 milliGRAM(s) IntraMuscular once  heparin  Infusion.  Unit(s)/Hr (9 mL/Hr) IV Continuous <Continuous>  insulin lispro (ADMELOG) corrective regimen sliding scale   SubCutaneous three times a day before meals  insulin lispro (ADMELOG) corrective regimen sliding scale   SubCutaneous at bedtime  lactated ringers. 1000 milliLiter(s) (50 mL/Hr) IV Continuous <Continuous>  metoprolol tartrate 25 milliGRAM(s) Oral two times a day      Physical Exam  General: Patient comfortable in bed  Vital Signs Last 12 Hrs  T(F): 98.9 (02-04-22 @ 09:37), Max: 98.9 (02-04-22 @ 09:37)  HR: 86 (02-04-22 @ 09:37) (86 - 100)  BP: 103/69 (02-04-22 @ 09:37) (103/69 - 118/74)  BP(mean): --  RR: 18 (02-04-22 @ 09:37) (18 - 18)  SpO2: 97% (02-04-22 @ 09:37) (96% - 97%)  Neck: No palpable thyroid nodules.  CVS: S1S2, No murmurs  Respiratory: No wheezing, no crepitations  GI: Abdomen soft, bowel sounds positive  Musculoskeletal:  edema lower extremities.   Skin: No skin rashes, no ecchymosis    Diagnostics    Diet, Regular:   Consistent Carbohydrate No Snacks (CSTCHO) (01-26 @ 15:27)

## 2022-02-05 LAB
-  AMIKACIN: SIGNIFICANT CHANGE UP
-  AMPICILLIN/SULBACTAM: SIGNIFICANT CHANGE UP
-  AMPICILLIN: SIGNIFICANT CHANGE UP
-  AZTREONAM: SIGNIFICANT CHANGE UP
-  CEFAZOLIN: SIGNIFICANT CHANGE UP
-  CEFEPIME: SIGNIFICANT CHANGE UP
-  CEFOXITIN: SIGNIFICANT CHANGE UP
-  CEFTRIAXONE: SIGNIFICANT CHANGE UP
-  CIPROFLOXACIN: SIGNIFICANT CHANGE UP
-  ERTAPENEM: SIGNIFICANT CHANGE UP
-  GENTAMICIN: SIGNIFICANT CHANGE UP
-  IMIPENEM: SIGNIFICANT CHANGE UP
-  LEVOFLOXACIN: SIGNIFICANT CHANGE UP
-  MEROPENEM: SIGNIFICANT CHANGE UP
-  PIPERACILLIN/TAZOBACTAM: SIGNIFICANT CHANGE UP
-  TOBRAMYCIN: SIGNIFICANT CHANGE UP
-  TRIMETHOPRIM/SULFAMETHOXAZOLE: SIGNIFICANT CHANGE UP
ANION GAP SERPL CALC-SCNC: 13 MMOL/L — SIGNIFICANT CHANGE UP (ref 5–17)
APTT BLD: 60.9 SEC — HIGH (ref 27.5–35.5)
BUN SERPL-MCNC: 23 MG/DL — SIGNIFICANT CHANGE UP (ref 7–23)
CALCIUM SERPL-MCNC: 8.5 MG/DL — SIGNIFICANT CHANGE UP (ref 8.4–10.5)
CHLORIDE SERPL-SCNC: 103 MMOL/L — SIGNIFICANT CHANGE UP (ref 96–108)
CO2 SERPL-SCNC: 23 MMOL/L — SIGNIFICANT CHANGE UP (ref 22–31)
CREAT SERPL-MCNC: 0.75 MG/DL — SIGNIFICANT CHANGE UP (ref 0.5–1.3)
CULTURE RESULTS: SIGNIFICANT CHANGE UP
CULTURE RESULTS: SIGNIFICANT CHANGE UP
GLUCOSE BLDC GLUCOMTR-MCNC: 104 MG/DL — HIGH (ref 70–99)
GLUCOSE BLDC GLUCOMTR-MCNC: 113 MG/DL — HIGH (ref 70–99)
GLUCOSE BLDC GLUCOMTR-MCNC: 121 MG/DL — HIGH (ref 70–99)
GLUCOSE BLDC GLUCOMTR-MCNC: 134 MG/DL — HIGH (ref 70–99)
GLUCOSE SERPL-MCNC: 91 MG/DL — SIGNIFICANT CHANGE UP (ref 70–99)
HCT VFR BLD CALC: 24.7 % — LOW (ref 34.5–45)
HCT VFR BLD CALC: 27.2 % — LOW (ref 34.5–45)
HGB BLD-MCNC: 7.9 G/DL — LOW (ref 11.5–15.5)
HGB BLD-MCNC: 8.5 G/DL — LOW (ref 11.5–15.5)
MCHC RBC-ENTMCNC: 27.2 PG — SIGNIFICANT CHANGE UP (ref 27–34)
MCHC RBC-ENTMCNC: 27.6 PG — SIGNIFICANT CHANGE UP (ref 27–34)
MCHC RBC-ENTMCNC: 31.3 GM/DL — LOW (ref 32–36)
MCHC RBC-ENTMCNC: 32 GM/DL — SIGNIFICANT CHANGE UP (ref 32–36)
MCV RBC AUTO: 86.4 FL — SIGNIFICANT CHANGE UP (ref 80–100)
MCV RBC AUTO: 86.9 FL — SIGNIFICANT CHANGE UP (ref 80–100)
METHOD TYPE: SIGNIFICANT CHANGE UP
NRBC # BLD: 0 /100 WBCS — SIGNIFICANT CHANGE UP (ref 0–0)
NRBC # BLD: 0 /100 WBCS — SIGNIFICANT CHANGE UP (ref 0–0)
ORGANISM # SPEC MICROSCOPIC CNT: SIGNIFICANT CHANGE UP
PLATELET # BLD AUTO: 173 K/UL — SIGNIFICANT CHANGE UP (ref 150–400)
PLATELET # BLD AUTO: 175 K/UL — SIGNIFICANT CHANGE UP (ref 150–400)
POTASSIUM SERPL-MCNC: 3.7 MMOL/L — SIGNIFICANT CHANGE UP (ref 3.5–5.3)
POTASSIUM SERPL-SCNC: 3.7 MMOL/L — SIGNIFICANT CHANGE UP (ref 3.5–5.3)
RBC # BLD: 2.86 M/UL — LOW (ref 3.8–5.2)
RBC # BLD: 3.13 M/UL — LOW (ref 3.8–5.2)
RBC # FLD: 15.8 % — HIGH (ref 10.3–14.5)
RBC # FLD: 15.9 % — HIGH (ref 10.3–14.5)
SODIUM SERPL-SCNC: 139 MMOL/L — SIGNIFICANT CHANGE UP (ref 135–145)
SPECIMEN SOURCE: SIGNIFICANT CHANGE UP
SPECIMEN SOURCE: SIGNIFICANT CHANGE UP
WBC # BLD: 6.21 K/UL — SIGNIFICANT CHANGE UP (ref 3.8–10.5)
WBC # BLD: 6.47 K/UL — SIGNIFICANT CHANGE UP (ref 3.8–10.5)
WBC # FLD AUTO: 6.21 K/UL — SIGNIFICANT CHANGE UP (ref 3.8–10.5)
WBC # FLD AUTO: 6.47 K/UL — SIGNIFICANT CHANGE UP (ref 3.8–10.5)

## 2022-02-05 RX ORDER — POTASSIUM CHLORIDE 20 MEQ
40 PACKET (EA) ORAL ONCE
Refills: 0 | Status: COMPLETED | OUTPATIENT
Start: 2022-02-05 | End: 2022-02-05

## 2022-02-05 RX ORDER — HYDROCORTISONE 1 %
1 OINTMENT (GRAM) TOPICAL DAILY
Refills: 0 | Status: DISCONTINUED | OUTPATIENT
Start: 2022-02-05 | End: 2022-02-13

## 2022-02-05 RX ORDER — FAMOTIDINE 10 MG/ML
20 INJECTION INTRAVENOUS DAILY
Refills: 0 | Status: DISCONTINUED | OUTPATIENT
Start: 2022-02-05 | End: 2022-02-12

## 2022-02-05 RX ADMIN — ATORVASTATIN CALCIUM 10 MILLIGRAM(S): 80 TABLET, FILM COATED ORAL at 21:08

## 2022-02-05 RX ADMIN — FAMOTIDINE 20 MILLIGRAM(S): 10 INJECTION INTRAVENOUS at 17:53

## 2022-02-05 RX ADMIN — CEFTRIAXONE 100 MILLIGRAM(S): 500 INJECTION, POWDER, FOR SOLUTION INTRAMUSCULAR; INTRAVENOUS at 17:53

## 2022-02-05 RX ADMIN — Medication 81 MILLIGRAM(S): at 12:05

## 2022-02-05 RX ADMIN — Medication 25 MILLIGRAM(S): at 06:06

## 2022-02-05 RX ADMIN — HEPARIN SODIUM 1100 UNIT(S)/HR: 5000 INJECTION INTRAVENOUS; SUBCUTANEOUS at 08:56

## 2022-02-05 RX ADMIN — Medication 40 MILLIEQUIVALENT(S): at 22:21

## 2022-02-05 RX ADMIN — Medication 25 MILLIGRAM(S): at 17:53

## 2022-02-05 NOTE — PROGRESS NOTE ADULT - SUBJECTIVE AND OBJECTIVE BOX
Chief complaint    Patient is a 81y old  Female who presents with a chief complaint of sob/cp (05 Feb 2022 13:43)   Review of systems  Patient in bed, appears comfortable.    Labs and Fingersticks  CAPILLARY BLOOD GLUCOSE      POCT Blood Glucose.: 134 mg/dL (05 Feb 2022 21:09)  POCT Blood Glucose.: 121 mg/dL (05 Feb 2022 17:39)  POCT Blood Glucose.: 113 mg/dL (05 Feb 2022 12:20)  POCT Blood Glucose.: 104 mg/dL (05 Feb 2022 09:09)      Anion Gap, Serum: 13 (02-05 @ 07:12)  Anion Gap, Serum: 12 (02-04 @ 07:19)      Calcium, Total Serum: 8.5 (02-05 @ 07:12)  Calcium, Total Serum: 8.3 *L* (02-04 @ 07:19)  Albumin, Serum: 2.7 *L* (02-04 @ 07:19)    Alanine Aminotransferase (ALT/SGPT): 14 (02-04 @ 07:19)  Alkaline Phosphatase, Serum: 53 (02-04 @ 07:19)  Aspartate Aminotransferase (AST/SGOT): 15 (02-04 @ 07:19)        02-05    139  |  103  |  23  ----------------------------<  91  3.7   |  23  |  0.75    Ca    8.5      05 Feb 2022 07:12    TPro  6.0  /  Alb  2.7<L>  /  TBili  0.3  /  DBili  x   /  AST  15  /  ALT  14  /  AlkPhos  53  02-04                        8.5    6.21  )-----------( 173      ( 05 Feb 2022 09:40 )             27.2     Medications  MEDICATIONS  (STANDING):  aspirin enteric coated 81 milliGRAM(s) Oral daily  atorvastatin 10 milliGRAM(s) Oral at bedtime  cefTRIAXone   IVPB 1000 milliGRAM(s) IV Intermittent every 24 hours  dextrose 40% Gel 15 Gram(s) Oral once  dextrose 50% Injectable 25 Gram(s) IV Push once  dextrose 50% Injectable 12.5 Gram(s) IV Push once  dextrose 50% Injectable 25 Gram(s) IV Push once  famotidine    Tablet 20 milliGRAM(s) Oral daily  glucagon  Injectable 1 milliGRAM(s) IntraMuscular once  heparin  Infusion.  Unit(s)/Hr (9 mL/Hr) IV Continuous <Continuous>  hydrocortisone hemorrhoidal Suppository 1 Suppository(s) Rectal daily  insulin lispro (ADMELOG) corrective regimen sliding scale   SubCutaneous at bedtime  insulin lispro (ADMELOG) corrective regimen sliding scale   SubCutaneous three times a day before meals  lactated ringers. 1000 milliLiter(s) (50 mL/Hr) IV Continuous <Continuous>  metoprolol tartrate 25 milliGRAM(s) Oral two times a day  potassium chloride    Tablet ER 40 milliEquivalent(s) Oral once      Physical Exam  General: Patient comfortable in bed  Vital Signs Last 12 Hrs  T(F): 98.9 (02-05-22 @ 17:09), Max: 98.9 (02-05-22 @ 17:09)  HR: 91 (02-05-22 @ 21:33) (91 - 97)  BP: 120/70 (02-05-22 @ 21:33) (108/62 - 120/70)  BP(mean): --  RR: 18 (02-05-22 @ 21:33) (16 - 18)  SpO2: 95% (02-05-22 @ 21:33) (95% - 96%)  Neck: No palpable thyroid nodules.  CVS: S1S2, No murmurs  Respiratory: No wheezing, no crepitations  GI: Abdomen soft, bowel sounds positive  Musculoskeletal:  edema lower extremities.     Diagnostics    A1C with Estimated Average Glucose: AM Sched. Collection: 27-Jan-2022 06:00 (01-26 @ 08:11)  A1C with Estimated Average Glucose: Routine (01-26 @ 08:11)

## 2022-02-05 NOTE — PROGRESS NOTE ADULT - ASSESSMENT
80 yo F     h/o   dementia and   ? CHF per EMS,/  no other  hx  is  available      presenting via EMS from home for few days of weakness, followed by development of SOB and episode of substernal CP this morning.     Pt is a poor historian and unable to provide significant history    all ROS negative upon questioning.     Lives at home with brother - he reports concern for pt's increased work of breathing.      pt with   weakness/  sob  from  covid/  pna   CT  chest, with  GOO   on decadron/  remdisivir   *  Dementia   *    HTN,   *  acute  diastolic chf  on   lasix  card  dr davis  on  dvt ppx.  bmi  is  20  tele,   s/p vtach/ on  toprol,  card to  f/p   *  Afib, WAS  on lovenox  bid  v tach  on tele.  card following  tele, afib  tele, Vtach  22  beats. , had  subsequent vtach also  Afib, on iv heparin,   cardiac cath deferred  due to  fevers  *  E  coli  bacteremia,   on iv rocephin,. rpt bcx  are negative  cath  deferred  for  now, will perform at  a later date,  when cleared by  ID  follow  hb  this  am.  no  rectal  bleed        spoke  with  brother, sammi/ pt  is , has  no  children/ states,   pt  is  full code    pmd  dr sheppard, allie chan< from: CT Angio Chest PE Protocol w/ IV Cont (01.21.22 @ 16:01) >  IMPRESSION:  No main, right, left, lobar, or proximal segmental pulmonary embolus.  Patchy bilateral groundglass opacities noted throughout both lungs likely   representing infection or alveolar component of edema. COVID 19 can also   have this appearance. Correlate with RT PCR    --- End of Report --  < end of copied text >

## 2022-02-05 NOTE — CONSULT NOTE ADULT - SUBJECTIVE AND OBJECTIVE BOX
Chief Complaint:  Patient is a 81y old  Female who presents with a chief complaint of sob/cp c/o rectal bleeding and anemia  82 yo F     h/o   dementia and  <CHF per EMS,/  no othe r hx  is  available      presenting via EMS from home for few days of weakness, followed by development of SOB and episode of substernal CP this morning.     Pt is a poor historian and unable to provide significant history    all ROS negative upon questioning.     Lives at home with brother - he reports concern for pt's increased work of breathing.     Review of Systems:  Review of Systems: REVIEW OF SYSTEMS:  GEN: no fever,    no chills  RESP: SOB,   no cough  CVS: no chest pain,   no palpitations  GI: no abdominal pain,   no nausea,   no vomiting,   no constipation,   no diarrhea  : no dysuria,   no frequency  NEURO: no headache,   no dizziness  PSYCH: no depression,   not anxious  Derm : no rash      Allergies:  No Known Allergies      Medications:  acetaminophen     Tablet .. 650 milliGRAM(s) Oral every 6 hours PRN  aspirin enteric coated 81 milliGRAM(s) Oral daily  atorvastatin 10 milliGRAM(s) Oral at bedtime  cefTRIAXone   IVPB 1000 milliGRAM(s) IV Intermittent every 24 hours  dextrose 40% Gel 15 Gram(s) Oral once  dextrose 50% Injectable 25 Gram(s) IV Push once  dextrose 50% Injectable 12.5 Gram(s) IV Push once  dextrose 50% Injectable 25 Gram(s) IV Push once  glucagon  Injectable 1 milliGRAM(s) IntraMuscular once  heparin   Injectable 4000 Unit(s) IV Push every 6 hours PRN  heparin   Injectable 2000 Unit(s) IV Push every 6 hours PRN  heparin  Infusion.  Unit(s)/Hr IV Continuous <Continuous>  insulin lispro (ADMELOG) corrective regimen sliding scale   SubCutaneous at bedtime  insulin lispro (ADMELOG) corrective regimen sliding scale   SubCutaneous three times a day before meals  lactated ringers. 1000 milliLiter(s) IV Continuous <Continuous>  metoprolol tartrate 25 milliGRAM(s) Oral two times a day      PMHX/PSHX:  Dementia        Family history:    no uc no cd    Social History:   no etoh no cigs no ivda    ROS:     General:  No wt loss, fevers, chills, night sweats, fatigue,   Eyes:  Good vision, no reported pain  ENT:  No sore throat, pain, runny nose, dysphagia  CV:  No pain, palpitations, hypo/hypertension  Resp:  No dyspnea, cough, tachypnea, wheezing  GI:  No pain, No nausea, No vomiting, No diarrhea, No constipation, No weight loss, No fever, No pruritis, No rectal bleeding, No tarry stools, No dysphagia,  :  No pain, bleeding, incontinence, nocturia  Muscle:  No pain, weakness  Neuro:  No weakness, tingling, memory problems  Psych:  No fatigue, insomnia, mood problems, depression  Endocrine:  No polyuria, polydipsia, cold/heat intolerance  Heme:  No petechiae, ecchymosis, easy bruisability  Skin:  No rash, tattoos, scars, edema      PHYSICAL EXAM:   Vital Signs:  Vital Signs Last 24 Hrs  T(C): 36.7 (05 Feb 2022 09:32), Max: 37.6 (04 Feb 2022 16:22)  T(F): 98 (05 Feb 2022 09:32), Max: 99.7 (04 Feb 2022 16:22)  HR: 81 (05 Feb 2022 09:32) (81 - 101)  BP: 102/70 (05 Feb 2022 09:32) (100/53 - 116/69)  BP(mean): --  RR: 17 (05 Feb 2022 09:32) (17 - 18)  SpO2: 96% (05 Feb 2022 09:32) (95% - 97%)  Daily     Daily     GENERAL:  Appears stated age, well-groomed, well-nourished, no distress  HEENT:  NC/AT,  conjunctivae clear and pink, no thyromegaly, nodules, adenopathy, no JVD, sclera -anicteric  CHEST:  Full & symmetric excursion, no increased effort, breath sounds clear  HEART:  Regular rhythm, S1, S2, no murmur/rub/S3/S4, no abdominal bruit, no edema  ABDOMEN:  Soft, non-tender, non-distended, normoactive bowel sounds,  no masses ,no hepato-splenomegaly, no signs of chronic liver disease  EXTEREMITIES:  no cyanosis,clubbing or edema  SKIN:  No rash/erythema/ecchymoses/petechiae/wounds/abscess/warm/dry  NEURO:  Alert, oriented, no asterixis, no tremor, no encephalopathy    LABS:                        8.5    6.21  )-----------( 173      ( 05 Feb 2022 09:40 )             27.2     02-05    139  |  103  |  23  ----------------------------<  91  3.7   |  23  |  0.75    Ca    8.5      05 Feb 2022 07:12    TPro  6.0  /  Alb  2.7<L>  /  TBili  0.3  /  DBili  x   /  AST  15  /  ALT  14  /  AlkPhos  53  02-04    LIVER FUNCTIONS - ( 04 Feb 2022 07:19 )  Alb: 2.7 g/dL / Pro: 6.0 g/dL / ALK PHOS: 53 U/L / ALT: 14 U/L / AST: 15 U/L / GGT: x           PTT - ( 05 Feb 2022 07:13 )  PTT:60.9 sec        Imaging:

## 2022-02-05 NOTE — CONSULT NOTE ADULT - ASSESSMENT
rectal bleedin  anemia    plan  Monitor CBC and Transfuse prn  hydrocrotsiosne supp  Hold anticoagulation   IR/CRS evaluation if decompensates   May need colonoscopy once optimized if bleeding persists  daily cbc   transfuse prn   check iron studies   ppi once a day  check stool occult blood  further recommendations pending above

## 2022-02-05 NOTE — PROGRESS NOTE ADULT - SUBJECTIVE AND OBJECTIVE BOX
afberile  REVIEW OF SYSTEMS:  GEN: no fever,    no chills  RESP: no SOB,   no cough  CVS: no chest pain,   no palpitations  GI: no abdominal pain,   no nausea,   no vomiting,   no constipation,   no diarrhea  : no dysuria,   no frequency  NEURO: no headache,   no dizziness  PSYCH: no depression,   not anxious  Derm : no rash    MEDICATIONS  (STANDING):  aspirin enteric coated 81 milliGRAM(s) Oral daily  atorvastatin 10 milliGRAM(s) Oral at bedtime  cefTRIAXone   IVPB 1000 milliGRAM(s) IV Intermittent every 24 hours  dextrose 40% Gel 15 Gram(s) Oral once  dextrose 50% Injectable 25 Gram(s) IV Push once  dextrose 50% Injectable 12.5 Gram(s) IV Push once  dextrose 50% Injectable 25 Gram(s) IV Push once  glucagon  Injectable 1 milliGRAM(s) IntraMuscular once  heparin  Infusion.  Unit(s)/Hr (9 mL/Hr) IV Continuous <Continuous>  insulin lispro (ADMELOG) corrective regimen sliding scale   SubCutaneous at bedtime  insulin lispro (ADMELOG) corrective regimen sliding scale   SubCutaneous three times a day before meals  lactated ringers. 1000 milliLiter(s) (50 mL/Hr) IV Continuous <Continuous>  metoprolol tartrate 25 milliGRAM(s) Oral two times a day    MEDICATIONS  (PRN):  acetaminophen     Tablet .. 650 milliGRAM(s) Oral every 6 hours PRN Temp greater or equal to 38C (100.4F), Mild Pain (1 - 3), Moderate Pain (4 - 6), Severe Pain (7 - 10)  heparin   Injectable 4000 Unit(s) IV Push every 6 hours PRN For aPTT less than 40  heparin   Injectable 2000 Unit(s) IV Push every 6 hours PRN For aPTT between 40 - 57      Vital Signs Last 24 Hrs  T(C): 36.4 (05 Feb 2022 05:12), Max: 37.6 (04 Feb 2022 16:22)  T(F): 97.6 (05 Feb 2022 05:12), Max: 99.7 (04 Feb 2022 16:22)  HR: 88 (05 Feb 2022 05:12) (86 - 101)  BP: 116/69 (05 Feb 2022 05:12) (91/48 - 116/69)  BP(mean): --  RR: 18 (05 Feb 2022 05:12) (18 - 18)  SpO2: 97% (05 Feb 2022 05:12) (95% - 97%)  CAPILLARY BLOOD GLUCOSE      POCT Blood Glucose.: 119 mg/dL (04 Feb 2022 21:14)  POCT Blood Glucose.: 128 mg/dL (04 Feb 2022 17:43)  POCT Blood Glucose.: 111 mg/dL (04 Feb 2022 12:19)    I&O's Summary    04 Feb 2022 07:01  -  05 Feb 2022 07:00  --------------------------------------------------------  IN: 2274 mL / OUT: 800 mL / NET: 1474 mL        PHYSICAL EXAM:  HEAD:  Atraumatic, Normocephalic  NECK: Supple, No   JVD  CHEST/LUNG:   no     rales,     no,    rhonchi  HEART: Regular rate and rhythm;         murmur  ABDOMEN: Soft, Nontender, ;   EXTREMITIES:    no    edema  NEUROLOGY:  alert    LABS:                        7.9    6.47  )-----------( 175      ( 05 Feb 2022 07:12 )             24.7     02-04    137  |  102  |  21  ----------------------------<  96  3.7   |  23  |  0.65    Ca    8.3<L>      04 Feb 2022 07:19    TPro  6.0  /  Alb  2.7<L>  /  TBili  0.3  /  DBili  x   /  AST  15  /  ALT  14  /  AlkPhos  53  02-04    PTT - ( 05 Feb 2022 07:13 )  PTT:60.9 sec                Thyroid Stimulating Hormone, Serum: 0.47 uIU/mL (01-21 @ 17:02)          Consultant(s) Notes Reviewed:      Care Discussed with Consultants/Other Providers:

## 2022-02-06 LAB
-  AMIKACIN: SIGNIFICANT CHANGE UP
-  AMOXICILLIN/CLAVULANIC ACID: SIGNIFICANT CHANGE UP
-  AMPICILLIN/SULBACTAM: SIGNIFICANT CHANGE UP
-  AMPICILLIN: SIGNIFICANT CHANGE UP
-  AZTREONAM: SIGNIFICANT CHANGE UP
-  CEFAZOLIN: SIGNIFICANT CHANGE UP
-  CEFEPIME: SIGNIFICANT CHANGE UP
-  CEFOXITIN: SIGNIFICANT CHANGE UP
-  CEFTRIAXONE: SIGNIFICANT CHANGE UP
-  CIPROFLOXACIN: SIGNIFICANT CHANGE UP
-  ERTAPENEM: SIGNIFICANT CHANGE UP
-  GENTAMICIN: SIGNIFICANT CHANGE UP
-  IMIPENEM: SIGNIFICANT CHANGE UP
-  LEVOFLOXACIN: SIGNIFICANT CHANGE UP
-  MEROPENEM: SIGNIFICANT CHANGE UP
-  NITROFURANTOIN: SIGNIFICANT CHANGE UP
-  PIPERACILLIN/TAZOBACTAM: SIGNIFICANT CHANGE UP
-  TIGECYCLINE: SIGNIFICANT CHANGE UP
-  TOBRAMYCIN: SIGNIFICANT CHANGE UP
-  TRIMETHOPRIM/SULFAMETHOXAZOLE: SIGNIFICANT CHANGE UP
ANION GAP SERPL CALC-SCNC: 12 MMOL/L — SIGNIFICANT CHANGE UP (ref 5–17)
APTT BLD: 63 SEC — HIGH (ref 27.5–35.5)
BUN SERPL-MCNC: 20 MG/DL — SIGNIFICANT CHANGE UP (ref 7–23)
CALCIUM SERPL-MCNC: 8.7 MG/DL — SIGNIFICANT CHANGE UP (ref 8.4–10.5)
CHLORIDE SERPL-SCNC: 102 MMOL/L — SIGNIFICANT CHANGE UP (ref 96–108)
CO2 SERPL-SCNC: 25 MMOL/L — SIGNIFICANT CHANGE UP (ref 22–31)
CREAT SERPL-MCNC: 0.7 MG/DL — SIGNIFICANT CHANGE UP (ref 0.5–1.3)
CULTURE RESULTS: SIGNIFICANT CHANGE UP
CULTURE RESULTS: SIGNIFICANT CHANGE UP
GLUCOSE BLDC GLUCOMTR-MCNC: 134 MG/DL — HIGH (ref 70–99)
GLUCOSE BLDC GLUCOMTR-MCNC: 139 MG/DL — HIGH (ref 70–99)
GLUCOSE BLDC GLUCOMTR-MCNC: 140 MG/DL — HIGH (ref 70–99)
GLUCOSE BLDC GLUCOMTR-MCNC: 149 MG/DL — HIGH (ref 70–99)
GLUCOSE SERPL-MCNC: 119 MG/DL — HIGH (ref 70–99)
GRAM STN FLD: SIGNIFICANT CHANGE UP
HCT VFR BLD CALC: 29.7 % — LOW (ref 34.5–45)
HGB BLD-MCNC: 9.4 G/DL — LOW (ref 11.5–15.5)
INR BLD: 1.08 RATIO — SIGNIFICANT CHANGE UP (ref 0.88–1.16)
MAGNESIUM SERPL-MCNC: 1.8 MG/DL — SIGNIFICANT CHANGE UP (ref 1.6–2.6)
MCHC RBC-ENTMCNC: 27.4 PG — SIGNIFICANT CHANGE UP (ref 27–34)
MCHC RBC-ENTMCNC: 31.6 GM/DL — LOW (ref 32–36)
MCV RBC AUTO: 86.6 FL — SIGNIFICANT CHANGE UP (ref 80–100)
METHOD TYPE: SIGNIFICANT CHANGE UP
NRBC # BLD: 0 /100 WBCS — SIGNIFICANT CHANGE UP (ref 0–0)
OB PNL STL: NEGATIVE — SIGNIFICANT CHANGE UP
ORGANISM # SPEC MICROSCOPIC CNT: SIGNIFICANT CHANGE UP
ORGANISM # SPEC MICROSCOPIC CNT: SIGNIFICANT CHANGE UP
PHOSPHATE SERPL-MCNC: 3.1 MG/DL — SIGNIFICANT CHANGE UP (ref 2.5–4.5)
PLATELET # BLD AUTO: 174 K/UL — SIGNIFICANT CHANGE UP (ref 150–400)
POTASSIUM SERPL-MCNC: 4.3 MMOL/L — SIGNIFICANT CHANGE UP (ref 3.5–5.3)
POTASSIUM SERPL-SCNC: 4.3 MMOL/L — SIGNIFICANT CHANGE UP (ref 3.5–5.3)
PROTHROM AB SERPL-ACNC: 12.9 SEC — SIGNIFICANT CHANGE UP (ref 10.6–13.6)
RBC # BLD: 3.43 M/UL — LOW (ref 3.8–5.2)
RBC # FLD: 15.7 % — HIGH (ref 10.3–14.5)
SODIUM SERPL-SCNC: 139 MMOL/L — SIGNIFICANT CHANGE UP (ref 135–145)
SPECIMEN SOURCE: SIGNIFICANT CHANGE UP
WBC # BLD: 7.5 K/UL — SIGNIFICANT CHANGE UP (ref 3.8–10.5)
WBC # FLD AUTO: 7.5 K/UL — SIGNIFICANT CHANGE UP (ref 3.8–10.5)

## 2022-02-06 RX ORDER — MAGNESIUM SULFATE 500 MG/ML
1 VIAL (ML) INJECTION ONCE
Refills: 0 | Status: COMPLETED | OUTPATIENT
Start: 2022-02-06 | End: 2022-02-06

## 2022-02-06 RX ADMIN — Medication 25 MILLIGRAM(S): at 18:08

## 2022-02-06 RX ADMIN — ATORVASTATIN CALCIUM 10 MILLIGRAM(S): 80 TABLET, FILM COATED ORAL at 21:03

## 2022-02-06 RX ADMIN — Medication 1 SUPPOSITORY(S): at 12:14

## 2022-02-06 RX ADMIN — FAMOTIDINE 20 MILLIGRAM(S): 10 INJECTION INTRAVENOUS at 12:14

## 2022-02-06 RX ADMIN — HEPARIN SODIUM 1100 UNIT(S)/HR: 5000 INJECTION INTRAVENOUS; SUBCUTANEOUS at 03:14

## 2022-02-06 RX ADMIN — Medication 25 MILLIGRAM(S): at 06:27

## 2022-02-06 RX ADMIN — Medication 81 MILLIGRAM(S): at 12:14

## 2022-02-06 RX ADMIN — Medication 100 GRAM(S): at 06:44

## 2022-02-06 RX ADMIN — Medication 650 MILLIGRAM(S): at 20:45

## 2022-02-06 RX ADMIN — Medication 650 MILLIGRAM(S): at 20:10

## 2022-02-06 RX ADMIN — CEFTRIAXONE 100 MILLIGRAM(S): 500 INJECTION, POWDER, FOR SOLUTION INTRAMUSCULAR; INTRAVENOUS at 18:07

## 2022-02-06 NOTE — PROGRESS NOTE ADULT - ASSESSMENT
Assessment  DMT2: 81y Female with DM T2 with hyperglycemia, A1C 6.3%, she is unsure of her outpatient medications, on low-scale insulin coverage only,  blood sugars are stable and trending within acceptable range, no hypoglycemias, eating meals, no acute events.  Covid: on medications,  stable, monitored.  HTN: Controlled,  on antihypertensive medications.  Dementia: stable, monitored.      Brian Huynh MD  Cell: [phone]  Office: [phone]     Assessment  DMT2: 81y Female with DM T2 with hyperglycemia, A1C 6.3%, she is unsure of her outpatient medications, on low-scale insulin coverage only, blood sugars are stable and trending within acceptable range, no hypoglycemias, eating meals, no acute events.  Covid: on medications,  stable, monitored.  HTN: Controlled,  on antihypertensive medications.  Dementia: stable, monitored.      Brian Huynh MD  Cell: [phone]  Office: [phone]

## 2022-02-06 NOTE — PROGRESS NOTE ADULT - SUBJECTIVE AND OBJECTIVE BOX
Chief complaint  Patient is a 81y old  Female who presents with a chief complaint of sob/cp (06 Feb 2022 11:05)   Review of systems  Patient in bed, looks comfortable, no hypoglycemic episodes.    Labs and Fingersticks  CAPILLARY BLOOD GLUCOSE      POCT Blood Glucose.: 149 mg/dL (06 Feb 2022 12:03)  POCT Blood Glucose.: 140 mg/dL (06 Feb 2022 09:09)  POCT Blood Glucose.: 134 mg/dL (05 Feb 2022 21:09)  POCT Blood Glucose.: 121 mg/dL (05 Feb 2022 17:39)      Anion Gap, Serum: 12 (02-06 @ 02:50)  Anion Gap, Serum: 13 (02-05 @ 07:12)      Calcium, Total Serum: 8.7 (02-06 @ 02:50)  Calcium, Total Serum: 8.5 (02-05 @ 07:12)          02-06    139  |  102  |  20  ----------------------------<  119<H>  4.3   |  25  |  0.70    Ca    8.7      06 Feb 2022 02:50  Phos  3.1     02-06  Mg     1.8     02-06                          9.4    7.50  )-----------( 174      ( 06 Feb 2022 02:50 )             29.7     Medications  MEDICATIONS  (STANDING):  aspirin enteric coated 81 milliGRAM(s) Oral daily  atorvastatin 10 milliGRAM(s) Oral at bedtime  cefTRIAXone   IVPB 1000 milliGRAM(s) IV Intermittent every 24 hours  dextrose 40% Gel 15 Gram(s) Oral once  dextrose 50% Injectable 25 Gram(s) IV Push once  dextrose 50% Injectable 12.5 Gram(s) IV Push once  dextrose 50% Injectable 25 Gram(s) IV Push once  famotidine    Tablet 20 milliGRAM(s) Oral daily  glucagon  Injectable 1 milliGRAM(s) IntraMuscular once  heparin  Infusion.  Unit(s)/Hr (9 mL/Hr) IV Continuous <Continuous>  hydrocortisone hemorrhoidal Suppository 1 Suppository(s) Rectal daily  insulin lispro (ADMELOG) corrective regimen sliding scale   SubCutaneous three times a day before meals  insulin lispro (ADMELOG) corrective regimen sliding scale   SubCutaneous at bedtime  lactated ringers. 1000 milliLiter(s) (50 mL/Hr) IV Continuous <Continuous>  metoprolol tartrate 25 milliGRAM(s) Oral two times a day      Physical Exam  General: Patient comfortable in bed  Vital Signs Last 12 Hrs  T(F): 97.5 (02-06-22 @ 13:26), Max: 98.2 (02-06-22 @ 10:39)  HR: 111 (02-06-22 @ 13:26) (100 - 111)  BP: 135/66 (02-06-22 @ 13:26) (102/63 - 135/66)  BP(mean): --  RR: 18 (02-06-22 @ 13:26) (18 - 18)  SpO2: 94% (02-06-22 @ 13:26) (94% - 95%)  Neck: No palpable thyroid nodules.  CVS: S1S2, No murmurs  Respiratory: No wheezing, no crepitations  GI: Abdomen soft, bowel sounds positive  Musculoskeletal:  edema lower extremities.   Skin: No skin rashes, no ecchymosis    Diagnostics    Diet, Regular:   Consistent Carbohydrate No Snacks (CSTCHO) (01-26 @ 15:27)               Chief complaint  Patient is a 81y old  Female who presents with a chief complaint of sob/cp (06 Feb 2022 11:05)   Review of systems  Patient in bed, looks comfortable, no hypoglycemic episodes.    Labs and Fingersticks  CAPILLARY BLOOD GLUCOSE      POCT Blood Glucose.: 149 mg/dL (06 Feb 2022 12:03)  POCT Blood Glucose.: 140 mg/dL (06 Feb 2022 09:09)  POCT Blood Glucose.: 134 mg/dL (05 Feb 2022 21:09)  POCT Blood Glucose.: 121 mg/dL (05 Feb 2022 17:39)      Anion Gap, Serum: 12 (02-06 @ 02:50)  Anion Gap, Serum: 13 (02-05 @ 07:12)      Calcium, Total Serum: 8.7 (02-06 @ 02:50)  Calcium, Total Serum: 8.5 (02-05 @ 07:12)          02-06    139  |  102  |  20  ----------------------------<  119<H>  4.3   |  25  |  0.70    Ca    8.7      06 Feb 2022 02:50  Phos  3.1     02-06  Mg     1.8     02-06                          9.4    7.50  )-----------( 174      ( 06 Feb 2022 02:50 )             29.7     Medications  MEDICATIONS  (STANDING):  aspirin enteric coated 81 milliGRAM(s) Oral daily  atorvastatin 10 milliGRAM(s) Oral at bedtime  cefTRIAXone   IVPB 1000 milliGRAM(s) IV Intermittent every 24 hours  dextrose 40% Gel 15 Gram(s) Oral once  dextrose 50% Injectable 25 Gram(s) IV Push once  dextrose 50% Injectable 12.5 Gram(s) IV Push once  dextrose 50% Injectable 25 Gram(s) IV Push once  famotidine    Tablet 20 milliGRAM(s) Oral daily  glucagon  Injectable 1 milliGRAM(s) IntraMuscular once  heparin  Infusion.  Unit(s)/Hr (9 mL/Hr) IV Continuous <Continuous>  hydrocortisone hemorrhoidal Suppository 1 Suppository(s) Rectal daily  insulin lispro (ADMELOG) corrective regimen sliding scale   SubCutaneous three times a day before meals  insulin lispro (ADMELOG) corrective regimen sliding scale   SubCutaneous at bedtime  lactated ringers. 1000 milliLiter(s) (50 mL/Hr) IV Continuous <Continuous>  metoprolol tartrate 25 milliGRAM(s) Oral two times a day      Physical Exam  General: Patient comfortable in bed  Vital Signs Last 12 Hrs  T(F): 97.5 (02-06-22 @ 13:26), Max: 98.2 (02-06-22 @ 10:39)  HR: 111 (02-06-22 @ 13:26) (100 - 111)  BP: 135/66 (02-06-22 @ 13:26) (102/63 - 135/66)  BP(mean): --  RR: 18 (02-06-22 @ 13:26) (18 - 18)  SpO2: 94% (02-06-22 @ 13:26) (94% - 95%)  Neck: No palpable thyroid nodules.  CVS: S1S2, No murmurs  Respiratory: No wheezing, no crepitations  GI: Abdomen soft, bowel sounds positive  Musculoskeletal:  edema lower extremities.   Skin: No skin rashes, no ecchymosis    Diagnostics    Diet, Regular:   Consistent Carbohydrate No Snacks (CSTCHO) (01-26 @ 15:27)

## 2022-02-06 NOTE — PROGRESS NOTE ADULT - ASSESSMENT
82 yo F     h/o   dementia and   ? CHF per EMS,/  no other  hx  is  available      presenting via EMS from home for few days of weakness, followed by development of SOB and episode of substernal CP this morning.     Pt is a poor historian and unable to provide significant history    all ROS negative upon questioning.     Lives at home with brother - he reports concern for pt's increased work of breathing.      pt with   weakness/  sob  from  covid/  pna   CT  chest, with  GOO   on decadron/  remdisivir   *  Dementia   *    HTN,   *  acute  diastolic chf  on   lasix  card  dr davis  on  dvt ppx.  bmi  is  20  tele,   s/p vtach/ on  toprol,  card to  f/p   *  Afib,  on iv heparin  v tach  on tele.  card following  tele, afib.  s/p  Vtach  22  beats. , had  subsequent vtach also,   cardiac cath was  deferred  due to  fevers  *  E  coli  bacteremia,   on iv rocephin,. rpt bcx  are negative  cath  deferred  for  now, will perform at  a later date,  when cleared by  ID  Hb stable  at  9   awiat  ID clearance   for  cath        spoke  with  brothersammi/ pt  is , has  no  children/ states,   pt  is  full code    pmd  dr sheppard, allie chan< from: CT Angio Chest PE Protocol w/ IV Cont (01.21.22 @ 16:01) >  IMPRESSION:  No main, right, left, lobar, or proximal segmental pulmonary embolus.  Patchy bilateral groundglass opacities noted throughout both lungs likely   representing infection or alveolar component of edema. COVID 19 can also   have this appearance. Correlate with RT PCR    --- End of Report --  < end of copied text >

## 2022-02-06 NOTE — PROVIDER CONTACT NOTE (OTHER) - ASSESSMENT
Per tele tech, pt had 9 beats wide complex. Pt denies chest pain at this time and does not appear to have any s/s of distress. Pt having multiple beats of wide complex almost every day. Pt pending cardiac cath.

## 2022-02-06 NOTE — PROGRESS NOTE ADULT - SUBJECTIVE AND OBJECTIVE BOX
CARDIOLOGY     PROGRESS  NOTE   ________________________________________________    CHIEF COMPLAINT:Patient is a 81y old  Female who presents with a chief complaint of sob/cp (06 Feb 2022 10:33)  comfortable.  	  REVIEW OF SYSTEMS:  CONSTITUTIONAL: No fever, weight loss, or fatigue  EYES: No eye pain, visual disturbances, or discharge  ENT:  No difficulty hearing, tinnitus, vertigo; No sinus or throat pain  NECK: No pain or stiffness  RESPIRATORY: No cough, wheezing, chills or hemoptysis; No Shortness of Breath  CARDIOVASCULAR: No chest pain, palpitations, passing out, dizziness, or leg swelling  GASTROINTESTINAL: No abdominal or epigastric pain. No nausea, vomiting, or hematemesis; No diarrhea or constipation. No melena or hematochezia.  GENITOURINARY: No dysuria, frequency, hematuria, or incontinence  NEUROLOGICAL: No headaches, memory loss, loss of strength, numbness, or tremors  SKIN: No itching, burning, rashes, or lesions   LYMPH Nodes: No enlarged glands  ENDOCRINE: No heat or cold intolerance; No hair loss  MUSCULOSKELETAL: No joint pain or swelling; No muscle, back, or extremity pain  PSYCHIATRIC: No depression, anxiety, mood swings, or difficulty sleeping  HEME/LYMPH: No easy bruising, or bleeding gums  ALLERGY AND IMMUNOLOGIC: No hives or eczema	    [ ] All others negative	  [x ] Unable to obtain    PHYSICAL EXAM:  T(C): 36.8 (02-06-22 @ 10:39), Max: 37.5 (02-06-22 @ 01:21)  HR: 100 (02-06-22 @ 10:39) (91 - 100)  BP: 102/63 (02-06-22 @ 10:39) (102/63 - 125/75)  RR: 18 (02-06-22 @ 10:39) (16 - 18)  SpO2: 94% (02-06-22 @ 10:39) (94% - 96%)  Wt(kg): --  I&O's Summary    05 Feb 2022 07:01  -  06 Feb 2022 07:00  --------------------------------------------------------  IN: 2244 mL / OUT: 500 mL / NET: 1744 mL    06 Feb 2022 07:01  -  06 Feb 2022 11:06  --------------------------------------------------------  IN: 240 mL / OUT: 0 mL / NET: 240 mL        Appearance: Normal	  HEENT:   Normal oral mucosa, PERRL, EOMI	  Lymphatic: No lymphadenopathy  Cardiovascular: Normal S1 S2, No JVD, + murmurs, No edema  Respiratory: Lungs clear to auscultation	  Psychiatry: A & O x 3, Mood & affect appropriate  Gastrointestinal:  Soft, Non-tender, + BS	  Skin: No rashes, No ecchymoses, No cyanosis	  Neurologic: Non-focal  Extremities: Normal range of motion, No clubbing, cyanosis or edema  Vascular: Peripheral pulses palpable 2+ bilaterally    MEDICATIONS  (STANDING):  aspirin enteric coated 81 milliGRAM(s) Oral daily  atorvastatin 10 milliGRAM(s) Oral at bedtime  cefTRIAXone   IVPB 1000 milliGRAM(s) IV Intermittent every 24 hours  dextrose 40% Gel 15 Gram(s) Oral once  dextrose 50% Injectable 25 Gram(s) IV Push once  dextrose 50% Injectable 12.5 Gram(s) IV Push once  dextrose 50% Injectable 25 Gram(s) IV Push once  famotidine    Tablet 20 milliGRAM(s) Oral daily  glucagon  Injectable 1 milliGRAM(s) IntraMuscular once  heparin  Infusion.  Unit(s)/Hr (9 mL/Hr) IV Continuous <Continuous>  hydrocortisone hemorrhoidal Suppository 1 Suppository(s) Rectal daily  insulin lispro (ADMELOG) corrective regimen sliding scale   SubCutaneous at bedtime  insulin lispro (ADMELOG) corrective regimen sliding scale   SubCutaneous three times a day before meals  lactated ringers. 1000 milliLiter(s) (50 mL/Hr) IV Continuous <Continuous>  metoprolol tartrate 25 milliGRAM(s) Oral two times a day      TELEMETRY: 	    ECG:  	  RADIOLOGY:  OTHER: 	  	  LABS:	 	    CARDIAC MARKERS:                                9.4    7.50  )-----------( 174      ( 06 Feb 2022 02:50 )             29.7     02-06    139  |  102  |  20  ----------------------------<  119<H>  4.3   |  25  |  0.70    Ca    8.7      06 Feb 2022 02:50  Phos  3.1     02-06  Mg     1.8     02-06      proBNP: Serum Pro-Brain Natriuretic Peptide: 20071 pg/mL (01-21 @ 12:11)    Lipid Profile:   HgA1c:   TSH: Thyroid Stimulating Hormone, Serum: 0.47 uIU/mL (01-21 @ 17:02)    PT/INR - ( 06 Feb 2022 02:50 )   PT: 12.9 sec;   INR: 1.08 ratio         PTT - ( 06 Feb 2022 02:50 )  PTT:63.0 sec      Assessment and plan  ---------------------------  82 yo F  h/o   dementia and  <CHF per EMS,/  no othe r hx  is  available presenting via EMS from home for few days of weakness, followed by development of SOB and episode of substernal CP this morning.     Pt is a poor historian and unable to provide significant history    all ROS negative upon questioning.   Lives at home with brother - he reports concern for pt's increased work of breathing.   pt is a 82 yo female with hx of htn, chf, cad, with increasing sob and COVID with  abnormal  cta and chest x ray  cta negative for PE  lipid panel  continue covid therapy  fu renal function  keep K>  will increase beta blocker as tolerated  ac, a.fib rate is controlled  echo noted with normal EF, no WMA  continue beta blocker increase as tolerated  continued with WCT, plan for cath , cleared by ID  will consider Wilmer/ cardioversion post cath  cath was delayed sec to fever  ID noted  9 beats of WCT last night, plan ?cath tomorrow

## 2022-02-06 NOTE — PROGRESS NOTE ADULT - SUBJECTIVE AND OBJECTIVE BOX
INTERVAL HPI/OVERNIGHT EVENTS:  No new overnight event.  No N/V/D.  Tolerating diet.  no bleeding    Allergies    No Known Allergies    Intolerances    General:  No wt loss, fevers, chills, night sweats, fatigue,   Eyes:  Good vision, no reported pain  ENT:  No sore throat, pain, runny nose, dysphagia  CV:  No pain, palpitations, hypo/hypertension  Resp:  No dyspnea, cough, tachypnea, wheezing  GI:  No pain, No nausea, No vomiting, No diarrhea, No constipation, No weight loss, No fever, No pruritis, No rectal bleeding, No tarry stools, No dysphagia,  :  No pain, bleeding, incontinence, nocturia  Muscle:  No pain, weakness  Neuro:  No weakness, tingling, memory problems  Psych:  No fatigue, insomnia, mood problems, depression  Endocrine:  No polyuria, polydipsia, cold/heat intolerance  Heme:  No petechiae, ecchymosis, easy bruisability  Skin:  No rash, tattoos, scars, edema      PHYSICAL EXAM:   Vital Signs:  Vital Signs Last 24 Hrs  T(C): 36.4 (06 Feb 2022 06:05), Max: 37.5 (06 Feb 2022 01:21)  T(F): 97.5 (06 Feb 2022 06:05), Max: 99.5 (06 Feb 2022 01:21)  HR: 100 (06 Feb 2022 06:05) (91 - 100)  BP: 123/89 (06 Feb 2022 06:05) (102/75 - 125/75)  BP(mean): --  RR: 18 (06 Feb 2022 06:05) (16 - 18)  SpO2: 95% (06 Feb 2022 06:05) (95% - 96%)  Daily     Daily I&O's Summary    05 Feb 2022 07:01  -  06 Feb 2022 07:00  --------------------------------------------------------  IN: 2244 mL / OUT: 500 mL / NET: 1744 mL        GENERAL:  Appears stated age, well-groomed, well-nourished, no distress  HEENT:  NC/AT,  conjunctivae clear and pink, no thyromegaly, nodules, adenopathy, no JVD, sclera -anicteric  CHEST:  Full & symmetric excursion, no increased effort, breath sounds clear  HEART:  Regular rhythm, S1, S2, no murmur/rub/S3/S4, no abdominal bruit, no edema  ABDOMEN:  Soft, non-tender, non-distended, normoactive bowel sounds,  no masses ,no hepato-splenomegaly, no signs of chronic liver disease  EXTEREMITIES:  no cyanosis,clubbing or edema  SKIN:  No rash/erythema/ecchymoses/petechiae/wounds/abscess/warm/dry  NEURO:  Alert, oriented, no asterixis, no tremor, no encephalopathy      LABS:                        9.4    7.50  )-----------( 174      ( 06 Feb 2022 02:50 )             29.7     02-06    139  |  102  |  20  ----------------------------<  119<H>  4.3   |  25  |  0.70    Ca    8.7      06 Feb 2022 02:50  Phos  3.1     02-06  Mg     1.8     02-06      PT/INR - ( 06 Feb 2022 02:50 )   PT: 12.9 sec;   INR: 1.08 ratio         PTT - ( 06 Feb 2022 02:50 )  PTT:63.0 sec    amylase   lipase  RADIOLOGY & ADDITIONAL TESTS:

## 2022-02-06 NOTE — PROGRESS NOTE ADULT - ASSESSMENT
anemia  gi bleed  lgib    plan  Monitor CBC and Transfuse prn  hydrocortisone 25mg pr qd  IR/CRS evaluation if decompensates   May need colonoscopy once optimized  daily cbc   transfuse prn   check iron studies   ppi once a day  check stool occult blood  further recommendations pending above    Advanced care planning was discussed with patient and family.  Advanced care planning forms were reviewed and discussed.  Risks, benefits and alternatives of gastroenterologic procedures were discussed in detail and all questions were answered.    30 minutes spent.

## 2022-02-06 NOTE — PROGRESS NOTE ADULT - SUBJECTIVE AND OBJECTIVE BOX
afberile    REVIEW OF SYSTEMS:  GEN: no fever,    no chills  RESP: no SOB,   no cough  CVS: no chest pain,   no palpitations  GI: no abdominal pain,   no nausea,   no vomiting,   no constipation,   no diarrhea  : no dysuria,   no frequency  NEURO: no headache,   no dizziness  PSYCH: no depression,   not anxious  Derm : no rash    MEDICATIONS  (STANDING):  aspirin enteric coated 81 milliGRAM(s) Oral daily  atorvastatin 10 milliGRAM(s) Oral at bedtime  cefTRIAXone   IVPB 1000 milliGRAM(s) IV Intermittent every 24 hours  dextrose 40% Gel 15 Gram(s) Oral once  dextrose 50% Injectable 25 Gram(s) IV Push once  dextrose 50% Injectable 12.5 Gram(s) IV Push once  dextrose 50% Injectable 25 Gram(s) IV Push once  famotidine    Tablet 20 milliGRAM(s) Oral daily  glucagon  Injectable 1 milliGRAM(s) IntraMuscular once  heparin  Infusion.  Unit(s)/Hr (9 mL/Hr) IV Continuous <Continuous>  hydrocortisone hemorrhoidal Suppository 1 Suppository(s) Rectal daily  insulin lispro (ADMELOG) corrective regimen sliding scale   SubCutaneous at bedtime  insulin lispro (ADMELOG) corrective regimen sliding scale   SubCutaneous three times a day before meals  lactated ringers. 1000 milliLiter(s) (50 mL/Hr) IV Continuous <Continuous>  metoprolol tartrate 25 milliGRAM(s) Oral two times a day    MEDICATIONS  (PRN):  acetaminophen     Tablet .. 650 milliGRAM(s) Oral every 6 hours PRN Temp greater or equal to 38C (100.4F), Mild Pain (1 - 3), Moderate Pain (4 - 6), Severe Pain (7 - 10)  heparin   Injectable 4000 Unit(s) IV Push every 6 hours PRN For aPTT less than 40  heparin   Injectable 2000 Unit(s) IV Push every 6 hours PRN For aPTT between 40 - 57      Vital Signs Last 24 Hrs  T(C): 36.4 (06 Feb 2022 06:05), Max: 37.5 (06 Feb 2022 01:21)  T(F): 97.5 (06 Feb 2022 06:05), Max: 99.5 (06 Feb 2022 01:21)  HR: 100 (06 Feb 2022 06:05) (81 - 100)  BP: 123/89 (06 Feb 2022 06:05) (102/70 - 125/75)  BP(mean): --  RR: 18 (06 Feb 2022 06:05) (16 - 18)  SpO2: 95% (06 Feb 2022 06:05) (95% - 96%)  CAPILLARY BLOOD GLUCOSE      POCT Blood Glucose.: 134 mg/dL (05 Feb 2022 21:09)  POCT Blood Glucose.: 121 mg/dL (05 Feb 2022 17:39)  POCT Blood Glucose.: 113 mg/dL (05 Feb 2022 12:20)  POCT Blood Glucose.: 104 mg/dL (05 Feb 2022 09:09)    I&O's Summary    05 Feb 2022 07:01  -  06 Feb 2022 07:00  --------------------------------------------------------  IN: 2244 mL / OUT: 500 mL / NET: 1744 mL        PHYSICAL EXAM:  HEAD:  Atraumatic, Normocephalic  NECK: Supple, No   JVD  CHEST/LUNG:   no     rales,     no,    rhonchi  HEART: Regular rate and rhythm;         murmur  ABDOMEN: Soft, Nontender, ;   EXTREMITIES:    no    edema  NEUROLOGY:  alert    LABS:                        9.4    7.50  )-----------( 174      ( 06 Feb 2022 02:50 )             29.7     02-06    139  |  102  |  20  ----------------------------<  119<H>  4.3   |  25  |  0.70    Ca    8.7      06 Feb 2022 02:50  Phos  3.1     02-06  Mg     1.8     02-06      PT/INR - ( 06 Feb 2022 02:50 )   PT: 12.9 sec;   INR: 1.08 ratio         PTT - ( 06 Feb 2022 02:50 )  PTT:63.0 sec                Thyroid Stimulating Hormone, Serum: 0.47 uIU/mL (01-21 @ 17:02)          Consultant(s) Notes Reviewed:      Care Discussed with Consultants/Other Providers:

## 2022-02-07 LAB
ANION GAP SERPL CALC-SCNC: 15 MMOL/L — SIGNIFICANT CHANGE UP (ref 5–17)
APTT BLD: 45.3 SEC — HIGH (ref 27.5–35.5)
APTT BLD: 56.8 SEC — HIGH (ref 27.5–35.5)
BUN SERPL-MCNC: 18 MG/DL — SIGNIFICANT CHANGE UP (ref 7–23)
CALCIUM SERPL-MCNC: 8.9 MG/DL — SIGNIFICANT CHANGE UP (ref 8.4–10.5)
CHLORIDE SERPL-SCNC: 99 MMOL/L — SIGNIFICANT CHANGE UP (ref 96–108)
CO2 SERPL-SCNC: 21 MMOL/L — LOW (ref 22–31)
CREAT SERPL-MCNC: 0.67 MG/DL — SIGNIFICANT CHANGE UP (ref 0.5–1.3)
CULTURE RESULTS: SIGNIFICANT CHANGE UP
GLUCOSE BLDC GLUCOMTR-MCNC: 126 MG/DL — HIGH (ref 70–99)
GLUCOSE BLDC GLUCOMTR-MCNC: 136 MG/DL — HIGH (ref 70–99)
GLUCOSE BLDC GLUCOMTR-MCNC: 146 MG/DL — HIGH (ref 70–99)
GLUCOSE BLDC GLUCOMTR-MCNC: 96 MG/DL — SIGNIFICANT CHANGE UP (ref 70–99)
GLUCOSE SERPL-MCNC: 117 MG/DL — HIGH (ref 70–99)
HCT VFR BLD CALC: 26.7 % — LOW (ref 34.5–45)
HCT VFR BLD CALC: 28.1 % — LOW (ref 34.5–45)
HGB BLD-MCNC: 8.4 G/DL — LOW (ref 11.5–15.5)
HGB BLD-MCNC: 9 G/DL — LOW (ref 11.5–15.5)
INR BLD: 1.2 RATIO — HIGH (ref 0.88–1.16)
MAGNESIUM SERPL-MCNC: 1.9 MG/DL — SIGNIFICANT CHANGE UP (ref 1.6–2.6)
MCHC RBC-ENTMCNC: 27.2 PG — SIGNIFICANT CHANGE UP (ref 27–34)
MCHC RBC-ENTMCNC: 27.9 PG — SIGNIFICANT CHANGE UP (ref 27–34)
MCHC RBC-ENTMCNC: 31.5 GM/DL — LOW (ref 32–36)
MCHC RBC-ENTMCNC: 32 GM/DL — SIGNIFICANT CHANGE UP (ref 32–36)
MCV RBC AUTO: 86.4 FL — SIGNIFICANT CHANGE UP (ref 80–100)
MCV RBC AUTO: 87 FL — SIGNIFICANT CHANGE UP (ref 80–100)
NRBC # BLD: 0 /100 WBCS — SIGNIFICANT CHANGE UP (ref 0–0)
NRBC # BLD: 0 /100 WBCS — SIGNIFICANT CHANGE UP (ref 0–0)
PLATELET # BLD AUTO: 194 K/UL — SIGNIFICANT CHANGE UP (ref 150–400)
PLATELET # BLD AUTO: 223 K/UL — SIGNIFICANT CHANGE UP (ref 150–400)
POTASSIUM SERPL-MCNC: 3.9 MMOL/L — SIGNIFICANT CHANGE UP (ref 3.5–5.3)
POTASSIUM SERPL-SCNC: 3.9 MMOL/L — SIGNIFICANT CHANGE UP (ref 3.5–5.3)
PROTHROM AB SERPL-ACNC: 14.3 SEC — HIGH (ref 10.6–13.6)
RBC # BLD: 3.09 M/UL — LOW (ref 3.8–5.2)
RBC # BLD: 3.23 M/UL — LOW (ref 3.8–5.2)
RBC # FLD: 16 % — HIGH (ref 10.3–14.5)
RBC # FLD: 16.1 % — HIGH (ref 10.3–14.5)
SODIUM SERPL-SCNC: 135 MMOL/L — SIGNIFICANT CHANGE UP (ref 135–145)
WBC # BLD: 10.39 K/UL — SIGNIFICANT CHANGE UP (ref 3.8–10.5)
WBC # BLD: 10.65 K/UL — HIGH (ref 3.8–10.5)
WBC # FLD AUTO: 10.39 K/UL — SIGNIFICANT CHANGE UP (ref 3.8–10.5)
WBC # FLD AUTO: 10.65 K/UL — HIGH (ref 3.8–10.5)

## 2022-02-07 RX ORDER — METOPROLOL TARTRATE 50 MG
25 TABLET ORAL THREE TIMES A DAY
Refills: 0 | Status: DISCONTINUED | OUTPATIENT
Start: 2022-02-07 | End: 2022-02-11

## 2022-02-07 RX ADMIN — HEPARIN SODIUM 2000 UNIT(S): 5000 INJECTION INTRAVENOUS; SUBCUTANEOUS at 08:15

## 2022-02-07 RX ADMIN — Medication 650 MILLIGRAM(S): at 05:14

## 2022-02-07 RX ADMIN — FAMOTIDINE 20 MILLIGRAM(S): 10 INJECTION INTRAVENOUS at 13:24

## 2022-02-07 RX ADMIN — ATORVASTATIN CALCIUM 10 MILLIGRAM(S): 80 TABLET, FILM COATED ORAL at 22:34

## 2022-02-07 RX ADMIN — HEPARIN SODIUM 2000 UNIT(S): 5000 INJECTION INTRAVENOUS; SUBCUTANEOUS at 16:50

## 2022-02-07 RX ADMIN — Medication 650 MILLIGRAM(S): at 05:45

## 2022-02-07 RX ADMIN — CEFTRIAXONE 100 MILLIGRAM(S): 500 INJECTION, POWDER, FOR SOLUTION INTRAMUSCULAR; INTRAVENOUS at 17:44

## 2022-02-07 RX ADMIN — Medication 25 MILLIGRAM(S): at 13:24

## 2022-02-07 RX ADMIN — Medication 81 MILLIGRAM(S): at 13:24

## 2022-02-07 RX ADMIN — Medication 25 MILLIGRAM(S): at 05:15

## 2022-02-07 RX ADMIN — HEPARIN SODIUM 1200 UNIT(S)/HR: 5000 INJECTION INTRAVENOUS; SUBCUTANEOUS at 08:10

## 2022-02-07 RX ADMIN — HEPARIN SODIUM 1300 UNIT(S)/HR: 5000 INJECTION INTRAVENOUS; SUBCUTANEOUS at 16:44

## 2022-02-07 RX ADMIN — Medication 1 SUPPOSITORY(S): at 13:24

## 2022-02-07 RX ADMIN — Medication 25 MILLIGRAM(S): at 22:34

## 2022-02-07 RX ADMIN — SODIUM CHLORIDE 50 MILLILITER(S): 9 INJECTION, SOLUTION INTRAVENOUS at 08:10

## 2022-02-07 NOTE — PROGRESS NOTE ADULT - SUBJECTIVE AND OBJECTIVE BOX
afberile/  gtacycardia    REVIEW OF SYSTEMS:  GEN: no fever,    no chills  RESP: no SOB,   no cough  CVS: no chest pain,   no palpitations  GI: no abdominal pain,   no nausea,   no vomiting,   no constipation,   no diarrhea  : no dysuria,   no frequency  NEURO: no headache,   no dizziness  PSYCH: no depression,   not anxious  Derm : no rash    MEDICATIONS  (STANDING):  aspirin enteric coated 81 milliGRAM(s) Oral daily  atorvastatin 10 milliGRAM(s) Oral at bedtime  cefTRIAXone   IVPB 1000 milliGRAM(s) IV Intermittent every 24 hours  dextrose 40% Gel 15 Gram(s) Oral once  dextrose 50% Injectable 25 Gram(s) IV Push once  dextrose 50% Injectable 12.5 Gram(s) IV Push once  dextrose 50% Injectable 25 Gram(s) IV Push once  famotidine    Tablet 20 milliGRAM(s) Oral daily  glucagon  Injectable 1 milliGRAM(s) IntraMuscular once  heparin  Infusion.  Unit(s)/Hr (9 mL/Hr) IV Continuous <Continuous>  hydrocortisone hemorrhoidal Suppository 1 Suppository(s) Rectal daily  insulin lispro (ADMELOG) corrective regimen sliding scale   SubCutaneous three times a day before meals  insulin lispro (ADMELOG) corrective regimen sliding scale   SubCutaneous at bedtime  lactated ringers. 1000 milliLiter(s) (50 mL/Hr) IV Continuous <Continuous>  metoprolol tartrate 25 milliGRAM(s) Oral two times a day    MEDICATIONS  (PRN):  acetaminophen     Tablet .. 650 milliGRAM(s) Oral every 6 hours PRN Temp greater or equal to 38C (100.4F), Mild Pain (1 - 3), Moderate Pain (4 - 6), Severe Pain (7 - 10)  heparin   Injectable 4000 Unit(s) IV Push every 6 hours PRN For aPTT less than 40  heparin   Injectable 2000 Unit(s) IV Push every 6 hours PRN For aPTT between 40 - 57      Vital Signs Last 24 Hrs  T(C): 37.3 (07 Feb 2022 05:11), Max: 37.4 (06 Feb 2022 22:09)  T(F): 99.1 (07 Feb 2022 05:11), Max: 99.4 (06 Feb 2022 22:09)  HR: 114 (07 Feb 2022 05:11) (100 - 154)  BP: 111/57 (07 Feb 2022 05:11) (102/63 - 181/92)  BP(mean): --  RR: 21 (07 Feb 2022 05:11) (18 - 22)  SpO2: 95% (07 Feb 2022 05:11) (91% - 97%)  CAPILLARY BLOOD GLUCOSE      POCT Blood Glucose.: 134 mg/dL (06 Feb 2022 22:11)  POCT Blood Glucose.: 139 mg/dL (06 Feb 2022 17:56)  POCT Blood Glucose.: 149 mg/dL (06 Feb 2022 12:03)  POCT Blood Glucose.: 140 mg/dL (06 Feb 2022 09:09)    I&O's Summary    06 Feb 2022 07:01  -  07 Feb 2022 07:00  --------------------------------------------------------  IN: 2002 mL / OUT: 550 mL / NET: 1452 mL        PHYSICAL EXAM:  HEAD:  Atraumatic, Normocephalic  NECK: Supple, No   JVD  CHEST/LUNG:   no     rales,     no,    rhonchi  HEART: Regular rate and rhythm;         murmur  ABDOMEN: Soft, Nontender, ;   EXTREMITIES:     no   edema  NEUROLOGY:  alert    LABS:                        9.0    10.65 )-----------( 194      ( 07 Feb 2022 07:09 )             28.1     02-06    139  |  102  |  20  ----------------------------<  119<H>  4.3   |  25  |  0.70    Ca    8.7      06 Feb 2022 02:50  Phos  3.1     02-06  Mg     1.8     02-06      PT/INR - ( 06 Feb 2022 02:50 )   PT: 12.9 sec;   INR: 1.08 ratio         PTT - ( 07 Feb 2022 07:20 )  PTT:45.3 sec                Thyroid Stimulating Hormone, Serum: 0.47 uIU/mL (01-21 @ 17:02)          Consultant(s) Notes Reviewed:      Care Discussed with Consultants/Other Providers:

## 2022-02-07 NOTE — PROGRESS NOTE ADULT - SUBJECTIVE AND OBJECTIVE BOX
Chief complaint  Patient is a 81y old  Female who presents with a chief complaint of sob/cp (07 Feb 2022 14:57)   Review of systems  Patient in bed, looks comfortable, no hypoglycemic episodes.    Labs and Fingersticks  CAPILLARY BLOOD GLUCOSE      POCT Blood Glucose.: 96 mg/dL (07 Feb 2022 12:13)  POCT Blood Glucose.: 136 mg/dL (07 Feb 2022 09:01)  POCT Blood Glucose.: 134 mg/dL (06 Feb 2022 22:11)  POCT Blood Glucose.: 139 mg/dL (06 Feb 2022 17:56)      Anion Gap, Serum: 15 (02-07 @ 07:22)  Anion Gap, Serum: 12 (02-06 @ 02:50)      Calcium, Total Serum: 8.9 (02-07 @ 07:22)  Calcium, Total Serum: 8.7 (02-06 @ 02:50)          02-07    135  |  99  |  18  ----------------------------<  117<H>  3.9   |  21<L>  |  0.67    Ca    8.9      07 Feb 2022 07:22  Phos  3.1     02-06  Mg     1.9     02-07                          9.0    10.65 )-----------( 194      ( 07 Feb 2022 07:09 )             28.1     Medications  MEDICATIONS  (STANDING):  aspirin enteric coated 81 milliGRAM(s) Oral daily  atorvastatin 10 milliGRAM(s) Oral at bedtime  cefTRIAXone   IVPB 1000 milliGRAM(s) IV Intermittent every 24 hours  dextrose 40% Gel 15 Gram(s) Oral once  dextrose 50% Injectable 25 Gram(s) IV Push once  dextrose 50% Injectable 12.5 Gram(s) IV Push once  dextrose 50% Injectable 25 Gram(s) IV Push once  famotidine    Tablet 20 milliGRAM(s) Oral daily  glucagon  Injectable 1 milliGRAM(s) IntraMuscular once  heparin  Infusion.  Unit(s)/Hr (9 mL/Hr) IV Continuous <Continuous>  hydrocortisone hemorrhoidal Suppository 1 Suppository(s) Rectal daily  insulin lispro (ADMELOG) corrective regimen sliding scale   SubCutaneous three times a day before meals  insulin lispro (ADMELOG) corrective regimen sliding scale   SubCutaneous at bedtime  lactated ringers. 1000 milliLiter(s) (50 mL/Hr) IV Continuous <Continuous>  metoprolol tartrate 25 milliGRAM(s) Oral three times a day      Physical Exam  General: Patient comfortable in bed  Vital Signs Last 12 Hrs  T(F): 97.6 (02-07-22 @ 12:10), Max: 99.1 (02-07-22 @ 05:11)  HR: 99 (02-07-22 @ 12:10) (99 - 114)  BP: 142/85 (02-07-22 @ 12:10) (111/57 - 142/85)  BP(mean): --  RR: 18 (02-07-22 @ 12:10) (18 - 21)  SpO2: 87% (02-07-22 @ 12:10) (87% - 95%)  Neck: No palpable thyroid nodules.  CVS: S1S2, No murmurs  Respiratory: No wheezing, no crepitations  GI: Abdomen soft, bowel sounds positive  Musculoskeletal:  edema lower extremities.   Skin: No skin rashes, no ecchymosis    Diagnostics    Diet, Regular:   Consistent Carbohydrate No Snacks (CSTCHO) (01-26 @ 15:27)           Chief complaint  Patient is a 81y old  Female who presents with a chief complaint of sob/cp (07 Feb 2022 14:57)   Review of systems  Patient in bed, looks comfortable, no hypoglycemic episodes.    Labs and Fingersticks  CAPILLARY BLOOD GLUCOSE      POCT Blood Glucose.: 96 mg/dL (07 Feb 2022 12:13)  POCT Blood Glucose.: 136 mg/dL (07 Feb 2022 09:01)  POCT Blood Glucose.: 134 mg/dL (06 Feb 2022 22:11)  POCT Blood Glucose.: 139 mg/dL (06 Feb 2022 17:56)      Anion Gap, Serum: 15 (02-07 @ 07:22)  Anion Gap, Serum: 12 (02-06 @ 02:50)      Calcium, Total Serum: 8.9 (02-07 @ 07:22)  Calcium, Total Serum: 8.7 (02-06 @ 02:50)          02-07    135  |  99  |  18  ----------------------------<  117<H>  3.9   |  21<L>  |  0.67    Ca    8.9      07 Feb 2022 07:22  Phos  3.1     02-06  Mg     1.9     02-07                          9.0    10.65 )-----------( 194      ( 07 Feb 2022 07:09 )             28.1     Medications  MEDICATIONS  (STANDING):  aspirin enteric coated 81 milliGRAM(s) Oral daily  atorvastatin 10 milliGRAM(s) Oral at bedtime  cefTRIAXone   IVPB 1000 milliGRAM(s) IV Intermittent every 24 hours  dextrose 40% Gel 15 Gram(s) Oral once  dextrose 50% Injectable 25 Gram(s) IV Push once  dextrose 50% Injectable 12.5 Gram(s) IV Push once  dextrose 50% Injectable 25 Gram(s) IV Push once  famotidine    Tablet 20 milliGRAM(s) Oral daily  glucagon  Injectable 1 milliGRAM(s) IntraMuscular once  heparin  Infusion.  Unit(s)/Hr (9 mL/Hr) IV Continuous <Continuous>  hydrocortisone hemorrhoidal Suppository 1 Suppository(s) Rectal daily  insulin lispro (ADMELOG) corrective regimen sliding scale   SubCutaneous three times a day before meals  insulin lispro (ADMELOG) corrective regimen sliding scale   SubCutaneous at bedtime  lactated ringers. 1000 milliLiter(s) (50 mL/Hr) IV Continuous <Continuous>  metoprolol tartrate 25 milliGRAM(s) Oral three times a day      Physical Exam  General: Patient comfortable in bed  Vital Signs Last 12 Hrs  T(F): 97.6 (02-07-22 @ 12:10), Max: 99.1 (02-07-22 @ 05:11)  HR: 99 (02-07-22 @ 12:10) (99 - 114)  BP: 142/85 (02-07-22 @ 12:10) (111/57 - 142/85)  BP(mean): --  RR: 18 (02-07-22 @ 12:10) (18 - 21)  SpO2: 87% (02-07-22 @ 12:10) (87% - 95%)  Neck: No palpable thyroid nodules.  CVS: S1S2, No murmurs  Respiratory: No wheezing, no crepitations  GI: Abdomen soft, bowel sounds positive  Musculoskeletal:  edema lower extremities.   Skin: No skin rashes, no ecchymosis    Diagnostics    Diet, Regular:   Consistent Carbohydrate No Snacks (CSTCHO) (01-26 @ 15:27)

## 2022-02-07 NOTE — DIETITIAN INITIAL EVALUATION ADULT. - CONTINUE CURRENT NUTRITION CARE PLAN
Administered flu vaccine to the left deltoid, tolerated well, no c/o pain noted , no adverse reaction noted.     yes

## 2022-02-07 NOTE — PROGRESS NOTE ADULT - ASSESSMENT
Assessment  DMT2: 81y Female with DM T2 with hyperglycemia, A1C 6.3%, she is unsure of her outpatient medications, on low-scale insulin coverage only, blood sugars are stable and trending within acceptable range, no hypoglycemias, eating meals, no acute events.  Covid: on medications,  stable, monitored.  HTN: Controlled,  on antihypertensive medications.  Dementia: stable, monitored.      Brian Huynh MD  Cell: [phone]  Office: [phone]     Assessment  DMT2: 81y Female with DM T2 with hyperglycemia, A1C 6.3%, she is unsure of her outpatient medications, on low-scale insulin coverage only, blood sugars are stable and trending within acceptable range, no hypoglycemias,  eating meals, no acute events.  Covid: on medications,  stable, monitored.  HTN: Controlled,  on antihypertensive medications.  Dementia: stable, monitored.      Brian Huynh MD  Cell: [phone]  Office: [phone]

## 2022-02-07 NOTE — PROGRESS NOTE ADULT - SUBJECTIVE AND OBJECTIVE BOX
CARDIOLOGY     PROGRESS  NOTE   ________________________________________________    CHIEF COMPLAINT:Patient is a 81y old  Female who presents with a chief complaint of sob/cp (07 Feb 2022 08:09)  lethargic  	  REVIEW OF SYSTEMS:  CONSTITUTIONAL: No fever, weight loss, or fatigue  EYES: No eye pain, visual disturbances, or discharge  ENT:  No difficulty hearing, tinnitus, vertigo; No sinus or throat pain  NECK: No pain or stiffness  RESPIRATORY: No cough, wheezing, chills or hemoptysis; No Shortness of Breath  CARDIOVASCULAR: No chest pain, palpitations, passing out, dizziness, or leg swelling  GASTROINTESTINAL: No abdominal or epigastric pain. No nausea, vomiting, or hematemesis; No diarrhea or constipation. No melena or hematochezia.  GENITOURINARY: No dysuria, frequency, hematuria, or incontinence  NEUROLOGICAL: No headaches, memory loss, loss of strength, numbness, or tremors  SKIN: No itching, burning, rashes, or lesions   LYMPH Nodes: No enlarged glands  ENDOCRINE: No heat or cold intolerance; No hair loss  MUSCULOSKELETAL: No joint pain or swelling; No muscle, back, or extremity pain  PSYCHIATRIC: No depression, anxiety, mood swings, or difficulty sleeping  HEME/LYMPH: No easy bruising, or bleeding gums  ALLERGY AND IMMUNOLOGIC: No hives or eczema	    [ ] All others negative	  [ x] Unable to obtain    PHYSICAL EXAM:  T(C): 37.3 (02-07-22 @ 05:11), Max: 37.4 (02-06-22 @ 22:09)  HR: 114 (02-07-22 @ 05:11) (100 - 154)  BP: 111/57 (02-07-22 @ 05:11) (102/63 - 181/92)  RR: 21 (02-07-22 @ 05:11) (18 - 22)  SpO2: 95% (02-07-22 @ 05:11) (91% - 97%)  Wt(kg): --  I&O's Summary    06 Feb 2022 07:01  -  07 Feb 2022 07:00  --------------------------------------------------------  IN: 2002 mL / OUT: 550 mL / NET: 1452 mL        Appearance: Normal	  HEENT:   Normal oral mucosa, PERRL, EOMI	  Lymphatic: No lymphadenopathy  Cardiovascular: Normal S1 S2, No JVD, + murmurs, No edema  Respiratory: Lungs clear to auscultation	  Psychiatry: A & O x 3, Mood & affect appropriate  Gastrointestinal:  Soft, Non-tender, + BS	  Skin: No rashes, No ecchymoses, No cyanosis	  Neurologic: Non-focal  Extremities: Normal range of motion, No clubbing, cyanosis or edema  Vascular: Peripheral pulses palpable 2+ bilaterally    MEDICATIONS  (STANDING):  aspirin enteric coated 81 milliGRAM(s) Oral daily  atorvastatin 10 milliGRAM(s) Oral at bedtime  cefTRIAXone   IVPB 1000 milliGRAM(s) IV Intermittent every 24 hours  dextrose 40% Gel 15 Gram(s) Oral once  dextrose 50% Injectable 25 Gram(s) IV Push once  dextrose 50% Injectable 12.5 Gram(s) IV Push once  dextrose 50% Injectable 25 Gram(s) IV Push once  famotidine    Tablet 20 milliGRAM(s) Oral daily  glucagon  Injectable 1 milliGRAM(s) IntraMuscular once  heparin  Infusion.  Unit(s)/Hr (9 mL/Hr) IV Continuous <Continuous>  hydrocortisone hemorrhoidal Suppository 1 Suppository(s) Rectal daily  insulin lispro (ADMELOG) corrective regimen sliding scale   SubCutaneous three times a day before meals  insulin lispro (ADMELOG) corrective regimen sliding scale   SubCutaneous at bedtime  lactated ringers. 1000 milliLiter(s) (50 mL/Hr) IV Continuous <Continuous>  metoprolol tartrate 25 milliGRAM(s) Oral two times a day      TELEMETRY: 	    ECG:  	  RADIOLOGY:  OTHER: 	  	  LABS:	 	    CARDIAC MARKERS:                                9.0    10.65 )-----------( 194      ( 07 Feb 2022 07:09 )             28.1     02-07    135  |  99  |  18  ----------------------------<  117<H>  3.9   |  21<L>  |  0.67    Ca    8.9      07 Feb 2022 07:22  Phos  3.1     02-06  Mg     1.9     02-07      proBNP: Serum Pro-Brain Natriuretic Peptide: 20071 pg/mL (01-21 @ 12:11)    Lipid Profile:   HgA1c:   TSH: Thyroid Stimulating Hormone, Serum: 0.47 uIU/mL (01-21 @ 17:02)    PT/INR - ( 06 Feb 2022 02:50 )   PT: 12.9 sec;   INR: 1.08 ratio         PTT - ( 07 Feb 2022 07:20 )  PTT:45.3 sec      Assessment and plan  ---------------------------  80 yo F  h/o   dementia and  <CHF per EMS,/  no othe r hx  is  available presenting via EMS from home for few days of weakness, followed by development of SOB and episode of substernal CP this morning.     Pt is a poor historian and unable to provide significant history    all ROS negative upon questioning.   Lives at home with brother - he reports concern for pt's increased work of breathing.   pt is a 80 yo female with hx of htn, chf, cad, with increasing sob and COVID with  abnormal  cta and chest x ray  cta negative for PE  lipid panel  continue covid therapy  fu renal function  keep K>  will increase beta blocker as tolerated  ac, a.fib rate is controlled  echo noted with normal EF, no WMA  continue beta blocker increase as tolerated  will consider Wilmer/ cardioversion post cath  cath was delayed sec to fever  ID noted  increase beta blocker/ AC

## 2022-02-07 NOTE — DIETITIAN INITIAL EVALUATION ADULT. - ORAL INTAKE PTA/DIET HISTORY
Pt noted with dementia, limited participation in interview. Information obtained from chart review, RN. Limited subjective information available regarding diet PTA. NKFA noted. No reports of  chewing/swallowing difficulty, nausea, vomiting, diarrhea, constipation PTA.

## 2022-02-07 NOTE — PROGRESS NOTE ADULT - SUBJECTIVE AND OBJECTIVE BOX
INTERVAL HPI/OVERNIGHT EVENTS:  No new overnight event.  No N/V/D.  Tolerating diet.  no bleeding, FOBT negative, H/H stable     Allergies    No Known Allergies    Intolerances    General:  No wt loss, fevers, chills, night sweats, fatigue,   Eyes:  Good vision, no reported pain  ENT:  No sore throat, pain, runny nose, dysphagia  CV:  No pain, palpitations, hypo/hypertension  Resp:  No dyspnea, cough, tachypnea, wheezing  GI:  No pain, No nausea, No vomiting, No diarrhea, No constipation, No weight loss, No fever, No pruritis, No rectal bleeding, No tarry stools, No dysphagia,  :  No pain, bleeding, incontinence, nocturia  Muscle:  No pain, weakness  Neuro:  No weakness, tingling, memory problems  Psych:  No fatigue, insomnia, mood problems, depression  Endocrine:  No polyuria, polydipsia, cold/heat intolerance  Heme:  No petechiae, ecchymosis, easy bruisability  Skin:  No rash, tattoos, scars, edema      PHYSICAL EXAM:   Vital Signs Last 24 Hrs  T(C): 36.4 (07 Feb 2022 12:10), Max: 37.4 (06 Feb 2022 22:09)  T(F): 97.6 (07 Feb 2022 12:10), Max: 99.4 (06 Feb 2022 22:09)  HR: 71 (07 Feb 2022 12:10) (71 - 154)  BP: 142/85 (07 Feb 2022 12:10) (111/57 - 181/92)  BP(mean): --  RR: 18 (07 Feb 2022 12:10) (18 - 22)  SpO2: 95% (07 Feb 2022 05:11) (91% - 97%)  Daily     Daily I&O's Summary    06 Feb 2022 07:01  -  07 Feb 2022 07:00  --------------------------------------------------------  IN: 2002 mL / OUT: 550 mL / NET: 1452 mL    07 Feb 2022 07:01  -  07 Feb 2022 14:05  --------------------------------------------------------  IN: 0 mL / OUT: 200 mL / NET: -200 mL        GENERAL:  Appears stated age, well-groomed, well-nourished, no distress  HEENT:  NC/AT,  conjunctivae clear and pink, no thyromegaly, nodules, adenopathy, no JVD, sclera -anicteric  CHEST:  Full & symmetric excursion, no increased effort, breath sounds clear  HEART:  Regular rhythm, S1, S2, no murmur/rub/S3/S4, no abdominal bruit, no edema  ABDOMEN:  Soft, non-tender, non-distended, normoactive bowel sounds,  no masses ,no hepato-splenomegaly, no signs of chronic liver disease  EXTEREMITIES:  no cyanosis,clubbing or edema  SKIN:  No rash/erythema/ecchymoses/petechiae/wounds/abscess/warm/dry  NEURO:  Alert, oriented, no asterixis, no tremor, no encephalopathy      LABS:                        9.0    10.65 )-----------( 194      ( 07 Feb 2022 07:09 )             28.1   02-07    135  |  99  |  18  ----------------------------<  117<H>  3.9   |  21<L>  |  0.67    Ca    8.9      07 Feb 2022 07:22  Phos  3.1     02-06  Mg     1.9     02-07    PT/INR - ( 06 Feb 2022 02:50 )   PT: 12.9 sec;   INR: 1.08 ratio         PTT - ( 07 Feb 2022 07:20 )  PTT:45.3 sec    amylase   lipase  RADIOLOGY & ADDITIONAL TESTS:

## 2022-02-07 NOTE — PROGRESS NOTE ADULT - ASSESSMENT
anemia  gi bleed  lgib    plan  hydrocortisone 25mg pr qd  cbc daily  transfuse prn   check iron studies   ppi once a day  stool occult negative   diet as tolerated   will follow    Advanced care planning was discussed with patient and family.  Advanced care planning forms were reviewed and discussed.  Risks, benefits and alternatives of gastroenterologic procedures were discussed in detail and all questions were answered.    30 minutes spent.

## 2022-02-07 NOTE — DIETITIAN INITIAL EVALUATION ADULT. - ADD RECOMMEND
1) Continue current diet as tolerated. 2) Recommend addition of Gucerna 1x daily, RD to continue to provide Diet Mighty Shake 1x daily. 3) RD to remain available and follow-up as medically appropriate.

## 2022-02-07 NOTE — DIETITIAN INITIAL EVALUATION ADULT. - OTHER INFO
Weight: Current dosing weight is 110lbs (1/21/22), no additional weights to assess. Pt unable to recall UBW.     Pt with hx of TDM, endocrine following for elevated BG in-house now improving, of note pt previously on decadron from 1/21-1/31 in setting of Covid infection. Per Endocrine note, unclear what medication for diabetes pt was on outpatient, currently on sliding scale. HBA1c: 6.5% (1/26)--glycemic control.     Since admission pt noted with variable PO intake, initially eating well consuming % of meals, over the last few days intake per nursing flow sheets now varying from 25-75% of meals. Requires assistance with tray setup. No acute GI distress noted. Last BM 2/7.

## 2022-02-07 NOTE — CHART NOTE - NSCHARTNOTEFT_GEN_A_CORE
MEDICINE PA EPISODIC NOTE    CC: Right thigh hematoma     Notified by RN that patient has a right thigh hematoma. Per RN staffing, patient's hematoma has been expanding since being placed on Heparin gtt.    Patient seen and examined at bedside. Right lateral upper thigh hematoma noted.     #Right Thigh hematoma secondary to Heparin gtt?  >Vital signs stable, H&H stable  >Heparin drip stopped   >STAT CBC ordered  >Doppler of right extremity ordered   >Vascular consult called  >Will f/u cbc and will monitor for hematoma worsening. Will monitor for any signs of bleeding.   >Case D/w with Dr. Ferguson, aware and agrees with above plan.    CARMENZA CeeC  Dept. of Medicine   [id number]

## 2022-02-07 NOTE — DIETITIAN INITIAL EVALUATION ADULT. - CHIEF COMPLAINT
This is a "80 yo F  h/o   dementia and  <CHF per EMS,/  no othe r hx  is  available presenting via EMS from home for few days of weakness, followed by development of SOB and episode of substernal CP this morning. "

## 2022-02-08 LAB
ALBUMIN SERPL ELPH-MCNC: 2.6 G/DL — LOW (ref 3.3–5)
ALP SERPL-CCNC: 57 U/L — SIGNIFICANT CHANGE UP (ref 40–120)
ALT FLD-CCNC: 19 U/L — SIGNIFICANT CHANGE UP (ref 10–45)
ANION GAP SERPL CALC-SCNC: 14 MMOL/L — SIGNIFICANT CHANGE UP (ref 5–17)
ANION GAP SERPL CALC-SCNC: 15 MMOL/L — SIGNIFICANT CHANGE UP (ref 5–17)
AST SERPL-CCNC: 14 U/L — SIGNIFICANT CHANGE UP (ref 10–40)
BILIRUB SERPL-MCNC: 0.4 MG/DL — SIGNIFICANT CHANGE UP (ref 0.2–1.2)
BUN SERPL-MCNC: 14 MG/DL — SIGNIFICANT CHANGE UP (ref 7–23)
BUN SERPL-MCNC: 16 MG/DL — SIGNIFICANT CHANGE UP (ref 7–23)
CALCIUM SERPL-MCNC: 8.9 MG/DL — SIGNIFICANT CHANGE UP (ref 8.4–10.5)
CALCIUM SERPL-MCNC: 9.4 MG/DL — SIGNIFICANT CHANGE UP (ref 8.4–10.5)
CHLORIDE SERPL-SCNC: 103 MMOL/L — SIGNIFICANT CHANGE UP (ref 96–108)
CHLORIDE SERPL-SCNC: 104 MMOL/L — SIGNIFICANT CHANGE UP (ref 96–108)
CO2 SERPL-SCNC: 22 MMOL/L — SIGNIFICANT CHANGE UP (ref 22–31)
CO2 SERPL-SCNC: 24 MMOL/L — SIGNIFICANT CHANGE UP (ref 22–31)
CREAT SERPL-MCNC: 0.59 MG/DL — SIGNIFICANT CHANGE UP (ref 0.5–1.3)
CREAT SERPL-MCNC: 0.61 MG/DL — SIGNIFICANT CHANGE UP (ref 0.5–1.3)
CULTURE RESULTS: SIGNIFICANT CHANGE UP
CULTURE RESULTS: SIGNIFICANT CHANGE UP
GLUCOSE BLDC GLUCOMTR-MCNC: 101 MG/DL — HIGH (ref 70–99)
GLUCOSE BLDC GLUCOMTR-MCNC: 110 MG/DL — HIGH (ref 70–99)
GLUCOSE BLDC GLUCOMTR-MCNC: 144 MG/DL — HIGH (ref 70–99)
GLUCOSE BLDC GLUCOMTR-MCNC: 186 MG/DL — HIGH (ref 70–99)
GLUCOSE SERPL-MCNC: 104 MG/DL — HIGH (ref 70–99)
GLUCOSE SERPL-MCNC: 104 MG/DL — HIGH (ref 70–99)
HCT VFR BLD CALC: 26.6 % — LOW (ref 34.5–45)
HCT VFR BLD CALC: 28.8 % — LOW (ref 34.5–45)
HGB BLD-MCNC: 8.4 G/DL — LOW (ref 11.5–15.5)
HGB BLD-MCNC: 9 G/DL — LOW (ref 11.5–15.5)
MCHC RBC-ENTMCNC: 27.4 PG — SIGNIFICANT CHANGE UP (ref 27–34)
MCHC RBC-ENTMCNC: 27.5 PG — SIGNIFICANT CHANGE UP (ref 27–34)
MCHC RBC-ENTMCNC: 31.3 GM/DL — LOW (ref 32–36)
MCHC RBC-ENTMCNC: 31.6 GM/DL — LOW (ref 32–36)
MCV RBC AUTO: 87.2 FL — SIGNIFICANT CHANGE UP (ref 80–100)
MCV RBC AUTO: 87.8 FL — SIGNIFICANT CHANGE UP (ref 80–100)
NRBC # BLD: 0 /100 WBCS — SIGNIFICANT CHANGE UP (ref 0–0)
NRBC # BLD: 0 /100 WBCS — SIGNIFICANT CHANGE UP (ref 0–0)
PLATELET # BLD AUTO: 211 K/UL — SIGNIFICANT CHANGE UP (ref 150–400)
PLATELET # BLD AUTO: 238 K/UL — SIGNIFICANT CHANGE UP (ref 150–400)
POTASSIUM SERPL-MCNC: 4 MMOL/L — SIGNIFICANT CHANGE UP (ref 3.5–5.3)
POTASSIUM SERPL-MCNC: 4 MMOL/L — SIGNIFICANT CHANGE UP (ref 3.5–5.3)
POTASSIUM SERPL-SCNC: 4 MMOL/L — SIGNIFICANT CHANGE UP (ref 3.5–5.3)
POTASSIUM SERPL-SCNC: 4 MMOL/L — SIGNIFICANT CHANGE UP (ref 3.5–5.3)
PROT SERPL-MCNC: 5.8 G/DL — LOW (ref 6–8.3)
RBC # BLD: 3.05 M/UL — LOW (ref 3.8–5.2)
RBC # BLD: 3.28 M/UL — LOW (ref 3.8–5.2)
RBC # FLD: 16 % — HIGH (ref 10.3–14.5)
RBC # FLD: 16.1 % — HIGH (ref 10.3–14.5)
SODIUM SERPL-SCNC: 139 MMOL/L — SIGNIFICANT CHANGE UP (ref 135–145)
SODIUM SERPL-SCNC: 143 MMOL/L — SIGNIFICANT CHANGE UP (ref 135–145)
SPECIMEN SOURCE: SIGNIFICANT CHANGE UP
SPECIMEN SOURCE: SIGNIFICANT CHANGE UP
WBC # BLD: 10.1 K/UL — SIGNIFICANT CHANGE UP (ref 3.8–10.5)
WBC # BLD: 10.19 K/UL — SIGNIFICANT CHANGE UP (ref 3.8–10.5)
WBC # FLD AUTO: 10.1 K/UL — SIGNIFICANT CHANGE UP (ref 3.8–10.5)
WBC # FLD AUTO: 10.19 K/UL — SIGNIFICANT CHANGE UP (ref 3.8–10.5)

## 2022-02-08 PROCEDURE — 76882 US LMTD JT/FCL EVL NVASC XTR: CPT | Mod: 26,RT

## 2022-02-08 PROCEDURE — 99221 1ST HOSP IP/OBS SF/LOW 40: CPT

## 2022-02-08 RX ADMIN — CEFTRIAXONE 100 MILLIGRAM(S): 500 INJECTION, POWDER, FOR SOLUTION INTRAMUSCULAR; INTRAVENOUS at 17:39

## 2022-02-08 RX ADMIN — Medication 25 MILLIGRAM(S): at 13:57

## 2022-02-08 RX ADMIN — Medication 25 MILLIGRAM(S): at 22:43

## 2022-02-08 RX ADMIN — ATORVASTATIN CALCIUM 10 MILLIGRAM(S): 80 TABLET, FILM COATED ORAL at 22:43

## 2022-02-08 RX ADMIN — Medication 25 MILLIGRAM(S): at 06:10

## 2022-02-08 RX ADMIN — FAMOTIDINE 20 MILLIGRAM(S): 10 INJECTION INTRAVENOUS at 11:37

## 2022-02-08 RX ADMIN — Medication 81 MILLIGRAM(S): at 11:37

## 2022-02-08 NOTE — PROGRESS NOTE ADULT - ASSESSMENT
Assessment  DMT2: 81y Female with DM T2 with hyperglycemia, A1C 6.3%, she is unsure of her outpatient medications, on low-scale insulin coverage only, blood sugars are stable and trending within acceptable range, no hypoglycemias, eating meals, NAD, new finding of thigh hematoma.  Covid: on medications,  stable, monitored.  HTN: Controlled,  on antihypertensive medications.  Dementia: stable, monitored.      Brian Huynh MD  Cell: [phone]  Office: [phone]     Assessment  DMT2: 81y Female with DM T2 with hyperglycemia, A1C 6.3%, she is unsure of her outpatient medications, on low-scale insulin coverage only, blood sugars are stable and trending within acceptable range, no hypoglycemias, eating meals,  NAD, new finding of thigh hematoma.  Covid: on medications,  stable, monitored.  HTN: Controlled,  on antihypertensive medications.  Dementia: stable, monitored.      Brian Huynh MD  Cell: [phone]  Office: [phone]

## 2022-02-08 NOTE — PROGRESS NOTE ADULT - SUBJECTIVE AND OBJECTIVE BOX
Chief complaint  Patient is a 81y old  Female who presents with a chief complaint of sob/cp (08 Feb 2022 13:50)   Review of systems  Patient in bed, looks comfortable, no hypoglycemic episodes.    Labs and Fingersticks  CAPILLARY BLOOD GLUCOSE      POCT Blood Glucose.: 110 mg/dL (08 Feb 2022 12:13)  POCT Blood Glucose.: 101 mg/dL (08 Feb 2022 08:25)  POCT Blood Glucose.: 126 mg/dL (07 Feb 2022 21:26)  POCT Blood Glucose.: 146 mg/dL (07 Feb 2022 18:41)      Anion Gap, Serum: 15 (02-08 @ 12:12)  Anion Gap, Serum: 14 (02-08 @ 06:39)  Anion Gap, Serum: 15 (02-07 @ 07:22)      Calcium, Total Serum: 9.4 (02-08 @ 12:12)  Calcium, Total Serum: 8.9 (02-08 @ 06:39)  Calcium, Total Serum: 8.9 (02-07 @ 07:22)  Albumin, Serum: 2.6 *L* (02-08 @ 06:39)    Alanine Aminotransferase (ALT/SGPT): 19 (02-08 @ 06:39)  Alkaline Phosphatase, Serum: 57 (02-08 @ 06:39)  Aspartate Aminotransferase (AST/SGOT): 14 (02-08 @ 06:39)        02-08    143  |  104  |  16  ----------------------------<  104<H>  4.0   |  24  |  0.61    Ca    9.4      08 Feb 2022 12:12  Mg     1.9     02-07    TPro  5.8<L>  /  Alb  2.6<L>  /  TBili  0.4  /  DBili  x   /  AST  14  /  ALT  19  /  AlkPhos  57  02-08                        9.0    10.10 )-----------( 238      ( 08 Feb 2022 12:12 )             28.8     Medications  MEDICATIONS  (STANDING):  aspirin enteric coated 81 milliGRAM(s) Oral daily  atorvastatin 10 milliGRAM(s) Oral at bedtime  cefTRIAXone   IVPB 1000 milliGRAM(s) IV Intermittent every 24 hours  dextrose 40% Gel 15 Gram(s) Oral once  dextrose 50% Injectable 25 Gram(s) IV Push once  dextrose 50% Injectable 12.5 Gram(s) IV Push once  dextrose 50% Injectable 25 Gram(s) IV Push once  famotidine    Tablet 20 milliGRAM(s) Oral daily  glucagon  Injectable 1 milliGRAM(s) IntraMuscular once  hydrocortisone hemorrhoidal Suppository 1 Suppository(s) Rectal daily  insulin lispro (ADMELOG) corrective regimen sliding scale   SubCutaneous three times a day before meals  insulin lispro (ADMELOG) corrective regimen sliding scale   SubCutaneous at bedtime  metoprolol tartrate 25 milliGRAM(s) Oral three times a day      Physical Exam  General: Patient comfortable in bed  Vital Signs Last 12 Hrs  T(F): 98.2 (02-08-22 @ 13:28), Max: 98.2 (02-08-22 @ 13:28)  HR: 82 (02-08-22 @ 13:28) (82 - 90)  BP: 128/68 (02-08-22 @ 13:28) (113/70 - 132/72)  BP(mean): --  RR: 18 (02-08-22 @ 13:28) (16 - 18)  SpO2: 97% (02-08-22 @ 13:28) (94% - 97%)  Neck: No palpable thyroid nodules.  CVS: S1S2, No murmurs  Respiratory: No wheezing, no crepitations  GI: Abdomen soft, bowel sounds positive  Musculoskeletal:  edema lower extremities.   Skin: No skin rashes, no ecchymosis    Diagnostics    Diet, Regular:   Consistent Carbohydrate No Snacks (CSTCHO) (01-26 @ 15:27)           Chief complaint  Patient is a 81y old  Female who presents with a chief complaint of sob/cp (08 Feb 2022 13:50)   Review of systems  Patient in bed, looks comfortable, no hypoglycemic episodes.    Labs and Fingersticks  CAPILLARY BLOOD GLUCOSE      POCT Blood Glucose.: 110 mg/dL (08 Feb 2022 12:13)  POCT Blood Glucose.: 101 mg/dL (08 Feb 2022 08:25)  POCT Blood Glucose.: 126 mg/dL (07 Feb 2022 21:26)  POCT Blood Glucose.: 146 mg/dL (07 Feb 2022 18:41)      Anion Gap, Serum: 15 (02-08 @ 12:12)  Anion Gap, Serum: 14 (02-08 @ 06:39)  Anion Gap, Serum: 15 (02-07 @ 07:22)      Calcium, Total Serum: 9.4 (02-08 @ 12:12)  Calcium, Total Serum: 8.9 (02-08 @ 06:39)  Calcium, Total Serum: 8.9 (02-07 @ 07:22)  Albumin, Serum: 2.6 *L* (02-08 @ 06:39)    Alanine Aminotransferase (ALT/SGPT): 19 (02-08 @ 06:39)  Alkaline Phosphatase, Serum: 57 (02-08 @ 06:39)  Aspartate Aminotransferase (AST/SGOT): 14 (02-08 @ 06:39)        02-08    143  |  104  |  16  ----------------------------<  104<H>  4.0   |  24  |  0.61    Ca    9.4      08 Feb 2022 12:12  Mg     1.9     02-07    TPro  5.8<L>  /  Alb  2.6<L>  /  TBili  0.4  /  DBili  x   /  AST  14  /  ALT  19  /  AlkPhos  57  02-08                        9.0    10.10 )-----------( 238      ( 08 Feb 2022 12:12 )             28.8     Medications  MEDICATIONS  (STANDING):  aspirin enteric coated 81 milliGRAM(s) Oral daily  atorvastatin 10 milliGRAM(s) Oral at bedtime  cefTRIAXone   IVPB 1000 milliGRAM(s) IV Intermittent every 24 hours  dextrose 40% Gel 15 Gram(s) Oral once  dextrose 50% Injectable 25 Gram(s) IV Push once  dextrose 50% Injectable 12.5 Gram(s) IV Push once  dextrose 50% Injectable 25 Gram(s) IV Push once  famotidine    Tablet 20 milliGRAM(s) Oral daily  glucagon  Injectable 1 milliGRAM(s) IntraMuscular once  hydrocortisone hemorrhoidal Suppository 1 Suppository(s) Rectal daily  insulin lispro (ADMELOG) corrective regimen sliding scale   SubCutaneous three times a day before meals  insulin lispro (ADMELOG) corrective regimen sliding scale   SubCutaneous at bedtime  metoprolol tartrate 25 milliGRAM(s) Oral three times a day      Physical Exam  General: Patient comfortable in bed  Vital Signs Last 12 Hrs  T(F): 98.2 (02-08-22 @ 13:28), Max: 98.2 (02-08-22 @ 13:28)  HR: 82 (02-08-22 @ 13:28) (82 - 90)  BP: 128/68 (02-08-22 @ 13:28) (113/70 - 132/72)  BP(mean): --  RR: 18 (02-08-22 @ 13:28) (16 - 18)  SpO2: 97% (02-08-22 @ 13:28) (94% - 97%)  Neck: No palpable thyroid nodules.  CVS: S1S2, No murmurs  Respiratory: No wheezing, no crepitations  GI: Abdomen soft, bowel sounds positive  Musculoskeletal:  edema lower extremities.   Skin: No skin rashes, no ecchymosis    Diagnostics    Diet, Regular:   Consistent Carbohydrate No Snacks (CSTCHO) (01-26 @ 15:27)

## 2022-02-08 NOTE — PROGRESS NOTE ADULT - ASSESSMENT
82 yo F     h/o   dementia and   ? CHF per EMS,/  no other  hx  is  available      presenting via EMS from home for few days of weakness, followed by development of SOB and episode of substernal CP this morning.     Pt is a poor historian and unable to provide significant history    all ROS negative upon questioning.     Lives at home with brother - he reports concern for pt's increased work of breathing.      pt with   weakness/  sob  from  covid/  pna   CT  chest, with  GOO   on decadron/  remdisivir   *  Dementia   *    HTN,   *  acute  diastolic chf  on   lasix  card  dr davis  on  dvt ppx.  bmi  is  20  tele,   s/p vtach/ on  toprol,  card to  f/p   *  Afib,   was on iv heparin  v tach  on tele.  card following  tele, afib.  s/p  Vtach  22  beats. , had  subsequent vtach also,   cardiac cath was  deferred  due to  fevers  *  E  coli  bacteremia,   on iv rocephin,. rpt bcx  are negative  cath  deferred  for  now, will perform at  a later date,  when cleared by  ID  Hb stable  at  9   awiat  ID clearance   for  cath,  when stable  pt  with  tachycardia and  Vtach on tele,  card  following  pt  right  thigh  ecchymoses,  follow  hb/  u/s  thigh/ seen by  vascular,  no  intervention  a/c  on  hold  for  now        spoke  with  brothersammi/ pt  is , has  no  children/ states,   pt  is  full code    pmd  dr sheppard, allie chan< from: CT Angio Chest PE Protocol w/ IV Cont (01.21.22 @ 16:01) >  IMPRESSION:  No main, right, left, lobar, or proximal segmental pulmonary embolus.  Patchy bilateral groundglass opacities noted throughout both lungs likely   representing infection or alveolar component of edema. COVID 19 can also   have this appearance. Correlate with RT PCR    --- End of Report --  < end of copied text >

## 2022-02-08 NOTE — PROVIDER CONTACT NOTE (OTHER) - ASSESSMENT
Upon assessment of pt, a large hematoma on covering right thigh. not hurtful to touch as per patient.

## 2022-02-08 NOTE — CHART NOTE - NSCHARTNOTEFT_GEN_A_CORE
Advanced Care Planning:  I have had goals of care discussion, advanced directive and code status with patient/family    and  in  coordinating   patient care.     all questions answered and expressed understanding of the plan.  per  brother, pt  is  full code

## 2022-02-08 NOTE — CHART NOTE - NSCHARTNOTEFT_GEN_A_CORE
81 year old female w/ PMHx of dementia and CHF who presented from home for several days of weakness followed by development of dyspnea and episode of substernal chest pain.    COVID19 Positive with hypoxic respiratory failure  Unvaccinated for COVID19  CTA Chest (1/21) with no PE and GGO consistent with COVID19    Completed 5 days of Remdesivir 1/21 - 1/25  Completed 10 days of Dexamethasone 1/22 - 1/31    High grade fever on 2/2  Blood Cultures (2/2) with E coli (based on BCID PCR)  U/A (2/2) with pyuria  UCx (2/2) with >100K E coli  Renal US with no hydronephrosis    Likely E coli bacteremia secondary to UTI / ?Pyelo    #Fever, Leukocytosis, E coli Bacteremia  --Continue Ceftriaxone 1g IV Q24H while admitted  --On discharge anticipate Cefpodoxime 200 mg PO Q12H with end date 2/12/22 (10 days from negative repeat blood cultures)    #COVID19  s/p Remdesivir 1/21 - 1/25  s/p Dexamethasone 1/22 - 1/31  Completed isolation for COVID19    I will continue to follow. Please feel free to contact me with any further questions.    Sincere Camp M.D.  [facility] Infectious [facility]  8AM-5PM Monday - Friday: Available on [employer] Teams  After Hours and Holidays (or if no response on [employer] Teams): Please contact the Infectious Diseases Office at [phone] 81 year old female w/ PMHx of dementia and CHF who presented from home for several days of weakness followed by development of dyspnea and episode of substernal chest pain.    COVID19 Positive with hypoxic respiratory failure  Unvaccinated for COVID19  CTA Chest (1/21) with no PE and GGO consistent with COVID19    Completed 5 days of Remdesivir 1/21 - 1/25  Completed 10 days of Dexamethasone 1/22 - 1/31    High grade fever on 2/2  Blood Cultures (2/2) with E coli  U/A (2/2) with pyuria  UCx (2/2) with >100K E coli  Renal US with no hydronephrosis    Likely E coli bacteremia secondary to UTI / ?Pyelo    #Fever, Leukocytosis, E coli Bacteremia  --Continue Ceftriaxone 1g IV Q24H while admitted  --On discharge anticipate Cefpodoxime 200 mg PO Q12H with end date 2/12/22 (10 days from negative repeat blood cultures)    #COVID19  s/p Remdesivir 1/21 - 1/25  s/p Dexamethasone 1/22 - 1/31  Completed isolation for COVID19    I will continue to follow. Please feel free to contact me with any further questions.    Sincere Camp M.D.  [facility] Infectious [facility]  8AM-5PM Monday - Friday: Available on [employer] Teams  After Hours and Holidays (or if no response on [employer] Teams): Please contact the Infectious Diseases Office at [phone]

## 2022-02-08 NOTE — PROGRESS NOTE ADULT - SUBJECTIVE AND OBJECTIVE BOX
INTERVAL HPI/OVERNIGHT EVENTS:  pt seen and examined earlier, events noted  No N/V/D.  Tolerating diet. H/H stable  pt with right thigh hematoma      Allergies    No Known Allergies    Intolerances    General:  No wt loss, fevers, chills, night sweats, fatigue,   Eyes:  Good vision, no reported pain  ENT:  No sore throat, pain, runny nose, dysphagia  CV:  No pain, palpitations, hypo/hypertension  Resp:  No dyspnea, cough, tachypnea, wheezing  GI:  No pain, No nausea, No vomiting, No diarrhea, No constipation, No weight loss, No fever, No pruritis, No rectal bleeding, No tarry stools, No dysphagia,  :  No pain, bleeding, incontinence, nocturia  Muscle:  No pain, weakness  Neuro:  No weakness, tingling, memory problems  Psych:  No fatigue, insomnia, mood problems, depression  Endocrine:  No polyuria, polydipsia, cold/heat intolerance  Heme:  No petechiae, ecchymosis, easy bruisability  Skin:  No rash, tattoos, scars, edema      PHYSICAL EXAM:   Vital Signs Last 24 Hrs  T(C): 36.7 (08 Feb 2022 09:11), Max: 36.9 (08 Feb 2022 01:22)  T(F): 98 (08 Feb 2022 09:11), Max: 98.4 (08 Feb 2022 01:22)  HR: 90 (08 Feb 2022 09:11) (85 - 115)  BP: 113/70 (08 Feb 2022 09:11) (113/70 - 138/78)  BP(mean): --  RR: 18 (08 Feb 2022 09:11) (16 - 18)  SpO2: 94% (08 Feb 2022 09:11) (94% - 99%)  Daily     Daily   I&O's Summary    07 Feb 2022 07:01  -  08 Feb 2022 07:00  --------------------------------------------------------  IN: 780 mL / OUT: 500 mL / NET: 280 mL    08 Feb 2022 07:01  -  08 Feb 2022 13:51  --------------------------------------------------------  IN: 0 mL / OUT: 100 mL / NET: -100 mL      GENERAL:  Appears stated age, well-groomed, well-nourished, no distress  HEENT:  NC/AT,  conjunctivae clear and pink, no thyromegaly, nodules, adenopathy, no JVD, sclera -anicteric  CHEST:  Full & symmetric excursion, no increased effort, breath sounds clear  HEART:  Regular rhythm, S1, S2, no murmur/rub/S3/S4, no abdominal bruit, no edema  ABDOMEN:  Soft, non-tender, non-distended, normoactive bowel sounds,  no masses ,no hepato-splenomegaly, no signs of chronic liver disease  EXTEREMITIES:  no cyanosis,clubbing or edema  SKIN:  No rash/erythema/ecchymoses/petechiae/wounds/abscess/warm/dry, +Right thigh hematoma   NEURO:  Alert, oriented, no asterixis, no tremor, no encephalopathy      LABS:                                   9.0    10.10 )-----------( 238      ( 08 Feb 2022 12:12 )             28.8     02-08    143  |  104  |  16  ----------------------------<  104<H>  4.0   |  24  |  0.61    Ca    9.4      08 Feb 2022 12:12  Mg     1.9     02-07    TPro  5.8<L>  /  Alb  2.6<L>  /  TBili  0.4  /  DBili  x   /  AST  14  /  ALT  19  /  AlkPhos  57  02-08  PT/INR - ( 07 Feb 2022 15:46 )   PT: 14.3 sec;   INR: 1.20 ratio         PTT - ( 07 Feb 2022 15:46 )  PTT:56.8 sec    amylase   lipase  RADIOLOGY & ADDITIONAL TESTS:

## 2022-02-08 NOTE — PROGRESS NOTE ADULT - SUBJECTIVE AND OBJECTIVE BOX
afberile/  wct    REVIEW OF SYSTEMS:  GEN: no fever,    no chills  RESP: no SOB,   no cough  CVS: no chest pain,   no palpitations  GI: no abdominal pain,   no nausea,   no vomiting,   no constipation,   no diarrhea  : no dysuria,   no frequency  NEURO: no headache,   no dizziness  PSYCH: no depression,   not anxious  Derm : no rash    MEDICATIONS  (STANDING):  aspirin enteric coated 81 milliGRAM(s) Oral daily  atorvastatin 10 milliGRAM(s) Oral at bedtime  cefTRIAXone   IVPB 1000 milliGRAM(s) IV Intermittent every 24 hours  dextrose 40% Gel 15 Gram(s) Oral once  dextrose 50% Injectable 25 Gram(s) IV Push once  dextrose 50% Injectable 12.5 Gram(s) IV Push once  dextrose 50% Injectable 25 Gram(s) IV Push once  famotidine    Tablet 20 milliGRAM(s) Oral daily  glucagon  Injectable 1 milliGRAM(s) IntraMuscular once  hydrocortisone hemorrhoidal Suppository 1 Suppository(s) Rectal daily  insulin lispro (ADMELOG) corrective regimen sliding scale   SubCutaneous three times a day before meals  insulin lispro (ADMELOG) corrective regimen sliding scale   SubCutaneous at bedtime  lactated ringers. 1000 milliLiter(s) (50 mL/Hr) IV Continuous <Continuous>  metoprolol tartrate 25 milliGRAM(s) Oral three times a day    MEDICATIONS  (PRN):  acetaminophen     Tablet .. 650 milliGRAM(s) Oral every 6 hours PRN Temp greater or equal to 38C (100.4F), Mild Pain (1 - 3), Moderate Pain (4 - 6), Severe Pain (7 - 10)      Vital Signs Last 24 Hrs  T(C): 36.3 (08 Feb 2022 06:09), Max: 36.9 (08 Feb 2022 01:22)  T(F): 97.4 (08 Feb 2022 06:09), Max: 98.4 (08 Feb 2022 01:22)  HR: 90 (08 Feb 2022 06:09) (85 - 115)  BP: 132/72 (08 Feb 2022 06:09) (122/80 - 142/85)  BP(mean): --  RR: 16 (08 Feb 2022 06:09) (16 - 18)  SpO2: 96% (08 Feb 2022 06:09) (87% - 99%)  CAPILLARY BLOOD GLUCOSE      POCT Blood Glucose.: 126 mg/dL (07 Feb 2022 21:26)  POCT Blood Glucose.: 146 mg/dL (07 Feb 2022 18:41)  POCT Blood Glucose.: 96 mg/dL (07 Feb 2022 12:13)  POCT Blood Glucose.: 136 mg/dL (07 Feb 2022 09:01)    I&O's Summary    07 Feb 2022 07:01  -  08 Feb 2022 07:00  --------------------------------------------------------  IN: 780 mL / OUT: 500 mL / NET: 280 mL        PHYSICAL EXAM:  HEAD:  Atraumatic, Normocephalic  NECK: Supple, No   JVD  CHEST/LUNG:   no     rales,     no,    rhonchi  HEART: Regular rate and rhythm;         murmur  ABDOMEN: Soft, Nontender, ;   EXTREMITIES:   right thigh  ecchymoses  NEUROLOGY:  alert    LABS:                        8.4    10.19 )-----------( 211      ( 08 Feb 2022 06:39 )             26.6     02-08    139  |  103  |  14  ----------------------------<  104<H>  4.0   |  22  |  0.59    Ca    8.9      08 Feb 2022 06:39  Mg     1.9     02-07    TPro  5.8<L>  /  Alb  2.6<L>  /  TBili  0.4  /  DBili  x   /  AST  14  /  ALT  19  /  AlkPhos  57  02-08    PT/INR - ( 07 Feb 2022 15:46 )   PT: 14.3 sec;   INR: 1.20 ratio         PTT - ( 07 Feb 2022 15:46 )  PTT:56.8 sec                Thyroid Stimulating Hormone, Serum: 0.47 uIU/mL (01-21 @ 17:02)          Consultant(s) Notes Reviewed:      Care Discussed with Consultants/Other Providers:

## 2022-02-08 NOTE — CONSULT NOTE ADULT - CONSULT REASON
chest pain/ sob.
R thigh hematoma
anemia  gi bleed
COVID-19 evaluation
chest pain/ sob/ LBBB
High Blood Sugars/DMT2

## 2022-02-08 NOTE — PROGRESS NOTE ADULT - ASSESSMENT
anemia  gi bleed  lgib    plan  hydrocortisone 25mg pr qd  cbc daily  transfuse prn   check iron studies   ppi once a day  stool occult negative   diet as tolerated   right thigh hematoma, sx consult noted   will follow    Advanced care planning was discussed with patient and family.  Advanced care planning forms were reviewed and discussed.  Risks, benefits and alternatives of gastroenterologic procedures were discussed in detail and all questions were answered.    30 minutes spent.

## 2022-02-08 NOTE — CONSULT NOTE ADULT - SUBJECTIVE AND OBJECTIVE BOX
SURGERY CONSULT NOTE    HPI:  80 yo F h/o   dementia and  <CHF per EMS,/  no othe r hx  is  available presenting via EMS from home for few days of weakness, followed by development of SOB and episode of substernal CP this morning. Pt is a poor historian and unable to provide significant history all ROS negative upon questioning. Lives at home with brother - he reports concern for pt's increased work of breathing. (21 Jan 2022 18:07)      Surgery consulted for evaluation of right thigh hematoma. Patient unable to participate in encounter. Did not express pain during palpation of hematoma. Able to move lower extremity freely. No evidence of compartment syndrome.      PAST MEDICAL & SURGICAL HISTORY:  Dementia        MEDICATIONS  (STANDING):  aspirin enteric coated 81 milliGRAM(s) Oral daily  atorvastatin 10 milliGRAM(s) Oral at bedtime  cefTRIAXone   IVPB 1000 milliGRAM(s) IV Intermittent every 24 hours  dextrose 40% Gel 15 Gram(s) Oral once  dextrose 50% Injectable 25 Gram(s) IV Push once  dextrose 50% Injectable 12.5 Gram(s) IV Push once  dextrose 50% Injectable 25 Gram(s) IV Push once  famotidine    Tablet 20 milliGRAM(s) Oral daily  glucagon  Injectable 1 milliGRAM(s) IntraMuscular once  hydrocortisone hemorrhoidal Suppository 1 Suppository(s) Rectal daily  insulin lispro (ADMELOG) corrective regimen sliding scale   SubCutaneous three times a day before meals  insulin lispro (ADMELOG) corrective regimen sliding scale   SubCutaneous at bedtime  lactated ringers. 1000 milliLiter(s) (50 mL/Hr) IV Continuous <Continuous>  metoprolol tartrate 25 milliGRAM(s) Oral three times a day    MEDICATIONS  (PRN):  acetaminophen     Tablet .. 650 milliGRAM(s) Oral every 6 hours PRN Temp greater or equal to 38C (100.4F), Mild Pain (1 - 3), Moderate Pain (4 - 6), Severe Pain (7 - 10)      Allergies    No Known Allergies    Intolerances        SOCIAL HISTORY:    FAMILY HISTORY:      Physical Exam:  General: NAD, resting comfortably  Pulmonary: normal resp effort, CTA-B  Cardiovascular: NSR, no murmurs  Abdominal: soft, ND/NT, no organomegaly  Extremities: signals DP/PT b/l, hematoma on R thigh, not swollen, could not assess numbness with patient able to move leg freely, low concern of compartment syndrome    Vital Signs Last 24 Hrs  T(C): 36.9 (08 Feb 2022 01:22), Max: 37.3 (07 Feb 2022 05:11)  T(F): 98.4 (08 Feb 2022 01:22), Max: 99.1 (07 Feb 2022 05:11)  HR: 104 (08 Feb 2022 01:22) (85 - 115)  BP: 129/91 (08 Feb 2022 01:22) (111/57 - 142/85)  BP(mean): --  RR: 18 (08 Feb 2022 01:22) (18 - 21)  SpO2: 99% (08 Feb 2022 01:22) (87% - 99%)    I&O's Summary    06 Feb 2022 07:01  -  07 Feb 2022 07:00  --------------------------------------------------------  IN: 2002 mL / OUT: 550 mL / NET: 1452 mL    07 Feb 2022 07:01  -  08 Feb 2022 01:51  --------------------------------------------------------  IN: 120 mL / OUT: 400 mL / NET: -280 mL            LABS:                        8.4    10.39 )-----------( 223      ( 07 Feb 2022 22:31 )             26.7     02-07    135  |  99  |  18  ----------------------------<  117<H>  3.9   |  21<L>  |  0.67    Ca    8.9      07 Feb 2022 07:22  Phos  3.1     02-06  Mg     1.9     02-07      PT/INR - ( 07 Feb 2022 15:46 )   PT: 14.3 sec;   INR: 1.20 ratio         PTT - ( 07 Feb 2022 15:46 )  PTT:56.8 sec    CAPILLARY BLOOD GLUCOSE      POCT Blood Glucose.: 126 mg/dL (07 Feb 2022 21:26)  POCT Blood Glucose.: 146 mg/dL (07 Feb 2022 18:41)  POCT Blood Glucose.: 96 mg/dL (07 Feb 2022 12:13)  POCT Blood Glucose.: 136 mg/dL (07 Feb 2022 09:01)        Cultures:      RADIOLOGY & ADDITIONAL STUDIES:      Plan:  80 YO F with dementia and CHF presenting with SOB. Surgery consulted for evaluation of right thigh ecchymosis vs. hematoma    - no acute surgical intervention at this time  - no signs of compartment syndrome, skin changes, or infection at this time  - monitor H/H, transfuse PRN  - pain control  - would hold AC for now; risks and benefits to restart per primary team  - please let team know if any acute changes    to be d/w surgery attending    ATP, [id number]             SURGERY CONSULT NOTE    HPI:  82 yo F h/o   dementia and  <CHF per EMS,/  no othe r hx  is  available presenting via EMS from home for few days of weakness, followed by development of SOB and episode of substernal CP this morning. Pt is a poor historian and unable to provide significant history all ROS negative upon questioning. Lives at home with brother - he reports concern for pt's increased work of breathing. (21 Jan 2022 18:07)      Surgery consulted for evaluation of right thigh hematoma. Patient unable to participate in encounter. Did not express pain during palpation of hematoma. Able to move lower extremity freely. No evidence of compartment syndrome.      PAST MEDICAL & SURGICAL HISTORY:  Dementia        MEDICATIONS  (STANDING):  aspirin enteric coated 81 milliGRAM(s) Oral daily  atorvastatin 10 milliGRAM(s) Oral at bedtime  cefTRIAXone   IVPB 1000 milliGRAM(s) IV Intermittent every 24 hours  dextrose 40% Gel 15 Gram(s) Oral once  dextrose 50% Injectable 25 Gram(s) IV Push once  dextrose 50% Injectable 12.5 Gram(s) IV Push once  dextrose 50% Injectable 25 Gram(s) IV Push once  famotidine    Tablet 20 milliGRAM(s) Oral daily  glucagon  Injectable 1 milliGRAM(s) IntraMuscular once  hydrocortisone hemorrhoidal Suppository 1 Suppository(s) Rectal daily  insulin lispro (ADMELOG) corrective regimen sliding scale   SubCutaneous three times a day before meals  insulin lispro (ADMELOG) corrective regimen sliding scale   SubCutaneous at bedtime  lactated ringers. 1000 milliLiter(s) (50 mL/Hr) IV Continuous <Continuous>  metoprolol tartrate 25 milliGRAM(s) Oral three times a day    MEDICATIONS  (PRN):  acetaminophen     Tablet .. 650 milliGRAM(s) Oral every 6 hours PRN Temp greater or equal to 38C (100.4F), Mild Pain (1 - 3), Moderate Pain (4 - 6), Severe Pain (7 - 10)      Allergies    No Known Allergies    Intolerances        SOCIAL HISTORY:    FAMILY HISTORY:      Physical Exam:  General: NAD, resting comfortably  Pulmonary: normal resp effort, CTA-B  Cardiovascular: NSR, no murmurs  Abdominal: soft, ND/NT, no organomegaly  Extremities: signals DP/PT b/l, hematoma on R thigh, not swollen, could not assess numbness with patient able to move leg freely, low concern of compartment syndrome    Vital Signs Last 24 Hrs  T(C): 36.9 (08 Feb 2022 01:22), Max: 37.3 (07 Feb 2022 05:11)  T(F): 98.4 (08 Feb 2022 01:22), Max: 99.1 (07 Feb 2022 05:11)  HR: 104 (08 Feb 2022 01:22) (85 - 115)  BP: 129/91 (08 Feb 2022 01:22) (111/57 - 142/85)  BP(mean): --  RR: 18 (08 Feb 2022 01:22) (18 - 21)  SpO2: 99% (08 Feb 2022 01:22) (87% - 99%)    I&O's Summary    06 Feb 2022 07:01  -  07 Feb 2022 07:00  --------------------------------------------------------  IN: 2002 mL / OUT: 550 mL / NET: 1452 mL    07 Feb 2022 07:01  -  08 Feb 2022 01:51  --------------------------------------------------------  IN: 120 mL / OUT: 400 mL / NET: -280 mL            LABS:                        8.4    10.39 )-----------( 223      ( 07 Feb 2022 22:31 )             26.7     02-07    135  |  99  |  18  ----------------------------<  117<H>  3.9   |  21<L>  |  0.67    Ca    8.9      07 Feb 2022 07:22  Phos  3.1     02-06  Mg     1.9     02-07      PT/INR - ( 07 Feb 2022 15:46 )   PT: 14.3 sec;   INR: 1.20 ratio         PTT - ( 07 Feb 2022 15:46 )  PTT:56.8 sec    CAPILLARY BLOOD GLUCOSE      POCT Blood Glucose.: 126 mg/dL (07 Feb 2022 21:26)  POCT Blood Glucose.: 146 mg/dL (07 Feb 2022 18:41)  POCT Blood Glucose.: 96 mg/dL (07 Feb 2022 12:13)  POCT Blood Glucose.: 136 mg/dL (07 Feb 2022 09:01)        Cultures:      RADIOLOGY & ADDITIONAL STUDIES:      Plan:  80 YO F with dementia and CHF presenting with SOB. Surgery consulted for evaluation of right thigh ecchymosis vs. hematoma    - no acute surgical intervention at this time  - no signs of compartment syndrome, skin changes, or infection at this time  - monitor H/H, transfuse PRN  - pain control  - would hold AC for now; risks and benefits to restart per primary team  - please let team know if any acute changes    d/w ACS surgery attending, Dr. Lowe    ATP, [id number]             SURGERY CONSULT NOTE    HPI:  82 yo F h/o   dementia and  <CHF per EMS,/  no othe r hx  is  available presenting via EMS from home for few days of weakness, followed by development of SOB and episode of substernal CP this morning. Pt is a poor historian and unable to provide significant history all ROS negative upon questioning. Lives at home with brother - he reports concern for pt's increased work of breathing. (21 Jan 2022 18:07)      Surgery consulted for evaluation of right thigh hematoma. Patient unable to participate in encounter due to dementia. Did not express pain during palpation of hematoma. Able to move lower extremity freely. No evidence of compartment syndrome.      PAST MEDICAL & SURGICAL HISTORY:  Dementia        MEDICATIONS  (STANDING):  aspirin enteric coated 81 milliGRAM(s) Oral daily  atorvastatin 10 milliGRAM(s) Oral at bedtime  cefTRIAXone   IVPB 1000 milliGRAM(s) IV Intermittent every 24 hours  dextrose 40% Gel 15 Gram(s) Oral once  dextrose 50% Injectable 25 Gram(s) IV Push once  dextrose 50% Injectable 12.5 Gram(s) IV Push once  dextrose 50% Injectable 25 Gram(s) IV Push once  famotidine    Tablet 20 milliGRAM(s) Oral daily  glucagon  Injectable 1 milliGRAM(s) IntraMuscular once  hydrocortisone hemorrhoidal Suppository 1 Suppository(s) Rectal daily  insulin lispro (ADMELOG) corrective regimen sliding scale   SubCutaneous three times a day before meals  insulin lispro (ADMELOG) corrective regimen sliding scale   SubCutaneous at bedtime  lactated ringers. 1000 milliLiter(s) (50 mL/Hr) IV Continuous <Continuous>  metoprolol tartrate 25 milliGRAM(s) Oral three times a day    MEDICATIONS  (PRN):  acetaminophen     Tablet .. 650 milliGRAM(s) Oral every 6 hours PRN Temp greater or equal to 38C (100.4F), Mild Pain (1 - 3), Moderate Pain (4 - 6), Severe Pain (7 - 10)      Allergies    No Known Allergies    Intolerances        SOCIAL HISTORY:    FAMILY HISTORY:      Physical Exam:  General: NAD, resting comfortably  Pulmonary: normal resp effort, CTA-B  Cardiovascular: NSR, no murmurs  Abdominal: soft, ND/NT, no organomegaly  Extremities: signals DP/PT b/l, hematoma on R thigh, not swollen, could not assess numbness with patient able to move leg freely, low concern of compartment syndrome    Vital Signs Last 24 Hrs  T(C): 36.9 (08 Feb 2022 01:22), Max: 37.3 (07 Feb 2022 05:11)  T(F): 98.4 (08 Feb 2022 01:22), Max: 99.1 (07 Feb 2022 05:11)  HR: 104 (08 Feb 2022 01:22) (85 - 115)  BP: 129/91 (08 Feb 2022 01:22) (111/57 - 142/85)  BP(mean): --  RR: 18 (08 Feb 2022 01:22) (18 - 21)  SpO2: 99% (08 Feb 2022 01:22) (87% - 99%)    I&O's Summary    06 Feb 2022 07:01  -  07 Feb 2022 07:00  --------------------------------------------------------  IN: 2002 mL / OUT: 550 mL / NET: 1452 mL    07 Feb 2022 07:01  -  08 Feb 2022 01:51  --------------------------------------------------------  IN: 120 mL / OUT: 400 mL / NET: -280 mL            LABS:                        8.4    10.39 )-----------( 223      ( 07 Feb 2022 22:31 )             26.7     02-07    135  |  99  |  18  ----------------------------<  117<H>  3.9   |  21<L>  |  0.67    Ca    8.9      07 Feb 2022 07:22  Phos  3.1     02-06  Mg     1.9     02-07      PT/INR - ( 07 Feb 2022 15:46 )   PT: 14.3 sec;   INR: 1.20 ratio         PTT - ( 07 Feb 2022 15:46 )  PTT:56.8 sec    CAPILLARY BLOOD GLUCOSE      POCT Blood Glucose.: 126 mg/dL (07 Feb 2022 21:26)  POCT Blood Glucose.: 146 mg/dL (07 Feb 2022 18:41)  POCT Blood Glucose.: 96 mg/dL (07 Feb 2022 12:13)  POCT Blood Glucose.: 136 mg/dL (07 Feb 2022 09:01)        Cultures:      RADIOLOGY & ADDITIONAL STUDIES:      Plan:  82 YO F with dementia and CHF presenting with SOB. Surgery consulted for evaluation of right thigh ecchymosis vs. hematoma    - no acute surgical intervention at this time  - no signs of compartment syndrome, skin changes, or infection at this time  - monitor H/H, transfuse PRN  - pain control  - would hold AC for now; risks and benefits to restart per primary team  - please let team know if any acute changes    d/w ACS surgery attending, Dr. Lowe    ATP, [id number]

## 2022-02-08 NOTE — PROGRESS NOTE ADULT - SUBJECTIVE AND OBJECTIVE BOX
CARDIOLOGY     PROGRESS  NOTE   ________________________________________________    CHIEF COMPLAINT:Patient is a 81y old  Female who presents with a chief complaint of sob/cp (08 Feb 2022 07:52)  no complain.  	  REVIEW OF SYSTEMS:  CONSTITUTIONAL: No fever, weight loss, or fatigue  EYES: No eye pain, visual disturbances, or discharge  ENT:  No difficulty hearing, tinnitus, vertigo; No sinus or throat pain  NECK: No pain or stiffness  RESPIRATORY: No cough, wheezing, chills or hemoptysis; No Shortness of Breath  CARDIOVASCULAR: No chest pain, palpitations, passing out, dizziness, or leg swelling  GASTROINTESTINAL: No abdominal or epigastric pain. No nausea, vomiting, or hematemesis; No diarrhea or constipation. No melena or hematochezia.  GENITOURINARY: No dysuria, frequency, hematuria, or incontinence  NEUROLOGICAL: No headaches, memory loss, loss of strength, numbness, or tremors  SKIN: No itching, burning, rashes, or lesions   LYMPH Nodes: No enlarged glands  ENDOCRINE: No heat or cold intolerance; No hair loss  MUSCULOSKELETAL: No joint pain or swelling; No muscle, back, or extremity pain  PSYCHIATRIC: No depression, anxiety, mood swings, or difficulty sleeping  HEME/LYMPH: No easy bruising, or bleeding gums  ALLERGY AND IMMUNOLOGIC: No hives or eczema	    [ ] All others negative	  [x ] Unable to obtain    PHYSICAL EXAM:  T(C): 36.7 (02-08-22 @ 09:11), Max: 36.9 (02-08-22 @ 01:22)  HR: 90 (02-08-22 @ 09:11) (85 - 115)  BP: 113/70 (02-08-22 @ 09:11) (113/70 - 142/85)  RR: 18 (02-08-22 @ 09:11) (16 - 18)  SpO2: 94% (02-08-22 @ 09:11) (87% - 99%)  Wt(kg): --  I&O's Summary    07 Feb 2022 07:01  -  08 Feb 2022 07:00  --------------------------------------------------------  IN: 780 mL / OUT: 500 mL / NET: 280 mL    08 Feb 2022 07:01  -  08 Feb 2022 11:02  --------------------------------------------------------  IN: 0 mL / OUT: 100 mL / NET: -100 mL      not examined  Appearance: lethargic/ comfortable  HEENT:   Normal oral mucosa, PERRL, EOMI	  Lymphatic: No lymphadenopathy  Cardiovascular: Normal S1 S2, No JVD, + murmurs, No edema  Respiratory: Lungs clear to auscultation	  Gastrointestinal:  Soft, Non-tender, + BS	  Skin: No rashes, No ecchymoses, No cyanosis	  Neurologic: Non-focal  Extremities: Normal range of motion, No clubbing, cyanosis or edema  Vascular: Peripheral pulses palpable 2+ bilaterally    MEDICATIONS  (STANDING):  aspirin enteric coated 81 milliGRAM(s) Oral daily  atorvastatin 10 milliGRAM(s) Oral at bedtime  cefTRIAXone   IVPB 1000 milliGRAM(s) IV Intermittent every 24 hours  dextrose 40% Gel 15 Gram(s) Oral once  dextrose 50% Injectable 25 Gram(s) IV Push once  dextrose 50% Injectable 12.5 Gram(s) IV Push once  dextrose 50% Injectable 25 Gram(s) IV Push once  famotidine    Tablet 20 milliGRAM(s) Oral daily  glucagon  Injectable 1 milliGRAM(s) IntraMuscular once  hydrocortisone hemorrhoidal Suppository 1 Suppository(s) Rectal daily  insulin lispro (ADMELOG) corrective regimen sliding scale   SubCutaneous three times a day before meals  insulin lispro (ADMELOG) corrective regimen sliding scale   SubCutaneous at bedtime  lactated ringers. 1000 milliLiter(s) (50 mL/Hr) IV Continuous <Continuous>  metoprolol tartrate 25 milliGRAM(s) Oral three times a day      TELEMETRY: 	    ECG:  	  RADIOLOGY:  OTHER: 	  	  LABS:	 	    CARDIAC MARKERS:                                8.4    10.19 )-----------( 211      ( 08 Feb 2022 06:39 )             26.6     02-08    139  |  103  |  14  ----------------------------<  104<H>  4.0   |  22  |  0.59    Ca    8.9      08 Feb 2022 06:39  Mg     1.9     02-07    TPro  5.8<L>  /  Alb  2.6<L>  /  TBili  0.4  /  DBili  x   /  AST  14  /  ALT  19  /  AlkPhos  57  02-08    proBNP: Serum Pro-Brain Natriuretic Peptide: 20071 pg/mL (01-21 @ 12:11)    Lipid Profile:   HgA1c:   TSH: Thyroid Stimulating Hormone, Serum: 0.47 uIU/mL (01-21 @ 17:02)    PT/INR - ( 07 Feb 2022 15:46 )   PT: 14.3 sec;   INR: 1.20 ratio         PTT - ( 07 Feb 2022 15:46 )  PTT:56.8 sec  I have had goals of care discussion, advanced directive and code status with patient/family    and  in  coordinating   patient care.     all questions answered and expressed understanding of the plan.  per  brother, pt  is  full code.  Notified by RN that patient has a right thigh hematoma. Per RN staffing, patient's hematoma has been expanding since being placed on Heparin gtt.      Assessment and plan  ---------------------------  82 yo F  h/o   dementia and  <CHF per EMS,/  no othe r hx  is  available presenting via EMS from home for few days of weakness, followed by development of SOB and episode of substernal CP this morning.     Pt is a poor historian and unable to provide significant history    all ROS negative upon questioning.   Lives at home with brother - he reports concern for pt's increased work of breathing.   pt is a 82 yo female with hx of htn, chf, cad, with increasing sob and COVID with  abnormal  cta and chest x ray  cta negative for PE  lipid panel  continue covid therapy  fu renal function  keep K>  will increase beta blocker as tolerated  ac, a.fib rate is controlled  echo noted with normal EF, no WMA  continue beta blocker increase as tolerated  will consider Wilmer/ cardioversion post cath  cath was delayed sec to fever  ID noted  increase beta blocker hr better controlled  ac held sec to R thigh  hematoma

## 2022-02-08 NOTE — CONSULT NOTE ADULT - ATTENDING COMMENTS
82 yo female with complex medical history including dementia and CHF. Consulted for evaluation of right thigh ecchymosis in setting of AC.  - On physical exam no hematoma palpated, compartments soft, non tender.  - No evidence of infection or compartment syndrome.  - Hold anticoagulation if risk benefit allows, serial CBC, monitor Hgb.  - No surgical intervention indicated at this point.
81 year old female w/ PMHx of dementia and CHF who presented from home for several days of weakness followed by development of dyspnea and episode of substernal chest pain.    COVID19 Positive with hypoxic respiratory failure  Agree with RDV and Dexamethasone  Monitor BUN/Cr and LFT's daily  Doubt bacterial pneumonia / superinfection as WBC not elevated, CT Chest infiltrates not consistent with bacterial PNA    I will continue to follow. Please feel free to contact me with any further questions.    Sincere Camp M.D.  [facility] Division of Infectious Disease  8AM-5PM: Pager Number [phone]  After Hours (or if no response): Please contact the Infectious Diseases Office at [phone]     The above assessment and plan were discussed with medicine team

## 2022-02-09 LAB
GLUCOSE BLDC GLUCOMTR-MCNC: 101 MG/DL — HIGH (ref 70–99)
GLUCOSE BLDC GLUCOMTR-MCNC: 120 MG/DL — HIGH (ref 70–99)
GLUCOSE BLDC GLUCOMTR-MCNC: 121 MG/DL — HIGH (ref 70–99)
GLUCOSE BLDC GLUCOMTR-MCNC: 125 MG/DL — HIGH (ref 70–99)
GLUCOSE BLDC GLUCOMTR-MCNC: 153 MG/DL — HIGH (ref 70–99)
HCT VFR BLD CALC: 28.9 % — LOW (ref 34.5–45)
HGB BLD-MCNC: 9.2 G/DL — LOW (ref 11.5–15.5)
MCHC RBC-ENTMCNC: 28.1 PG — SIGNIFICANT CHANGE UP (ref 27–34)
MCHC RBC-ENTMCNC: 31.8 GM/DL — LOW (ref 32–36)
MCV RBC AUTO: 88.4 FL — SIGNIFICANT CHANGE UP (ref 80–100)
NRBC # BLD: 0 /100 WBCS — SIGNIFICANT CHANGE UP (ref 0–0)
PLATELET # BLD AUTO: 280 K/UL — SIGNIFICANT CHANGE UP (ref 150–400)
RBC # BLD: 3.27 M/UL — LOW (ref 3.8–5.2)
RBC # FLD: 16.5 % — HIGH (ref 10.3–14.5)
WBC # BLD: 8.14 K/UL — SIGNIFICANT CHANGE UP (ref 3.8–10.5)
WBC # FLD AUTO: 8.14 K/UL — SIGNIFICANT CHANGE UP (ref 3.8–10.5)

## 2022-02-09 PROCEDURE — 99232 SBSQ HOSP IP/OBS MODERATE 35: CPT

## 2022-02-09 RX ORDER — POLYETHYLENE GLYCOL 3350 17 G/17G
17 POWDER, FOR SOLUTION ORAL DAILY
Refills: 0 | Status: DISCONTINUED | OUTPATIENT
Start: 2022-02-09 | End: 2022-02-11

## 2022-02-09 RX ADMIN — CEFTRIAXONE 100 MILLIGRAM(S): 500 INJECTION, POWDER, FOR SOLUTION INTRAMUSCULAR; INTRAVENOUS at 18:15

## 2022-02-09 RX ADMIN — Medication 25 MILLIGRAM(S): at 05:46

## 2022-02-09 RX ADMIN — POLYETHYLENE GLYCOL 3350 17 GRAM(S): 17 POWDER, FOR SOLUTION ORAL at 18:15

## 2022-02-09 RX ADMIN — Medication 25 MILLIGRAM(S): at 13:05

## 2022-02-09 RX ADMIN — Medication 1: at 18:14

## 2022-02-09 RX ADMIN — FAMOTIDINE 20 MILLIGRAM(S): 10 INJECTION INTRAVENOUS at 13:05

## 2022-02-09 RX ADMIN — Medication 25 MILLIGRAM(S): at 21:59

## 2022-02-09 RX ADMIN — Medication 81 MILLIGRAM(S): at 13:05

## 2022-02-09 RX ADMIN — ATORVASTATIN CALCIUM 10 MILLIGRAM(S): 80 TABLET, FILM COATED ORAL at 21:59

## 2022-02-09 RX ADMIN — Medication 1 SUPPOSITORY(S): at 13:05

## 2022-02-09 NOTE — PROGRESS NOTE ADULT - SUBJECTIVE AND OBJECTIVE BOX
CARDIOLOGY     PROGRESS  NOTE   ________________________________________________    CHIEF COMPLAINT:Patient is a 81y old  Female who presents with a chief complaint of sob/cp (09 Feb 2022 08:24)  no complain.  	  REVIEW OF SYSTEMS:  CONSTITUTIONAL: No fever, weight loss, or fatigue  EYES: No eye pain, visual disturbances, or discharge  ENT:  No difficulty hearing, tinnitus, vertigo; No sinus or throat pain  NECK: No pain or stiffness  RESPIRATORY: No cough, wheezing, chills or hemoptysis; No Shortness of Breath  CARDIOVASCULAR: No chest pain, palpitations, passing out, dizziness, or leg swelling  GASTROINTESTINAL: No abdominal or epigastric pain. No nausea, vomiting, or hematemesis; No diarrhea or constipation. No melena or hematochezia.  GENITOURINARY: No dysuria, frequency, hematuria, or incontinence  NEUROLOGICAL: No headaches, memory loss, loss of strength, numbness, or tremors  SKIN: No itching, burning, rashes, or lesions   LYMPH Nodes: No enlarged glands  ENDOCRINE: No heat or cold intolerance; No hair loss  MUSCULOSKELETAL: No joint pain or swelling; No muscle, back, or extremity pain  PSYCHIATRIC: No depression, anxiety, mood swings, or difficulty sleeping  HEME/LYMPH: No easy bruising, or bleeding gums  ALLERGY AND IMMUNOLOGIC: No hives or eczema	    [ ] All others negative	  [ ] Unable to obtain    PHYSICAL EXAM:  T(C): 36.3 (02-09-22 @ 08:20), Max: 37.2 (02-08-22 @ 21:00)  HR: 82 (02-09-22 @ 08:20) (82 - 92)  BP: 119/74 (02-09-22 @ 08:20) (111/70 - 134/72)  RR: 18 (02-09-22 @ 08:20) (18 - 18)  SpO2: 92% (02-09-22 @ 08:20) (92% - 100%)  Wt(kg): --  I&O's Summary    08 Feb 2022 07:01  -  09 Feb 2022 07:00  --------------------------------------------------------  IN: 80 mL / OUT: 450 mL / NET: -370 mL        Appearance: Normal	  HEENT:   Normal oral mucosa, PERRL, EOMI	  Lymphatic: No lymphadenopathy  Cardiovascular: Normal S1 S2, No JVD, + murmurs, No edema  Respiratory: rhonchi  Psychiatry: A & O x 3, Mood & affect appropriate  Gastrointestinal:  Soft, Non-tender, + BS	  Skin: No rashes, No ecchymoses, No cyanosis	  Neurologic: Non-focal  Extremities: Normal range of motion, No clubbing, cyanosis or edema  Vascular: Peripheral pulses palpable 2+ bilaterally    MEDICATIONS  (STANDING):  aspirin enteric coated 81 milliGRAM(s) Oral daily  atorvastatin 10 milliGRAM(s) Oral at bedtime  cefTRIAXone   IVPB 1000 milliGRAM(s) IV Intermittent every 24 hours  dextrose 40% Gel 15 Gram(s) Oral once  dextrose 50% Injectable 25 Gram(s) IV Push once  dextrose 50% Injectable 12.5 Gram(s) IV Push once  dextrose 50% Injectable 25 Gram(s) IV Push once  famotidine    Tablet 20 milliGRAM(s) Oral daily  glucagon  Injectable 1 milliGRAM(s) IntraMuscular once  hydrocortisone hemorrhoidal Suppository 1 Suppository(s) Rectal daily  insulin lispro (ADMELOG) corrective regimen sliding scale   SubCutaneous three times a day before meals  insulin lispro (ADMELOG) corrective regimen sliding scale   SubCutaneous at bedtime  metoprolol tartrate 25 milliGRAM(s) Oral three times a day      TELEMETRY: 	    ECG:  	  RADIOLOGY:  OTHER: 	  	  LABS:	 	    CARDIAC MARKERS:                                9.2    8.14  )-----------( 280      ( 09 Feb 2022 07:20 )             28.9     02-08    143  |  104  |  16  ----------------------------<  104<H>  4.0   |  24  |  0.61    Ca    9.4      08 Feb 2022 12:12    TPro  5.8<L>  /  Alb  2.6<L>  /  TBili  0.4  /  DBili  x   /  AST  14  /  ALT  19  /  AlkPhos  57  02-08    proBNP: Serum Pro-Brain Natriuretic Peptide: 09074 pg/mL (01-21 @ 12:11)    Lipid Profile:   HgA1c:   TSH: Thyroid Stimulating Hormone, Serum: 0.47 uIU/mL (01-21 @ 17:02)    PT/INR - ( 07 Feb 2022 15:46 )   PT: 14.3 sec;   INR: 1.20 ratio         PTT - ( 07 Feb 2022 15:46 )  PTT:56.8 sec      Assessment and plan  ---------------------------  80 yo F  h/o   dementia and  <CHF per EMS,/  no othe r hx  is  available presenting via EMS from home for few days of weakness, followed by development of SOB and episode of substernal CP this morning.     Pt is a poor historian and unable to provide significant history    all ROS negative upon questioning.   Lives at home with brother - he reports concern for pt's increased work of breathing.   pt is a 80 yo female with hx of htn, chf, cad, with increasing sob and COVID with  abnormal  cta and chest x ray  cta negative for PE  lipid panel  continue covid therapy  fu renal function  keep K>  will increase beta blocker as tolerated  ac, a.fib rate is controlled  echo noted with normal EF, no WMA  continue beta blocker increase as tolerated  will consider Wilmer/ cardioversion post cath  cath was delayed sec to fever  ID noted  increase beta blocker hr better controlled  ac held sec to R thigh  hematoma

## 2022-02-09 NOTE — PROGRESS NOTE ADULT - SUBJECTIVE AND OBJECTIVE BOX
Follow Up:  e coli bacteremia    Interval History: afebrile overnight. no acute events.     REVIEW OF SYSTEMS  [  ] ROS unobtainable because:    [ x ] All other systems negative except as noted below    Constitutional:  [ ] fever [ ] chills  [ ] weight loss  [ ] weakness  Skin:  [ ] rash [ ] phlebitis	  Eyes: [ ] icterus [ ] pain  [ ] discharge	  ENMT: [ ] sore throat  [ ] thrush [ ] ulcers [ ] exudates  Respiratory: [ ] dyspnea [ ] hemoptysis [ ] cough [ ] sputum	  Cardiovascular:  [ ] chest pain [ ] palpitations [ ] edema	  Gastrointestinal:  [ ] nausea [ ] vomiting [ ] diarrhea [ ] constipation [ ] pain	  Genitourinary:  [ ] dysuria [ ] frequency [ ] hematuria [ ] discharge [ ] flank pain  [ ] incontinence  Musculoskeletal:  [ ] myalgias [ ] arthralgias [ ] arthritis  [ ] back pain  Neurological:  [ ] headache [ ] seizures  [ ] confusion/altered mental status    Allergies  No Known Allergies        ANTIMICROBIALS:  cefTRIAXone   IVPB 1000 every 24 hours      OTHER MEDS:  MEDICATIONS  (STANDING):  acetaminophen     Tablet .. 650 every 6 hours PRN  aspirin enteric coated 81 daily  atorvastatin 10 at bedtime  dextrose 40% Gel 15 once  dextrose 50% Injectable 25 once  dextrose 50% Injectable 12.5 once  dextrose 50% Injectable 25 once  famotidine    Tablet 20 daily  glucagon  Injectable 1 once  insulin lispro (ADMELOG) corrective regimen sliding scale  three times a day before meals  insulin lispro (ADMELOG) corrective regimen sliding scale  at bedtime  metoprolol tartrate 25 three times a day  polyethylene glycol 3350 17 daily      Vital Signs Last 24 Hrs  T(C): 37.2 (09 Feb 2022 21:52), Max: 37.9 (09 Feb 2022 20:00)  T(F): 98.9 (09 Feb 2022 21:52), Max: 100.2 (09 Feb 2022 20:00)  HR: 103 (09 Feb 2022 21:52) (82 - 109)  BP: 126/55 (09 Feb 2022 21:52) (104/64 - 134/72)  BP(mean): --  RR: 18 (09 Feb 2022 21:52) (18 - 18)  SpO2: 93% (09 Feb 2022 21:52) (92% - 99%)    PHYSICAL EXAMINATION:  General: Alert and Awake, NAD  Cardiac: RRR, No M/R/G  Resp: CTAB, No Wh/Rh/Ra  Abdomen: NBS, NT/ND, No HSM, No rigidity or guarding  MSK: No LE edema. No Calf tenderness  Skin: No rashes or lesions. Skin is warm and dry to the touch.   Neuro: Alert and Awake. CN 2-12 Grossly intact. Moves all four extremities spontaneously.  Psych: Calm, Pleasant, Cooperative                          9.2    8.14  )-----------( 280      ( 09 Feb 2022 07:20 )             28.9       02-08    143  |  104  |  16  ----------------------------<  104<H>  4.0   |  24  |  0.61    Ca    9.4      08 Feb 2022 12:12    TPro  5.8<L>  /  Alb  2.6<L>  /  TBili  0.4  /  DBili  x   /  AST  14  /  ALT  19  /  AlkPhos  57  02-08          MICROBIOLOGY:  v  .Blood Blood-Peripheral  02-03-22   No Growth Final  --  --      Clean Catch Clean Catch (Midstream)  02-03-22   >100,000 CFU/ml Escherichia coli  --  Escherichia coli      .Blood Blood  02-02-22   Growth in aerobic and anaerobic bottles: Escherichia coli  See previous culture [id number]  --  Blood Culture PCR  Escherichia coli      Clean Catch Clean Catch (Midstream)  01-21-22   >=3 organisms. Probable collection contamination.  --  --    RADIOLOGY:    <The imaging below has been reviewed and visualized by me independently. Findings as detailed in report below>    < from: US Extremity Nonvasc Limited, Right (02.08.22 @ 10:59) >    IMPRESSION:  Right thigh hematoma.    < end of copied text >

## 2022-02-09 NOTE — PROGRESS NOTE ADULT - SUBJECTIVE AND OBJECTIVE BOX
Chief complaint  Patient is a 81y old  Female who presents with a chief complaint of sob/cp (09 Feb 2022 13:58)   Review of systems  Patient in bed, looks comfortable, no hypoglycemic episodes.    Labs and Fingersticks  CAPILLARY BLOOD GLUCOSE      POCT Blood Glucose.: 101 mg/dL (09 Feb 2022 12:21)  POCT Blood Glucose.: 120 mg/dL (09 Feb 2022 08:15)  POCT Blood Glucose.: 121 mg/dL (09 Feb 2022 05:15)  POCT Blood Glucose.: 186 mg/dL (08 Feb 2022 22:32)  POCT Blood Glucose.: 144 mg/dL (08 Feb 2022 18:09)      Anion Gap, Serum: 15 (02-08 @ 12:12)  Anion Gap, Serum: 14 (02-08 @ 06:39)      Calcium, Total Serum: 9.4 (02-08 @ 12:12)  Calcium, Total Serum: 8.9 (02-08 @ 06:39)  Albumin, Serum: 2.6 *L* (02-08 @ 06:39)    Alanine Aminotransferase (ALT/SGPT): 19 (02-08 @ 06:39)  Alkaline Phosphatase, Serum: 57 (02-08 @ 06:39)  Aspartate Aminotransferase (AST/SGOT): 14 (02-08 @ 06:39)        02-08    143  |  104  |  16  ----------------------------<  104<H>  4.0   |  24  |  0.61    Ca    9.4      08 Feb 2022 12:12    TPro  5.8<L>  /  Alb  2.6<L>  /  TBili  0.4  /  DBili  x   /  AST  14  /  ALT  19  /  AlkPhos  57  02-08                        9.2    8.14  )-----------( 280      ( 09 Feb 2022 07:20 )             28.9     Medications  MEDICATIONS  (STANDING):  aspirin enteric coated 81 milliGRAM(s) Oral daily  atorvastatin 10 milliGRAM(s) Oral at bedtime  cefTRIAXone   IVPB 1000 milliGRAM(s) IV Intermittent every 24 hours  dextrose 40% Gel 15 Gram(s) Oral once  dextrose 50% Injectable 25 Gram(s) IV Push once  dextrose 50% Injectable 12.5 Gram(s) IV Push once  dextrose 50% Injectable 25 Gram(s) IV Push once  famotidine    Tablet 20 milliGRAM(s) Oral daily  glucagon  Injectable 1 milliGRAM(s) IntraMuscular once  hydrocortisone hemorrhoidal Suppository 1 Suppository(s) Rectal daily  insulin lispro (ADMELOG) corrective regimen sliding scale   SubCutaneous three times a day before meals  insulin lispro (ADMELOG) corrective regimen sliding scale   SubCutaneous at bedtime  metoprolol tartrate 25 milliGRAM(s) Oral three times a day  polyethylene glycol 3350 17 Gram(s) Oral daily      Physical Exam  General: Patient comfortable in bed  Vital Signs Last 12 Hrs  T(F): 98.1 (02-09-22 @ 13:02), Max: 98.4 (02-09-22 @ 05:18)  HR: 84 (02-09-22 @ 13:02) (82 - 92)  BP: 106/56 (02-09-22 @ 13:02) (106/56 - 119/74)  BP(mean): --  RR: 18 (02-09-22 @ 13:02) (18 - 18)  SpO2: 99% (02-09-22 @ 13:02) (92% - 99%)  Neck: No palpable thyroid nodules.  CVS: S1S2, No murmurs  Respiratory: No wheezing, no crepitations  GI: Abdomen soft, bowel sounds positive  Musculoskeletal:  edema lower extremities.   Skin: No skin rashes, no ecchymosis    Diagnostics    Diet, Regular:   Consistent Carbohydrate No Snacks (CSTCHO) (01-26 @ 15:27)           Chief complaint  Patient is a 81y old  Female who presents with a chief complaint of sob/cp (09 Feb 2022 13:58)   Review of systems  Patient in bed, looks comfortable, no hypoglycemic episodes.    Labs and Fingersticks  CAPILLARY BLOOD GLUCOSE      POCT Blood Glucose.: 101 mg/dL (09 Feb 2022 12:21)  POCT Blood Glucose.: 120 mg/dL (09 Feb 2022 08:15)  POCT Blood Glucose.: 121 mg/dL (09 Feb 2022 05:15)  POCT Blood Glucose.: 186 mg/dL (08 Feb 2022 22:32)  POCT Blood Glucose.: 144 mg/dL (08 Feb 2022 18:09)      Anion Gap, Serum: 15 (02-08 @ 12:12)  Anion Gap, Serum: 14 (02-08 @ 06:39)      Calcium, Total Serum: 9.4 (02-08 @ 12:12)  Calcium, Total Serum: 8.9 (02-08 @ 06:39)  Albumin, Serum: 2.6 *L* (02-08 @ 06:39)    Alanine Aminotransferase (ALT/SGPT): 19 (02-08 @ 06:39)  Alkaline Phosphatase, Serum: 57 (02-08 @ 06:39)  Aspartate Aminotransferase (AST/SGOT): 14 (02-08 @ 06:39)        02-08    143  |  104  |  16  ----------------------------<  104<H>  4.0   |  24  |  0.61    Ca    9.4      08 Feb 2022 12:12    TPro  5.8<L>  /  Alb  2.6<L>  /  TBili  0.4  /  DBili  x   /  AST  14  /  ALT  19  /  AlkPhos  57  02-08                        9.2    8.14  )-----------( 280      ( 09 Feb 2022 07:20 )             28.9     Medications  MEDICATIONS  (STANDING):  aspirin enteric coated 81 milliGRAM(s) Oral daily  atorvastatin 10 milliGRAM(s) Oral at bedtime  cefTRIAXone   IVPB 1000 milliGRAM(s) IV Intermittent every 24 hours  dextrose 40% Gel 15 Gram(s) Oral once  dextrose 50% Injectable 25 Gram(s) IV Push once  dextrose 50% Injectable 12.5 Gram(s) IV Push once  dextrose 50% Injectable 25 Gram(s) IV Push once  famotidine    Tablet 20 milliGRAM(s) Oral daily  glucagon  Injectable 1 milliGRAM(s) IntraMuscular once  hydrocortisone hemorrhoidal Suppository 1 Suppository(s) Rectal daily  insulin lispro (ADMELOG) corrective regimen sliding scale   SubCutaneous three times a day before meals  insulin lispro (ADMELOG) corrective regimen sliding scale   SubCutaneous at bedtime  metoprolol tartrate 25 milliGRAM(s) Oral three times a day  polyethylene glycol 3350 17 Gram(s) Oral daily      Physical Exam  General: Patient comfortable in bed  Vital Signs Last 12 Hrs  T(F): 98.1 (02-09-22 @ 13:02), Max: 98.4 (02-09-22 @ 05:18)  HR: 84 (02-09-22 @ 13:02) (82 - 92)  BP: 106/56 (02-09-22 @ 13:02) (106/56 - 119/74)  BP(mean): --  RR: 18 (02-09-22 @ 13:02) (18 - 18)  SpO2: 99% (02-09-22 @ 13:02) (92% - 99%)  Neck: No palpable thyroid nodules.  CVS: S1S2, No murmurs  Respiratory: No wheezing, no crepitations  GI: Abdomen soft, bowel sounds positive  Musculoskeletal:  edema lower extremities.   Skin: No skin rashes, no ecchymosis    Diagnostics    Diet, Regular:   Consistent Carbohydrate No Snacks (CSTCHO) (01-26 @ 15:27)

## 2022-02-09 NOTE — PROGRESS NOTE ADULT - SUBJECTIVE AND OBJECTIVE BOX
INTERVAL HPI/OVERNIGHT EVENTS:  pt seen and examined earlier, events noted  No N/V/D.  Tolerating diet. H/H stable  no BM    Allergies    No Known Allergies    Intolerances    General:  No wt loss, fevers, chills, night sweats, fatigue,   Eyes:  Good vision, no reported pain  ENT:  No sore throat, pain, runny nose, dysphagia  CV:  No pain, palpitations, hypo/hypertension  Resp:  No dyspnea, cough, tachypnea, wheezing  GI:  No pain, No nausea, No vomiting, No diarrhea, + constipation, No weight loss, No fever, No pruritis, No rectal bleeding, No tarry stools, No dysphagia,  :  No pain, bleeding, incontinence, nocturia  Muscle:  No pain, weakness  Neuro:  No weakness, tingling, memory problems  Psych:  No fatigue, insomnia, mood problems, depression  Endocrine:  No polyuria, polydipsia, cold/heat intolerance  Heme:  No petechiae, ecchymosis, easy bruisability  Skin:  No rash, tattoos, scars, edema      PHYSICAL EXAM:   Vital Signs Last 24 Hrs  T(C): 36.7 (09 Feb 2022 13:02), Max: 37.2 (08 Feb 2022 21:00)  T(F): 98.1 (09 Feb 2022 13:02), Max: 99 (08 Feb 2022 21:00)  HR: 84 (09 Feb 2022 13:02) (82 - 92)  BP: 106/56 (09 Feb 2022 13:02) (106/56 - 134/72)  BP(mean): --  RR: 18 (09 Feb 2022 13:02) (18 - 18)  SpO2: 99% (09 Feb 2022 13:02) (92% - 100%)  Daily     Daily   I&O's Summary    08 Feb 2022 07:01  -  09 Feb 2022 07:00  --------------------------------------------------------  IN: 80 mL / OUT: 450 mL / NET: -370 mL    09 Feb 2022 07:01  -  09 Feb 2022 13:59  --------------------------------------------------------  IN: 0 mL / OUT: 500 mL / NET: -500 mL        GENERAL:  Appears stated age, well-groomed, well-nourished, no distress  HEENT:  NC/AT,  conjunctivae clear and pink, no thyromegaly, nodules, adenopathy, no JVD, sclera -anicteric  CHEST:  Full & symmetric excursion, no increased effort, breath sounds clear  HEART:  Regular rhythm, S1, S2, no murmur/rub/S3/S4, no abdominal bruit, no edema  ABDOMEN:  Soft, non-tender, non-distended, normoactive bowel sounds,  no masses ,no hepato-splenomegaly, no signs of chronic liver disease  EXTEREMITIES:  no cyanosis,clubbing or edema  SKIN:  No rash/erythema/ecchymoses/petechiae/wounds/abscess/warm/dry, +Right thigh hematoma   NEURO:  Alert, oriented, no asterixis, no tremor, no encephalopathy      LABS:                                   9.2    8.14  )-----------( 280      ( 09 Feb 2022 07:20 )             28.9   02-08    143  |  104  |  16  ----------------------------<  104<H>  4.0   |  24  |  0.61    Ca    9.4      08 Feb 2022 12:12    TPro  5.8<L>  /  Alb  2.6<L>  /  TBili  0.4  /  DBili  x   /  AST  14  /  ALT  19  /  AlkPhos  57  02-08  PT/INR - ( 07 Feb 2022 15:46 )   PT: 14.3 sec;   INR: 1.20 ratio         PTT - ( 07 Feb 2022 15:46 )  PTT:56.8 sec  amylase   lipase  RADIOLOGY & ADDITIONAL TESTS:

## 2022-02-09 NOTE — PROGRESS NOTE ADULT - ASSESSMENT
82 yo F     h/o   dementia and   ? CHF per EMS,/  no other  hx  is  available      presenting via EMS from home for few days of weakness, followed by development of SOB and episode of substernal CP this morning.     Pt is a poor historian and unable to provide significant history    all ROS negative upon questioning.     Lives at home with brother - he reports concern for pt's increased work of breathing.      pt with   weakness/  sob  from  covid/  pna   CT  chest, with  GOO   on decadron/  remdisivir   *  Dementia   *    HTN,   *  acute  diastolic chf  on   lasix  card  dr davis  on  dvt ppx.  bmi  is  20  tele,   s/p vtach/ on  toprol,  card to  f/p   *  Afib,   was on iv heparin  v tach  on tele.  card following  tele, afib.  s/p  Vtach  22  beats. , had  subsequent vtach also,   cardiac cath was  deferred  due to  fevers  *  E  coli  bacteremia,   on iv rocephin,. rpt bcx  are negative  cath  deferred  for  now, will perform at  a later date,  when cleared by  ID  Hb stable  at  9   awiat  ID clearance   for  cath,  when stable  pt  with  tachycardia and  Vtach on tele,  card  following  pt   u/s     right  thigh  hematoma, 5  cm ,  seen by  vascular,  no  intervention  a/c  on  hold  for  now.  hb is  stable        spoke  with  brothersammi/ pt  is , has  no  children/ states,   pt  is  full code    pmd  dr sheppard, allie chan< from: CT Angio Chest PE Protocol w/ IV Cont (01.21.22 @ 16:01) >  IMPRESSION:  No main, right, left, lobar, or proximal segmental pulmonary embolus.  Patchy bilateral groundglass opacities noted throughout both lungs likely   representing infection or alveolar component of edema. COVID 19 can also   have this appearance. Correlate with RT PCR    --- End of Report --  < end of copied text >

## 2022-02-09 NOTE — PROGRESS NOTE ADULT - SUBJECTIVE AND OBJECTIVE BOX
afberile,  afib  REVIEW OF SYSTEMS:  GEN: no fever,    no chills  RESP: no SOB,   no cough  CVS: no chest pain,   no palpitations  GI: no abdominal pain,   no nausea,   no vomiting,   no constipation,   no diarrhea  : no dysuria,   no frequency  NEURO: no headache,   no dizziness  PSYCH: no depression,   not anxious  Derm : no rash    MEDICATIONS  (STANDING):  aspirin enteric coated 81 milliGRAM(s) Oral daily  atorvastatin 10 milliGRAM(s) Oral at bedtime  cefTRIAXone   IVPB 1000 milliGRAM(s) IV Intermittent every 24 hours  dextrose 40% Gel 15 Gram(s) Oral once  dextrose 50% Injectable 25 Gram(s) IV Push once  dextrose 50% Injectable 12.5 Gram(s) IV Push once  dextrose 50% Injectable 25 Gram(s) IV Push once  famotidine    Tablet 20 milliGRAM(s) Oral daily  glucagon  Injectable 1 milliGRAM(s) IntraMuscular once  hydrocortisone hemorrhoidal Suppository 1 Suppository(s) Rectal daily  insulin lispro (ADMELOG) corrective regimen sliding scale   SubCutaneous three times a day before meals  insulin lispro (ADMELOG) corrective regimen sliding scale   SubCutaneous at bedtime  metoprolol tartrate 25 milliGRAM(s) Oral three times a day    MEDICATIONS  (PRN):  acetaminophen     Tablet .. 650 milliGRAM(s) Oral every 6 hours PRN Temp greater or equal to 38C (100.4F), Mild Pain (1 - 3), Moderate Pain (4 - 6), Severe Pain (7 - 10)      Vital Signs Last 24 Hrs  T(C): 36.3 (09 Feb 2022 08:20), Max: 37.2 (08 Feb 2022 21:00)  T(F): 97.4 (09 Feb 2022 08:20), Max: 99 (08 Feb 2022 21:00)  HR: 82 (09 Feb 2022 08:20) (82 - 92)  BP: 119/74 (09 Feb 2022 08:20) (111/70 - 134/72)  BP(mean): --  RR: 18 (09 Feb 2022 08:20) (18 - 18)  SpO2: 92% (09 Feb 2022 08:20) (92% - 100%)  CAPILLARY BLOOD GLUCOSE      POCT Blood Glucose.: 120 mg/dL (09 Feb 2022 08:15)  POCT Blood Glucose.: 121 mg/dL (09 Feb 2022 05:15)  POCT Blood Glucose.: 186 mg/dL (08 Feb 2022 22:32)  POCT Blood Glucose.: 144 mg/dL (08 Feb 2022 18:09)  POCT Blood Glucose.: 110 mg/dL (08 Feb 2022 12:13)  POCT Blood Glucose.: 101 mg/dL (08 Feb 2022 08:25)    I&O's Summary    08 Feb 2022 07:01  -  09 Feb 2022 07:00  --------------------------------------------------------  IN: 80 mL / OUT: 450 mL / NET: -370 mL        PHYSICAL EXAM:  HEAD:  Atraumatic, Normocephalic  NECK: Supple, No   JVD  CHEST/LUNG:   no     rales,     no,    rhonchi  HEART: Regular rate and rhythm;         murmur  ABDOMEN: Soft, Nontender, ;   EXTREMITIES:  no  pedal     edema  NEUROLOGY:  alert    LABS:                        9.2    8.14  )-----------( 280      ( 09 Feb 2022 07:20 )             28.9     02-08    143  |  104  |  16  ----------------------------<  104<H>  4.0   |  24  |  0.61    Ca    9.4      08 Feb 2022 12:12    TPro  5.8<L>  /  Alb  2.6<L>  /  TBili  0.4  /  DBili  x   /  AST  14  /  ALT  19  /  AlkPhos  57  02-08    PT/INR - ( 07 Feb 2022 15:46 )   PT: 14.3 sec;   INR: 1.20 ratio         PTT - ( 07 Feb 2022 15:46 )  PTT:56.8 sec                Thyroid Stimulating Hormone, Serum: 0.47 uIU/mL (01-21 @ 17:02)          Consultant(s) Notes Reviewed:      Care Discussed with Consultants/Other Providers:

## 2022-02-09 NOTE — PROGRESS NOTE ADULT - ASSESSMENT
Assessment  DMT2: 81y Female with DM T2 with hyperglycemia, A1C 6.3%, she is unsure of her outpatient medications, on low-scale insulin coverage only, blood sugars are stable and trending within acceptable range, no hypoglycemias, eating meals, NAD.  Covid: on medications,  stable, monitored.  HTN: Controlled,  on antihypertensive medications.  Dementia: stable, monitored.      Brian Huynh MD  Cell: [phone]  Office: [phone]     Assessment  DMT2: 81y Female with DM T2 with hyperglycemia, A1C 6.3%, she is unsure of her outpatient medications, on low-scale insulin coverage only, blood sugars are stable and trending within acceptable range,  no hypoglycemias, eating meals, NAD.  Covid: on medications,  stable, monitored.  HTN: Controlled,  on antihypertensive medications.  Dementia: stable, monitored.      Brian Huynh MD  Cell: [phone]  Office: [phone]

## 2022-02-09 NOTE — PROGRESS NOTE ADULT - ASSESSMENT
81 year old female w/ PMHx of dementia and CHF who presented from home for several days of weakness followed by development of dyspnea and episode of substernal chest pain.    COVID19 Positive with hypoxic respiratory failure  Unvaccinated for COVID19  CTA Chest (1/21) with no PE and GGO consistent with COVID19    Completed 5 days of Remdesivir 1/21 - 1/25  Completed 10 days of Dexamethasone 1/22 - 1/31    High grade fever on 2/2  Blood Cultures (2/2) with E coli  U/A (2/2) with pyuria  UCx (2/2) with >100K E coli  Renal US with no hydronephrosis    Likely E coli bacteremia secondary to UTI / ?Pyelo    #Fever, Leukocytosis, E coli Bacteremia  --No absolute ID contraindication with proceeding with cardiac cath  --Continue Ceftriaxone 1g IV Q24H while admitted  --On discharge anticipate Cefpodoxime 200 mg PO Q12H with end date 2/12/22 (10 days from negative repeat blood cultures)    #COVID19  s/p Remdesivir 1/21 - 1/25  s/p Dexamethasone 1/22 - 1/31  Completed isolation for COVID19    I will sign off at this time. Please feel free to contact me with any further questions or concerns.    Sincere Camp M.D.  [facility] Division of Infectious Disease  8AM-5PM Monday - Friday: Available on [employer] Teams  After Hours and Holidays (or if no response on [employer] Teams): Please contact the Infectious Diseases Office at [phone]     The above assessment and plan were discussed with medicine PA

## 2022-02-09 NOTE — PROGRESS NOTE ADULT - ASSESSMENT
anemia  gi bleed  lgib    plan  hydrocortisone 25mg pr qd  cbc daily  transfuse prn   ppi once a day  stool occult negative   diet as tolerated   right thigh hematoma, sx consult noted   miralax daily   will follow    Advanced care planning was discussed with patient and family.  Advanced care planning forms were reviewed and discussed.  Risks, benefits and alternatives of gastroenterologic procedures were discussed in detail and all questions were answered.    30 minutes spent.

## 2022-02-10 LAB
ALBUMIN SERPL ELPH-MCNC: 2.9 G/DL — LOW (ref 3.3–5)
ALP SERPL-CCNC: 49 U/L — SIGNIFICANT CHANGE UP (ref 40–120)
ALT FLD-CCNC: 15 U/L — SIGNIFICANT CHANGE UP (ref 10–45)
ANION GAP SERPL CALC-SCNC: 10 MMOL/L — SIGNIFICANT CHANGE UP (ref 5–17)
AST SERPL-CCNC: 12 U/L — SIGNIFICANT CHANGE UP (ref 10–40)
BILIRUB SERPL-MCNC: 0.4 MG/DL — SIGNIFICANT CHANGE UP (ref 0.2–1.2)
BUN SERPL-MCNC: 18 MG/DL — SIGNIFICANT CHANGE UP (ref 7–23)
CALCIUM SERPL-MCNC: 8.8 MG/DL — SIGNIFICANT CHANGE UP (ref 8.4–10.5)
CHLORIDE SERPL-SCNC: 105 MMOL/L — SIGNIFICANT CHANGE UP (ref 96–108)
CO2 SERPL-SCNC: 27 MMOL/L — SIGNIFICANT CHANGE UP (ref 22–31)
CREAT SERPL-MCNC: 0.66 MG/DL — SIGNIFICANT CHANGE UP (ref 0.5–1.3)
GLUCOSE BLDC GLUCOMTR-MCNC: 113 MG/DL — HIGH (ref 70–99)
GLUCOSE BLDC GLUCOMTR-MCNC: 114 MG/DL — HIGH (ref 70–99)
GLUCOSE BLDC GLUCOMTR-MCNC: 129 MG/DL — HIGH (ref 70–99)
GLUCOSE SERPL-MCNC: 100 MG/DL — HIGH (ref 70–99)
HCT VFR BLD CALC: 26.3 % — LOW (ref 34.5–45)
HGB BLD-MCNC: 8.4 G/DL — LOW (ref 11.5–15.5)
MCHC RBC-ENTMCNC: 28.3 PG — SIGNIFICANT CHANGE UP (ref 27–34)
MCHC RBC-ENTMCNC: 31.9 GM/DL — LOW (ref 32–36)
MCV RBC AUTO: 88.6 FL — SIGNIFICANT CHANGE UP (ref 80–100)
NRBC # BLD: 0 /100 WBCS — SIGNIFICANT CHANGE UP (ref 0–0)
PLATELET # BLD AUTO: 265 K/UL — SIGNIFICANT CHANGE UP (ref 150–400)
POTASSIUM SERPL-MCNC: 3.8 MMOL/L — SIGNIFICANT CHANGE UP (ref 3.5–5.3)
POTASSIUM SERPL-SCNC: 3.8 MMOL/L — SIGNIFICANT CHANGE UP (ref 3.5–5.3)
PROT SERPL-MCNC: 5.8 G/DL — LOW (ref 6–8.3)
RBC # BLD: 2.97 M/UL — LOW (ref 3.8–5.2)
RBC # FLD: 16.4 % — HIGH (ref 10.3–14.5)
SODIUM SERPL-SCNC: 142 MMOL/L — SIGNIFICANT CHANGE UP (ref 135–145)
WBC # BLD: 6.29 K/UL — SIGNIFICANT CHANGE UP (ref 3.8–10.5)
WBC # FLD AUTO: 6.29 K/UL — SIGNIFICANT CHANGE UP (ref 3.8–10.5)

## 2022-02-10 RX ADMIN — Medication 25 MILLIGRAM(S): at 22:14

## 2022-02-10 RX ADMIN — POLYETHYLENE GLYCOL 3350 17 GRAM(S): 17 POWDER, FOR SOLUTION ORAL at 13:28

## 2022-02-10 RX ADMIN — CEFTRIAXONE 100 MILLIGRAM(S): 500 INJECTION, POWDER, FOR SOLUTION INTRAMUSCULAR; INTRAVENOUS at 17:49

## 2022-02-10 RX ADMIN — Medication 25 MILLIGRAM(S): at 05:36

## 2022-02-10 RX ADMIN — Medication 81 MILLIGRAM(S): at 13:28

## 2022-02-10 RX ADMIN — Medication 25 MILLIGRAM(S): at 13:32

## 2022-02-10 RX ADMIN — Medication 1 SUPPOSITORY(S): at 13:28

## 2022-02-10 RX ADMIN — FAMOTIDINE 20 MILLIGRAM(S): 10 INJECTION INTRAVENOUS at 13:31

## 2022-02-10 RX ADMIN — ATORVASTATIN CALCIUM 10 MILLIGRAM(S): 80 TABLET, FILM COATED ORAL at 22:14

## 2022-02-10 NOTE — PROGRESS NOTE ADULT - SUBJECTIVE AND OBJECTIVE BOX
CARDIOLOGY     PROGRESS  NOTE   ________________________________________________    CHIEF COMPLAINT:Patient is a 81y old  Female who presents with a chief complaint of sob/cp (10 Feb 2022 15:16)  comfortable.  	  REVIEW OF SYSTEMS:  CONSTITUTIONAL: No fever, weight loss, or fatigue  EYES: No eye pain, visual disturbances, or discharge  ENT:  No difficulty hearing, tinnitus, vertigo; No sinus or throat pain  NECK: No pain or stiffness  RESPIRATORY: No cough, wheezing, chills or hemoptysis; No Shortness of Breath  CARDIOVASCULAR: No chest pain, palpitations, passing out, dizziness, or leg swelling  GASTROINTESTINAL: No abdominal or epigastric pain. No nausea, vomiting, or hematemesis; No diarrhea or constipation. No melena or hematochezia.  GENITOURINARY: No dysuria, frequency, hematuria, or incontinence  NEUROLOGICAL: No headaches, memory loss, loss of strength, numbness, or tremors  SKIN: No itching, burning, rashes, or lesions   LYMPH Nodes: No enlarged glands  ENDOCRINE: No heat or cold intolerance; No hair loss  MUSCULOSKELETAL: No joint pain or swelling; No muscle, back, or extremity pain  PSYCHIATRIC: No depression, anxiety, mood swings, or difficulty sleeping  HEME/LYMPH: No easy bruising, or bleeding gums  ALLERGY AND IMMUNOLOGIC: No hives or eczema	    [ ] All others negative	  [x ] Unable to obtain    PHYSICAL EXAM:  T(C): 36.8 (02-10-22 @ 17:27), Max: 37.2 (02-09-22 @ 21:52)  HR: 80 (02-10-22 @ 17:27) (80 - 103)  BP: 126/77 (02-10-22 @ 17:27) (101/62 - 127/75)  RR: 18 (02-10-22 @ 17:27) (18 - 19)  SpO2: 99% (02-10-22 @ 17:27) (93% - 99%)  Wt(kg): --  I&O's Summary    09 Feb 2022 07:01  -  10 Feb 2022 07:00  --------------------------------------------------------  IN: 730 mL / OUT: 500 mL / NET: 230 mL    10 Feb 2022 07:01  -  10 Feb 2022 20:26  --------------------------------------------------------  IN: 820 mL / OUT: 0 mL / NET: 820 mL        Appearance: Normal	  HEENT:   Normal oral mucosa, PERRL, EOMI	  Lymphatic: No lymphadenopathy  Cardiovascular: Normal S1 S2, No JVD, + murmurs, No edema  Respiratory: rhonchi  Psychiatry: A & O x 3, Mood & affect appropriate  Gastrointestinal:  Soft, Non-tender, + BS	  Skin: No rashes, No ecchymoses, No cyanosis	  Neurologic: Non-focal  Extremities: Normal range of motion, No clubbing, cyanosis or edema  Vascular: Peripheral pulses palpable 2+ bilaterally    MEDICATIONS  (STANDING):  aspirin enteric coated 81 milliGRAM(s) Oral daily  atorvastatin 10 milliGRAM(s) Oral at bedtime  cefTRIAXone   IVPB 1000 milliGRAM(s) IV Intermittent every 24 hours  dextrose 40% Gel 15 Gram(s) Oral once  dextrose 50% Injectable 25 Gram(s) IV Push once  dextrose 50% Injectable 12.5 Gram(s) IV Push once  dextrose 50% Injectable 25 Gram(s) IV Push once  famotidine    Tablet 20 milliGRAM(s) Oral daily  glucagon  Injectable 1 milliGRAM(s) IntraMuscular once  hydrocortisone hemorrhoidal Suppository 1 Suppository(s) Rectal daily  insulin lispro (ADMELOG) corrective regimen sliding scale   SubCutaneous three times a day before meals  insulin lispro (ADMELOG) corrective regimen sliding scale   SubCutaneous at bedtime  metoprolol tartrate 25 milliGRAM(s) Oral three times a day  polyethylene glycol 3350 17 Gram(s) Oral daily      TELEMETRY: 	    ECG:  	  RADIOLOGY:  OTHER: 	  	  LABS:	 	    CARDIAC MARKERS:                                8.4    6.29  )-----------( 265      ( 10 Feb 2022 07:11 )             26.3     02-10    142  |  105  |  18  ----------------------------<  100<H>  3.8   |  27  |  0.66    Ca    8.8      10 Feb 2022 07:12    TPro  5.8<L>  /  Alb  2.9<L>  /  TBili  0.4  /  DBili  x   /  AST  12  /  ALT  15  /  AlkPhos  49  02-10    proBNP: Serum Pro-Brain Natriuretic Peptide: 20071 pg/mL (01-21 @ 12:11)    Lipid Profile:   HgA1c:   TSH: Thyroid Stimulating Hormone, Serum: 0.47 uIU/mL (01-21 @ 17:02)  #Fever, Leukocytosis, E coli Bacteremia  --No absolute ID contraindication with proceeding with cardiac cath  --Continue Ceftriaxone 1g IV Q24H while admitted  --On discharge anticipate Cefpodoxime 200 mg PO Q12H with end date 2/12/22 (10 days from negative repeat blood cultures)    #COVID19  s/p Remdesivir 1/21 - 1/25  s/p Dexamethasone 1/22 - 1/31  Completed isolation for COVID19    Culture - Blood (02.03.22 @ 14:42)    Specimen Source: .Blood Blood-Peripheral    Culture Results:   No Growth Final    < from: Xray Chest 1 View- PORTABLE-Urgent (Xray Chest 1 View- PORTABLE-Urgent .) (02.02.22 @ 15:13) >  Mild, diffuse bilateral patchy opacities with peripheral and basilar   predilection most consistent with atypical pneumonia.    Mild pulmonary vascular congestion.        Assessment and plan  ---------------------------  80 yo F  h/o   dementia and  <CHF per EMS,/  no othe r hx  is  available presenting via EMS from home for few days of weakness, followed by development of SOB and episode of substernal CP this morning.     Pt is a poor historian and unable to provide significant history    all ROS negative upon questioning.   Lives at home with brother - he reports concern for pt's increased work of breathing.   pt is a 80 yo female with hx of htn, chf, cad, with increasing sob and COVID with  abnormal  cta and chest x ray  cta negative for PE  lipid panel  continue covid therapy  fu renal function  keep K>  will increase beta blocker as tolerated  ac, a.fib rate is controlled  echo noted with normal EF, no WMA  continue beta blocker increase as tolerated  will consider Wilmer/ cardioversion post cath  ID noted, appreciated  increase beta blocker hr better controlled  ac held sec to R thigh  hematoma re start vac as clear   repeat chest x ray in am

## 2022-02-10 NOTE — PROGRESS NOTE ADULT - SUBJECTIVE AND OBJECTIVE BOX
INTERVAL HPI/OVERNIGHT EVENTS:  pt seen and examined earlier, events noted  No N/V/D.  Tolerating diet. H/H stable  +BM, no blood     Allergies    No Known Allergies    Intolerances    General:  No wt loss, fevers, chills, night sweats, fatigue,   Eyes:  Good vision, no reported pain  ENT:  No sore throat, pain, runny nose, dysphagia  CV:  No pain, palpitations, hypo/hypertension  Resp:  No dyspnea, cough, tachypnea, wheezing  GI:  No pain, No nausea, No vomiting, No diarrhea, + constipation, No weight loss, No fever, No pruritis, No rectal bleeding, No tarry stools, No dysphagia,  :  No pain, bleeding, incontinence, nocturia  Muscle:  No pain, weakness  Neuro:  No weakness, tingling, memory problems  Psych:  No fatigue, insomnia, mood problems, depression  Endocrine:  No polyuria, polydipsia, cold/heat intolerance  Heme:  No petechiae, ecchymosis, easy bruisability  Skin:  No rash, tattoos, scars, edema      PHYSICAL EXAM:   Vital Signs Last 24 Hrs  T(C): 36.4 (10 Feb 2022 10:29), Max: 37.9 (09 Feb 2022 20:00)  T(F): 97.5 (10 Feb 2022 10:29), Max: 100.2 (09 Feb 2022 20:00)  HR: 92 (10 Feb 2022 10:29) (84 - 109)  BP: 101/62 (10 Feb 2022 10:29) (101/62 - 127/75)  BP(mean): --  RR: 18 (10 Feb 2022 10:29) (18 - 18)  SpO2: 97% (10 Feb 2022 10:29) (93% - 98%)  Daily     Daily   I&O's Summary    09 Feb 2022 07:01  -  10 Feb 2022 07:00  --------------------------------------------------------  IN: 730 mL / OUT: 500 mL / NET: 230 mL          GENERAL:  Appears stated age, well-groomed, well-nourished, no distress  HEENT:  NC/AT,  conjunctivae clear and pink, no thyromegaly, nodules, adenopathy, no JVD, sclera -anicteric  CHEST:  Full & symmetric excursion, no increased effort, breath sounds clear  HEART:  Regular rhythm, S1, S2, no murmur/rub/S3/S4, no abdominal bruit, no edema  ABDOMEN:  Soft, non-tender, non-distended, normoactive bowel sounds,  no masses ,no hepato-splenomegaly, no signs of chronic liver disease  EXTEREMITIES:  no cyanosis,clubbing or edema  SKIN:  No rash/erythema/ecchymoses/petechiae/wounds/abscess/warm/dry, +Right thigh hematoma   NEURO:  Alert, oriented, no asterixis, no tremor, no encephalopathy      LABS:                                   8.4    6.29  )-----------( 265      ( 10 Feb 2022 07:11 )             26.3   02-10    142  |  105  |  18  ----------------------------<  100<H>  3.8   |  27  |  0.66    Ca    8.8      10 Feb 2022 07:12    TPro  5.8<L>  /  Alb  2.9<L>  /  TBili  0.4  /  DBili  x   /  AST  12  /  ALT  15  /  AlkPhos  49  02-10    amylase   lipase  RADIOLOGY & ADDITIONAL TESTS:

## 2022-02-10 NOTE — PROGRESS NOTE ADULT - ASSESSMENT
80 yo F     h/o   dementia and   ? CHF per EMS,/  no other  hx  is  available      presenting via EMS from home for few days of weakness, followed by development of SOB and episode of substernal CP this morning.     Pt is a poor historian and unable to provide significant history    all ROS negative upon questioning.     Lives at home with brother - he reports concern for pt's increased work of breathing.      pt with   weakness/  sob  from  covid/  pna   CT  chest, with  GOO   on decadron/  remdisivir   *  Dementia   *    HTN,   *  acute  diastolic chf  on   lasix  card  dr davis  on  dvt ppx.  bmi  is  20  tele,   s/p vtach/ on  toprol,  card to  f/p   *  Afib,   was on iv heparin  v tach  on tele.  card following  tele, afib.  s/p  Vtach  22  beats. , had  subsequent vtach also,   cardiac cath was  deferred  due to  fevers  *  E  coli  bacteremia,   on iv rocephin,. rpt bcx  are negative  cath  deferred  for  now, will perform at  a later date,  when cleared by    pt  with  tachycardia and  Vtach on tele,  card  following  pt   u/s     right  thigh  hematoma, 5  cm ,  seen by  vascular,  no  intervention  a/c  on  hold  for  now.  hb is  stable  remains  off a/c.  cath at  a latertome        spoke  with  brothersammi/ pt  is , has  no  children/ states,   pt  is  full code    pmd  dr sheppard, allie chan< from: CT Angio Chest PE Protocol w/ IV Cont (01.21.22 @ 16:01) >  IMPRESSION:  No main, right, left, lobar, or proximal segmental pulmonary embolus.  Patchy bilateral groundglass opacities noted throughout both lungs likely   representing infection or alveolar component of edema. COVID 19 can also   have this appearance. Correlate with RT PCR    --- End of Report --  < end of copied text >

## 2022-02-10 NOTE — PROGRESS NOTE ADULT - ASSESSMENT
Assessment  DMT2: 81y Female with DM T2 with hyperglycemia, A1C 6.3%, she is unsure of her outpatient medications, on low-scale insulin coverage only, blood sugars are stable and trending within acceptable range, no hypoglycemias. Patient eating meals, appears comfortable in NAD, planning cath at a later time..  Covid: on medications,  stable, monitored.  HTN: Controlled,  on antihypertensive medications.  Dementia: stable, monitored.      Brian Huynh MD  Cell: [phone]  Office: [phone]     Assessment  DMT2: 81y Female with DM T2 with hyperglycemia, A1C 6.3%, she is unsure of her outpatient medications, on low-scale insulin coverage only, blood sugars are stable and trending within acceptable range, no hypoglycemias.  Patient eating meals, appears comfortable in NAD, planning cath at a later time..  Covid: on medications,  stable, monitored.  HTN: Controlled,  on antihypertensive medications.  Dementia: stable, monitored.      Brian Huynh MD  Cell: [phone]  Office: [phone]

## 2022-02-10 NOTE — PROGRESS NOTE ADULT - SUBJECTIVE AND OBJECTIVE BOX
Chief complaint  Patient is a 81y old  Female who presents with a chief complaint of sob/cp (10 Feb 2022 13:44)   Review of systems  Patient in bed, looks comfortable, no hypoglycemic episodes.    Labs and Fingersticks  CAPILLARY BLOOD GLUCOSE      POCT Blood Glucose.: 113 mg/dL (10 Feb 2022 12:14)  POCT Blood Glucose.: 125 mg/dL (09 Feb 2022 22:10)  POCT Blood Glucose.: 153 mg/dL (09 Feb 2022 18:10)      Anion Gap, Serum: 10 (02-10 @ 07:12)      Calcium, Total Serum: 8.8 (02-10 @ 07:12)  Albumin, Serum: 2.9 *L* (02-10 @ 07:12)    Alanine Aminotransferase (ALT/SGPT): 15 (02-10 @ 07:12)  Alkaline Phosphatase, Serum: 49 (02-10 @ 07:12)  Aspartate Aminotransferase (AST/SGOT): 12 (02-10 @ 07:12)        02-10    142  |  105  |  18  ----------------------------<  100<H>  3.8   |  27  |  0.66    Ca    8.8      10 Feb 2022 07:12    TPro  5.8<L>  /  Alb  2.9<L>  /  TBili  0.4  /  DBili  x   /  AST  12  /  ALT  15  /  AlkPhos  49  02-10                        8.4    6.29  )-----------( 265      ( 10 Feb 2022 07:11 )             26.3     Medications  MEDICATIONS  (STANDING):  aspirin enteric coated 81 milliGRAM(s) Oral daily  atorvastatin 10 milliGRAM(s) Oral at bedtime  cefTRIAXone   IVPB 1000 milliGRAM(s) IV Intermittent every 24 hours  dextrose 40% Gel 15 Gram(s) Oral once  dextrose 50% Injectable 25 Gram(s) IV Push once  dextrose 50% Injectable 12.5 Gram(s) IV Push once  dextrose 50% Injectable 25 Gram(s) IV Push once  famotidine    Tablet 20 milliGRAM(s) Oral daily  glucagon  Injectable 1 milliGRAM(s) IntraMuscular once  hydrocortisone hemorrhoidal Suppository 1 Suppository(s) Rectal daily  insulin lispro (ADMELOG) corrective regimen sliding scale   SubCutaneous three times a day before meals  insulin lispro (ADMELOG) corrective regimen sliding scale   SubCutaneous at bedtime  metoprolol tartrate 25 milliGRAM(s) Oral three times a day  polyethylene glycol 3350 17 Gram(s) Oral daily      Physical Exam  General: Patient comfortable in bed  Vital Signs Last 12 Hrs  T(F): 98.1 (02-10-22 @ 13:49), Max: 98.1 (02-10-22 @ 13:49)  HR: 88 (02-10-22 @ 13:49) (84 - 92)  BP: 124/74 (02-10-22 @ 13:49) (101/62 - 127/75)  BP(mean): --  RR: 19 (02-10-22 @ 13:49) (18 - 19)  SpO2: 98% (02-10-22 @ 13:49) (94% - 98%)  Neck: No palpable thyroid nodules.  CVS: S1S2, No murmurs  Respiratory: No wheezing, no crepitations  GI: Abdomen soft, bowel sounds positive  Musculoskeletal:  edema lower extremities.   Skin: No skin rashes, no ecchymosis    Diagnostics    Diet, Regular:   Consistent Carbohydrate No Snacks (CSTCHO) (01-26 @ 15:27)           Chief complaint  Patient is a 81y old  Female who presents with a chief complaint of sob/cp (10 Feb 2022 13:44)   Review of systems  Patient in bed, looks comfortable, no hypoglycemic episodes.    Labs and Fingersticks  CAPILLARY BLOOD GLUCOSE      POCT Blood Glucose.: 113 mg/dL (10 Feb 2022 12:14)  POCT Blood Glucose.: 125 mg/dL (09 Feb 2022 22:10)  POCT Blood Glucose.: 153 mg/dL (09 Feb 2022 18:10)      Anion Gap, Serum: 10 (02-10 @ 07:12)      Calcium, Total Serum: 8.8 (02-10 @ 07:12)  Albumin, Serum: 2.9 *L* (02-10 @ 07:12)    Alanine Aminotransferase (ALT/SGPT): 15 (02-10 @ 07:12)  Alkaline Phosphatase, Serum: 49 (02-10 @ 07:12)  Aspartate Aminotransferase (AST/SGOT): 12 (02-10 @ 07:12)        02-10    142  |  105  |  18  ----------------------------<  100<H>  3.8   |  27  |  0.66    Ca    8.8      10 Feb 2022 07:12    TPro  5.8<L>  /  Alb  2.9<L>  /  TBili  0.4  /  DBili  x   /  AST  12  /  ALT  15  /  AlkPhos  49  02-10                        8.4    6.29  )-----------( 265      ( 10 Feb 2022 07:11 )             26.3     Medications  MEDICATIONS  (STANDING):  aspirin enteric coated 81 milliGRAM(s) Oral daily  atorvastatin 10 milliGRAM(s) Oral at bedtime  cefTRIAXone   IVPB 1000 milliGRAM(s) IV Intermittent every 24 hours  dextrose 40% Gel 15 Gram(s) Oral once  dextrose 50% Injectable 25 Gram(s) IV Push once  dextrose 50% Injectable 12.5 Gram(s) IV Push once  dextrose 50% Injectable 25 Gram(s) IV Push once  famotidine    Tablet 20 milliGRAM(s) Oral daily  glucagon  Injectable 1 milliGRAM(s) IntraMuscular once  hydrocortisone hemorrhoidal Suppository 1 Suppository(s) Rectal daily  insulin lispro (ADMELOG) corrective regimen sliding scale   SubCutaneous three times a day before meals  insulin lispro (ADMELOG) corrective regimen sliding scale   SubCutaneous at bedtime  metoprolol tartrate 25 milliGRAM(s) Oral three times a day  polyethylene glycol 3350 17 Gram(s) Oral daily      Physical Exam  General: Patient comfortable in bed  Vital Signs Last 12 Hrs  T(F): 98.1 (02-10-22 @ 13:49), Max: 98.1 (02-10-22 @ 13:49)  HR: 88 (02-10-22 @ 13:49) (84 - 92)  BP: 124/74 (02-10-22 @ 13:49) (101/62 - 127/75)  BP(mean): --  RR: 19 (02-10-22 @ 13:49) (18 - 19)  SpO2: 98% (02-10-22 @ 13:49) (94% - 98%)  Neck: No palpable thyroid nodules.  CVS: S1S2, No murmurs  Respiratory: No wheezing, no crepitations  GI: Abdomen soft, bowel sounds positive  Musculoskeletal:  edema lower extremities.   Skin: No skin rashes, no ecchymosis    Diagnostics    Diet, Regular:   Consistent Carbohydrate No Snacks (CSTCHO) (01-26 @ 15:27)

## 2022-02-10 NOTE — PROGRESS NOTE ADULT - SUBJECTIVE AND OBJECTIVE BOX
afberile    REVIEW OF SYSTEMS:  GEN: no fever,    no chills  RESP: no SOB,   no cough  CVS: no chest pain,   no palpitations  GI: no abdominal pain,   no nausea,   no vomiting,   no constipation,   no diarrhea  : no dysuria,   no frequency  NEURO: no headache,   no dizziness  PSYCH: no depression,   not anxious  Derm : no rash    MEDICATIONS  (STANDING):  aspirin enteric coated 81 milliGRAM(s) Oral daily  atorvastatin 10 milliGRAM(s) Oral at bedtime  cefTRIAXone   IVPB 1000 milliGRAM(s) IV Intermittent every 24 hours  dextrose 40% Gel 15 Gram(s) Oral once  dextrose 50% Injectable 25 Gram(s) IV Push once  dextrose 50% Injectable 12.5 Gram(s) IV Push once  dextrose 50% Injectable 25 Gram(s) IV Push once  famotidine    Tablet 20 milliGRAM(s) Oral daily  glucagon  Injectable 1 milliGRAM(s) IntraMuscular once  hydrocortisone hemorrhoidal Suppository 1 Suppository(s) Rectal daily  insulin lispro (ADMELOG) corrective regimen sliding scale   SubCutaneous three times a day before meals  insulin lispro (ADMELOG) corrective regimen sliding scale   SubCutaneous at bedtime  metoprolol tartrate 25 milliGRAM(s) Oral three times a day  polyethylene glycol 3350 17 Gram(s) Oral daily    MEDICATIONS  (PRN):  acetaminophen     Tablet .. 650 milliGRAM(s) Oral every 6 hours PRN Temp greater or equal to 38C (100.4F), Mild Pain (1 - 3), Moderate Pain (4 - 6), Severe Pain (7 - 10)      Vital Signs Last 24 Hrs  T(C): 36.7 (10 Feb 2022 05:32), Max: 37.9 (09 Feb 2022 20:00)  T(F): 98 (10 Feb 2022 05:32), Max: 100.2 (09 Feb 2022 20:00)  HR: 84 (10 Feb 2022 05:32) (84 - 109)  BP: 127/75 (10 Feb 2022 05:32) (104/64 - 127/75)  BP(mean): --  RR: 18 (10 Feb 2022 05:32) (18 - 18)  SpO2: 94% (10 Feb 2022 05:32) (93% - 99%)  CAPILLARY BLOOD GLUCOSE      POCT Blood Glucose.: 125 mg/dL (09 Feb 2022 22:10)  POCT Blood Glucose.: 153 mg/dL (09 Feb 2022 18:10)  POCT Blood Glucose.: 101 mg/dL (09 Feb 2022 12:21)    I&O's Summary    09 Feb 2022 07:01  -  10 Feb 2022 07:00  --------------------------------------------------------  IN: 730 mL / OUT: 500 mL / NET: 230 mL        PHYSICAL EXAM:  HEAD:  Atraumatic, Normocephalic  NECK: Supple, No   JVD  CHEST/LUNG:   no     rales,     no,    rhonchi  HEART: Regular rate and rhythm;         murmur  ABDOMEN: Soft, Nontender, ;   EXTREMITIES:     no   edema  NEUROLOGY:  alert    LABS:                        8.4    6.29  )-----------( 265      ( 10 Feb 2022 07:11 )             26.3     02-10    142  |  105  |  18  ----------------------------<  100<H>  3.8   |  27  |  0.66    Ca    8.8      10 Feb 2022 07:12    TPro  5.8<L>  /  Alb  2.9<L>  /  TBili  0.4  /  DBili  x   /  AST  12  /  ALT  15  /  AlkPhos  49  02-10                    Thyroid Stimulating Hormone, Serum: 0.47 uIU/mL (01-21 @ 17:02)          Consultant(s) Notes Reviewed:      Care Discussed with Consultants/Other Providers:

## 2022-02-11 LAB
GLUCOSE BLDC GLUCOMTR-MCNC: 101 MG/DL — HIGH (ref 70–99)
GLUCOSE BLDC GLUCOMTR-MCNC: 122 MG/DL — HIGH (ref 70–99)
GLUCOSE BLDC GLUCOMTR-MCNC: 128 MG/DL — HIGH (ref 70–99)
GLUCOSE BLDC GLUCOMTR-MCNC: 129 MG/DL — HIGH (ref 70–99)
HCT VFR BLD CALC: 28.3 % — LOW (ref 34.5–45)
HGB BLD-MCNC: 8.7 G/DL — LOW (ref 11.5–15.5)
MCHC RBC-ENTMCNC: 27.7 PG — SIGNIFICANT CHANGE UP (ref 27–34)
MCHC RBC-ENTMCNC: 30.7 GM/DL — LOW (ref 32–36)
MCV RBC AUTO: 90.1 FL — SIGNIFICANT CHANGE UP (ref 80–100)
NRBC # BLD: 0 /100 WBCS — SIGNIFICANT CHANGE UP (ref 0–0)
PLATELET # BLD AUTO: 282 K/UL — SIGNIFICANT CHANGE UP (ref 150–400)
RBC # BLD: 3.14 M/UL — LOW (ref 3.8–5.2)
RBC # FLD: 16.6 % — HIGH (ref 10.3–14.5)
WBC # BLD: 6.15 K/UL — SIGNIFICANT CHANGE UP (ref 3.8–10.5)
WBC # FLD AUTO: 6.15 K/UL — SIGNIFICANT CHANGE UP (ref 3.8–10.5)

## 2022-02-11 RX ORDER — POLYETHYLENE GLYCOL 3350 17 G/17G
17 POWDER, FOR SOLUTION ORAL
Refills: 0 | Status: DISCONTINUED | OUTPATIENT
Start: 2022-02-11 | End: 2022-02-12

## 2022-02-11 RX ORDER — METOPROLOL TARTRATE 50 MG
50 TABLET ORAL
Refills: 0 | Status: DISCONTINUED | OUTPATIENT
Start: 2022-02-11 | End: 2022-02-14

## 2022-02-11 RX ADMIN — POLYETHYLENE GLYCOL 3350 17 GRAM(S): 17 POWDER, FOR SOLUTION ORAL at 17:36

## 2022-02-11 RX ADMIN — Medication 25 MILLIGRAM(S): at 17:52

## 2022-02-11 RX ADMIN — CEFTRIAXONE 100 MILLIGRAM(S): 500 INJECTION, POWDER, FOR SOLUTION INTRAMUSCULAR; INTRAVENOUS at 17:51

## 2022-02-11 RX ADMIN — Medication 25 MILLIGRAM(S): at 06:26

## 2022-02-11 RX ADMIN — Medication 1 SUPPOSITORY(S): at 12:14

## 2022-02-11 RX ADMIN — Medication 0: at 21:34

## 2022-02-11 RX ADMIN — FAMOTIDINE 20 MILLIGRAM(S): 10 INJECTION INTRAVENOUS at 12:14

## 2022-02-11 RX ADMIN — Medication 81 MILLIGRAM(S): at 12:14

## 2022-02-11 RX ADMIN — ATORVASTATIN CALCIUM 10 MILLIGRAM(S): 80 TABLET, FILM COATED ORAL at 21:32

## 2022-02-11 NOTE — PROGRESS NOTE ADULT - SUBJECTIVE AND OBJECTIVE BOX
CARDIOLOGY     PROGRESS  NOTE   ________________________________________________    CHIEF COMPLAINT:Patient is a 81y old  Female who presents with a chief complaint of sob/cp (11 Feb 2022 14:17)  no complain.  	  REVIEW OF SYSTEMS:  CONSTITUTIONAL: No fever, weight loss, or fatigue  EYES: No eye pain, visual disturbances, or discharge  ENT:  No difficulty hearing, tinnitus, vertigo; No sinus or throat pain  NECK: No pain or stiffness  RESPIRATORY: No cough, wheezing, chills or hemoptysis; No Shortness of Breath  CARDIOVASCULAR: No chest pain, palpitations, passing out, dizziness, or leg swelling  GASTROINTESTINAL: No abdominal or epigastric pain. No nausea, vomiting, or hematemesis; No diarrhea or constipation. No melena or hematochezia.  GENITOURINARY: No dysuria, frequency, hematuria, or incontinence  NEUROLOGICAL: No headaches, memory loss, loss of strength, numbness, or tremors  SKIN: No itching, burning, rashes, or lesions   LYMPH Nodes: No enlarged glands  ENDOCRINE: No heat or cold intolerance; No hair loss  MUSCULOSKELETAL: No joint pain or swelling; No muscle, back, or extremity pain  PSYCHIATRIC: No depression, anxiety, mood swings, or difficulty sleeping  HEME/LYMPH: No easy bruising, or bleeding gums  ALLERGY AND IMMUNOLOGIC: No hives or eczema	    [ ] All others negative	  [ x] Unable to obtain    PHYSICAL EXAM:  T(C): 37.2 (02-11-22 @ 17:47), Max: 37.2 (02-10-22 @ 22:18)  HR: 82 (02-11-22 @ 17:47) (79 - 99)  BP: 143/98 (02-11-22 @ 17:47) (112/72 - 152/78)  RR: 18 (02-11-22 @ 17:47) (18 - 18)  SpO2: 95% (02-11-22 @ 17:47) (94% - 97%)  Wt(kg): --  I&O's Summary    10 Feb 2022 07:01  -  11 Feb 2022 07:00  --------------------------------------------------------  IN: 1300 mL / OUT: 0 mL / NET: 1300 mL    11 Feb 2022 07:01  -  11 Feb 2022 19:52  --------------------------------------------------------  IN: 440 mL / OUT: 0 mL / NET: 440 mL        Appearance: Normal	  HEENT:   Normal oral mucosa, PERRL, EOMI	  Lymphatic: No lymphadenopathy  Cardiovascular: Normal S1 S2, No JVD, + murmurs, No edema  Respiratory: Lungs clear to auscultation	  Psychiatry: A & O x 3, Mood & affect appropriate  Gastrointestinal:  Soft, Non-tender, + BS	  Skin: No rashes, No ecchymoses, No cyanosis	  Neurologic: Non-focal  Extremities: Normal range of motion, No clubbing, cyanosis or edema  Vascular: Peripheral pulses palpable 2+ bilaterally    MEDICATIONS  (STANDING):  aspirin enteric coated 81 milliGRAM(s) Oral daily  atorvastatin 10 milliGRAM(s) Oral at bedtime  dextrose 40% Gel 15 Gram(s) Oral once  dextrose 50% Injectable 25 Gram(s) IV Push once  dextrose 50% Injectable 12.5 Gram(s) IV Push once  dextrose 50% Injectable 25 Gram(s) IV Push once  famotidine    Tablet 20 milliGRAM(s) Oral daily  glucagon  Injectable 1 milliGRAM(s) IntraMuscular once  hydrocortisone hemorrhoidal Suppository 1 Suppository(s) Rectal daily  insulin lispro (ADMELOG) corrective regimen sliding scale   SubCutaneous three times a day before meals  insulin lispro (ADMELOG) corrective regimen sliding scale   SubCutaneous at bedtime  metoprolol tartrate 25 milliGRAM(s) Oral three times a day  polyethylene glycol 3350 17 Gram(s) Oral two times a day      TELEMETRY: 	    ECG:  	  RADIOLOGY:  OTHER: 	  	  LABS:	 	    CARDIAC MARKERS:                                8.7    6.15  )-----------( 282      ( 11 Feb 2022 07:22 )             28.3     02-10    142  |  105  |  18  ----------------------------<  100<H>  3.8   |  27  |  0.66    Ca    8.8      10 Feb 2022 07:12    TPro  5.8<L>  /  Alb  2.9<L>  /  TBili  0.4  /  DBili  x   /  AST  12  /  ALT  15  /  AlkPhos  49  02-10    proBNP: Serum Pro-Brain Natriuretic Peptide: 20071 pg/mL (01-21 @ 12:11)    Lipid Profile:   HgA1c:   TSH: Thyroid Stimulating Hormone, Serum: 0.47 uIU/mL (01-21 @ 17:02)          Assessment and plan  ---------------------------  80 yo F  h/o   dementia and  <CHF per EMS,/  no othe r hx  is  available presenting via EMS from home for few days of weakness, followed by development of SOB and episode of substernal CP this morning.     Pt is a poor historian and unable to provide significant history    all ROS negative upon questioning.   Lives at home with brother - he reports concern for pt's increased work of breathing.   pt is a 80 yo female with hx of htn, chf, cad, with increasing sob and COVID with  abnormal  cta and chest x ray  cta negative for PE  lipid panel  continue covid therapy  fu renal function  keep K>  will increase beta blocker as tolerated  ac, a.fib rate is controlled  echo noted with normal EF, no WMA  continue beta blocker increase as tolerated  will consider Wilmer/ cardioversion post cath  ID noted, appreciated  increase beta blocker hr better controlled  ac held sec to R thigh  hematoma re start vac as clear   repeat chest x ray in am will order  will increase beta blocker as tolerated

## 2022-02-11 NOTE — PROGRESS NOTE ADULT - ASSESSMENT
Assessment  DMT2: 81y Female with DM T2 with hyperglycemia, A1C 6.3%, she is unsure of her outpatient medications, on low-scale insulin coverage only, blood sugars are stable and trending within acceptable range, no hypoglycemias. Patient eating meals, appears comfortable in NAD, planning cath next week per Cardiology.  Covid: on medications,  stable, monitored.  HTN: Controlled,  on antihypertensive medications.  Dementia: stable, monitored.      Brian Huynh MD  Cell: [phone]  Office: [phone]     Assessment  DMT2: 81y Female with DM T2 with hyperglycemia, A1C 6.3%, she is unsure of her outpatient medications,  on low-scale insulin coverage only, blood sugars are stable and trending within acceptable range, no hypoglycemias. Patient eating meals, appears comfortable in NAD, planning cath next week per Cardiology.  Covid: on medications,  stable, monitored.  HTN: Controlled,  on antihypertensive medications.  Dementia: stable, monitored.      Brian Huynh MD  Cell: [phone]  Office: [phone]

## 2022-02-11 NOTE — PROGRESS NOTE ADULT - SUBJECTIVE AND OBJECTIVE BOX
Chief complaint  Patient is a 81y old  Female who presents with a chief complaint of sob/cp (11 Feb 2022 13:29)   Review of systems  Patient awake in chair, looks comfortable, no hypoglycemic episodes.    Labs and Fingersticks  CAPILLARY BLOOD GLUCOSE      POCT Blood Glucose.: 128 mg/dL (11 Feb 2022 12:54)  POCT Blood Glucose.: 101 mg/dL (11 Feb 2022 09:27)  POCT Blood Glucose.: 114 mg/dL (10 Feb 2022 22:09)  POCT Blood Glucose.: 129 mg/dL (10 Feb 2022 17:37)      Anion Gap, Serum: 10 (02-10 @ 07:12)      Calcium, Total Serum: 8.8 (02-10 @ 07:12)  Albumin, Serum: 2.9 *L* (02-10 @ 07:12)    Alanine Aminotransferase (ALT/SGPT): 15 (02-10 @ 07:12)  Alkaline Phosphatase, Serum: 49 (02-10 @ 07:12)  Aspartate Aminotransferase (AST/SGOT): 12 (02-10 @ 07:12)        02-10    142  |  105  |  18  ----------------------------<  100<H>  3.8   |  27  |  0.66    Ca    8.8      10 Feb 2022 07:12    TPro  5.8<L>  /  Alb  2.9<L>  /  TBili  0.4  /  DBili  x   /  AST  12  /  ALT  15  /  AlkPhos  49  02-10                        8.7    6.15  )-----------( 282      ( 11 Feb 2022 07:22 )             28.3     Medications  MEDICATIONS  (STANDING):  aspirin enteric coated 81 milliGRAM(s) Oral daily  atorvastatin 10 milliGRAM(s) Oral at bedtime  cefTRIAXone   IVPB 1000 milliGRAM(s) IV Intermittent every 24 hours  dextrose 40% Gel 15 Gram(s) Oral once  dextrose 50% Injectable 25 Gram(s) IV Push once  dextrose 50% Injectable 12.5 Gram(s) IV Push once  dextrose 50% Injectable 25 Gram(s) IV Push once  famotidine    Tablet 20 milliGRAM(s) Oral daily  glucagon  Injectable 1 milliGRAM(s) IntraMuscular once  hydrocortisone hemorrhoidal Suppository 1 Suppository(s) Rectal daily  insulin lispro (ADMELOG) corrective regimen sliding scale   SubCutaneous three times a day before meals  insulin lispro (ADMELOG) corrective regimen sliding scale   SubCutaneous at bedtime  metoprolol tartrate 25 milliGRAM(s) Oral three times a day  polyethylene glycol 3350 17 Gram(s) Oral two times a day      Physical Exam  General: Patient comfortable in bed  Vital Signs Last 12 Hrs  T(F): 98.1 (02-11-22 @ 13:23), Max: 98.1 (02-11-22 @ 13:23)  HR: 85 (02-11-22 @ 13:23) (79 - 85)  BP: 112/72 (02-11-22 @ 13:23) (112/72 - 138/77)  BP(mean): --  RR: 18 (02-11-22 @ 13:23) (18 - 18)  SpO2: 96% (02-11-22 @ 13:23) (94% - 97%)  Neck: No palpable thyroid nodules.  CVS: S1S2, No murmurs  Respiratory: No wheezing, no crepitations  GI: Abdomen soft, bowel sounds positive  Musculoskeletal:  edema lower extremities.   Skin: No skin rashes, no ecchymosis    Diagnostics    Diet, Regular:   Consistent Carbohydrate No Snacks (CSTCHO) (01-26 @ 15:27)               Chief complaint  Patient is a 81y old  Female who presents with a chief complaint of sob/cp (11 Feb 2022 13:29)   Review of systems  Patient awake in chair, looks comfortable, no hypoglycemic episodes.    Labs and Fingersticks  CAPILLARY BLOOD GLUCOSE      POCT Blood Glucose.: 128 mg/dL (11 Feb 2022 12:54)  POCT Blood Glucose.: 101 mg/dL (11 Feb 2022 09:27)  POCT Blood Glucose.: 114 mg/dL (10 Feb 2022 22:09)  POCT Blood Glucose.: 129 mg/dL (10 Feb 2022 17:37)      Anion Gap, Serum: 10 (02-10 @ 07:12)      Calcium, Total Serum: 8.8 (02-10 @ 07:12)  Albumin, Serum: 2.9 *L* (02-10 @ 07:12)    Alanine Aminotransferase (ALT/SGPT): 15 (02-10 @ 07:12)  Alkaline Phosphatase, Serum: 49 (02-10 @ 07:12)  Aspartate Aminotransferase (AST/SGOT): 12 (02-10 @ 07:12)        02-10    142  |  105  |  18  ----------------------------<  100<H>  3.8   |  27  |  0.66    Ca    8.8      10 Feb 2022 07:12    TPro  5.8<L>  /  Alb  2.9<L>  /  TBili  0.4  /  DBili  x   /  AST  12  /  ALT  15  /  AlkPhos  49  02-10                        8.7    6.15  )-----------( 282      ( 11 Feb 2022 07:22 )             28.3     Medications  MEDICATIONS  (STANDING):  aspirin enteric coated 81 milliGRAM(s) Oral daily  atorvastatin 10 milliGRAM(s) Oral at bedtime  cefTRIAXone   IVPB 1000 milliGRAM(s) IV Intermittent every 24 hours  dextrose 40% Gel 15 Gram(s) Oral once  dextrose 50% Injectable 25 Gram(s) IV Push once  dextrose 50% Injectable 12.5 Gram(s) IV Push once  dextrose 50% Injectable 25 Gram(s) IV Push once  famotidine    Tablet 20 milliGRAM(s) Oral daily  glucagon  Injectable 1 milliGRAM(s) IntraMuscular once  hydrocortisone hemorrhoidal Suppository 1 Suppository(s) Rectal daily  insulin lispro (ADMELOG) corrective regimen sliding scale   SubCutaneous three times a day before meals  insulin lispro (ADMELOG) corrective regimen sliding scale   SubCutaneous at bedtime  metoprolol tartrate 25 milliGRAM(s) Oral three times a day  polyethylene glycol 3350 17 Gram(s) Oral two times a day      Physical Exam  General: Patient comfortable in bed  Vital Signs Last 12 Hrs  T(F): 98.1 (02-11-22 @ 13:23), Max: 98.1 (02-11-22 @ 13:23)  HR: 85 (02-11-22 @ 13:23) (79 - 85)  BP: 112/72 (02-11-22 @ 13:23) (112/72 - 138/77)  BP(mean): --  RR: 18 (02-11-22 @ 13:23) (18 - 18)  SpO2: 96% (02-11-22 @ 13:23) (94% - 97%)  Neck: No palpable thyroid nodules.  CVS: S1S2, No murmurs  Respiratory: No wheezing, no crepitations  GI: Abdomen soft, bowel sounds positive  Musculoskeletal:  edema lower extremities.   Skin: No skin rashes, no ecchymosis    Diagnostics    Diet, Regular:   Consistent Carbohydrate No Snacks (CSTCHO) (01-26 @ 15:27)

## 2022-02-11 NOTE — PROGRESS NOTE ADULT - SUBJECTIVE AND OBJECTIVE BOX
afberile    REVIEW OF SYSTEMS:  GEN: no fever,    no chills  RESP: no SOB,   no cough  CVS: no chest pain,   no palpitations  GI: no abdominal pain,   no nausea,   no vomiting,   no constipation,   no diarrhea  : no dysuria,   no frequency  NEURO: no headache,   no dizziness  PSYCH: no depression,   not anxious  Derm : no rash    MEDICATIONS  (STANDING):  aspirin enteric coated 81 milliGRAM(s) Oral daily  atorvastatin 10 milliGRAM(s) Oral at bedtime  cefTRIAXone   IVPB 1000 milliGRAM(s) IV Intermittent every 24 hours  dextrose 40% Gel 15 Gram(s) Oral once  dextrose 50% Injectable 25 Gram(s) IV Push once  dextrose 50% Injectable 12.5 Gram(s) IV Push once  dextrose 50% Injectable 25 Gram(s) IV Push once  famotidine    Tablet 20 milliGRAM(s) Oral daily  glucagon  Injectable 1 milliGRAM(s) IntraMuscular once  hydrocortisone hemorrhoidal Suppository 1 Suppository(s) Rectal daily  insulin lispro (ADMELOG) corrective regimen sliding scale   SubCutaneous three times a day before meals  insulin lispro (ADMELOG) corrective regimen sliding scale   SubCutaneous at bedtime  metoprolol tartrate 25 milliGRAM(s) Oral three times a day  polyethylene glycol 3350 17 Gram(s) Oral two times a day    MEDICATIONS  (PRN):  acetaminophen     Tablet .. 650 milliGRAM(s) Oral every 6 hours PRN Temp greater or equal to 38C (100.4F), Mild Pain (1 - 3), Moderate Pain (4 - 6), Severe Pain (7 - 10)      Vital Signs Last 24 Hrs  T(C): 36.6 (11 Feb 2022 10:11), Max: 37.2 (10 Feb 2022 22:18)  T(F): 97.9 (11 Feb 2022 10:11), Max: 99 (10 Feb 2022 22:18)  HR: 79 (11 Feb 2022 10:11) (79 - 99)  BP: 129/74 (11 Feb 2022 10:11) (124/74 - 152/78)  BP(mean): --  RR: 18 (11 Feb 2022 10:11) (18 - 19)  SpO2: 97% (11 Feb 2022 10:11) (94% - 99%)  CAPILLARY BLOOD GLUCOSE      POCT Blood Glucose.: 128 mg/dL (11 Feb 2022 12:54)  POCT Blood Glucose.: 101 mg/dL (11 Feb 2022 09:27)  POCT Blood Glucose.: 114 mg/dL (10 Feb 2022 22:09)  POCT Blood Glucose.: 129 mg/dL (10 Feb 2022 17:37)    I&O's Summary    10 Feb 2022 07:01  -  11 Feb 2022 07:00  --------------------------------------------------------  IN: 1300 mL / OUT: 0 mL / NET: 1300 mL    11 Feb 2022 07:01  -  11 Feb 2022 13:29  --------------------------------------------------------  IN: 100 mL / OUT: 0 mL / NET: 100 mL        PHYSICAL EXAM:  HEAD:  Atraumatic, Normocephalic  NECK: Supple, No   JVD  CHEST/LUNG:   nono     rales,     no,    rhonchi  HEART: Regular rate and rhythm;         murmur  ABDOMEN: Soft, Nontender, ;   EXTREMITIES:        edema  NEUROLOGY:  alert    LABS:                        8.7    6.15  )-----------( 282      ( 11 Feb 2022 07:22 )             28.3     02-10    142  |  105  |  18  ----------------------------<  100<H>  3.8   |  27  |  0.66    Ca    8.8      10 Feb 2022 07:12    TPro  5.8<L>  /  Alb  2.9<L>  /  TBili  0.4  /  DBili  x   /  AST  12  /  ALT  15  /  AlkPhos  49  02-10                    Thyroid Stimulating Hormone, Serum: 0.47 uIU/mL (01-21 @ 17:02)          Consultant(s) Notes Reviewed:      Care Discussed with Consultants/Other Providers:

## 2022-02-12 LAB
ANION GAP SERPL CALC-SCNC: 11 MMOL/L — SIGNIFICANT CHANGE UP (ref 5–17)
BUN SERPL-MCNC: 17 MG/DL — SIGNIFICANT CHANGE UP (ref 7–23)
CALCIUM SERPL-MCNC: 9.1 MG/DL — SIGNIFICANT CHANGE UP (ref 8.4–10.5)
CHLORIDE SERPL-SCNC: 102 MMOL/L — SIGNIFICANT CHANGE UP (ref 96–108)
CO2 SERPL-SCNC: 27 MMOL/L — SIGNIFICANT CHANGE UP (ref 22–31)
CREAT SERPL-MCNC: 0.78 MG/DL — SIGNIFICANT CHANGE UP (ref 0.5–1.3)
GLUCOSE BLDC GLUCOMTR-MCNC: 105 MG/DL — HIGH (ref 70–99)
GLUCOSE BLDC GLUCOMTR-MCNC: 155 MG/DL — HIGH (ref 70–99)
GLUCOSE BLDC GLUCOMTR-MCNC: 97 MG/DL — SIGNIFICANT CHANGE UP (ref 70–99)
GLUCOSE SERPL-MCNC: 108 MG/DL — HIGH (ref 70–99)
HCT VFR BLD CALC: 31 % — LOW (ref 34.5–45)
HGB BLD-MCNC: 9.5 G/DL — LOW (ref 11.5–15.5)
MCHC RBC-ENTMCNC: 27.5 PG — SIGNIFICANT CHANGE UP (ref 27–34)
MCHC RBC-ENTMCNC: 30.6 GM/DL — LOW (ref 32–36)
MCV RBC AUTO: 89.6 FL — SIGNIFICANT CHANGE UP (ref 80–100)
NRBC # BLD: 0 /100 WBCS — SIGNIFICANT CHANGE UP (ref 0–0)
PLATELET # BLD AUTO: 277 K/UL — SIGNIFICANT CHANGE UP (ref 150–400)
POTASSIUM SERPL-MCNC: 4.1 MMOL/L — SIGNIFICANT CHANGE UP (ref 3.5–5.3)
POTASSIUM SERPL-SCNC: 4.1 MMOL/L — SIGNIFICANT CHANGE UP (ref 3.5–5.3)
RBC # BLD: 3.46 M/UL — LOW (ref 3.8–5.2)
RBC # FLD: 16.7 % — HIGH (ref 10.3–14.5)
SODIUM SERPL-SCNC: 140 MMOL/L — SIGNIFICANT CHANGE UP (ref 135–145)
WBC # BLD: 5.68 K/UL — SIGNIFICANT CHANGE UP (ref 3.8–10.5)
WBC # FLD AUTO: 5.68 K/UL — SIGNIFICANT CHANGE UP (ref 3.8–10.5)

## 2022-02-12 RX ORDER — APIXABAN 2.5 MG/1
2.5 TABLET, FILM COATED ORAL EVERY 12 HOURS
Refills: 0 | Status: DISCONTINUED | OUTPATIENT
Start: 2022-02-12 | End: 2022-02-14

## 2022-02-12 RX ADMIN — APIXABAN 2.5 MILLIGRAM(S): 2.5 TABLET, FILM COATED ORAL at 18:14

## 2022-02-12 RX ADMIN — FAMOTIDINE 20 MILLIGRAM(S): 10 INJECTION INTRAVENOUS at 12:34

## 2022-02-12 RX ADMIN — Medication 50 MILLIGRAM(S): at 17:43

## 2022-02-12 RX ADMIN — ATORVASTATIN CALCIUM 10 MILLIGRAM(S): 80 TABLET, FILM COATED ORAL at 21:44

## 2022-02-12 RX ADMIN — Medication 1 SUPPOSITORY(S): at 12:34

## 2022-02-12 RX ADMIN — Medication 50 MILLIGRAM(S): at 06:08

## 2022-02-12 RX ADMIN — Medication 81 MILLIGRAM(S): at 12:34

## 2022-02-12 NOTE — PROGRESS NOTE ADULT - ASSESSMENT
82 yo F     h/o   dementia and   ? CHF per EMS,/  no other  hx  is  available      presenting via EMS from home for few days of weakness, followed by development of SOB and episode of substernal CP this morning.     Pt is a poor historian and unable to provide significant history    all ROS negative upon questioning.     Lives at home with brother - he reports concern for pt's increased work of breathing.      pt with   weakness/  sob  from  covid/  pna   CT  chest, with  GOO   on decadron/  remdisivir   *  Dementia   *    HTN,   *  acute  diastolic chf  on   lasix  card  dr davis  on  dvt ppx.  bmi  is  20  tele,   s/p vtach/ on  toprol,  card to  f/p   *  Afib,   was on iv heparin  v tach  on tele.  card following  tele, afib.  s/p  Vtach  22  beats. , had  subsequent vtach also,   cardiac cath was  deferred  due to  fevers  *  E  coli  bacteremia,   on iv rocephin,. rpt bcx  are negative  cath  deferred  for  now, will perform at  a later date,  when cleared by    pt  with  tachycardia and  Vtach on tele,  card  following  pt   u/s     right  thigh  hematoma, 5  cm ,  seen by  vascular,  no  intervention  a/c was  on  hold  for  now.  hb is  stable   cath at  a later  time.  right  groin  ecchymoses,  eliquis now re started  by  card    plan is  cath  next  week, pe r card         spoke  with  brothersammi/ pt  is , has  no  children/ states,   pt  is  full code    pmd  dr sheppard, allie chan< from: CT Angio Chest PE Protocol w/ IV Cont (01.21.22 @ 16:01) >  IMPRESSION:  No main, right, left, lobar, or proximal segmental pulmonary embolus.  Patchy bilateral groundglass opacities noted throughout both lungs likely   representing infection or alveolar component of edema. COVID 19 can also   have this appearance. Correlate with RT PCR    --- End of Report --  < end of copied text >     82 yo F     h/o   dementia and   ? CHF per EMS,/  no other  hx  is  available      presenting via EMS from home for few days of weakness, followed by development of SOB and episode of substernal CP this morning.     Pt is a poor historian and unable to provide significant history    all ROS negative upon questioning.     Lives at home with brother - he reports concern for pt's increased work of breathing.      pt with   weakness/  sob  from  covid/  pna   CT  chest, with  GOO   on decadron/  remdisivir   *  Dementia   *    HTN,   *  acute  diastolic chf  on   lasix  card  dr davis  on  dvt ppx.  bmi  is  20  tele,   s/p vtach/ on  toprol,  card to  f/p   *  Afib,   was on iv heparin  v tach  on tele.  card following  tele, afib.  s/p  Vtach  22  beats. , had  subsequent vtach also,   cardiac cath was  deferred  due to  fevers  *  E  coli  bacteremia,   on iv rocephin,. rpt bcx  are negative  cath  deferred  for  now, will perform at  a later date,  when cleared by    pt  with  tachycardia and  Vtach on tele,  card  following  pt   u/s     right  thigh  hematoma, 5  cm ,  seen by  vascular,  no  intervention  a/c was  on  hold  for  now.  hb is  stable   cath at  a later  time., was  the plan,   right  groin  ecchymoses,  eliquis now re started  by  card/ no further  v tach, no cath planned  start  d/c  planning             spoke  with  brothersammi/ pt  is , has  no  children/ states,   pt  is  full code    pmd  allie longoria< from: CT Angio Chest PE Protocol w/ IV Cont (01.21.22 @ 16:01) >  IMPRESSION:  No main, right, left, lobar, or proximal segmental pulmonary embolus.  Patchy bilateral groundglass opacities noted throughout both lungs likely   representing infection or alveolar component of edema. COVID 19 can also   have this appearance. Correlate with RT PCR    --- End of Report --  < end of copied text >

## 2022-02-12 NOTE — PROGRESS NOTE ADULT - SUBJECTIVE AND OBJECTIVE BOX
Chief complaint    Patient is a 81y old  Female who presents with a chief complaint of sob/cp (11 Feb 2022 19:52)   Review of systems  Patient in bed, appears comfortable.    Labs and Fingersticks  CAPILLARY BLOOD GLUCOSE      POCT Blood Glucose.: 105 mg/dL (12 Feb 2022 08:29)  POCT Blood Glucose.: 129 mg/dL (11 Feb 2022 21:24)  POCT Blood Glucose.: 122 mg/dL (11 Feb 2022 17:42)      Anion Gap, Serum: 11 (02-12 @ 06:13)      Calcium, Total Serum: 9.1 (02-12 @ 06:13)          02-12    140  |  102  |  17  ----------------------------<  108<H>  4.1   |  27  |  0.78    Ca    9.1      12 Feb 2022 06:13                          9.5    5.68  )-----------( 277      ( 12 Feb 2022 06:14 )             31.0     Medications  MEDICATIONS  (STANDING):  aspirin enteric coated 81 milliGRAM(s) Oral daily  atorvastatin 10 milliGRAM(s) Oral at bedtime  dextrose 40% Gel 15 Gram(s) Oral once  dextrose 50% Injectable 25 Gram(s) IV Push once  dextrose 50% Injectable 12.5 Gram(s) IV Push once  dextrose 50% Injectable 25 Gram(s) IV Push once  famotidine    Tablet 20 milliGRAM(s) Oral daily  glucagon  Injectable 1 milliGRAM(s) IntraMuscular once  hydrocortisone hemorrhoidal Suppository 1 Suppository(s) Rectal daily  insulin lispro (ADMELOG) corrective regimen sliding scale   SubCutaneous three times a day before meals  insulin lispro (ADMELOG) corrective regimen sliding scale   SubCutaneous at bedtime  metoprolol tartrate 50 milliGRAM(s) Oral two times a day  polyethylene glycol 3350 17 Gram(s) Oral two times a day      Physical Exam  General: Patient comfortable in bed  Vital Signs Last 12 Hrs  T(F): 98.2 (02-12-22 @ 05:52), Max: 98.2 (02-12-22 @ 05:52)  HR: 95 (02-12-22 @ 05:52) (83 - 95)  BP: 129/83 (02-12-22 @ 05:52) (129/83 - 143/87)  BP(mean): --  RR: 18 (02-12-22 @ 05:52) (18 - 18)  SpO2: 97% (02-12-22 @ 05:52) (97% - 98%)  Neck: No palpable thyroid nodules.  CVS: S1S2, No murmurs  Respiratory: No wheezing, no crepitations  GI: Abdomen soft, bowel sounds positive  Musculoskeletal:  edema lower extremities.     Diagnostics    A1C with Estimated Average Glucose: AM Sched. Collection: 27-Jan-2022 06:00 (01-26 @ 08:11)  A1C with Estimated Average Glucose: Routine (01-26 @ 08:11)

## 2022-02-12 NOTE — PROGRESS NOTE ADULT - SUBJECTIVE AND OBJECTIVE BOX
CARDIOLOGY     PROGRESS  NOTE   ________________________________________________    CHIEF COMPLAINT:Patient is a 81y old  Female who presents with a chief complaint of sob/cp (12 Feb 2022 12:58)  no complain.  	  REVIEW OF SYSTEMS:  CONSTITUTIONAL: No fever, weight loss, or fatigue  EYES: No eye pain, visual disturbances, or discharge  ENT:  No difficulty hearing, tinnitus, vertigo; No sinus or throat pain  NECK: No pain or stiffness  RESPIRATORY: No cough, wheezing, chills or hemoptysis; No Shortness of Breath  CARDIOVASCULAR: No chest pain, palpitations, passing out, dizziness, or leg swelling  GASTROINTESTINAL: No abdominal or epigastric pain. No nausea, vomiting, or hematemesis; No diarrhea or constipation. No melena or hematochezia.  GENITOURINARY: No dysuria, frequency, hematuria, or incontinence  NEUROLOGICAL: No headaches, memory loss, loss of strength, numbness, or tremors  SKIN: No itching, burning, rashes, or lesions   LYMPH Nodes: No enlarged glands  ENDOCRINE: No heat or cold intolerance; No hair loss  MUSCULOSKELETAL: No joint pain or swelling; No muscle, back, or extremity pain  PSYCHIATRIC: No depression, anxiety, mood swings, or difficulty sleeping  HEME/LYMPH: No easy bruising, or bleeding gums  ALLERGY AND IMMUNOLOGIC: No hives or eczema	    [ ] All others negative	  [ ] Unable to obtain    PHYSICAL EXAM:  T(C): 36.8 (02-12-22 @ 05:52), Max: 37.2 (02-11-22 @ 17:47)  HR: 95 (02-12-22 @ 05:52) (82 - 95)  BP: 129/83 (02-12-22 @ 05:52) (112/72 - 143/98)  RR: 18 (02-12-22 @ 05:52) (18 - 18)  SpO2: 97% (02-12-22 @ 05:52) (95% - 98%)  Wt(kg): --  I&O's Summary    11 Feb 2022 07:01  -  12 Feb 2022 07:00  --------------------------------------------------------  IN: 1020 mL / OUT: 0 mL / NET: 1020 mL    12 Feb 2022 07:01  -  12 Feb 2022 12:59  --------------------------------------------------------  IN: 120 mL / OUT: 0 mL / NET: 120 mL        Appearance: Normal	  HEENT:   Normal oral mucosa, PERRL, EOMI	  Lymphatic: No lymphadenopathy  Cardiovascular: Normal S1 S2, No JVD, + murmurs, No edema  Respiratory: Lungs clear to auscultation	  Psychiatry: A & O x 3, Mood & affect appropriate  Gastrointestinal:  Soft, Non-tender, + BS	  Skin: No rashes, No ecchymoses, No cyanosis	  Neurologic: Non-focal  Extremities: Normal range of motion, No clubbing, cyanosis or edema  Vascular: Peripheral pulses palpable 2+ bilaterally    MEDICATIONS  (STANDING):  aspirin enteric coated 81 milliGRAM(s) Oral daily  atorvastatin 10 milliGRAM(s) Oral at bedtime  dextrose 40% Gel 15 Gram(s) Oral once  dextrose 50% Injectable 25 Gram(s) IV Push once  dextrose 50% Injectable 12.5 Gram(s) IV Push once  dextrose 50% Injectable 25 Gram(s) IV Push once  famotidine    Tablet 20 milliGRAM(s) Oral daily  glucagon  Injectable 1 milliGRAM(s) IntraMuscular once  hydrocortisone hemorrhoidal Suppository 1 Suppository(s) Rectal daily  insulin lispro (ADMELOG) corrective regimen sliding scale   SubCutaneous three times a day before meals  insulin lispro (ADMELOG) corrective regimen sliding scale   SubCutaneous at bedtime  metoprolol tartrate 50 milliGRAM(s) Oral two times a day  polyethylene glycol 3350 17 Gram(s) Oral two times a day      TELEMETRY: 	    ECG:  	  RADIOLOGY:  OTHER: 	  	  LABS:	 	    CARDIAC MARKERS:                                9.5    5.68  )-----------( 277      ( 12 Feb 2022 06:14 )             31.0     02-12    140  |  102  |  17  ----------------------------<  108<H>  4.1   |  27  |  0.78    Ca    9.1      12 Feb 2022 06:13      proBNP: Serum Pro-Brain Natriuretic Peptide: 20071 pg/mL (01-21 @ 12:11)    Lipid Profile:   HgA1c:   TSH: Thyroid Stimulating Hormone, Serum: 0.47 uIU/mL (01-21 @ 17:02)          Assessment and plan  ---------------------------  82 yo F  h/o   dementia and  <CHF per EMS,/  no othe r hx  is  available presenting via EMS from home for few days of weakness, followed by development of SOB and episode of substernal CP this morning.     Pt is a poor historian and unable to provide significant history    all ROS negative upon questioning.   Lives at home with brother - he reports concern for pt's increased work of breathing.   pt is a 82 yo female with hx of htn, chf, cad, with increasing sob and COVID with  abnormal  cta and chest x ray  cta negative for PE  lipid panel  continue covid therapy  fu renal function  keep K>  will increase beta blocker as tolerated  ac, a.fib rate is controlled  echo noted with normal EF, no WMA  continue beta blocker increase as tolerated  will consider Wilmer/ cardioversion post cath  ID noted, appreciated  increase beta blocker hr better controlled  ac held sec to R thigh  hematoma re start vac as clear   repeat chest x ray in am will order  will increase beta blocker as tolerated  will start on eliquis , no further VT/ normal ef

## 2022-02-12 NOTE — PROGRESS NOTE ADULT - SUBJECTIVE AND OBJECTIVE BOX
afberile  REVIEW OF SYSTEMS:  GEN: no fever,    no chills  RESP: no SOB,   no cough  CVS: no chest pain,   no palpitations  GI: no abdominal pain,   no nausea,   no vomiting,   no constipation,   no diarrhea  : no dysuria,   no frequency  NEURO: no headache,   no dizziness  PSYCH: no depression,   not anxious  Derm : no rash    MEDICATIONS  (STANDING):  apixaban 2.5 milliGRAM(s) Oral every 12 hours  aspirin enteric coated 81 milliGRAM(s) Oral daily  atorvastatin 10 milliGRAM(s) Oral at bedtime  dextrose 40% Gel 15 Gram(s) Oral once  dextrose 50% Injectable 25 Gram(s) IV Push once  dextrose 50% Injectable 12.5 Gram(s) IV Push once  dextrose 50% Injectable 25 Gram(s) IV Push once  famotidine    Tablet 20 milliGRAM(s) Oral daily  glucagon  Injectable 1 milliGRAM(s) IntraMuscular once  hydrocortisone hemorrhoidal Suppository 1 Suppository(s) Rectal daily  insulin lispro (ADMELOG) corrective regimen sliding scale   SubCutaneous three times a day before meals  insulin lispro (ADMELOG) corrective regimen sliding scale   SubCutaneous at bedtime  metoprolol tartrate 50 milliGRAM(s) Oral two times a day  polyethylene glycol 3350 17 Gram(s) Oral two times a day    MEDICATIONS  (PRN):  acetaminophen     Tablet .. 650 milliGRAM(s) Oral every 6 hours PRN Temp greater or equal to 38C (100.4F), Mild Pain (1 - 3), Moderate Pain (4 - 6), Severe Pain (7 - 10)      Vital Signs Last 24 Hrs  T(C): 36.8 (12 Feb 2022 05:52), Max: 37.2 (11 Feb 2022 17:47)  T(F): 98.2 (12 Feb 2022 05:52), Max: 98.9 (11 Feb 2022 17:47)  HR: 95 (12 Feb 2022 05:52) (82 - 95)  BP: 129/83 (12 Feb 2022 05:52) (129/69 - 143/98)  BP(mean): --  RR: 18 (12 Feb 2022 05:52) (18 - 18)  SpO2: 97% (12 Feb 2022 05:52) (95% - 98%)  CAPILLARY BLOOD GLUCOSE      POCT Blood Glucose.: 105 mg/dL (12 Feb 2022 08:29)  POCT Blood Glucose.: 129 mg/dL (11 Feb 2022 21:24)  POCT Blood Glucose.: 122 mg/dL (11 Feb 2022 17:42)    I&O's Summary    11 Feb 2022 07:01  -  12 Feb 2022 07:00  --------------------------------------------------------  IN: 1020 mL / OUT: 0 mL / NET: 1020 mL    12 Feb 2022 07:01  -  12 Feb 2022 13:29  --------------------------------------------------------  IN: 120 mL / OUT: 0 mL / NET: 120 mL        PHYSICAL EXAM:  HEAD:  Atraumatic, Normocephalic  NECK: Supple, No   JVD  CHEST/LUNG:   no     rales,     no,    rhonchi  HEART: Regular rate and rhythm;         murmur  ABDOMEN: Soft, Nontender, ;   EXTREMITIES:   no     edema  NEUROLOGY:  alert    LABS:                        9.5    5.68  )-----------( 277      ( 12 Feb 2022 06:14 )             31.0     02-12    140  |  102  |  17  ----------------------------<  108<H>  4.1   |  27  |  0.78    Ca    9.1      12 Feb 2022 06:13                      Thyroid Stimulating Hormone, Serum: 0.47 uIU/mL (01-21 @ 17:02)          Consultant(s) Notes Reviewed:      Care Discussed with Consultants/Other Providers:

## 2022-02-12 NOTE — PROGRESS NOTE ADULT - ASSESSMENT
Assessment  DMT2: 81y Female with DM T2 with hyperglycemia, A1C 6.3%, she is unsure of her outpatient medications,  on low-scale insulin coverage only, blood sugars are improving, no hypoglycemias. Patient eating meals, appears comfortable in NAD, planning cath next week per Cardiology.  Covid: on medications,  stable, monitored.  HTN: Controlled,  on antihypertensive medications.  Dementia: stable, monitored.      Brian Huynh MD  Cell: [phone]  Office: [phone]

## 2022-02-13 LAB
ANION GAP SERPL CALC-SCNC: 11 MMOL/L — SIGNIFICANT CHANGE UP (ref 5–17)
BUN SERPL-MCNC: 15 MG/DL — SIGNIFICANT CHANGE UP (ref 7–23)
CALCIUM SERPL-MCNC: 9.1 MG/DL — SIGNIFICANT CHANGE UP (ref 8.4–10.5)
CHLORIDE SERPL-SCNC: 101 MMOL/L — SIGNIFICANT CHANGE UP (ref 96–108)
CO2 SERPL-SCNC: 29 MMOL/L — SIGNIFICANT CHANGE UP (ref 22–31)
CREAT SERPL-MCNC: 0.63 MG/DL — SIGNIFICANT CHANGE UP (ref 0.5–1.3)
GLUCOSE BLDC GLUCOMTR-MCNC: 105 MG/DL — HIGH (ref 70–99)
GLUCOSE BLDC GLUCOMTR-MCNC: 150 MG/DL — HIGH (ref 70–99)
GLUCOSE BLDC GLUCOMTR-MCNC: 188 MG/DL — HIGH (ref 70–99)
GLUCOSE BLDC GLUCOMTR-MCNC: 99 MG/DL — SIGNIFICANT CHANGE UP (ref 70–99)
GLUCOSE SERPL-MCNC: 91 MG/DL — SIGNIFICANT CHANGE UP (ref 70–99)
HCT VFR BLD CALC: 33 % — LOW (ref 34.5–45)
HGB BLD-MCNC: 10 G/DL — LOW (ref 11.5–15.5)
MCHC RBC-ENTMCNC: 27.6 PG — SIGNIFICANT CHANGE UP (ref 27–34)
MCHC RBC-ENTMCNC: 30.3 GM/DL — LOW (ref 32–36)
MCV RBC AUTO: 91.2 FL — SIGNIFICANT CHANGE UP (ref 80–100)
NRBC # BLD: 0 /100 WBCS — SIGNIFICANT CHANGE UP (ref 0–0)
PLATELET # BLD AUTO: 294 K/UL — SIGNIFICANT CHANGE UP (ref 150–400)
POTASSIUM SERPL-MCNC: 4.1 MMOL/L — SIGNIFICANT CHANGE UP (ref 3.5–5.3)
POTASSIUM SERPL-SCNC: 4.1 MMOL/L — SIGNIFICANT CHANGE UP (ref 3.5–5.3)
RBC # BLD: 3.62 M/UL — LOW (ref 3.8–5.2)
RBC # FLD: 16.9 % — HIGH (ref 10.3–14.5)
SARS-COV-2 RNA SPEC QL NAA+PROBE: DETECTED
SODIUM SERPL-SCNC: 141 MMOL/L — SIGNIFICANT CHANGE UP (ref 135–145)
WBC # BLD: 6.52 K/UL — SIGNIFICANT CHANGE UP (ref 3.8–10.5)
WBC # FLD AUTO: 6.52 K/UL — SIGNIFICANT CHANGE UP (ref 3.8–10.5)

## 2022-02-13 RX ADMIN — ATORVASTATIN CALCIUM 10 MILLIGRAM(S): 80 TABLET, FILM COATED ORAL at 21:23

## 2022-02-13 RX ADMIN — Medication 81 MILLIGRAM(S): at 12:36

## 2022-02-13 RX ADMIN — Medication 50 MILLIGRAM(S): at 17:06

## 2022-02-13 RX ADMIN — Medication 50 MILLIGRAM(S): at 05:59

## 2022-02-13 RX ADMIN — APIXABAN 2.5 MILLIGRAM(S): 2.5 TABLET, FILM COATED ORAL at 17:06

## 2022-02-13 RX ADMIN — APIXABAN 2.5 MILLIGRAM(S): 2.5 TABLET, FILM COATED ORAL at 06:06

## 2022-02-13 NOTE — PROGRESS NOTE ADULT - ASSESSMENT
Assessment  DMT2: 81y Female with DM T2 with hyperglycemia, A1C 6.3%, she is unsure of her outpatient medications,  on low-scale insulin coverage only, blood sugars are trending within acceptable range, no hypoglycemias. Patient eating meals, appears comfortable in NAD, planning cath next week per Cardiology.  Covid: on medications,  stable, monitored.  HTN: Controlled,  on antihypertensive medications.  Dementia: stable, monitored.      Brian Huynh MD  Cell: [phone]  Office: [phone]

## 2022-02-13 NOTE — PROVIDER CONTACT NOTE (OTHER) - DATE AND TIME:
26-Jan-2022 03:20
27-Jan-2022 21:30
02-Feb-2022 13:02
07-Feb-2022 21:00
13-Feb-2022 00:15
25-Jan-2022 09:10
07-Feb-2022 18:00
01-Feb-2022 01:06
06-Feb-2022 21:58

## 2022-02-13 NOTE — PROVIDER CONTACT NOTE (OTHER) - BACKGROUND
Pt BIBEMS for a few days of weakness at home. Pt found to be covid positive. Hx of dementia and CHF. Pts metoprolol frequency was just changed today.
Pt admitted 1/21 with increased weakness/SOB- +covid   1/24 pt had tele event- 7 beats wide complex
Pt BIBEMS for a few days of weakness at home. Pt found to be covid positive. Hx of dementia and CHF.
pt admitted for SOB and weakness found to be positive covid on 1/21. During admission stay, pt went into new onset afib and was placed on heparin gtt.
Pt admitted on 1/21 with weakness. Pt with 14 beats and 6 beats of wide complex on 1/31. Pt pending cardiac cath scheduled for 2/1.
pt admitted for SOB and weakness found to be covid positive, during stay went into afib and has been having episodes of wide complex tachycardia.
pt admitted weakness and SOB. found to be positive COVID.
Pt BIBEMS for a few days of weakness at home. Pt found to be covid positive. Hx of dementia and CHF.
pt pmhx of dementia, HTN and CHF. C/O weakness for few days. Admitting dx Dyspnea.

## 2022-02-13 NOTE — PROVIDER CONTACT NOTE (OTHER) - NAME OF MD/NP/PA/DO NOTIFIED:
Unit Leadership/Patient Logistics
Betty Kathleen ACP
Esther Napoles NP
ROGER Verma
YUKI josue
Shanice Umanzor, PA
Romelia Adhikari, NP
Aparna Le ACP
Liang Tay
Aparna Tomas ACP
Marcel Zepeda, PA

## 2022-02-13 NOTE — PROGRESS NOTE ADULT - SUBJECTIVE AND OBJECTIVE BOX
afberile    REVIEW OF SYSTEMS:  GEN: no fever,    no chills  RESP: no SOB,   no cough  CVS: no chest pain,   no palpitations  GI: no abdominal pain,   no nausea,   no vomiting,   no constipation,   no diarrhea  : no dysuria,   no frequency  NEURO: no headache,   no dizziness  PSYCH: no depression,   not anxious  Derm : no rash    MEDICATIONS  (STANDING):  apixaban 2.5 milliGRAM(s) Oral every 12 hours  aspirin enteric coated 81 milliGRAM(s) Oral daily  atorvastatin 10 milliGRAM(s) Oral at bedtime  dextrose 40% Gel 15 Gram(s) Oral once  dextrose 50% Injectable 25 Gram(s) IV Push once  dextrose 50% Injectable 12.5 Gram(s) IV Push once  dextrose 50% Injectable 25 Gram(s) IV Push once  glucagon  Injectable 1 milliGRAM(s) IntraMuscular once  hydrocortisone hemorrhoidal Suppository 1 Suppository(s) Rectal daily  insulin lispro (ADMELOG) corrective regimen sliding scale   SubCutaneous three times a day before meals  insulin lispro (ADMELOG) corrective regimen sliding scale   SubCutaneous at bedtime  metoprolol tartrate 50 milliGRAM(s) Oral two times a day    MEDICATIONS  (PRN):  acetaminophen     Tablet .. 650 milliGRAM(s) Oral every 6 hours PRN Temp greater or equal to 38C (100.4F), Mild Pain (1 - 3), Moderate Pain (4 - 6), Severe Pain (7 - 10)      Vital Signs Last 24 Hrs  T(C): 36.4 (13 Feb 2022 10:05), Max: 36.7 (12 Feb 2022 17:15)  T(F): 97.5 (13 Feb 2022 10:05), Max: 98.1 (12 Feb 2022 17:15)  HR: 96 (13 Feb 2022 10:05) (80 - 96)  BP: 116/63 (13 Feb 2022 10:05) (110/69 - 134/74)  BP(mean): --  RR: 18 (13 Feb 2022 10:05) (17 - 18)  SpO2: 99% (13 Feb 2022 10:05) (95% - 99%)  CAPILLARY BLOOD GLUCOSE      POCT Blood Glucose.: 99 mg/dL (13 Feb 2022 10:03)  POCT Blood Glucose.: 155 mg/dL (12 Feb 2022 21:41)  POCT Blood Glucose.: 97 mg/dL (12 Feb 2022 17:42)    I&O's Summary    12 Feb 2022 07:01  -  13 Feb 2022 07:00  --------------------------------------------------------  IN: 340 mL / OUT: 200 mL / NET: 140 mL        PHYSICAL EXAM:  HEAD:  Atraumatic, Normocephalic  NECK: Supple, No   JVD  CHEST/LUNG:   no     rales,     no,    rhonchi  HEART: Regular rate and rhythm;         murmur  ABDOMEN: Soft, Nontender, ;   EXTREMITIES:    no    edema  NEUROLOGY:  alert    LABS:                        10.0   6.52  )-----------( 294      ( 13 Feb 2022 07:33 )             33.0     02-13    141  |  101  |  15  ----------------------------<  91  4.1   |  29  |  0.63    Ca    9.1      13 Feb 2022 07:35                      Thyroid Stimulating Hormone, Serum: 0.47 uIU/mL (01-21 @ 17:02)          Consultant(s) Notes Reviewed:      Care Discussed with Consultants/Other Providers:

## 2022-02-13 NOTE — PROVIDER CONTACT NOTE (OTHER) - REASON
Pt with 22 beats of wide complex
Discontinuation of COVID Isolation
tele event
Remote tele event
Remote tele event
pt had 12 beats wide complex of tele monitoring
pt had tele event of 13 beats of wide complex
6 Beats Wide complex tachycardia
pt has large hematoma on right thigh
pt had 10 beats of wide complex up to 160s
Pt in a-flutter as per tele tech since 0540

## 2022-02-13 NOTE — PROGRESS NOTE ADULT - SUBJECTIVE AND OBJECTIVE BOX
CARDIOLOGY     PROGRESS  NOTE   ________________________________________________    CHIEF COMPLAINT:Patient is a 81y old  Female who presents with a chief complaint of sob/cp (13 Feb 2022 11:01)  no complain.  	  REVIEW OF SYSTEMS:  CONSTITUTIONAL: No fever, weight loss, or fatigue  EYES: No eye pain, visual disturbances, or discharge  ENT:  No difficulty hearing, tinnitus, vertigo; No sinus or throat pain  NECK: No pain or stiffness  RESPIRATORY: No cough, wheezing, chills or hemoptysis; No Shortness of Breath  CARDIOVASCULAR: No chest pain, palpitations, passing out, dizziness, or leg swelling  GASTROINTESTINAL: No abdominal or epigastric pain. No nausea, vomiting, or hematemesis; No diarrhea or constipation. No melena or hematochezia.  GENITOURINARY: No dysuria, frequency, hematuria, or incontinence  NEUROLOGICAL: No headaches, memory loss, loss of strength, numbness, or tremors  SKIN: No itching, burning, rashes, or lesions   LYMPH Nodes: No enlarged glands  ENDOCRINE: No heat or cold intolerance; No hair loss  MUSCULOSKELETAL: No joint pain or swelling; No muscle, back, or extremity pain  PSYCHIATRIC: No depression, anxiety, mood swings, or difficulty sleeping  HEME/LYMPH: No easy bruising, or bleeding gums  ALLERGY AND IMMUNOLOGIC: No hives or eczema	    [ ] All others negative	  [ ] Unable to obtain    PHYSICAL EXAM:  T(C): 36.4 (02-13-22 @ 10:05), Max: 36.7 (02-12-22 @ 17:15)  HR: 96 (02-13-22 @ 10:05) (80 - 96)  BP: 116/63 (02-13-22 @ 10:05) (110/69 - 134/74)  RR: 18 (02-13-22 @ 10:05) (17 - 18)  SpO2: 99% (02-13-22 @ 10:05) (95% - 99%)  Wt(kg): --  I&O's Summary    12 Feb 2022 07:01  -  13 Feb 2022 07:00  --------------------------------------------------------  IN: 340 mL / OUT: 200 mL / NET: 140 mL        Appearance: Normal	  HEENT:   Normal oral mucosa, PERRL, EOMI	  Lymphatic: No lymphadenopathy  Cardiovascular: Normal S1 S2, No JVD, + murmurs, No edema  Respiratory: Lungs clear to auscultation	  Psychiatry: A & O x 3, Mood & affect appropriate  Gastrointestinal:  Soft, Non-tender, + BS	  Skin: No rashes, No ecchymoses, No cyanosis	  Neurologic: Non-focal  Extremities: Normal range of motion, No clubbing, cyanosis or edema  Vascular: Peripheral pulses palpable 2+ bilaterally    MEDICATIONS  (STANDING):  apixaban 2.5 milliGRAM(s) Oral every 12 hours  aspirin enteric coated 81 milliGRAM(s) Oral daily  atorvastatin 10 milliGRAM(s) Oral at bedtime  dextrose 40% Gel 15 Gram(s) Oral once  dextrose 50% Injectable 25 Gram(s) IV Push once  dextrose 50% Injectable 12.5 Gram(s) IV Push once  dextrose 50% Injectable 25 Gram(s) IV Push once  glucagon  Injectable 1 milliGRAM(s) IntraMuscular once  insulin lispro (ADMELOG) corrective regimen sliding scale   SubCutaneous three times a day before meals  insulin lispro (ADMELOG) corrective regimen sliding scale   SubCutaneous at bedtime  metoprolol tartrate 50 milliGRAM(s) Oral two times a day      TELEMETRY: 	    ECG:  	  RADIOLOGY:  OTHER: 	  	  LABS:	 	    CARDIAC MARKERS:                                10.0   6.52  )-----------( 294      ( 13 Feb 2022 07:33 )             33.0     02-13    141  |  101  |  15  ----------------------------<  91  4.1   |  29  |  0.63    Ca    9.1      13 Feb 2022 07:35      proBNP: Serum Pro-Brain Natriuretic Peptide: 08949 pg/mL (01-21 @ 12:11)    Lipid Profile:   HgA1c:   TSH: Thyroid Stimulating Hormone, Serum: 0.47 uIU/mL (01-21 @ 17:02)    < from: Limited Transthoracic Echo (01.25.22 @ 13:20) >  Mitral Valve: Mitral annular calcification and calcified  mitral leaflets.  Aortic Valve/Aorta: Aortic valve not well visualized;  appears calcified.  Normal aortic root, aortic arch and descending thoracic  aorta.  Left Atrium: Normal left atrium.  Left Ventricle: Endocardium not well visualized; grossly  normal left ventricular systolic function. Normal left  ventricular internal dimensions and wall thicknesses.  Right Heart: Normal right atrium. The right ventricle is  not well visualized; grossly normal right ventricular  systolic function. Tricuspid valve not well visualized,  probably normal.  Pericardium/Pleura: Normal pericardium with no pericardial  effusion.  Hemodynamic: Estimated right atrial pressure is 8 mm Hg.  ------------------------------------------------------------------------  Conclusions:  1. Endocardium not well visualized; grossly normal left  ventricular systolic function.  2. The right ventricle is not well visualized; grossly  normal right ventricular systolic function.      Assessment and plan    82 yo F  h/o   dementia and  <CHF per EMS,/  no othe r hx  is  available presenting via EMS from home for few days of weakness, followed by development of SOB and episode of substernal CP this morning.     Pt is a poor historian and unable to provide significant history    all ROS negative upon questioning.   Lives at home with brother - he reports concern for pt's increased work of breathing.   pt is a 82 yo female with hx of htn, chf, cad, with increasing sob and COVID with  abnormal  cta and chest x ray  cta negative for PE  lipid panel  continue covid therapy  fu renal function  keep K>  will increase beta blocker as tolerated  ac, a.fib rate is controlled  echo noted with normal EF, no WMA  continue beta blocker increase as tolerated  will consider Wilmer/ cardioversion post cath  ID noted, appreciated  increase beta blocker hr better controlled  ac held sec to R thigh  hematoma re start vac as clear   repeat chest x ray in am will order  will increase beta blocker as tolerated  will start on eliquis , no further VT/ normal ef  check pro bnp

## 2022-02-13 NOTE — PROVIDER CONTACT NOTE (OTHER) - ACTION/TREATMENT ORDERED:
BMP ordered for AM labs
Maggi aware- EKG ordered. RN will continue to monitor.
Will order BMP/Mag/Phos. May suggest increasing metoprolol dose.
ACP liat made aware, as per ACP allow metoprolol freq change to work, in meantime increase pts O2 from 2LNC to 4LNC.
ACP made aware, heparin gtt d/c and stat CBC ordered and drawn.
JESSIKA Rod made aware. Continue to monitor patient on tele, no further interventions needed at this time.
labs to be ordered for AM
Lab work ordered for AM. No interventions at this time. Continue to monitor.
NP aware, no intervention at this time, pt pending cardiac cath
Recheck VS. Will replete K+ with PO meds for reassessment in AM

## 2022-02-13 NOTE — PROGRESS NOTE ADULT - SUBJECTIVE AND OBJECTIVE BOX
Chief complaint    Patient is a 81y old  Female who presents with a chief complaint of sob/cp (13 Feb 2022 11:45)   Review of systems  Patient in bed, appears comfortable.    Labs and Fingersticks  CAPILLARY BLOOD GLUCOSE      POCT Blood Glucose.: 150 mg/dL (13 Feb 2022 18:27)  POCT Blood Glucose.: 105 mg/dL (13 Feb 2022 13:00)  POCT Blood Glucose.: 99 mg/dL (13 Feb 2022 10:03)  POCT Blood Glucose.: 155 mg/dL (12 Feb 2022 21:41)      Anion Gap, Serum: 11 (02-13 @ 07:35)  Anion Gap, Serum: 11 (02-12 @ 06:13)      Calcium, Total Serum: 9.1 (02-13 @ 07:35)  Calcium, Total Serum: 9.1 (02-12 @ 06:13)          02-13    141  |  101  |  15  ----------------------------<  91  4.1   |  29  |  0.63    Ca    9.1      13 Feb 2022 07:35                          10.0   6.52  )-----------( 294      ( 13 Feb 2022 07:33 )             33.0     Medications  MEDICATIONS  (STANDING):  apixaban 2.5 milliGRAM(s) Oral every 12 hours  aspirin enteric coated 81 milliGRAM(s) Oral daily  atorvastatin 10 milliGRAM(s) Oral at bedtime  dextrose 40% Gel 15 Gram(s) Oral once  dextrose 50% Injectable 12.5 Gram(s) IV Push once  dextrose 50% Injectable 25 Gram(s) IV Push once  dextrose 50% Injectable 25 Gram(s) IV Push once  glucagon  Injectable 1 milliGRAM(s) IntraMuscular once  insulin lispro (ADMELOG) corrective regimen sliding scale   SubCutaneous three times a day before meals  insulin lispro (ADMELOG) corrective regimen sliding scale   SubCutaneous at bedtime  metoprolol tartrate 50 milliGRAM(s) Oral two times a day      Physical Exam  General: Patient comfortable in bed  Vital Signs Last 12 Hrs  T(F): 98.2 (02-13-22 @ 16:48), Max: 98.2 (02-13-22 @ 16:48)  HR: 102 (02-13-22 @ 16:48) (93 - 102)  BP: 115/71 (02-13-22 @ 16:48) (105/52 - 116/63)  BP(mean): --  RR: 18 (02-13-22 @ 16:48) (18 - 18)  SpO2: 97% (02-13-22 @ 16:48) (97% - 99%)  Neck: No palpable thyroid nodules.  CVS: S1S2, No murmurs  Respiratory: No wheezing, no crepitations  GI: Abdomen soft, bowel sounds positive  Musculoskeletal:  edema lower extremities.     Diagnostics    A1C with Estimated Average Glucose: AM Sched. Collection: 27-Jan-2022 06:00 (01-26 @ 08:11)  A1C with Estimated Average Glucose: Routine (01-26 @ 08:11)

## 2022-02-13 NOTE — PROVIDER CONTACT NOTE (OTHER) - RECOMMENDATIONS
Notify provider, EKG
Patient with hx of COVID+ from 1/21/22. Patient no longer requires isolation at this time. Isolation discontinued as per hospital guidelines. No symptoms & >10 days
Make provider aware of tele event and maintain remote tele monitoring.
Per provider.
Make provider aware. Assess VS and pt LOC.
Make provider aware.

## 2022-02-13 NOTE — PROGRESS NOTE ADULT - ASSESSMENT
80 yo F     h/o   dementia and   ? CHF per EMS,/  no other  hx  is  available      presenting via EMS from home for few days of weakness, followed by development of SOB and episode of substernal CP this morning.     Pt is a poor historian and unable to provide significant history    all ROS negative upon questioning.     Lives at home with brother - he reports concern for pt's increased work of breathing.      pt with   weakness/  sob  from  covid/  pna   CT  chest, with  GOO   on decadron/  remdisivir   *  Dementia   *    HTN,   *  acute  diastolic chf  on   lasix  card  dr davis  on  dvt ppx.  bmi  is  20  tele,   s/p vtach/ on  toprol,  card to  f/p   *  Afib,   was on iv heparin  v tach  on tele.  card following  tele, afib.  s/p  Vtach  22  beats. , had  subsequent vtach also,   cardiac cath was  deferred  due to  fevers  *  E  coli  bacteremia,   on iv rocephin,. rpt bcx  are negative  cath  deferred  for  now, will perform at  a later date,  when cleared by    pt  with  tachycardia and  Vtach on tele,  card  following  pt   u/s     right  thigh  hematoma, 5  cm ,  seen by  vascular,  no  intervention  a/c was  on  hold  for  now.  hb is  stable   cath at  a later  time., was  the plan,   right  groin  ecchymoses,  eliquis now re started  by  card/ no further  v tach, no cath planned  start  d/c  planning  to rehab/ order  written/  brother  unable to take  care of  pt         spoke  with  brother, rosasin/ pt  is , has  no  children/ states,   pt  is  full code    pmd  allie longoria< from: CT Angio Chest PE Protocol w/ IV Cont (01.21.22 @ 16:01) >  IMPRESSION:  No main, right, left, lobar, or proximal segmental pulmonary embolus.  Patchy bilateral groundglass opacities noted throughout both lungs likely   representing infection or alveolar component of edema. COVID 19 can also   have this appearance. Correlate with RT PCR    --- End of Report --  < end of copied text >

## 2022-02-13 NOTE — PROVIDER CONTACT NOTE (OTHER) - SITUATION
6 Beats Wide complex tachycardia
pt had 10 beats wide complex
pt had 10 beats of wide complex up to 160s
Pt with 22 beats of wide complex
Remote tele event
pt has large hematoma on right thigh
As per tele tech-Pt in a-flutter  since 0540. HR fluctuating between 90's-120's
Remote tele event
pt had 12 beats wide complex of tele monitoring
pt had tele event of 13 beats of wide complex

## 2022-02-14 ENCOUNTER — TRANSCRIPTION ENCOUNTER (OUTPATIENT)
Age: 82
End: 2022-02-14

## 2022-02-14 VITALS
OXYGEN SATURATION: 93 % | TEMPERATURE: 98 F | HEART RATE: 84 BPM | DIASTOLIC BLOOD PRESSURE: 65 MMHG | SYSTOLIC BLOOD PRESSURE: 123 MMHG | RESPIRATION RATE: 18 BRPM

## 2022-02-14 LAB
ANION GAP SERPL CALC-SCNC: 12 MMOL/L — SIGNIFICANT CHANGE UP (ref 5–17)
BUN SERPL-MCNC: 20 MG/DL — SIGNIFICANT CHANGE UP (ref 7–23)
CALCIUM SERPL-MCNC: 9.4 MG/DL — SIGNIFICANT CHANGE UP (ref 8.4–10.5)
CHLORIDE SERPL-SCNC: 100 MMOL/L — SIGNIFICANT CHANGE UP (ref 96–108)
CO2 SERPL-SCNC: 27 MMOL/L — SIGNIFICANT CHANGE UP (ref 22–31)
CREAT SERPL-MCNC: 0.61 MG/DL — SIGNIFICANT CHANGE UP (ref 0.5–1.3)
GLUCOSE BLDC GLUCOMTR-MCNC: 140 MG/DL — HIGH (ref 70–99)
GLUCOSE BLDC GLUCOMTR-MCNC: 149 MG/DL — HIGH (ref 70–99)
GLUCOSE SERPL-MCNC: 121 MG/DL — HIGH (ref 70–99)
HCT VFR BLD CALC: 33.8 % — LOW (ref 34.5–45)
HGB BLD-MCNC: 10.5 G/DL — LOW (ref 11.5–15.5)
MCHC RBC-ENTMCNC: 27.9 PG — SIGNIFICANT CHANGE UP (ref 27–34)
MCHC RBC-ENTMCNC: 31.1 GM/DL — LOW (ref 32–36)
MCV RBC AUTO: 89.9 FL — SIGNIFICANT CHANGE UP (ref 80–100)
NRBC # BLD: 0 /100 WBCS — SIGNIFICANT CHANGE UP (ref 0–0)
PLATELET # BLD AUTO: 297 K/UL — SIGNIFICANT CHANGE UP (ref 150–400)
POTASSIUM SERPL-MCNC: 3.8 MMOL/L — SIGNIFICANT CHANGE UP (ref 3.5–5.3)
POTASSIUM SERPL-SCNC: 3.8 MMOL/L — SIGNIFICANT CHANGE UP (ref 3.5–5.3)
RBC # BLD: 3.76 M/UL — LOW (ref 3.8–5.2)
RBC # FLD: 17 % — HIGH (ref 10.3–14.5)
SODIUM SERPL-SCNC: 139 MMOL/L — SIGNIFICANT CHANGE UP (ref 135–145)
WBC # BLD: 9.83 K/UL — SIGNIFICANT CHANGE UP (ref 3.8–10.5)
WBC # FLD AUTO: 9.83 K/UL — SIGNIFICANT CHANGE UP (ref 3.8–10.5)

## 2022-02-14 PROCEDURE — 76775 US EXAM ABDO BACK WALL LIM: CPT

## 2022-02-14 PROCEDURE — 84484 ASSAY OF TROPONIN QUANT: CPT

## 2022-02-14 PROCEDURE — 84443 ASSAY THYROID STIM HORMONE: CPT

## 2022-02-14 PROCEDURE — 93005 ELECTROCARDIOGRAM TRACING: CPT

## 2022-02-14 PROCEDURE — 82962 GLUCOSE BLOOD TEST: CPT

## 2022-02-14 PROCEDURE — 87077 CULTURE AEROBIC IDENTIFY: CPT

## 2022-02-14 PROCEDURE — 82947 ASSAY GLUCOSE BLOOD QUANT: CPT

## 2022-02-14 PROCEDURE — 99285 EMERGENCY DEPT VISIT HI MDM: CPT

## 2022-02-14 PROCEDURE — U0003: CPT

## 2022-02-14 PROCEDURE — 82330 ASSAY OF CALCIUM: CPT

## 2022-02-14 PROCEDURE — 81001 URINALYSIS AUTO W/SCOPE: CPT

## 2022-02-14 PROCEDURE — 87640 STAPH A DNA AMP PROBE: CPT

## 2022-02-14 PROCEDURE — 93321 DOPPLER ECHO F-UP/LMTD STD: CPT

## 2022-02-14 PROCEDURE — 97530 THERAPEUTIC ACTIVITIES: CPT

## 2022-02-14 PROCEDURE — 82272 OCCULT BLD FECES 1-3 TESTS: CPT

## 2022-02-14 PROCEDURE — 85027 COMPLETE CBC AUTOMATED: CPT

## 2022-02-14 PROCEDURE — 71045 X-RAY EXAM CHEST 1 VIEW: CPT

## 2022-02-14 PROCEDURE — 80048 BASIC METABOLIC PNL TOTAL CA: CPT

## 2022-02-14 PROCEDURE — 87040 BLOOD CULTURE FOR BACTERIA: CPT

## 2022-02-14 PROCEDURE — 83605 ASSAY OF LACTIC ACID: CPT

## 2022-02-14 PROCEDURE — 83036 HEMOGLOBIN GLYCOSYLATED A1C: CPT

## 2022-02-14 PROCEDURE — 87150 DNA/RNA AMPLIFIED PROBE: CPT

## 2022-02-14 PROCEDURE — 82565 ASSAY OF CREATININE: CPT

## 2022-02-14 PROCEDURE — 71275 CT ANGIOGRAPHY CHEST: CPT | Mod: MA

## 2022-02-14 PROCEDURE — 84295 ASSAY OF SERUM SODIUM: CPT

## 2022-02-14 PROCEDURE — 97116 GAIT TRAINING THERAPY: CPT

## 2022-02-14 PROCEDURE — 83880 ASSAY OF NATRIURETIC PEPTIDE: CPT

## 2022-02-14 PROCEDURE — 96374 THER/PROPH/DIAG INJ IV PUSH: CPT

## 2022-02-14 PROCEDURE — 36415 COLL VENOUS BLD VENIPUNCTURE: CPT

## 2022-02-14 PROCEDURE — 80162 ASSAY OF DIGOXIN TOTAL: CPT

## 2022-02-14 PROCEDURE — 84132 ASSAY OF SERUM POTASSIUM: CPT

## 2022-02-14 PROCEDURE — 85730 THROMBOPLASTIN TIME PARTIAL: CPT

## 2022-02-14 PROCEDURE — 85610 PROTHROMBIN TIME: CPT

## 2022-02-14 PROCEDURE — 97162 PT EVAL MOD COMPLEX 30 MIN: CPT

## 2022-02-14 PROCEDURE — 87086 URINE CULTURE/COLONY COUNT: CPT

## 2022-02-14 PROCEDURE — 87186 SC STD MICRODIL/AGAR DIL: CPT

## 2022-02-14 PROCEDURE — 85025 COMPLETE CBC W/AUTO DIFF WBC: CPT

## 2022-02-14 PROCEDURE — U0005: CPT

## 2022-02-14 PROCEDURE — 80053 COMPREHEN METABOLIC PANEL: CPT

## 2022-02-14 PROCEDURE — 85018 HEMOGLOBIN: CPT

## 2022-02-14 PROCEDURE — 82248 BILIRUBIN DIRECT: CPT

## 2022-02-14 PROCEDURE — 76882 US LMTD JT/FCL EVL NVASC XTR: CPT

## 2022-02-14 PROCEDURE — 80076 HEPATIC FUNCTION PANEL: CPT

## 2022-02-14 PROCEDURE — 85014 HEMATOCRIT: CPT

## 2022-02-14 PROCEDURE — 82435 ASSAY OF BLOOD CHLORIDE: CPT

## 2022-02-14 PROCEDURE — 86140 C-REACTIVE PROTEIN: CPT

## 2022-02-14 PROCEDURE — 85379 FIBRIN DEGRADATION QUANT: CPT

## 2022-02-14 PROCEDURE — 82728 ASSAY OF FERRITIN: CPT

## 2022-02-14 PROCEDURE — 82803 BLOOD GASES ANY COMBINATION: CPT

## 2022-02-14 PROCEDURE — 96375 TX/PRO/DX INJ NEW DRUG ADDON: CPT

## 2022-02-14 PROCEDURE — 83735 ASSAY OF MAGNESIUM: CPT

## 2022-02-14 PROCEDURE — 84100 ASSAY OF PHOSPHORUS: CPT

## 2022-02-14 PROCEDURE — 93308 TTE F-UP OR LMTD: CPT

## 2022-02-14 PROCEDURE — 84145 PROCALCITONIN (PCT): CPT

## 2022-02-14 PROCEDURE — 87641 MR-STAPH DNA AMP PROBE: CPT

## 2022-02-14 RX ORDER — ASPIRIN/CALCIUM CARB/MAGNESIUM 324 MG
1 TABLET ORAL
Qty: 0 | Refills: 0 | DISCHARGE
Start: 2022-02-14

## 2022-02-14 RX ORDER — ACETAMINOPHEN 500 MG
2 TABLET ORAL
Qty: 0 | Refills: 0 | DISCHARGE
Start: 2022-02-14

## 2022-02-14 RX ORDER — APIXABAN 2.5 MG/1
1 TABLET, FILM COATED ORAL
Qty: 0 | Refills: 0 | DISCHARGE
Start: 2022-02-14

## 2022-02-14 RX ORDER — METOPROLOL TARTRATE 50 MG
1 TABLET ORAL
Qty: 0 | Refills: 0 | DISCHARGE
Start: 2022-02-14

## 2022-02-14 RX ORDER — ATORVASTATIN CALCIUM 80 MG/1
1 TABLET, FILM COATED ORAL
Qty: 0 | Refills: 0 | DISCHARGE
Start: 2022-02-14

## 2022-02-14 RX ADMIN — Medication 81 MILLIGRAM(S): at 11:03

## 2022-02-14 RX ADMIN — APIXABAN 2.5 MILLIGRAM(S): 2.5 TABLET, FILM COATED ORAL at 05:46

## 2022-02-14 RX ADMIN — Medication 50 MILLIGRAM(S): at 05:46

## 2022-02-14 NOTE — PROGRESS NOTE ADULT - SUBJECTIVE AND OBJECTIVE BOX
CARDIOLOGY     PROGRESS  NOTE   ________________________________________________    CHIEF COMPLAINT:Patient is a 81y old  Female who presents with a chief complaint of sob/cp (13 Feb 2022 20:42)  no complain.  	  REVIEW OF SYSTEMS:  CONSTITUTIONAL: No fever, weight loss, or fatigue  EYES: No eye pain, visual disturbances, or discharge  ENT:  No difficulty hearing, tinnitus, vertigo; No sinus or throat pain  NECK: No pain or stiffness  RESPIRATORY: No cough, wheezing, chills or hemoptysis; No Shortness of Breath  CARDIOVASCULAR: No chest pain, palpitations, passing out, dizziness, or leg swelling  GASTROINTESTINAL: No abdominal or epigastric pain. No nausea, vomiting, or hematemesis; No diarrhea or constipation. No melena or hematochezia.  GENITOURINARY: No dysuria, frequency, hematuria, or incontinence  NEUROLOGICAL: No headaches, memory loss, loss of strength, numbness, or tremors  SKIN: No itching, burning, rashes, or lesions   LYMPH Nodes: No enlarged glands  ENDOCRINE: No heat or cold intolerance; No hair loss  MUSCULOSKELETAL: No joint pain or swelling; No muscle, back, or extremity pain  PSYCHIATRIC: No depression, anxiety, mood swings, or difficulty sleeping  HEME/LYMPH: No easy bruising, or bleeding gums  ALLERGY AND IMMUNOLOGIC: No hives or eczema	    [ ] All others negative	  [ ] Unable to obtain    PHYSICAL EXAM:  T(C): 36.7 (02-14-22 @ 10:03), Max: 36.8 (02-13-22 @ 16:48)  HR: 100 (02-14-22 @ 10:03) (80 - 114)  BP: 94/59 (02-14-22 @ 10:03) (94/59 - 116/70)  RR: 18 (02-14-22 @ 10:03) (18 - 18)  SpO2: 92% (02-14-22 @ 10:03) (92% - 97%)  Wt(kg): --  I&O's Summary    13 Feb 2022 07:01  -  14 Feb 2022 07:00  --------------------------------------------------------  IN: 620 mL / OUT: 375 mL / NET: 245 mL    14 Feb 2022 07:01  -  14 Feb 2022 10:16  --------------------------------------------------------  IN: 240 mL / OUT: 200 mL / NET: 40 mL        Appearance: Normal	  HEENT:   Normal oral mucosa, PERRL, EOMI	  Lymphatic: No lymphadenopathy  Cardiovascular: Normal S1 S2, No JVD, +murmurs, No edema  Respiratory: Lungs clear to auscultation	  Psychiatry: A & O x 3, Mood & affect appropriate  Gastrointestinal:  Soft, Non-tender, + BS	  Skin: No rashes, No ecchymoses, No cyanosis	  Neurologic: Non-focal  Extremities: Normal range of motion, No clubbing, cyanosis or edema  Vascular: Peripheral pulses palpable 2+ bilaterally    MEDICATIONS  (STANDING):  apixaban 2.5 milliGRAM(s) Oral every 12 hours  aspirin enteric coated 81 milliGRAM(s) Oral daily  atorvastatin 10 milliGRAM(s) Oral at bedtime  dextrose 40% Gel 15 Gram(s) Oral once  dextrose 50% Injectable 25 Gram(s) IV Push once  dextrose 50% Injectable 12.5 Gram(s) IV Push once  dextrose 50% Injectable 25 Gram(s) IV Push once  glucagon  Injectable 1 milliGRAM(s) IntraMuscular once  insulin lispro (ADMELOG) corrective regimen sliding scale   SubCutaneous three times a day before meals  insulin lispro (ADMELOG) corrective regimen sliding scale   SubCutaneous at bedtime  metoprolol tartrate 50 milliGRAM(s) Oral two times a day      TELEMETRY: 	    ECG:  	  RADIOLOGY:  OTHER: 	  	  LABS:	 	    CARDIAC MARKERS:                                10.5   9.83  )-----------( 297      ( 14 Feb 2022 07:26 )             33.8     02-14    139  |  100  |  20  ----------------------------<  121<H>  3.8   |  27  |  0.61    Ca    9.4      14 Feb 2022 07:24      proBNP: Serum Pro-Brain Natriuretic Peptide: 20071 pg/mL (01-21 @ 12:11)    Lipid Profile:   HgA1c:   TSH: Thyroid Stimulating Hormone, Serum: 0.47 uIU/mL (01-21 @ 17:02)          Assessment and plan  ---------------------------  82 yo F  h/o   dementia and  <CHF per EMS,/  no othe r hx  is  available presenting via EMS from home for few days of weakness, followed by development of SOB and episode of substernal CP this morning.     Pt is a poor historian and unable to provide significant history    all ROS negative upon questioning.   Lives at home with brother - he reports concern for pt's increased work of breathing.   pt is a 82 yo female with hx of htn, chf, cad, with increasing sob and COVID with  abnormal  cta and chest x ray  cta negative for PE  lipid panel  continue covid therapy  fu renal function  keep K>  will increase beta blocker as tolerated  ac, a.fib rate is controlled  echo noted with normal EF, no WMA  continue beta blocker increase as tolerated  will consider Wilmer/ cardioversion post cath  ID noted, appreciated  increase beta blocker hr better controlled  ac held sec to R thigh  hematoma re start vac as clear   repeat chest x ray in am will order  will increase beta blocker as tolerated  will start on eliquis , no further VT/ normal ef  check pro bnp  further cardiac alegria as out pt

## 2022-02-14 NOTE — PROGRESS NOTE ADULT - REASON FOR ADMISSION
sob/cp

## 2022-02-14 NOTE — PROGRESS NOTE ADULT - PROBLEM SELECTOR PLAN 2
Suggest to continue medications, monitoring, FU primary team recommendations.

## 2022-02-14 NOTE — PROGRESS NOTE ADULT - PROBLEM SELECTOR PROBLEM 2
2019 novel coronavirus disease (COVID-19)

## 2022-02-14 NOTE — PROGRESS NOTE ADULT - PROBLEM SELECTOR PROBLEM 3
Hypertension

## 2022-02-14 NOTE — PROGRESS NOTE ADULT - SUBJECTIVE AND OBJECTIVE BOX
afebrile  REVIEW OF SYSTEMS:  GEN: no fever,    no chills  RESP: no SOB,   no cough  CVS: no chest pain,   no palpitations  GI: no abdominal pain,   no nausea,   no vomiting,   no constipation,   no diarrhea  : no dysuria,   no frequency  NEURO: no headache,   no dizziness  PSYCH: no depression,   not anxious  Derm : no rash    MEDICATIONS  (STANDING):  apixaban 2.5 milliGRAM(s) Oral every 12 hours  aspirin enteric coated 81 milliGRAM(s) Oral daily  atorvastatin 10 milliGRAM(s) Oral at bedtime  dextrose 40% Gel 15 Gram(s) Oral once  dextrose 50% Injectable 25 Gram(s) IV Push once  dextrose 50% Injectable 12.5 Gram(s) IV Push once  dextrose 50% Injectable 25 Gram(s) IV Push once  glucagon  Injectable 1 milliGRAM(s) IntraMuscular once  insulin lispro (ADMELOG) corrective regimen sliding scale   SubCutaneous three times a day before meals  insulin lispro (ADMELOG) corrective regimen sliding scale   SubCutaneous at bedtime  metoprolol tartrate 50 milliGRAM(s) Oral two times a day    MEDICATIONS  (PRN):  acetaminophen     Tablet .. 650 milliGRAM(s) Oral every 6 hours PRN Temp greater or equal to 38C (100.4F), Mild Pain (1 - 3), Moderate Pain (4 - 6), Severe Pain (7 - 10)      Vital Signs Last 24 Hrs  T(C): 36.7 (14 Feb 2022 10:03), Max: 36.8 (13 Feb 2022 16:48)  T(F): 98 (14 Feb 2022 10:03), Max: 98.3 (13 Feb 2022 22:44)  HR: 100 (14 Feb 2022 10:03) (80 - 114)  BP: 94/59 (14 Feb 2022 10:03) (94/59 - 116/70)  BP(mean): --  RR: 18 (14 Feb 2022 10:03) (18 - 18)  SpO2: 92% (14 Feb 2022 10:03) (92% - 97%)  CAPILLARY BLOOD GLUCOSE      POCT Blood Glucose.: 140 mg/dL (14 Feb 2022 08:55)  POCT Blood Glucose.: 188 mg/dL (13 Feb 2022 22:14)  POCT Blood Glucose.: 150 mg/dL (13 Feb 2022 18:27)  POCT Blood Glucose.: 105 mg/dL (13 Feb 2022 13:00)    I&O's Summary    13 Feb 2022 07:01  -  14 Feb 2022 07:00  --------------------------------------------------------  IN: 620 mL / OUT: 375 mL / NET: 245 mL    14 Feb 2022 07:01  -  14 Feb 2022 10:18  --------------------------------------------------------  IN: 240 mL / OUT: 200 mL / NET: 40 mL        PHYSICAL EXAM:  HEAD:  Atraumatic, Normocephalic  NECK: Supple, No   JVD  CHEST/LUNG:   no     rales,     no,    rhonchi  HEART: Regular rate and rhythm;         murmur  ABDOMEN: Soft, Nontender, ;   EXTREMITIES:    no    edema  NEUROLOGY:  alert    LABS:                        10.5   9.83  )-----------( 297      ( 14 Feb 2022 07:26 )             33.8     02-14    139  |  100  |  20  ----------------------------<  121<H>  3.8   |  27  |  0.61    Ca    9.4      14 Feb 2022 07:24                      Thyroid Stimulating Hormone, Serum: 0.47 uIU/mL (01-21 @ 17:02)          Consultant(s) Notes Reviewed:      Care Discussed with Consultants/Other Providers:

## 2022-02-14 NOTE — PROGRESS NOTE ADULT - ASSESSMENT
82 yo F     h/o   dementia and   ? CHF per EMS,/  no other  hx  is  available      presenting via EMS from home for few days of weakness, followed by development of SOB and episode of substernal CP this morning.     Pt is a poor historian and unable to provide significant history    all ROS negative upon questioning.     Lives at home with brother - he reports concern for pt's increased work of breathing.      pt with   weakness/  sob  from  covid/  pna   CT  chest, with  GOO   on decadron/  remdisivir   *  Dementia   *    HTN,   *  acute  diastolic chf  on   lasix  card  dr davis  on  dvt ppx.  bmi  is  20  tele,   s/p vtach/ on  toprol,  card to  f/p   *  Afib,   was on iv heparin  v tach  on tele.  card following  tele, afib.  s/p  Vtach  22  beats. , had  subsequent vtach also,   cardiac cath was  deferred  due to  fevers  *  E  coli  bacteremia,   on iv rocephin,. rpt bcx  are negative  cath  deferred  for  now, will perform at  a later date,  when cleared by    pt  with  tachycardia and  Vtach on tele,  card  following  pt   u/s     right  thigh  hematoma, 5  cm ,  seen by  vascular,  no  intervention  a/c was  on  hold  for  now.  hb is  stable   cath at  a later  time., was  the plan,   right  groin  ecchymoses,  eliquis now re started  by  card/ no further  v tach, no cath planned  start  d/c  planning  to rehab/ order  written/  brother  unable to take  care of  pt  dc to rehab         spoke  with  brothersammi/ pt  is , has  no  children/ states,   pt  is  full code    pmd  allie longoria< from: CT Angio Chest PE Protocol w/ IV Cont (01.21.22 @ 16:01) >  IMPRESSION:  No main, right, left, lobar, or proximal segmental pulmonary embolus.  Patchy bilateral groundglass opacities noted throughout both lungs likely   representing infection or alveolar component of edema. COVID 19 can also   have this appearance. Correlate with RT PCR    --- End of Report --  < end of copied text >

## 2022-02-14 NOTE — DISCHARGE NOTE NURSING/CASE MANAGEMENT/SOCIAL WORK - NSDCPEFALRISK_GEN_ALL_CORE
For information on Fall & Injury Prevention, visit: [email or web address] OR  [email or web address] OR  https://www.cdc.gov/steadi/patient.html

## 2022-02-14 NOTE — PROGRESS NOTE ADULT - ASSESSMENT
Assessment  DMT2: 81y Female with DM T2 with hyperglycemia, A1C 6.3%, she is unsure of her outpatient medications, on low-scale insulin coverage only, blood sugars are stable and trending within acceptable range, no hypoglycemias, eating meals, NAD, planning DC.  Covid: on medications,  stable, monitored.  HTN: Controlled,  on antihypertensive medications.  Dementia: stable, monitored.      Brian Huynh MD  Cell: [phone]  Office: [phone]     Assessment  DMT2: 81y Female with DM T2 with hyperglycemia, A1C 6.3%, she is unsure of her outpatient medications, on low-scale insulin coverage only, blood sugars are  stable and trending within acceptable range, no hypoglycemias, eating meals, NAD, planning DC.  Covid: on medications,  stable, monitored.  HTN: Controlled,  on antihypertensive medications.  Dementia: stable, monitored.      Brian Huynh MD  Cell: [phone]  Office: [phone]

## 2022-02-14 NOTE — PROGRESS NOTE ADULT - PROBLEM SELECTOR PLAN 4
Continue monitoring.

## 2022-02-14 NOTE — PROGRESS NOTE ADULT - PROBLEM SELECTOR PROBLEM 1
DM (diabetes mellitus)

## 2022-02-14 NOTE — PROGRESS NOTE ADULT - SUBJECTIVE AND OBJECTIVE BOX
Chief complaint  Patient is a 81y old  Female who presents with a chief complaint of sob/cp (14 Feb 2022 10:18)   Review of systems  Patient in bed, looks comfortable, no hypoglycemic episodes.    Labs and Fingersticks  CAPILLARY BLOOD GLUCOSE      POCT Blood Glucose.: 149 mg/dL (14 Feb 2022 11:59)  POCT Blood Glucose.: 140 mg/dL (14 Feb 2022 08:55)  POCT Blood Glucose.: 188 mg/dL (13 Feb 2022 22:14)  POCT Blood Glucose.: 150 mg/dL (13 Feb 2022 18:27)      Anion Gap, Serum: 12 (02-14 @ 07:24)  Anion Gap, Serum: 11 (02-13 @ 07:35)      Calcium, Total Serum: 9.4 (02-14 @ 07:24)  Calcium, Total Serum: 9.1 (02-13 @ 07:35)          02-14    139  |  100  |  20  ----------------------------<  121<H>  3.8   |  27  |  0.61    Ca    9.4      14 Feb 2022 07:24                          10.5   9.83  )-----------( 297      ( 14 Feb 2022 07:26 )             33.8     Medications  MEDICATIONS  (STANDING):  apixaban 2.5 milliGRAM(s) Oral every 12 hours  aspirin enteric coated 81 milliGRAM(s) Oral daily  atorvastatin 10 milliGRAM(s) Oral at bedtime  dextrose 40% Gel 15 Gram(s) Oral once  dextrose 50% Injectable 25 Gram(s) IV Push once  dextrose 50% Injectable 12.5 Gram(s) IV Push once  dextrose 50% Injectable 25 Gram(s) IV Push once  glucagon  Injectable 1 milliGRAM(s) IntraMuscular once  insulin lispro (ADMELOG) corrective regimen sliding scale   SubCutaneous three times a day before meals  insulin lispro (ADMELOG) corrective regimen sliding scale   SubCutaneous at bedtime  metoprolol tartrate 50 milliGRAM(s) Oral two times a day      Physical Exam  General: Patient comfortable in bed  Vital Signs Last 12 Hrs  T(F): 98.4 (02-14-22 @ 13:38), Max: 98.4 (02-14-22 @ 13:38)  HR: 84 (02-14-22 @ 13:38) (84 - 114)  BP: 123/65 (02-14-22 @ 13:38) (94/59 - 123/65)  BP(mean): --  RR: 18 (02-14-22 @ 13:38) (18 - 18)  SpO2: 93% (02-14-22 @ 13:38) (92% - 94%)  Neck: No palpable thyroid nodules.  CVS: S1S2, No murmurs  Respiratory: No wheezing, no crepitations  GI: Abdomen soft, bowel sounds positive  Musculoskeletal:  edema lower extremities.   Skin: No skin rashes, no ecchymosis    Diagnostics    Diet, Regular:   Consistent Carbohydrate No Snacks (CSTCHO) (01-26 @ 15:27)           Chief complaint  Patient is a 81y old  Female who presents with a chief complaint of sob/cp (14 Feb 2022 10:18)   Review of systems  Patient in bed, looks comfortable, no hypoglycemic episodes.    Labs and Fingersticks  CAPILLARY BLOOD GLUCOSE      POCT Blood Glucose.: 149 mg/dL (14 Feb 2022 11:59)  POCT Blood Glucose.: 140 mg/dL (14 Feb 2022 08:55)  POCT Blood Glucose.: 188 mg/dL (13 Feb 2022 22:14)  POCT Blood Glucose.: 150 mg/dL (13 Feb 2022 18:27)      Anion Gap, Serum: 12 (02-14 @ 07:24)  Anion Gap, Serum: 11 (02-13 @ 07:35)      Calcium, Total Serum: 9.4 (02-14 @ 07:24)  Calcium, Total Serum: 9.1 (02-13 @ 07:35)          02-14    139  |  100  |  20  ----------------------------<  121<H>  3.8   |  27  |  0.61    Ca    9.4      14 Feb 2022 07:24                          10.5   9.83  )-----------( 297      ( 14 Feb 2022 07:26 )             33.8     Medications  MEDICATIONS  (STANDING):  apixaban 2.5 milliGRAM(s) Oral every 12 hours  aspirin enteric coated 81 milliGRAM(s) Oral daily  atorvastatin 10 milliGRAM(s) Oral at bedtime  dextrose 40% Gel 15 Gram(s) Oral once  dextrose 50% Injectable 25 Gram(s) IV Push once  dextrose 50% Injectable 12.5 Gram(s) IV Push once  dextrose 50% Injectable 25 Gram(s) IV Push once  glucagon  Injectable 1 milliGRAM(s) IntraMuscular once  insulin lispro (ADMELOG) corrective regimen sliding scale   SubCutaneous three times a day before meals  insulin lispro (ADMELOG) corrective regimen sliding scale   SubCutaneous at bedtime  metoprolol tartrate 50 milliGRAM(s) Oral two times a day      Physical Exam  General: Patient comfortable in bed  Vital Signs Last 12 Hrs  T(F): 98.4 (02-14-22 @ 13:38), Max: 98.4 (02-14-22 @ 13:38)  HR: 84 (02-14-22 @ 13:38) (84 - 114)  BP: 123/65 (02-14-22 @ 13:38) (94/59 - 123/65)  BP(mean): --  RR: 18 (02-14-22 @ 13:38) (18 - 18)  SpO2: 93% (02-14-22 @ 13:38) (92% - 94%)  Neck: No palpable thyroid nodules.  CVS: S1S2, No murmurs  Respiratory: No wheezing, no crepitations  GI: Abdomen soft, bowel sounds positive  Musculoskeletal:  edema lower extremities.   Skin: No skin rashes, no ecchymosis    Diagnostics    Diet, Regular:   Consistent Carbohydrate No Snacks (CSTCHO) (01-26 @ 15:27)

## 2022-02-14 NOTE — DISCHARGE NOTE NURSING/CASE MANAGEMENT/SOCIAL WORK - PATIENT PORTAL LINK FT
You can access the FollowMyHealth Patient Portal offered by [facility] by registering at the following website: [email or web address]. By joining [facility]’s FollowMyHealth portal, you will also be able to view your health information using other applications (apps) compatible with our system.

## 2022-02-14 NOTE — PROGRESS NOTE ADULT - PROBLEM SELECTOR PLAN 1
Will continue current insulin regimen for now. Will continue monitoring FS, log, and FU.  Patient in pre-diabetic range, A1C 6.3%, blood sugars trending at target with minimal management. No need to d/c on DM meds, patient was counseled for compliance with consistent low carb diet, FU outpatient endo in 3 months.
Will continue current insulin regimen for now. Will continue monitoring FS, log, and FU.  Patient counseled for compliance with consistent low carb diet.
Will continue current insulin regimen for now. Will continue monitoring FS, log, and FU.  Patient in pre-diabetic range, A1C 6.3%, blood sugars trending at target with minimal management. No need to d/c on DM meds, patient was counseled for compliance with consistent low carb diet, FU outpatient endo in 3 months.
Will continue current insulin regimen for now. Will continue monitoring FS, log, and FU.  Patient in pre-diabetic range, A1C 6.3%, blood sugars trending at target with minimal management. No need to d/c on DM meds, patient was counseled for compliance with consistent low carb diet, FU outpatient endo in 3 months.

## 2022-07-20 NOTE — PROGRESS NOTE ADULT - PROVIDER SPECIALTY LIST ADULT
Speech-Language Pathology Evaluation    Visit Count: 1  Referred by: MARBIN Kam*, next visit (if known): 7/21/2022   Medical Diagnosis (from order):   Diagnosis Information      Diagnosis    V12.54 (ICD-9-CM) - Z86.73 (ICD-10-CM) - History of cerebrovascular accident (CVA) due to embolism    427.31 (ICD-9-CM) - I48.91 (ICD-10-CM) - Atrial fibrillation, unspecified type (CMS/HCC)    799.52 (ICD-9-CM) - R41.841 (ICD-10-CM) - Cognitive communication deficit    784.3 (ICD-9-CM) - R47.01 (ICD-10-CM) - Expressive aphasia               Treatment Diagnosis: Cognitive Communication Deficit    Date of Onset/Injury: 07/06/2022  Precautions: Cerebrovascular Accident (CVA), Vision Loss (blurred vision)   Chart reviewed: Relevant co-morbidities, allergies, tests and medications: History of stroke due to emolism (07/06/2022), GERD, Type 2 Diabetes,             MRI Brain WWO Contrast (07/08/2022):   1. Moderate/large left posterior cerebral artery territory early subacute   ischemic infarct involving the left occipital lobe, left posteromedial   temporal lobe, and left thalamus. No associated significant mass effect. Left   thalamic microbleed. 2. Evidence of left posterior cerebral artery proximal P2 segment focal severe   stenosis or possible occlusion. CTA or MRA could be considered for further   characterization. 3. Moderate chronic cerebral white matter microangiopathic changesÂ and small   cerebral white matter chronic infarcts, as above. CT No Contrast (07/08/2022):   1. No CT evidence of acute intracranial abnormality. Chronic encephalomalacia   in the left occipital lobe consistent with prior infarct. 2. Chronic paranasal sinus disease with hyperdensity in the central portion of   the right maxillary sinus which can be seen with fungal sinusitis. There is   also demineralization of the medial right maxillary sinus wall.                           SUBJECTIVE   Patient presented to outpatient speech "therapy below baseline cognitive functioning s/p stroke earlier this month. Patient's wife, Cornelio Martin, was present during the appointment and provided case history information. Patient stated that he has been experiencing memory deficits and difficulty reading, stating that sometimes ""the letters switch around. \"" His wife mentioned that he has difficulty reading street signs. Patient also commented on blurred vision post stroke in right eye. He has reading glasses that he did not bring with him to the session. Patient denied use of memory strategies (e.g., writing information down) at home. Pain: patient reports pain is not an issue/concern    Function:   Limitations (patient reported): communication, understanding, reading, remembering, attention, executive function (organization, planning)  Prior Level(patient reported): independent with activities of daily living, communication / cognition. Prior Treatment: no therapies in the past year for current condition. Hospitalization, home health services or skilled nursing facility in the last 30 days: Yes, per patient. Social Support/Home Environment: Patient lives with significant other. Patient has assistance as needed from family/friends. Safety:  Do you feel safe at home, work and/or school? yes, per patient  Fall History: (fall/near fall in past 12 months): No    Patient Goals / Concerns: Return to baseline level of cognitive functioning. OBJECTIVE   Cognitive Standardized Test     Standardized Assessment: Cognitive-communication functioning was assessed using the Scales of Cognitive and Communicative Ability for Neuro rehabilitation [facility]). This assessment is normed for adults (18-91) to identify current functioning of oral expression, speech comprehension, reading comprehension, writing, orientation, attention, memory, and problem solving. Sub-scores are analyzed as a percentage of accuracy performed on questions targeting each skill area.  The " total raw score is converted to percentile scores which is a normative rating to compare patient's performance to that of typically functioning adults. Cognitive Abilities Score Max Score Percentage (%)   Oral Expression 17 19 89%   Orientation 8 12 66%   Memory 7 19 37%   Speech Comprehension 12 13 92%   Reading Comprehension 8 12 66%   Writing 6 7 86%   Attention 10 16 63%   Problem Solving 20 23 87%     Total Raw Score Total Percentile Rank SCCAN Index Degree of Severity   68/94 <1 50/118 Moderate Impairment       ASSESSMENT   71year old male presents to speech therapy below prior level of function in communication,  cognition, and language s/p stroke earlier this month. Patient continues to experience cognitive deficits that are detrimental to his safety and functional independence within ADLs. Skilled therapeutic intervention will be used to target memory, language (reading), and executive functioning. Patient will benefit from skilled therapy and Rehabilitative potential is good based on assessment above  Plan of care to improve functional communication, improve cognitive processing, improve cognition to address functional limitations listed above. Goals: To be obtained by end of this plan of care:  1. Patient will improve focus and attention skills for complex auditory/visual stimuli at 75% for IADL's and to retain new information/new learning. 2. Patient will demonstrate complex language/thought processing skills such as problem solving, reasoning, and higher level executive functioning in order to improve safety and awareness in functional living environment at 75% accuracy. 3. Patient will improve short term working memory for [place], improved recall, and new learning at 65% with compensatory strategies. 4. Patient will develop functional reading skills and utilize compensatory strategies to maintain safety during IADL's and read and understand functional material at 75% accuracy. Internal Medicine PLAN   Frequency/Duration: 1 times per week for 12 weeks with tapering as the patient progresses for an estimated total of 10 visits  Treatment of Speech Language (76839)  Cognitive Treatment ([id number] [phone])    The plan of care and goals were established with the patient and patient's spouse who concurs. Patient has been given attendance policy at time of initial evaluation.     Patient Education:  Who will be receiving education: patient and patient's significant other  Are they ready to learn: yes  Preferred learning style: written, verbal, demonstration   Barriers to learning: no barriers apparent at this time   Result of initial outlined education: Verbalizes understanding    Therapy procedure time and total treatment time can be found documented on the Time Entry flowsheet

## 2022-08-18 NOTE — PHYSICAL THERAPY INITIAL EVALUATION ADULT - PATIENT PROFILE REVIEW, REHAB EVAL
DOS 08/23/2022. Pre procedural telephone interview complete with patient. Patient verbalizes correct arrival time 0530 and place [facility], NPO status 8 hours prior and medications amiodarone, midodrine, finerenone with only sips of water as per anesthesia protocol on DOS. Patient instructed to bring a photo ID, insurance, card/s, and list of current medications. Patient instructed to shower with antibacterial soap per protocol and NOT to wear any jewelry, lotions & powders. After hospital discharge patient will be going home with Ratna, sister in law. Person staying overnight is Linette, spouse. Patient acknowledged understanding to the plan of care and had no further questions/concerns.     Patient aware if they are ill (cough, fever, etc.) to call their MD. Patient aware of coronavirus precautions for hospital screening upon entry to hospital, mask requirement for all on hospital campus, current visitor policy and  parking no longer available.    Testing needed on DOS.        
yes

## 2022-09-19 NOTE — PROGRESS NOTE ADULT - ASSESSMENT
Assessment  DMT2: 81y Female with DM T2 with hyperglycemia, A1C 6.3%, she is unsure of her outpatient medications, on low-scale insulin coverage only,  blood sugars trending down,, nohypoglycemias  Covid: on medications,  stable, monitored.  HTN: Controlled,  on antihypertensive medications.  Dementia: stable, monitored.      Brian Huynh MD  Cell: [phone]  Office: [phone]   Cimzia Pregnancy And Lactation Text: This medication crosses the placenta but can be considered safe in certain situations. Cimzia may be excreted in breast milk.

## 2022-12-14 NOTE — DISCHARGE NOTE NURSING/CASE MANAGEMENT/SOCIAL WORK - NSTOBACCONEVERSMOKERY/N_GEN_A
Subjective:     Encounter Date:12/14/2022      Patient ID: Flavio Frost is a 66 y.o. male.    Chief Complaint:chest pain  History of Present Illness  This is a 67 y/o man who follows with Dr. Leyva and is new to me today. He has a pmhx of coronary artery disease, hypertension, hyperlipidemia and tobacco abuse. In 2018, he presented with an inferior MI and underwent a thrombectomy and CARRIE placement to the right coronary artery. He was found to have  areduced EF fo 30% at that time which has since improved to 70%. He was seen in [facility], [place], operated by [facility] ED on 8/28 with chest pain and left arm numbness. He underwent an exercise Cardiolite stress test 8/28/22 which showed no evidence of ischemia, EF 60%.     He was last seen in the office by VARINDER Segal in October and was doing well. He contacted our office yesterday with complaints of chest pain and was scheduled to see me for further evaluation today. On Sunday, he had about 4 episodes of stabbing chest pain in what he describes as the solar plexus area. He says it hurt so bad he screamed out. It lasted a few seconds and went away. He had no associated symptoms. It seemed to occur lying down. The Sunday prior to Thanksgiving, he had the flu and it lasted for about a week. Following that, he developed congestion and a severe cough that would almost make him vomit. He recovered from this a few days ago. He has been under a great amount of stress lately as well. He says that prior to his MI, he had nausea and dizziness. He denies any of these symptoms now. He quit smoking in 2018 and he stopped vaping in May.    I have reviewed and updated as appropriate allergies, current medications, past family history, past medical history, past surgical history and problem list.    Review of Systems   Constitutional: Negative for fever, malaise/fatigue, weight gain and weight loss.   HENT: Negative for congestion, hoarse voice and sore throat.    Eyes: Negative for blurred vision and  double vision.   Cardiovascular: Positive for chest pain. Negative for dyspnea on exertion, leg swelling, orthopnea, palpitations and syncope.   Respiratory: Negative for cough, shortness of breath and wheezing.    Gastrointestinal: Negative for abdominal pain, hematemesis, hematochezia and melena.   Genitourinary: Negative for dysuria and hematuria.   Neurological: Negative for dizziness, headaches, light-headedness and numbness.   Psychiatric/Behavioral: Negative for depression. The patient is not nervous/anxious.          Current Outpatient Medications:   •  aspirin 81 MG chewable tablet, Chew 1 tablet Daily., Disp: 30 tablet, Rfl: 11  •  atorvastatin (LIPITOR) 40 MG tablet, Take 1 tablet by mouth Every Night., Disp: 90 tablet, Rfl: 3  •  lisinopril (PRINIVIL,ZESTRIL) 10 MG tablet, Take 1 tablet by mouth Daily., Disp: 90 tablet, Rfl: 3  •  nitroglycerin (NITROSTAT) 0.4 MG SL tablet, 1 under the tongue as needed for angina, may repeat q5mins for up three doses (Patient taking differently: 1 under the tongue as needed for angina, may repeat q5mins for up three doses  Has them in his pocket, however he dose not generally use them), Disp: 45 tablet, Rfl: 11  •  tamsulosin (FLOMAX) 0.4 MG capsule 24 hr capsule, TAKE ONE CAPSULE BY MOUTH DAILY, Disp: 90 capsule, Rfl: 1  •  pantoprazole (Protonix) 40 MG EC tablet, Take 1 tablet by mouth Daily., Disp: 90 tablet, Rfl: 3    Past Medical History:   Diagnosis Date   • BPH (benign prostatic hyperplasia)    • Bradycardia    • Cancer (HCC)     skin cancer   • Coronary artery disease    • Enlarged prostate    • Hyperlipidemia    • Hypertension    • Hypotension    • Myocardial infarction (HCC)        Past Surgical History:   Procedure Laterality Date   • CARDIAC CATHETERIZATION     • CARDIAC CATHETERIZATION N/A 8/20/2018    Procedure: Left Heart Cath;  Surgeon: Giovanny Leyva MD;  Location: [facility] CATH INVASIVE LOCATION;  Service: Cardiology   • CARDIAC CATHETERIZATION  N/A 2018    Procedure: Stent CARRIE coronary;  Surgeon: Giovanny Leyva MD;  Location:  [facility] CATH INVASIVE LOCATION;  Service: Cardiology   • CARDIAC CATHETERIZATION N/A 2018    Procedure: Coronary angiography;  Surgeon: Giovanny Leyva MD;  Location:  [facility] CATH INVASIVE LOCATION;  Service: Cardiology   • CARDIAC CATHETERIZATION N/A 2018    Procedure: Left ventriculography;  Surgeon: Giovanny Leyva MD;  Location:  [facility] CATH INVASIVE LOCATION;  Service: Cardiology   • CARDIAC CATHETERIZATION  2018    Procedure: Percutaneous Mechanical Thrombectomy;  Surgeon: Giovanny Leyva MD;  Location:  [facility] CATH INVASIVE LOCATION;  Service: Cardiology   • CT RT/LT HEART CATHETERS N/A 2018    Procedure: Percutaneous Coronary Intervention;  Surgeon: Giovanny Leyva MD;  Location:  [facility] CATH INVASIVE LOCATION;  Service: Cardiology   • SKIN BIOPSY     • SKIN CANCER EXCISION      Basal cell carcinoma x 2, malignant melanoma x 1   • WRIST SURGERY Left        Family History   Problem Relation Age of Onset   • Heart attack Mother 60   • Heart disease Mother    • Hypertension Mother    • Diabetes Mother    • Heart attack Father 43   • Heart disease Father    • No Known Problems Daughter    • No Known Problems Daughter        Social History     Tobacco Use   • Smoking status: Former     Packs/day: 0.00     Years: 0.00     Pack years: 0.00     Types: Electronic Cigarette, Cigarettes     Start date: 1972     Quit date: 2018     Years since quittin.9   • Smokeless tobacco: Never   • Tobacco comments:     Quit smoking cigarettes 2018, now just uses ECig   Vaping Use   • Vaping Use: Every day   • Start date: 2018   • Substances: Nicotine   Substance Use Topics   • Alcohol use: Yes     Comment: Less than 1 drink a week   • Drug use: Never         ECG 12 Lead    Date/Time: 2022 3:47 PM  Performed by: Nilda Dan APRN  Authorized by: Sy  "VARINDER Munguia   Comparison: compared with previous ECG from 10/13/2022  Similar to previous ECG  Rhythm: sinus rhythm    Clinical impression: normal ECG               Objective:     Visit Vitals  /80 (BP Location: Right arm, Patient Position: Sitting, Cuff Size: Adult)   Pulse 59   Ht 182.9 cm (72\")   Wt 88 kg (194 lb)   BMI 26.31 kg/m²             Physical Exam  Constitutional:       Appearance: Normal appearance. He is normal weight.   HENT:      Head: Normocephalic.   Neck:      Vascular: No carotid bruit.   Cardiovascular:      Rate and Rhythm: Normal rate and regular rhythm.      Chest Wall: PMI is not displaced.      Pulses: Normal pulses.           Radial pulses are 2+ on the right side and 2+ on the left side.      Heart sounds: Normal heart sounds. No murmur heard.    No friction rub. No gallop.   Pulmonary:      Effort: Pulmonary effort is normal.      Breath sounds: Normal breath sounds.   Abdominal:      General: Bowel sounds are normal. There is no distension.      Palpations: Abdomen is soft.   Musculoskeletal:      Right lower leg: No edema.      Left lower leg: No edema.   Skin:     General: Skin is warm and dry.      Capillary Refill: Capillary refill takes less than 2 seconds.   Neurological:      Mental Status: He is alert and oriented to person, place, and time.   Psychiatric:         Mood and Affect: Mood normal.         Behavior: Behavior normal.         Thought Content: Thought content normal.          Lab Review:   Lipid Panel    Lipid Panel 1/17/22 8/28/22   Total Cholesterol  95   Total Cholesterol 125    Triglycerides 58 72   HDL Cholesterol 44 36 (A)   VLDL Cholesterol 13 15   LDL Cholesterol  68 44   LDL/HDL Ratio  1.24   (A) Abnormal value                Cardiac Procedures:   1. Echocardiogram 9/30/22:  • Calculated left ventricular EF = 63.5% Estimated left ventricular EF = 64% Estimated left ventricular EF was in agreement with the calculated left ventricular EF. Left ventricular " systolic function is normal. Normal left ventricular cavity size and wall thickness noted. All left ventricular wall segments contract normally. Left ventricular diastolic function was normal.  • No aortic valve regurgitation or stenosis is present. The aortic valve is abnormal in structure. There is mild calcification of the aortic valve.     2. Nuclear stress test 8/28/22:  • Findings consistent with a normal ECG stress test.  • Left ventricular ejection fraction is normal. (Calculated EF = 60%).  • Myocardial perfusion imaging indicates a normal myocardial perfusion study with no evidence of ischemia.  • Impressions are consistent with a low risk study.    3. Nuclear stress test 10/15/20:  • GI artifact is present.  • Myocardial perfusion imaging indicates a normal myocardial perfusion study with no evidence of ischemia.  • Left ventricular ejection fraction is normal. (Calculated EF = 55%).  • Impressions are consistent with a low risk study.    4. Cardiac catheterization 8/20/18:  Procedure findings:  Left main: Large-caliber vessel that bifurcates to an LAD and circumflex.  Left main is normal.  LAD: Medium caliber vessel that gives rise to a small caliber diagonal branch in the proximal and mid.  The apical LAD has a discrete 70-80% stenosis  LCX: Medium caliber vessel that gives rise to a small caliber OM branch in the proximal segment.  The OM has a tubular 60% proximal stenosis  RCA: Medium caliber vessel that gives rise to a small caliber PDA and RPL branch.  The RCA is acutely occluded in the midsegment.     Left ventricular angiography: Normal left ventricular size.  Moderately reduced systolic function.  Basal to mid inferior wall akinesis.  Mild anterolateral hypokinesis.  Ejection fraction 30%    Assessment:         Diagnoses and all orders for this visit:    1. Dyslipidemia (Primary)    2. Tricuspid valve insufficiency, unspecified etiology    3. Status post insertion of drug-eluting stent into  right coronary artery for coronary artery disease    4. Coronary artery disease of native artery of native heart with stable angina pectoris (HCC)    5. Essential hypertension    6. Chest discomfort    Other orders  -     pantoprazole (Protonix) 40 MG EC tablet; Take 1 tablet by mouth Daily.  Dispense: 90 tablet; Refill: 3            Plan:         1. Atypical chest pain: His EKG is stable with no ischemic changes. There are a number of things that could be causing his discomfort but I do not feel this is cardiac related. He has been sleeping propped up due to reflux. I explained that reflux can cause chest discomfort and reflux can be exacerbated by stress as well. I am going to trial him on a PPI. It's possible that his chest pain is due to soreness from the severe cough that he had during his URI. He is going to wait a few days to see if the discomfort continues to improve as he feels it may be muscle soreness as well. If the pain does not improve, he will start pantoprazole and call to let us know.   2. HTN: blood pressure well controlled on current regimen. No changes  3. CAD: s/p PCI and stent to RCA in 2018. Dr. Leyva reviewed his heart cath films and the 70-80% stenosis of the apical LAD is not amenable to PCI. Will continue with current medical management.  4. HLD: on statin therapy.    Thank you for allowing me to participate in this patient's care. Please call with any questions or concerns. Mr. Frost will follow up with VARINDER Segal in 3 months.          Your medication list          Accurate as of December 14, 2022  3:47 PM. If you have any questions, ask your nurse or doctor.            START taking these medications      Instructions Last Dose Given Next Dose Due   pantoprazole 40 MG EC tablet  Commonly known as: Protonix  Started by: VARINDER Funk      Take 1 tablet by mouth Daily.          CHANGE how you take these medications      Instructions Last Dose Given Next Dose Due    nitroglycerin 0.4 MG SL tablet  Commonly known as: NITROSTAT  What changed: additional instructions      1 under the tongue as needed for angina, may repeat q5mins for up three doses          CONTINUE taking these medications      Instructions Last Dose Given Next Dose Due   aspirin 81 MG chewable tablet      Chew 1 tablet Daily.       atorvastatin 40 MG tablet  Commonly known as: LIPITOR      Take 1 tablet by mouth Every Night.       lisinopril 10 MG tablet  Commonly known as: PRINIVIL,ZESTRIL      Take 1 tablet by mouth Daily.       tamsulosin 0.4 MG capsule 24 hr capsule  Commonly known as: FLOMAX      TAKE ONE CAPSULE BY MOUTH DAILY             Where to Get Your Medications      These medications were sent to [facility] [id number] - [place], [place] - [address] [facility] [address] - [phone] [facility] [phone]   [address] [facility] [address]    Phone: [phone]   · pantoprazole 40 MG EC tablet           VARINDER Funk  12/14/22  12:47 PM EST     Yes

## 2023-01-15 NOTE — PROGRESS NOTE ADULT - SUBJECTIVE AND OBJECTIVE BOX
CARDIOLOGY     PROGRESS  NOTE   ________________________________________________    CHIEF COMPLAINT:Patient is a 81y old  Female who presents with a chief complaint of sob/cp (23 Jan 2022 07:45)  no complain.  	  REVIEW OF SYSTEMS:  CONSTITUTIONAL: No fever, weight loss, or fatigue  EYES: No eye pain, visual disturbances, or discharge  ENT:  No difficulty hearing, tinnitus, vertigo; No sinus or throat pain  NECK: No pain or stiffness  RESPIRATORY: No cough, wheezing, chills or hemoptysis; No Shortness of Breath  CARDIOVASCULAR: No chest pain, palpitations, passing out, dizziness, or leg swelling  GASTROINTESTINAL: No abdominal or epigastric pain. No nausea, vomiting, or hematemesis; No diarrhea or constipation. No melena or hematochezia.  GENITOURINARY: No dysuria, frequency, hematuria, or incontinence  NEUROLOGICAL: No headaches, memory loss, loss of strength, numbness, or tremors  SKIN: No itching, burning, rashes, or lesions   LYMPH Nodes: No enlarged glands  ENDOCRINE: No heat or cold intolerance; No hair loss  MUSCULOSKELETAL: No joint pain or swelling; No muscle, back, or extremity pain  PSYCHIATRIC: No depression, anxiety, mood swings, or difficulty sleeping  HEME/LYMPH: No easy bruising, or bleeding gums  ALLERGY AND IMMUNOLOGIC: No hives or eczema	    [ ] All others negative	  [ ] Unable to obtain    PHYSICAL EXAM:  T(C): 36.9 (01-23-22 @ 09:05), Max: 36.9 (01-22-22 @ 21:45)  HR: 63 (01-23-22 @ 09:05) (55 - 85)  BP: 128/67 (01-23-22 @ 09:05) (117/63 - 140/64)  RR: 16 (01-23-22 @ 09:05) (16 - 20)  SpO2: 99% (01-23-22 @ 09:05) (94% - 99%)  Wt(kg): --  I&O's Summary    22 Jan 2022 07:01  -  23 Jan 2022 07:00  --------------------------------------------------------  IN: 920 mL / OUT: 1400 mL / NET: -480 mL        Appearance: Normal	  HEENT:   Normal oral mucosa, PERRL, EOMI	  Lymphatic: No lymphadenopathy  Cardiovascular: Normal S1 S2, No JVD,+murmurs, No edema  Respiratory: rhonchi  Psychiatry: A & O x 3, Mood & affect appropriate  Gastrointestinal:  Soft, Non-tender, + BS	  Skin: No rashes, No ecchymoses, No cyanosis	  Neurologic: Non-focal  Extremities: Normal range of motion, No clubbing, cyanosis or edema  Vascular: Peripheral pulses palpable 2+ bilaterally    MEDICATIONS  (STANDING):  dexAMETHasone     Tablet 6 milliGRAM(s) Oral daily  enoxaparin Injectable 40 milliGRAM(s) SubCutaneous daily  furosemide   Injectable 20 milliGRAM(s) IV Push two times a day  metoprolol succinate ER 25 milliGRAM(s) Oral daily  pantoprazole    Tablet 40 milliGRAM(s) Oral before breakfast  potassium chloride    Tablet ER 40 milliEquivalent(s) Oral four times a day  remdesivir  IVPB 100 milliGRAM(s) IV Intermittent every 24 hours  remdesivir  IVPB   IV Intermittent       TELEMETRY: 	    ECG:  	  RADIOLOGY:  OTHER: 	  	  LABS:	 	    CARDIAC MARKERS:                                11.4   4.26  )-----------( 116      ( 22 Jan 2022 06:55 )             34.8     01-23    142  |  100  |  26<H>  ----------------------------<  113<H>  3.4<L>   |  28  |  0.74    Ca    8.5      23 Jan 2022 07:07  Mg     2.0     01-23    TPro  6.1  /  Alb  3.0<L>  /  TBili  0.5  /  DBili  0.1  /  AST  19  /  ALT  12  /  AlkPhos  46  01-23    proBNP: Serum Pro-Brain Natriuretic Peptide: 20071 pg/mL (01-21 @ 12:11)    Lipid Profile:   HgA1c:   TSH: Thyroid Stimulating Hormone, Serum: 0.47 uIU/mL (01-21 @ 17:02)    PT/INR - ( 23 Jan 2022 07:09 )   PT: 13.8 sec;   INR: 1.16 ratio         PTT - ( 21 Jan 2022 12:11 )  PTT:35.6 sec  < from: 12 Lead ECG (01.21.22 @ 18:43) >  Diagnosis Line SINUS RHYTHM WITH PREMATURE SUPRAVENTRICULAR COMPLEXES AND WITH OCCASIONAL PREMATURE VENTRICULAR COMPLEXES  *** POOR DATA QUALITY, INTERPRETATION MAY BE ADVERSELY AFFECTED  LEFT AXIS DEVIATION  LEFT BUNDLE BRANCH BLOCK  type IVCD  MARKEDLY PROLONGED QT  ABNORMAL ECG  WHEN COMPARED WITH ECG OF 21-JAN-2022 11:31, (UNCONFIRMED)  QT prolonged, VPD's and APD's now present      Assessment and plan  ---------------------------  80 yo F  h/o   dementia and  <CHF per EMS,/  no othe r hx  is  available presenting via EMS from home for few days of weakness, followed by development of SOB and episode of substernal CP this morning.     Pt is a poor historian and unable to provide significant history    all ROS negative upon questioning.   Lives at home with brother - he reports concern for pt's increased work of breathing.   pt is a 80 yo female with hx of htn, chf, cad, with increasing sob and COVID with  abnormal  cta and chest x ray  cta negative for PE  echo to evaluated ef  trop to r/o ischemia, pt with chest pain.  lipid panel  continue covid therapy  fu renal function  increase lasix to bid  keep K>4  ecg with LBBB, r/o carduiomyopathy  no arrythmia on tele    	         Patient requests all Lab, Cardiology, and Radiology Results on their Discharge Instructions

## 2023-02-07 NOTE — PATIENT PROFILE ADULT - NSPROGENPREVTRANSF_GEN_A_NUR
TRANSFER - OUT REPORT:    Verbal report given to Abril BRADY on Lacy Duff  being transferred to [address] [place] [address](unit) for routine progression of care       Report consisted of patients Situation, Background, Assessment and   Recommendations(SBAR). Information from the following report(s) SBAR, OR Summary, Procedure Summary, Intake/Output, MAR, and Cardiac Rhythm SR  was reviewed with the receiving nurse. Lines:   Peripheral IV 02/07/23 Anterior; Left Hand (Active)   Site Assessment Clean, dry, & intact 02/07/23 1209   Phlebitis Assessment 0 02/07/23 1209   Infiltration Assessment 0 02/07/23 1209   Dressing Status Clean, dry, & intact 02/07/23 1209   Dressing Type Tape;Transparent 02/07/23 1209   Hub Color/Line Status Pink; Infusing;Patent 02/07/23 1209        Opportunity for questions and clarification was provided.       Patient transported with:   O2 @ 2 liters  Registered Nurse KESHIA pt A&Ox1

## 2024-01-03 NOTE — PATIENT PROFILE ADULT - FALL HARM RISK - PATIENT NEEDS ASSISTANCE
Patient woke up and told writer that she was sick and feeling nauseous again. Dr. Rubin notified. Writer administered droperidol as ordered per Dr. Rubin.   Standing/Walking/Toileting

## 2024-05-08 NOTE — PROGRESS NOTE ADULT - PROVIDER SPECIALTY LIST ADULT
Department of Emergency Medicine    FIRST PROVIDER TRIAGE NOTE             Independent MLP           5/8/24  5:30 PM EDT    Date of Encounter: 5/8/24   MRN: [id number]    Vitals:    05/08/24 1727   BP: (!) 147/79   Pulse: (!) 127   Resp: 18   Temp: 99.8 °F (37.7 °C)   TempSrc: Temporal   SpO2: 100%   Weight: 90.7 kg (200 lb)   Height: 1.499 m (4' 11\")      HPI: Emma Phillips is a 18 y.o. female who presents to the ED for Cough (X1 week) and Shortness of Breath     No history of asthma     ROS: Negative for abd pain, fever, vomiting, or diarrhea.    Physical Exam:   Gen Appearance/Constitutional: alert  CV: tachycardia  Pulm: CTA bilat     Initial Plan of Care: All treatment areas with department are currently occupied.     Plan to order/Initiate the following while awaiting opening in ED: Triage evaluation  labs and imaging studies.    Provider-Patient relationship only established for Provider In Triage (PIT).  Full assessment, HPI and examination not performed, therefore, it is not yet possible to state whether or not an emergency medical condition exists    Initial Plan of Care: Initiate Treatment-Testing, Proceed toTreatment Area When Bed Available for ED Attending/MLP to Continue Care  Secondary to high volume, low staffing, and/or boarding- patient to await bed availability.    This ends my PIT-Patient relationship.  Care of patient relinquished after triage    Electronically signed by Maria Antonia Bowman PA-C   DD: 5/8/24     Internal Medicine

## 2025-01-29 RX ORDER — IPRATROPIUM BROMIDE AND ALBUTEROL SULFATE .5; 2.5 MG/3ML; MG/3ML
3 SOLUTION RESPIRATORY (INHALATION)
Refills: 0 | DISCHARGE
Start: 2025-01-29 | End: 2025-02-02

## 2025-01-29 RX ORDER — PREDNISONE 5 MG/1
1 TABLET ORAL
Refills: 0 | DISCHARGE
Start: 2025-01-29 | End: 2025-02-02

## 2025-01-29 RX ORDER — PIPERACILLIN SODIUM AND TAZOBACTAM SODIUM 2; 250 G/50ML; MG/50ML
3.38 INJECTION, POWDER, FOR SOLUTION INTRAVENOUS
Refills: 0 | DISCHARGE
Start: 2025-01-29 | End: 2025-02-04

## 2025-01-30 ENCOUNTER — RESULT REVIEW (OUTPATIENT)
Age: 85
End: 2025-01-30

## 2025-01-30 ENCOUNTER — INPATIENT (INPATIENT)
Facility: HOSPITAL | Age: 85
LOS: 5 days | Discharge: NURSING, REHAB OR LONG-TERM CARE | DRG: 536 | End: 2025-02-05
Attending: INTERNAL MEDICINE | Admitting: INTERNAL MEDICINE
Payer: MEDICARE

## 2025-01-30 VITALS
WEIGHT: 158.73 LBS | RESPIRATION RATE: 40 BRPM | OXYGEN SATURATION: 90 % | TEMPERATURE: 98 F | DIASTOLIC BLOOD PRESSURE: 61 MMHG | HEART RATE: 123 BPM | SYSTOLIC BLOOD PRESSURE: 111 MMHG

## 2025-01-30 DIAGNOSIS — I50.9 HEART FAILURE, UNSPECIFIED: ICD-10-CM

## 2025-01-30 DIAGNOSIS — I48.0 PAROXYSMAL ATRIAL FIBRILLATION: ICD-10-CM

## 2025-01-30 DIAGNOSIS — J96.01 ACUTE RESPIRATORY FAILURE WITH HYPOXIA: ICD-10-CM

## 2025-01-30 DIAGNOSIS — S72.002A FRACTURE OF UNSPECIFIED PART OF NECK OF LEFT FEMUR, INITIAL ENCOUNTER FOR CLOSED FRACTURE: ICD-10-CM

## 2025-01-30 DIAGNOSIS — S72.009A FRACTURE OF UNSPECIFIED PART OF NECK OF UNSPECIFIED FEMUR, INITIAL ENCOUNTER FOR CLOSED FRACTURE: ICD-10-CM

## 2025-01-30 DIAGNOSIS — E78.5 HYPERLIPIDEMIA, UNSPECIFIED: ICD-10-CM

## 2025-01-30 DIAGNOSIS — I10 ESSENTIAL (PRIMARY) HYPERTENSION: ICD-10-CM

## 2025-01-30 DIAGNOSIS — J10.1 INFLUENZA DUE TO OTHER IDENTIFIED INFLUENZA VIRUS WITH OTHER RESPIRATORY MANIFESTATIONS: ICD-10-CM

## 2025-01-30 PROBLEM — F03.90 UNSPECIFIED DEMENTIA, UNSPECIFIED SEVERITY, WITHOUT BEHAVIORAL DISTURBANCE, PSYCHOTIC DISTURBANCE, MOOD DISTURBANCE, AND ANXIETY: Chronic | Status: ACTIVE | Noted: 2022-01-21

## 2025-01-30 LAB
ADD ON TEST-SPECIMEN IN LAB: SIGNIFICANT CHANGE UP
ALBUMIN SERPL ELPH-MCNC: 3.1 G/DL — LOW (ref 3.3–5)
ALP SERPL-CCNC: 59 U/L — SIGNIFICANT CHANGE UP (ref 40–120)
ALT FLD-CCNC: 21 U/L — SIGNIFICANT CHANGE UP (ref 10–45)
ANION GAP SERPL CALC-SCNC: 14 MMOL/L — SIGNIFICANT CHANGE UP (ref 5–17)
APPEARANCE UR: CLEAR — SIGNIFICANT CHANGE UP
APTT BLD: 35.6 SEC — SIGNIFICANT CHANGE UP (ref 24.5–35.6)
AST SERPL-CCNC: 24 U/L — SIGNIFICANT CHANGE UP (ref 10–40)
BACTERIA # UR AUTO: NEGATIVE /HPF — SIGNIFICANT CHANGE UP
BASOPHILS # BLD AUTO: 0.03 K/UL — SIGNIFICANT CHANGE UP (ref 0–0.2)
BASOPHILS NFR BLD AUTO: 0.3 % — SIGNIFICANT CHANGE UP (ref 0–2)
BILIRUB SERPL-MCNC: 0.4 MG/DL — SIGNIFICANT CHANGE UP (ref 0.2–1.2)
BILIRUB UR-MCNC: NEGATIVE — SIGNIFICANT CHANGE UP
BUN SERPL-MCNC: 27 MG/DL — HIGH (ref 7–23)
CALCIUM SERPL-MCNC: 8.5 MG/DL — SIGNIFICANT CHANGE UP (ref 8.4–10.5)
CAST: 0 /LPF — SIGNIFICANT CHANGE UP (ref 0–4)
CHLORIDE SERPL-SCNC: 105 MMOL/L — SIGNIFICANT CHANGE UP (ref 96–108)
CO2 SERPL-SCNC: 25 MMOL/L — SIGNIFICANT CHANGE UP (ref 22–31)
COLOR SPEC: YELLOW — SIGNIFICANT CHANGE UP
CREAT SERPL-MCNC: 0.67 MG/DL — SIGNIFICANT CHANGE UP (ref 0.5–1.3)
DIFF PNL FLD: ABNORMAL
EGFR: 86 ML/MIN/1.73M2 — SIGNIFICANT CHANGE UP
EOSINOPHIL # BLD AUTO: 0.05 K/UL — SIGNIFICANT CHANGE UP (ref 0–0.5)
EOSINOPHIL NFR BLD AUTO: 0.6 % — SIGNIFICANT CHANGE UP (ref 0–6)
FLUAV AG NPH QL: DETECTED
FLUBV AG NPH QL: SIGNIFICANT CHANGE UP
GAS PNL BLDV: SIGNIFICANT CHANGE UP
GLUCOSE SERPL-MCNC: 145 MG/DL — HIGH (ref 70–99)
GLUCOSE UR QL: NEGATIVE MG/DL — SIGNIFICANT CHANGE UP
HCT VFR BLD CALC: 35.2 % — SIGNIFICANT CHANGE UP (ref 34.5–45)
HGB BLD-MCNC: 11.2 G/DL — LOW (ref 11.5–15.5)
IMM GRANULOCYTES NFR BLD AUTO: 1.6 % — HIGH (ref 0–0.9)
INR BLD: 1.25 RATIO — HIGH (ref 0.85–1.16)
KETONES UR-MCNC: NEGATIVE MG/DL — SIGNIFICANT CHANGE UP
LEUKOCYTE ESTERASE UR-ACNC: NEGATIVE — SIGNIFICANT CHANGE UP
LIDOCAIN IGE QN: 49 U/L — SIGNIFICANT CHANGE UP (ref 7–60)
LYMPHOCYTES # BLD AUTO: 1.59 K/UL — SIGNIFICANT CHANGE UP (ref 1–3.3)
LYMPHOCYTES # BLD AUTO: 18.4 % — SIGNIFICANT CHANGE UP (ref 13–44)
MCHC RBC-ENTMCNC: 27.6 PG — SIGNIFICANT CHANGE UP (ref 27–34)
MCHC RBC-ENTMCNC: 31.8 G/DL — LOW (ref 32–36)
MCV RBC AUTO: 86.7 FL — SIGNIFICANT CHANGE UP (ref 80–100)
MONOCYTES # BLD AUTO: 0.95 K/UL — HIGH (ref 0–0.9)
MONOCYTES NFR BLD AUTO: 11 % — SIGNIFICANT CHANGE UP (ref 2–14)
NEUTROPHILS # BLD AUTO: 5.9 K/UL — SIGNIFICANT CHANGE UP (ref 1.8–7.4)
NEUTROPHILS NFR BLD AUTO: 68.1 % — SIGNIFICANT CHANGE UP (ref 43–77)
NITRITE UR-MCNC: NEGATIVE — SIGNIFICANT CHANGE UP
NRBC # BLD: 0 /100 WBCS — SIGNIFICANT CHANGE UP (ref 0–0)
NRBC BLD-RTO: 0 /100 WBCS — SIGNIFICANT CHANGE UP (ref 0–0)
PH UR: 5.5 — SIGNIFICANT CHANGE UP (ref 5–8)
PLATELET # BLD AUTO: 213 K/UL — SIGNIFICANT CHANGE UP (ref 150–400)
POTASSIUM SERPL-MCNC: 4.3 MMOL/L — SIGNIFICANT CHANGE UP (ref 3.5–5.3)
POTASSIUM SERPL-SCNC: 4.3 MMOL/L — SIGNIFICANT CHANGE UP (ref 3.5–5.3)
PROT SERPL-MCNC: 6.2 G/DL — SIGNIFICANT CHANGE UP (ref 6–8.3)
PROT UR-MCNC: 30 MG/DL
PROTHROM AB SERPL-ACNC: 14.4 SEC — HIGH (ref 9.9–13.4)
RBC # BLD: 4.06 M/UL — SIGNIFICANT CHANGE UP (ref 3.8–5.2)
RBC # FLD: 15.4 % — HIGH (ref 10.3–14.5)
RBC CASTS # UR COMP ASSIST: 14 /HPF — HIGH (ref 0–4)
RSV RNA NPH QL NAA+NON-PROBE: SIGNIFICANT CHANGE UP
SARS-COV-2 RNA SPEC QL NAA+PROBE: SIGNIFICANT CHANGE UP
SODIUM SERPL-SCNC: 144 MMOL/L — SIGNIFICANT CHANGE UP (ref 135–145)
SP GR SPEC: >1.03 — HIGH (ref 1–1.03)
SQUAMOUS # UR AUTO: 2 /HPF — SIGNIFICANT CHANGE UP (ref 0–5)
TROPONIN T, HIGH SENSITIVITY RESULT: 22 NG/L — SIGNIFICANT CHANGE UP (ref 0–51)
TROPONIN T, HIGH SENSITIVITY RESULT: 23 NG/L — SIGNIFICANT CHANGE UP (ref 0–51)
UROBILINOGEN FLD QL: 0.2 MG/DL — SIGNIFICANT CHANGE UP (ref 0.2–1)
WBC # BLD: 8.66 K/UL — SIGNIFICANT CHANGE UP (ref 3.8–10.5)
WBC # FLD AUTO: 8.66 K/UL — SIGNIFICANT CHANGE UP (ref 3.8–10.5)
WBC UR QL: 3 /HPF — SIGNIFICANT CHANGE UP (ref 0–5)

## 2025-01-30 PROCEDURE — 99291 CRITICAL CARE FIRST HOUR: CPT | Mod: GC

## 2025-01-30 PROCEDURE — 71045 X-RAY EXAM CHEST 1 VIEW: CPT | Mod: 26

## 2025-01-30 PROCEDURE — 70450 CT HEAD/BRAIN W/O DYE: CPT | Mod: 26

## 2025-01-30 PROCEDURE — 74177 CT ABD & PELVIS W/CONTRAST: CPT | Mod: 26

## 2025-01-30 PROCEDURE — 73552 X-RAY EXAM OF FEMUR 2/>: CPT | Mod: 26,LT

## 2025-01-30 PROCEDURE — 99222 1ST HOSP IP/OBS MODERATE 55: CPT

## 2025-01-30 PROCEDURE — 93306 TTE W/DOPPLER COMPLETE: CPT | Mod: 26

## 2025-01-30 PROCEDURE — 73562 X-RAY EXAM OF KNEE 3: CPT | Mod: 26,LT

## 2025-01-30 PROCEDURE — 73502 X-RAY EXAM HIP UNI 2-3 VIEWS: CPT | Mod: 26,LT

## 2025-01-30 PROCEDURE — 71275 CT ANGIOGRAPHY CHEST: CPT | Mod: 26

## 2025-01-30 PROCEDURE — 72170 X-RAY EXAM OF PELVIS: CPT | Mod: 26,XE

## 2025-01-30 PROCEDURE — 76942 ECHO GUIDE FOR BIOPSY: CPT | Mod: 26

## 2025-01-30 PROCEDURE — 72125 CT NECK SPINE W/O DYE: CPT | Mod: 26

## 2025-01-30 RX ORDER — ATORVASTATIN CALCIUM 80 MG/1
10 TABLET, FILM COATED ORAL AT BEDTIME
Refills: 0 | Status: DISCONTINUED | OUTPATIENT
Start: 2025-01-30 | End: 2025-01-31

## 2025-01-30 RX ORDER — FAMOTIDINE 10 MG/ML
20 INJECTION INTRAVENOUS DAILY
Refills: 0 | Status: DISCONTINUED | OUTPATIENT
Start: 2025-01-30 | End: 2025-01-31

## 2025-01-30 RX ORDER — ACETAMINOPHEN 160 MG/5ML
650 SUSPENSION ORAL EVERY 6 HOURS
Refills: 0 | Status: DISCONTINUED | OUTPATIENT
Start: 2025-01-30 | End: 2025-01-31

## 2025-01-30 RX ORDER — ACETAMINOPHEN 160 MG/5ML
1000 SUSPENSION ORAL ONCE
Refills: 0 | Status: COMPLETED | OUTPATIENT
Start: 2025-01-30 | End: 2025-01-30

## 2025-01-30 RX ORDER — PIPERACILLIN SODIUM AND TAZOBACTAM SODIUM 2; 250 G/50ML; MG/50ML
3.38 INJECTION, POWDER, FOR SOLUTION INTRAVENOUS ONCE
Refills: 0 | Status: COMPLETED | OUTPATIENT
Start: 2025-01-30 | End: 2025-01-30

## 2025-01-30 RX ORDER — METOPROLOL SUCCINATE 25 MG
25 TABLET, EXTENDED RELEASE 24 HR ORAL
Refills: 0 | Status: DISCONTINUED | OUTPATIENT
Start: 2025-01-30 | End: 2025-01-31

## 2025-01-30 RX ORDER — BACTERIOSTATIC SODIUM CHLORIDE 0.9 %
1000 VIAL (ML) INJECTION
Refills: 0 | Status: DISCONTINUED | OUTPATIENT
Start: 2025-01-31 | End: 2025-01-31

## 2025-01-30 RX ORDER — VANCOMYCIN HYDROCHLORIDE 50 MG/ML
1000 KIT ORAL ONCE
Refills: 0 | Status: COMPLETED | OUTPATIENT
Start: 2025-01-30 | End: 2025-01-30

## 2025-01-30 RX ORDER — POTASSIUM CHLORIDE 750 MG/1
1 TABLET, EXTENDED RELEASE ORAL
Refills: 0 | DISCHARGE

## 2025-01-30 RX ORDER — ATORVASTATIN CALCIUM 80 MG/1
1 TABLET, FILM COATED ORAL
Refills: 0 | DISCHARGE

## 2025-01-30 RX ORDER — PIPERACILLIN SODIUM AND TAZOBACTAM SODIUM 2; 250 G/50ML; MG/50ML
3.38 INJECTION, POWDER, FOR SOLUTION INTRAVENOUS EVERY 8 HOURS
Refills: 0 | Status: DISCONTINUED | OUTPATIENT
Start: 2025-01-30 | End: 2025-01-30

## 2025-01-30 RX ORDER — PIPERACILLIN SODIUM AND TAZOBACTAM SODIUM 2; 250 G/50ML; MG/50ML
3.38 INJECTION, POWDER, FOR SOLUTION INTRAVENOUS ONCE
Refills: 0 | Status: COMPLETED | OUTPATIENT
Start: 2025-01-31 | End: 2025-01-31

## 2025-01-30 RX ORDER — ANTISEPTIC SURGICAL SCRUB 0.04 MG/ML
1 SOLUTION TOPICAL DAILY
Refills: 0 | Status: DISCONTINUED | OUTPATIENT
Start: 2025-01-30 | End: 2025-01-31

## 2025-01-30 RX ORDER — PIPERACILLIN SODIUM AND TAZOBACTAM SODIUM 2; 250 G/50ML; MG/50ML
3.38 INJECTION, POWDER, FOR SOLUTION INTRAVENOUS EVERY 8 HOURS
Refills: 0 | Status: DISCONTINUED | OUTPATIENT
Start: 2025-01-31 | End: 2025-01-31

## 2025-01-30 RX ORDER — OSELTAMIVIR PHOSPHATE 75 MG/1
75 CAPSULE ORAL
Refills: 0 | Status: DISCONTINUED | OUTPATIENT
Start: 2025-01-30 | End: 2025-01-31

## 2025-01-30 RX ORDER — FAMOTIDINE 10 MG/ML
1 INJECTION INTRAVENOUS
Refills: 0 | DISCHARGE

## 2025-01-30 RX ORDER — MORPHINE SULFATE 60 MG/1
2 TABLET, FILM COATED, EXTENDED RELEASE ORAL ONCE
Refills: 0 | Status: DISCONTINUED | OUTPATIENT
Start: 2025-01-30 | End: 2025-01-30

## 2025-01-30 RX ADMIN — PIPERACILLIN SODIUM AND TAZOBACTAM SODIUM 200 GRAM(S): 2; 250 INJECTION, POWDER, FOR SOLUTION INTRAVENOUS at 13:13

## 2025-01-30 RX ADMIN — OSELTAMIVIR PHOSPHATE 75 MILLIGRAM(S): 75 CAPSULE ORAL at 18:44

## 2025-01-30 RX ADMIN — OSELTAMIVIR PHOSPHATE 75 MILLIGRAM(S): 75 CAPSULE ORAL at 07:35

## 2025-01-30 RX ADMIN — VANCOMYCIN HYDROCHLORIDE 250 MILLIGRAM(S): KIT at 06:18

## 2025-01-30 RX ADMIN — PIPERACILLIN SODIUM AND TAZOBACTAM SODIUM 200 GRAM(S): 2; 250 INJECTION, POWDER, FOR SOLUTION INTRAVENOUS at 05:32

## 2025-01-30 RX ADMIN — MORPHINE SULFATE 2 MILLIGRAM(S): 60 TABLET, FILM COATED, EXTENDED RELEASE ORAL at 08:50

## 2025-01-30 RX ADMIN — Medication 20 MILLIGRAM(S): at 12:39

## 2025-01-30 RX ADMIN — Medication 25 MILLIGRAM(S): at 18:44

## 2025-01-30 RX ADMIN — ATORVASTATIN CALCIUM 10 MILLIGRAM(S): 80 TABLET, FILM COATED ORAL at 21:47

## 2025-01-30 RX ADMIN — ACETAMINOPHEN 1000 MILLIGRAM(S): 160 SUSPENSION ORAL at 06:32

## 2025-01-30 RX ADMIN — MORPHINE SULFATE 2 MILLIGRAM(S): 60 TABLET, FILM COATED, EXTENDED RELEASE ORAL at 09:23

## 2025-01-30 RX ADMIN — FAMOTIDINE 20 MILLIGRAM(S): 10 INJECTION INTRAVENOUS at 12:39

## 2025-01-30 RX ADMIN — ACETAMINOPHEN 400 MILLIGRAM(S): 160 SUSPENSION ORAL at 05:27

## 2025-01-30 RX ADMIN — PIPERACILLIN SODIUM AND TAZOBACTAM SODIUM 25 GRAM(S): 2; 250 INJECTION, POWDER, FOR SOLUTION INTRAVENOUS at 18:43

## 2025-01-30 NOTE — CONSULT NOTE ADULT - ASSESSMENT
83 y/o F with PMH of HTN, HLD, afib on Eliquis, CHF. Presents with c/o L hip fracture (found on outpatient xray). Pt reportedly fell while at assisted living home day prior to admission. Pt also with c/o SOB, noted to be hypoxic to 78% on RA. RVP +influenza A. CTA chest with trace b/l pl effusions, interlobar septal thickening, patchy opacities LL.

## 2025-01-30 NOTE — CONSULT NOTE ADULT - ASSESSMENT
84yFemale with Left Intertrochanteric Fracture    Plan:  - NWB LLE  - Plan for OR today 1/30  - Pain control  - IS  - hold dvt ppx ahead of OR  - NPO.IVF  - EKG/CXR  - Please document medical clearance asap prior to planned procedure      Laura Ann, PGY-2  Orthopedic Surgery    [facility]: [id number]  [facility] [place]: [id number]  [facility]:  [id number]/ [phone]   84yFemale with Left Intertrochanteric Fracture    Plan:  - NWB LLE  - Plan for OR tomorrow 1/31  - Pain control  - IS  - hold dvt ppx ahead of OR  - NPO/IVF at midnight  - EKG/CXR  - Please document medical clearance asap prior to planned procedure      Laura Ann, PGY-2  Orthopedic Surgery    [facility] [place]: [id number]  [facility]: [id number]  [facility]:  [id number]/ [phone]

## 2025-01-30 NOTE — CONSULT NOTE ADULT - ATTENDING COMMENTS
L IT. will require ORIF pending medical optimization I, Jameson Salazar the attending physician, reviewed the above history and physical.  I have reviewed the appropriate medical and surgical history and reviewed the imaging independently as well performed an independent physical exam.  I have reviewed the above note and agree with the its findings and the plan as written with any exceptions noted below.     L IT fracture - discussed treatment with healthcare proxy her brother at length.    We discussed the risk and benefits of operative and nonoperative treatment options with the patient. Risks of surgery were discussed and include, but are not limited to, pain, infection, bleeding, nonunion, malunion, instability / dislocation, symptomatic hardware, limb length discrepancy, loss of motion, loss of strength, loss of function, need for revision surgery, damage to nerves and/or blood vessels, anesthetic risks, DVT, PE, MI, CVA, and even death. All questions were answered and informed consent was signed    Jameson Salazar MD  Attending Orthopedic Surgeon

## 2025-01-30 NOTE — CONSULT NOTE ADULT - SUBJECTIVE AND OBJECTIVE BOX
C A R D I O L O G Y  *********************    DATE OF SERVICE: 01-30-25    HISTORY OF PRESENT ILLNESS: HPI:  Patient is an 85 y/o Female with PMH of HTN, HLD, persistent afib on Eliquis, and CHF (HFpEF?) who presented with L hip fracture seen on outpatient xray s/p mechanical fall found have SOB/hypoxia and Influenza A. Cardiology consulted for preop clearance. Patient reports dyspnea improving on nasal cannula. Poor historian. Denies chest pain, palpitations, dizziness, or syncope.    PAST MEDICAL & SURGICAL HISTORY:  Dementia  Afib  HTN  HLD    MEDICATIONS:  MEDICATIONS  (STANDING):  atorvastatin 10 milliGRAM(s) Oral at bedtime  famotidine    Tablet 20 milliGRAM(s) Oral daily  metoprolol tartrate 25 milliGRAM(s) Oral two times a day  oseltamivir 75 milliGRAM(s) Oral two times a day  piperacillin/tazobactam IVPB.- 3.375 Gram(s) IV Intermittent once      Allergies    No Known Allergies    Intolerances        FAMILY HISTORY:    Non-contributary for premature coronary disease or sudden cardiac death    SOCIAL HISTORY:    [x ] Non-smoker  [ ] Smoker  [ ] Alcohol    FLU VACCINE THIS YEAR STARTS IN AUGUST:  [ ] Yes    [ ] No    IF OVER 65 HAVE YOU EVER HAD A PNA VACCINE:  [ ] Yes    [ ] No       [ ] N/A      REVIEW OF SYSTEMS:  [ ]chest pain  [ x ]shortness of breath  [  ]palpitations  [  ]syncope  [ ]near syncope [ ]upper extremity weakness   [ ] lower extremity weakness  [  ]diplopia  [  ]altered mental status   [  ]fevers  [ ]chills [ ]nausea  [ ]vomiting  [  ]dysphagia    [ ]abdominal pain  [ ]melena  [ ]BRBPR    [  ]epistaxis  [  ]rash    [ ]lower extremity edema        [X] All others negative	  [ ] Unable to obtain      LABS:	 	    CARDIAC MARKERS:                              11.2   8.66  )-----------( 213      ( 30 Jan 2025 04:11 )             35.2     Hb Trend: 11.2<--    01-30    144  |  105  |  27[H]  ----------------------------<  145[H]  4.3   |  25  |  0.67    Ca    8.5      30 Jan 2025 04:11  Phos  2.8     01-30  Mg     2.0     01-30    TPro  6.2  /  Alb  3.1[L]  /  TBili  0.4  /  DBili  x   /  AST  24  /  ALT  21  /  AlkPhos  59  01-30    Creatinine Trend: 0.67<--    Coags:  PT/INR - ( 30 Jan 2025 04:11 )   PT: 14.4 sec;   INR: 1.25 ratio         PTT - ( 30 Jan 2025 04:11 )  PTT:35.6 sec    proBNP:   Lipid Profile:   HgA1c:   TSH:         PHYSICAL EXAM:  T(C): 36.7 (01-30-25 @ 12:05), Max: 37.8 (01-30-25 @ 04:57)  HR: 96 (01-30-25 @ 12:05) (91 - 130)  BP: 115/73 (01-30-25 @ 12:05) (109/93 - 144/89)  RR: 22 (01-30-25 @ 12:05) (20 - 40)  SpO2: 99% (01-30-25 @ 12:05) (90% - 100%)  Wt(kg): --     I&O's Summary    30 Jan 2025 07:01  -  30 Jan 2025 16:37  --------------------------------------------------------  IN: 0 mL / OUT: 600 mL / NET: -600 mL        Gen: NAD  HEENT:  (-)icterus (-)pallor  CV: N S1 S2 1/6 YOSELIN (+)2 Pulses B/l  Resp:  Clear to auscultation B/L, normal effort  GI: (+) BS Soft, NT, ND  Lymph:  (-)Edema, (-)obvious lymphadenopathy  Skin: Warm to touch, Normal turgor  Psych: Appropriate mood and affect      TELEMETRY: AF 80-110s	      ECG: AF RVR 	    RADIOLOGY:  < from: CT Angio Chest PE Protocol w/ IV Cont (01.30.25 @ 06:14) >  IMPRESSION:    Suboptimal evaluation of the subsegmental pulmonary arteries. No obvious   embolus to the level of the segmental pulmonary arteries.    No obvious displaced rib fracture.    Acute impacted left femoral intertrochanteric fracture as described.    Pulmonary findings as described, which can be seen in noninfectious and   infectious processes. Recommend clinical correlation and follow-up to   resolution.    Somewhat asymmetric bladder wall thickening. Recommend clinical   correlation to assess UTI. Follow-up may be helpful to exclude potential   underlying lesion/neoplasm.    --- End of Report ---    < end of copied text >      ASSESSMENT/PLAN: Patient is an 85 y/o Female with PMH of HTN, HLD, persistent afib on Eliquis, and CHF (HFpEF?) who presented with L hip fracture seen on outpatient xray s/p mechanical fall found have SOB/hypoxia and Influenza A. Cardiology consulted for preop clearance.    #Acute Hypoxic Respiratory Failure  - Presentation appears more consistent with primarily PNA/Influenza driven rather than CHF  - CTA chest with no obvious PE, interlobar septal thickening and opacities noted  - Abx and Tamiflu per pulm  - s/p IV lasix 20mgx1, on PO lasix at home. Ok to keep net negative as tolerated if pulm suspects component of pulm edema on CT  - Check TTE to eval LV function    #L Hip Fracture  - Ortho consult noted - pending OR tomorrow  - No obvious cardiac contraindication to proceeding with nonelective orthopedic procedure, consider anesthesia eval given O2 requirements. Pulm eval noted.    #Persistent Afib  - Continue rate control with metoprolol 25mg BID  - Eliquis held for surgery    #HLD  - Continue Lipitor    Seth Olson PA-C  Pager: [phone]     C A R D I O L O G Y  *********************    DATE OF SERVICE: 01-30-25    HISTORY OF PRESENT ILLNESS: HPI:  Patient is an 85 y/o Female with PMH of HTN, HLD, persistent afib on Eliquis, and CHF (HFpEF?) who presented with L hip fracture seen on outpatient xray s/p mechanical fall found have SOB/hypoxia and Influenza A. Cardiology consulted for preop clearance. Patient reports dyspnea improving on nasal cannula. Poor historian. Denies chest pain, palpitations, dizziness, or syncope.    PAST MEDICAL & SURGICAL HISTORY:  Dementia  Afib  HTN  HLD    MEDICATIONS:  MEDICATIONS  (STANDING):  atorvastatin 10 milliGRAM(s) Oral at bedtime  famotidine    Tablet 20 milliGRAM(s) Oral daily  metoprolol tartrate 25 milliGRAM(s) Oral two times a day  oseltamivir 75 milliGRAM(s) Oral two times a day  piperacillin/tazobactam IVPB.- 3.375 Gram(s) IV Intermittent once      Allergies    No Known Allergies    Intolerances        FAMILY HISTORY:    Non-contributary for premature coronary disease or sudden cardiac death    SOCIAL HISTORY:    [x ] Non-smoker  [ ] Smoker  [ ] Alcohol    FLU VACCINE THIS YEAR STARTS IN AUGUST:  [ ] Yes    [ ] No    IF OVER 65 HAVE YOU EVER HAD A PNA VACCINE:  [ ] Yes    [ ] No       [ ] N/A      REVIEW OF SYSTEMS:  [ ]chest pain  [ x ]shortness of breath  [  ]palpitations  [  ]syncope  [ ]near syncope [ ]upper extremity weakness   [ ] lower extremity weakness  [  ]diplopia  [  ]altered mental status   [  ]fevers  [ ]chills [ ]nausea  [ ]vomiting  [  ]dysphagia    [ ]abdominal pain  [ ]melena  [ ]BRBPR    [  ]epistaxis  [  ]rash    [ ]lower extremity edema        [X] All others negative	  [ ] Unable to obtain      LABS:	 	    CARDIAC MARKERS:                              11.2   8.66  )-----------( 213      ( 30 Jan 2025 04:11 )             35.2     Hb Trend: 11.2<--    01-30    144  |  105  |  27[H]  ----------------------------<  145[H]  4.3   |  25  |  0.67    Ca    8.5      30 Jan 2025 04:11  Phos  2.8     01-30  Mg     2.0     01-30    TPro  6.2  /  Alb  3.1[L]  /  TBili  0.4  /  DBili  x   /  AST  24  /  ALT  21  /  AlkPhos  59  01-30    Creatinine Trend: 0.67<--    Coags:  PT/INR - ( 30 Jan 2025 04:11 )   PT: 14.4 sec;   INR: 1.25 ratio         PTT - ( 30 Jan 2025 04:11 )  PTT:35.6 sec    proBNP:   Lipid Profile:   HgA1c:   TSH:         PHYSICAL EXAM:  T(C): 36.7 (01-30-25 @ 12:05), Max: 37.8 (01-30-25 @ 04:57)  HR: 96 (01-30-25 @ 12:05) (91 - 130)  BP: 115/73 (01-30-25 @ 12:05) (109/93 - 144/89)  RR: 22 (01-30-25 @ 12:05) (20 - 40)  SpO2: 99% (01-30-25 @ 12:05) (90% - 100%)  Wt(kg): --     I&O's Summary    30 Jan 2025 07:01  -  30 Jan 2025 16:37  --------------------------------------------------------  IN: 0 mL / OUT: 600 mL / NET: -600 mL        Gen: NAD  HEENT:  (-)icterus (-)pallor  CV: N S1 S2 1/6 YOSELIN (+)2 Pulses B/l  Resp:  Clear to auscultation B/L, normal effort  GI: (+) BS Soft, NT, ND  Lymph:  (-)Edema, (-)obvious lymphadenopathy  Skin: Warm to touch, Normal turgor  Psych: Appropriate mood and affect      TELEMETRY: AF 80-110s	      ECG: AF RVR 	    RADIOLOGY:  < from: CT Angio Chest PE Protocol w/ IV Cont (01.30.25 @ 06:14) >  IMPRESSION:    Suboptimal evaluation of the subsegmental pulmonary arteries. No obvious   embolus to the level of the segmental pulmonary arteries.    No obvious displaced rib fracture.    Acute impacted left femoral intertrochanteric fracture as described.    Pulmonary findings as described, which can be seen in noninfectious and   infectious processes. Recommend clinical correlation and follow-up to   resolution.    Somewhat asymmetric bladder wall thickening. Recommend clinical   correlation to assess UTI. Follow-up may be helpful to exclude potential   underlying lesion/neoplasm.    --- End of Report ---    < end of copied text >      ASSESSMENT/PLAN: Patient is an 85 y/o Female with PMH of HTN, HLD, persistent afib on Eliquis, and CHF (HFpEF?) who presented with L hip fracture seen on outpatient xray s/p mechanical fall found have SOB/hypoxia and Influenza A. Cardiology consulted for preop clearance.    #Acute Hypoxic Respiratory Failure  - Presentation appears more consistent with primarily PNA/Influenza driven rather than CHF  - CTA chest with no obvious PE, interlobar septal thickening and opacities noted  - Negative troponin noted  - Abx and Tamiflu per pulm  - s/p IV lasix 20mgx1, on PO lasix at home. Ok to keep net negative as tolerated if pulm suspects component of pulm edema on CT  - Check TTE to eval LV function    #L Hip Fracture  - Ortho consult noted - pending OR tomorrow  - No obvious cardiac contraindication to proceeding with nonelective orthopedic procedure, consider anesthesia eval given O2 requirements. Pulm eval noted.    #Persistent Afib  - Continue rate control with metoprolol 25mg BID  - Eliquis held for surgery    #HLD  - Continue Lipitor    Seth Olson PA-C  Pager: [phone]

## 2025-01-30 NOTE — PATIENT PROFILE ADULT - FALL HARM RISK - HARM RISK INTERVENTIONS

## 2025-01-30 NOTE — CONSULT NOTE ADULT - PROBLEM SELECTOR RECOMMENDATION 9
likely in setting of influenza, mild volume overload +/- PNA  -VBG with no CO2 retention   -Keep O>I as tolerated   -HFNC lowered to 40L/40% with o2 sats maintaining 99%. If o2 sats maintaining high 90s in a few hrs, trial pt on 6LNC.  -Wean o2 as tolerated, keep sats >90%

## 2025-01-30 NOTE — ED ADULT NURSE NOTE - OBJECTIVE STATEMENT
Pt is a 84y F brought in by EMS from [facility] for L hip injury s/p fall approx 0600AM. Pt presents w/ L hip obvious deformity, shorted, externally rotated. Pt presents hypoxic, on non rebreather. Pt presents tachycardiac, tachypneic. Pt is A&Ox2. Pt in gown, placed on cardiac monitor, IV access obtained. Pt resting in stretcher, bed in lowest position, aware of plan of care. Comfort and safety measures maintained. Pt is a 84y F brought in by EMS from [facility] for L hip injury s/p fall approx 0600AM. Pt presents w/ L hip obvious deformity, shorted, externally rotated. Pt presents hypoxic, on non rebreather. Pt presents tachycardiac, tachypneic. Pt recently dx w/ pneumonia. Pt presents w/ RUE midline. Pt is A&Ox2. Pt in gown, placed on cardiac monitor, IV access obtained. Pt resting in stretcher, bed in lowest position, aware of plan of care. Comfort and safety measures maintained. Pt is a 84y F brought in by EMS from [facility] for L hip injury s/p fall approx 0600AM. Pt presents w/ L hip obvious deformity, shorted, externally rotated. Pt presents hypoxic, on non rebreather. Pt presents tachycardiac, tachypneic. Pt recently dx w/ pneumonia. Pt presents w/ RUE midline. Pt is A&Ox2. Pt in gown, placed on cardiac monitor, IV access obtained. Pt resting in stretcher, bed in lowest position, aware of plan of care. Comfort and safety measures maintained. palpable femoral and pedal pulses +2 on assessment, pt currently endorsing pain to the LLE, reports pain improves when extremity is still. Pt is a 84y F brought in by EMS from [facility] for L hip injury s/p fall approx 0600AM. Pt presents w/ L hip obvious deformity, shorted, externally rotated. Pt presents hypoxic, on non rebreather. Pt presents tachycardiac, tachypneic. Pt recently dx w/ pneumonia. Pt presents w/ RUE midline. Pt is A&Ox2. Pt in gown, placed on cardiac monitor, IV access obtained. Pt resting in stretcher, bed in lowest position, aware of plan of care. Comfort and safety measures maintained. pt LLE noted to be shortened and externally rotated, palpable femoral and pedal pulses +2 on assessment, pt currently endorsing pain to the LLE, reports pain improves when extremity is still, MD Long aware Pt is a 84y F brought in by EMS from [facility] for L hip injury s/p fall approx 0600AM. Pt presents w/ L hip obvious deformity, shorted, externally rotated. Pt presents hypoxic, on non rebreather. Pt presents tachycardiac, tachypneic. Pt recently dx w/ pneumonia. Pt presents w/ RUE midline. Pt is A&Ox2. Pt in gown, placed on cardiac monitor, IV access obtained. Pt resting in stretcher, bed in lowest position, aware of plan of care. Comfort and safety measures maintained. pt has b/l palpable femoral and pedal pulses +2 on assessment, pt currently endorsing pain to the LLE, reports pain improves when extremity is still, MD Long aware

## 2025-01-30 NOTE — ED PROVIDER NOTE - PROGRESS NOTE DETAILS
Borderline febrile to 100.1F on rectal temp, will empirically cover w/ abx and obtain cultures. Other labs relatively unremarkable, lactate non-elevated, no leukocytosis, no significant electrolyte abnormalities. Flu A positive, will add on Tamiflu. HFNC uptitrated with improvement in sats to 96%. CT CAP pending reads but initially notable for R IT fracture, no overt large PE. Ortho consulted, to see in the ED. Additionally had run of NSVT 21 beats, remains in A-fib now, BPs WNL, will continue to monitor. -Prerna Long MD (Attending) MD Aziza (PGY-3) Received sign out on patient. In brief, 84-year-old female with past medical history of hypertension, hyperlipidemia, A-fib on Eliquis, CHF, presenting to the emergency room with shortness of breath.  Patient on high flow nasal cannula 40 L at 40%.  Patient satting 96% on high flow.  Patient status post Vanco and Zosyn.  Patient positive for the flu.  Patient to receive Tamiflu.  Patient received CAT scans. Patient CAT scans with patchy opacities in bilateral lower no PE. Asymmetric gallbladder wall thickening patient with acute impacted left femoral intertrochanteric with superior lateral posterior displacement.  Also questionable fracture involvement of the lower femoral neck.  Ortho consulted.  Pending evaluation by orthopedics.  Patient pending UA for evaluation for UTI.  Labs nonactionable. pt c/o left hip pain.  pt rec'd Ofirmev with some improvement.  Morphine now.  awaiting orthopedic eval.  Fascia Iliac block. d/w ortho resident - pt with multiple medical issues and more appropriate for medical admission.  ortho specifically stated no regional nerve block for pain control.

## 2025-01-30 NOTE — CONSULT NOTE ADULT - ASSESSMENT
Patient seen and examined, agree with above assessment and plan as transcribed above.    - Not in clinical CHF presentations is consistent with a primary pulmonary infection   - No cardiac contraindication to non-elective orthopedic procedure   consider anesthesia eval given oxygen requirements   - Pulm eval appreciated    Pepe Carlson MD, [facility]  BEEPER [phone]

## 2025-01-30 NOTE — ED ADULT NURSE REASSESSMENT NOTE - NS ED NURSE REASSESS COMMENT FT1
Pt states pain 0/10, relief from IV morphine
Ultrasound team at bedside performing nerve block in left hip
per [facility], pt had 21 beats of wide complex rhythm, pt denies any chest pain or palpitations at the time of rhythm change, pt spontaneously returned to Afib following the 21 beats, printout obtained and given to MD Long.
pt reports improvement in pain to the L hip/LLE following ofirmev administration, MD Long notified.
Assumed care from Binh RN. Pt resting in stretcher, AAOx2, droplet precaution in place for +Influenza A, pt O2% at 96% on high flow O2, 40lpm, 40FIO2, pt tolerating well, Afib on CCM, pt states pain 3/10 to left hip/lower extremities, pulses present, no edema noted, pt changed and reposition, plan of care maintained

## 2025-01-30 NOTE — ED ADULT NURSE NOTE - NSFALLRISKINTERV_ED_ALL_ED

## 2025-01-30 NOTE — ED PROVIDER NOTE - PHYSICAL EXAMINATION
GEN: awake and alert, appears ill, in moderate respiratory distress  HEAD: (+) NC/AT  CHEST: (-) chest wall TTP or ecchymosis/deformities  CV: (+) tachycardic, irregularly irregular, (-) murmurs/rubs/gallops  RESP: (+) CTABL, (-) moderately increased WOB, (-) rales, (-) rhonchi, (-) wheezing; (+) high 80s on RA; (+) low-mid 90s on NRB  ABD: (+) soft, (-) tenderness, (-) guarding  EXT: (+) LLE shortened/externally rotated, (+) L hip TTP; (+) pelvis stable; otherwise, (-) joint deformities, (-) edema, (-) tenderness, (+) grossly intact ROM, (+) equal pulses in upper & lower extremities  NEURO: mental status as above, (-) gross strength/sensation deficits

## 2025-01-30 NOTE — CONSULT NOTE ADULT - SUBJECTIVE AND OBJECTIVE BOX
PULMONARY CONSULT    HPI: 83 y/o F with PMH of HTN, HLD, afib on Eliquis, CHF. Presents with c/o L hip fracture (found on outpatient xray). Pt reportedly fell while at assisted living home day prior to admission. Pt also with c/o SOB, noted to be hypoxic to 78% on RA. RVP +influenza A. CTA chest with trace b/l pl effusions, interlobar septal thickening, patchy opacities LL. Denies cough, CP, pleuritic CP.         PAST MEDICAL & SURGICAL HISTORY:  Dementia        Allergies  No Known Allergies         FAMILY HISTORY: non contributory     Social history: never a smoker     Review of Systems:  CONSTITUTIONAL: No fever, chills, or fatigue  EYES: No eye pain, visual disturbances, or discharge  ENMT:  No difficulty hearing, tinnitus, vertigo; No sinus or throat pain  NECK: No pain or stiffness  RESPIRATORY: Per above  CARDIOVASCULAR: No chest pain, palpitations, dizziness, or leg swelling  GASTROINTESTINAL: No abdominal or epigastric pain. No nausea, vomiting, or hematemesis; No diarrhea or constipation. No melena or hematochezia.  GENITOURINARY: No dysuria, frequency, hematuria, or incontinence  NEUROLOGICAL: No headaches, memory loss, loss of strength, numbness, or tremors  SKIN: No itching, burning, rashes, or lesions   MUSCULOSKELETAL: Per above   PSYCHIATRIC: No depression, anxiety, mood swings, or difficulty sleeping      Medications:  MEDICATIONS  (STANDING):  oseltamivir 75 milliGRAM(s) Oral two times a day            Vital Signs Last 24 Hrs  T(C): 36.8 (30 Jan 2025 09:21), Max: 37.8 (30 Jan 2025 04:57)  T(F): 98.2 (30 Jan 2025 09:21), Max: 100.1 (30 Jan 2025 04:57)  HR: 110 (30 Jan 2025 09:21) (98 - 130)  BP: 133/60 (30 Jan 2025 09:21) (109/93 - 144/89)  BP(mean): 108 (30 Jan 2025 04:02) (75 - 108)  RR: 22 (30 Jan 2025 09:21) (20 - 40)  SpO2: 98% (30 Jan 2025 09:21) (90% - 100%)    Parameters below as of 30 Jan 2025 09:21  Patient On (Oxygen Delivery Method): nasal cannula, high flow  O2 Flow (L/min): 40  O2 Concentration (%): 53      VBG pH 7.44 01-30 @ 03:55  VBG pCO2 42 01-30 @ 03:55  VBG O2 sat 69.5 01-30 @ 03:55  VBG lactate 1.2 01-30 @ 03:55            LABS:                        11.2   8.66  )-----------( 213      ( 30 Jan 2025 04:11 )             35.2     01-30    144  |  105  |  27[H]  ----------------------------<  145[H]  4.3   |  25  |  0.67    Ca    8.5      30 Jan 2025 04:11  Phos  2.8     01-30  Mg     2.0     01-30    TPro  6.2  /  Alb  3.1[L]  /  TBili  0.4  /  DBili  x   /  AST  24  /  ALT  21  /  AlkPhos  59  01-30          CAPILLARY BLOOD GLUCOSE      POCT Blood Glucose.: 143 mg/dL (30 Jan 2025 04:19)    PT/INR - ( 30 Jan 2025 04:11 )   PT: 14.4 sec;   INR: 1.25 ratio         PTT - ( 30 Jan 2025 04:11 )  PTT:35.6 sec  Urinalysis Basic - ( 30 Jan 2025 09:02 )    Color: Yellow / Appearance: Clear / SG: >1.030 / pH: x  Gluc: x / Ketone: Negative mg/dL  / Bili: Negative / Urobili: 0.2 mg/dL   Blood: x / Protein: 30 mg/dL / Nitrite: Negative   Leuk Esterase: Negative / RBC: 14 /HPF / WBC 3 /HPF   Sq Epi: x / Non Sq Epi: 2 /HPF / Bacteria: Negative /HPF      Procalcitonin: 0.04 ng/mL (01-30-25 @ 04:11)            Physical Examination:    General: No acute distress.      HEENT: Pupils equal, reactive to light.  Symmetric.    PULM: trace bibasilar crackles     CVS: S1, S2    ABD: Soft, nondistended, nontender, normoactive bowel sounds, no masses    EXT: No edema, nontender    SKIN: Warm and well perfused, no rashes noted.    NEURO: Alert, oriented, interactive, nonfocal    RADIOLOGY REVIEWED    CT chest:  < from: CT Angio Chest PE Protocol w/ IV Cont (01.30.25 @ 06:14) >  CHEST:    LUNGS AND AIRWAYS: Patent central airways. Interlobar septal thickening.   Peribronchial thickening/tree-in-bud opacities scattered in both lungs,   especially at the lower lobes. Patchy opacities at bilateral lower lobes,   right upper lobe, right middle lobe and lingula. Calcified granulomata.  PLEURA: No pleural effusion or pneumothorax.  HEART: Borderline heart size. Trace pericardial effusion. Aortic valve,   mitral annular and coronary artery calcification.  VESSELS: Suboptimal contrast opacification of the subsegmental pulmonary   arteries. No obvious embolus to the level of the segmental pulmonary   arteries. Atherosclerosis. Normal caliber of the thoracic aorta.  MEDIASTINUM AND JOSH: Subcentimeter lymph nodes.  CHEST WALL AND LOWER NECK: Thyroid gland degraded by artifact.    ABDOMEN/PELVIS:    LIVER: Unremarkable.  BILE DUCTS/GALLBLADDER: No intrahepatic or extrahepatic biliary   dilatation. No significant gallbladder edema.  PANCREAS: Unremarkable.  SPLEEN: Unremarkable.    ADRENALS: Unremarkable.  KIDNEYS/URETERS: Bilateral perinephric stranding without   hydroureteronephrosis. Bilateral renal cysts, for example, 2.5 x 3.5 cm   on the left. Subcentimeter hypodensities, too small to characterize.  BLADDER: Partially distended. Somewhat asymmetric bladder wall thickening.  REPRODUCTIVE ORGANS: Calcification along the left uterus, suggesting   fibroid.    BOWEL: No bowel obstruction. Unremarkable appendix.  PERITONEUM: No organized fluid collection or free air.  VESSELS: Atherosclerosis. Normal caliber of the abdominal aorta.  RETROPERITONEUM/LYMPH NODE: No lymphadenopathy.Subcentimeter lymph nodes.  ABDOMINAL WALL/SOFT TISSUES: Asymmetric enlargement of the left   pelvic/thigh muscles with stranding, suggesting edema/hematoma. Left hip   joint effusion/hemarthrosis. Small fat-containing umbilical hernia.  BONES: Acute, impacted left femoral intertrochanteric fracture with   superoposterior displacement, anterolateral apex angulation and varus   configuration. Displaced trochanteric fragments. Question fracture   involvement of the lower femoral neck. Rightward curvature and   degenerative changes of the spine. Endplate depression/anterior wedging   of the thoracolumbar spine, notably at L1 > L3, likely chronic. Stable L1   compression deformity.    IMPRESSION:    Suboptimal evaluation of the subsegmental pulmonary arteries. No obvious   embolus to the level of the segmental pulmonary arteries.    No obvious displaced rib fracture.    Acute impacted left femoral intertrochanteric fracture as described.    Pulmonary findings as described, which can be seen in noninfectious and   infectious processes. Recommend clinical correlation and follow-up to   resolution.    Somewhat asymmetric bladder wall thickening. Recommend clinical   correlation to assess UTI. Follow-up may be helpful to exclude potential   underlying lesion/neoplasm.    < end of copied text >

## 2025-01-30 NOTE — CONSULT NOTE ADULT - NS ATTEND AMEND GEN_ALL_CORE FT
pt now on 6l nc with sat 100% no resp distress  will cover w zosyn and keep o>i  no pulmonary contraindication to surgery

## 2025-01-30 NOTE — PATIENT PROFILE ADULT - NSTOBACCOUNKNOWNSMK_GEN_A_CORE_RD
[facility] ambulatory encounter  FAMILY PRACTICE ANNUAL PHYSICAL EXAM    CHIEF COMPLAINT:  Physical and Office Visit       HISTORY OF PRESENT ILLNESS:    Christiana Campbell is a pleasant 32 year old female who presents today for physcal    New concerns discussed include: trying lose weight on bariatric specialist, is already cleared but needs to loss 10 pounds .     PROBLEM LIST:    Patient Active Problem List   Diagnosis    Gastroesophageal reflux disease without esophagitis    Morbid obesity with BMI of 45.0-49.9, adult (CMD)    NINI (generalized anxiety disorder)    Panic attacks    Attention deficit hyperactivity disorder (ADHD), predominantly inattentive type    Class 3 severe obesity with serious comorbidity and body mass index (BMI) of 50.0 to 59.9 in adult (CMD)       HISTORIES:    I have reviewed the past medical history, family history, social history, medications and allergies listed in the medical record as obtained by my nursing staff and support staff and agree with their documentation.  ALLERGIES:   Allergen Reactions    Penicillins Other (See Comments)     unsure     Current Outpatient Medications   Medication Sig Dispense Refill    buPROPion XL (WELLBUTRIN XL) 300 MG 24 hr tablet Take 1 tablet by mouth daily. 30 tablet 3    escitalopram (LEXAPRO) 20 MG tablet Take 1 tablet by mouth daily. 30 tablet 3    metFORMIN (GLUCOPHAGE) 500 MG tablet Take 1 tablet by mouth in the morning and 1 tablet in the evening. Take with meals. 180 tablet 1    nalTREXone (REVIA) 50 MG tablet Indications: Abuse or Misuse of Alcohol, f10.10 1/2 tab twice daily 30 tablet 1     No current facility-administered medications for this visit.     Immunization History   Administered Date(s) Administered    COVID Moderna 0.5 mL 12Y+ 02/03/2021, 03/02/2021    COVID Moderna Bivalent 0.5 mL 12Y+ 03/29/2023    DPT 1991, 02/24/1992, 04/27/1992, 04/21/1993    DTaP(INFANRIX) 1991, 02/24/1992, 04/27/1992, 04/21/1993, 08/29/1997    HIB  (HbOC) 1991, 02/24/1992, 04/27/1992, 04/21/1993    HIB, Unspecified Formulation 1991, 02/24/1992, 04/27/1992, 04/21/1993    HPV Quadrivalent 05/24/2012, 05/16/2013, 07/16/2015    Hep B, Unspecified Formulation 08/29/1997, 09/26/1997, 02/10/1998    MMR 04/21/1993, 08/29/1997    Meningococcal Conjugate MCV4P (Menactra) 08/25/2010    Polio, INACTIVATED 1991, 02/24/1992, 04/27/1992, 04/21/1993    Polio, Oral 1991, 02/24/1992, 04/27/1992, 04/21/1993    TD Adult, Unspecified Formulation 08/07/2006    Tdap 08/07/2006, 01/10/2018     Past Medical History:   Diagnosis Date    ADHD (attention deficit hyperactivity disorder)     Anxiety     Binge eating 10/17/2022    Depressive disorder     Elevated cholesterol     GERD (gastroesophageal reflux disease)     High blood pressure     Major depressive disorder, recurrent episode, moderate (CMD) 1/25/2022    RTACEY on CPAP     PCOS (polycystic ovarian syndrome)     Varicella without complication      Past Surgical History:   Procedure Laterality Date    Knee arthroscopy w/ acl reconstruction Right 2007     Social History     Tobacco Use    Smoking status: Never    Smokeless tobacco: Never   Vaping Use    Vaping Use: never used   Substance Use Topics    Alcohol use: No    Drug use: No     Social History     Tobacco Use   Smoking Status Never   Smokeless Tobacco Never     Social History     Substance and Sexual Activity   Alcohol Use No     Family History   Problem Relation Age of Onset    Thyroid Mother     Sleep Apnea Mother     Other Mother         pcos       REVIEW OF SYSTEMS:    Constitutional: Negative for fever and chills.   Skin: Negative for rash.   HEENT: Negative for eye drainage, rhinorrhea, ear pain or sore throat.  Respiratory: Negative for cough, wheezing or shortness of breath.    Cardiovascular: Negative for chest pain, chest pressure, palpitations or diaphoresis.   Gastrointestinal: Negative for nausea, vomiting, diarrhea or abdominal pain.    Genitourinary: Negative for dysuria, urgency, frequency, hematuria or flank pain.  Extremities: Negative for joint swelling or joint pain.  Neurologic: Negative for change in sensory or motor function.  Negative for headache.  Endocrine: Negative for heat or cold intolerance, weight loss or gain.  Hematological: Negative for bleeding, bruising or adenopathy.  Psychiatric: Negative for change in affect, change in mentation or sleep disturbance.     PHYSICAL EXAM:    Vital Signs:  Visit Vitals  /84 (BP Location: RUE - Right upper extremity, Patient Position: Sitting, Cuff Size: Large Adult)   Pulse 70   Temp 98.1 °F (36.7 °C) (Oral)   Resp 18   Ht 5' 2\" (1.575 m)   Wt 128.5 kg (283 lb 6.4 oz)   LMP 05/25/2023 (Approximate) Comment: irregular periods PCOS   BMI 51.83 kg/m²     Pulse Ox Interpretation:  Within normal limits.  General:   Alert, cooperative, conversive in no acute distress.  Skin:  Warm and dry without rash.    Head:  Normocephalic, atraumatic.   Neck:  Trachea is midline. No adenopathy.  Normal thyroid without mass or tenderness..   Eyes:  Normal conjunctivae and sclerae.  Pupils equal, round and reactive to light.  Extraocular movements intact.  ENT:  Mucous membranes are moist.  Normal tympanic membranes and external auditory canals bilaterally.  No pharyngeal erythema or exudate.  No facial tenderness.  Normal nasal mucosa.  Cardiovascular:  Symmetrical pulses.  Regular rate and rhythm without murmur.  Respiratory:  Normal respiratory effort.  Clear to auscultation.  No wheezes, rales or rhonchi.  Gastrointestinal:  Soft and non tender.  Normal bowel sounds.  No hepatomegaly or splenomegaly.   Musculoskeletal:  No deformity or edema.  No tenderness to palpation.  Back:   Normal alignment.  No costovertebral angle tenderness or midline bony tenderness.  Neurologic:   Oriented x4.  Cranial nerves II-XII are intact.  No focal deficits or lateralizing signs.  Psychiatric:   Cooperative.   Appropriate mood and affect.    LABORATORY DATA:    No lab tests performed today    IMAGING STUDIES:    N/A    Body mass index is 51.83 kg/m².    BMI FOLLOW-UP/PLAN:  Patient is obese.    Journal food intake daily, Join health club, and follow-up with the bariatric specialist       Review Flowsheet  More data exists         3/29/2023   PHQ 2/9 Score   Adult PHQ 2 Score 0 1   Adult PHQ 2 Interpretation No further screening needed No further screening needed   Little interest or pleasure in activity? Not at all Not at all   Feeling down, depressed or hopeless? Not at all Several days   Adult PHQ 9 Score 0 6   Adult PHQ 9 Interpretation Mild Depression   Trouble falling or staying asleep or sleeping all the time? Not at all Several days   Feeling tired or having little energy? Not at all Several days   Poor appetite or overeating? Not at all Several days   Feeling bad about yourself or that you are a failure or have let yourself or family down? Not at all Several days   Trouble concentrating on things such as reading the newspaper or watching TV? Not at all Several days   Moving or speaking slowly that other people have noticed or the opposite - being so fidgety or restless that you have been moving around a lot more than usual? Not at all Not at all   Thoughts that you would be better off dead or of hurting yourself in some way? Not at all Not at all   If you reported any problems, how difficult have these problems made it to do your work, take care of things at home, or get along with other people? Not difficult at all Somewhat difficult       DEPRESSION ASSESSMENT/PLAN:  Start and/or continue medication.  See orders for details.  and Referred to Behavioral Health and/or Psychiatry    ASSESSMENT:    1. Routine history and physical examination of adult    2. Morbid obesity with BMI of 50.0-59.9, adult (CMD)    3. Prediabetes    4. PCOS (polycystic ovarian syndrome)    5. Dyslipidemia    6. NINI (generalized anxiety  disorder)    7. Contraception, device intrauterine    8. Major depressive disorder, recurrent episode, mild (CMD)        PLAN:    Orders Placed This Encounter    SERVICE TO OB GYN    SERVICE TO BEHAVIORAL HEALTH    buPROPion XL (WELLBUTRIN XL) 300 MG 24 hr tablet    escitalopram (LEXAPRO) 20 MG tablet   Encourage healthy lifestyle increase activity as tolerated    Return in about 6 months (around 8/7/2024).    Screenings/Treatments Needed:  Orders were already placed by the bariatric specialist    Instructions provided as documented in the after visit summary.    The patient indicated understanding of the diagnosis and agreed with the plan of care.      Cognitively Impaired

## 2025-01-30 NOTE — ED PROVIDER NOTE - CLINICAL SUMMARY MEDICAL DECISION MAKING FREE TEXT BOX
I was the supervising attending. I have independently seen face-to-face and examined the patient with the resident. I have reviewed the history and physical and discussed the MDM with the resident. I agree with the assessment and plan as presented unless otherwise documented as follows:    84F hx HTN, HLD, A-fib on Eliquis, CHF, DNR/DNI on prior MOLST, presenting from assisted living for hip fracture and SOB. Patient is AOx3, states she had a ground-level fall yesterday out of bed. Subsequently had L hip pain. Had XR imaging performed by assisted living facility which was notable for L IT fracture. Currently denies SOB, fevers/chills, CP, palpitations, recent cough/congestion, abdominal pain, nausea/vomiting, urinary complaints. Awake and alert, tachycardic/hypoxic, placed on NRB. Exam as above, concerning for L hip fracture, no obvious stigmata of additional traumatic injuries. Lungs overall clear bilaterally although significantly tachypneic/hypoxic. POCUS w/ scattered B-lines bilaterally, lung sliding present, decreased EF, IVC w/ respiratory variation, no pleural effusions, no overt intraabdominal free fluid. C/f acute hypoxemic respiratory failure i/s/o infection e.g. viral URI/PNA vs. PTX although B/L lung sliding on US reassuring vs. PE. Does not appear significantly volume overloaded but CHF exacerbation also possible. Additionally with likely hip fracture, no other obvious signs of additional traumatic injury. Will obtain labs, UA, XR/CT imaging, start HFNC. -Prerna Long MD (Attending)

## 2025-01-30 NOTE — ED ADULT NURSE NOTE - HISTORY OF COVID-19 VACCINATION
Problem: Altered Mood, Depressive Behavior:  Goal: Able to verbalize acceptance of life and situations over which he or she has no control  Description: Able to verbalize acceptance of life and situations over which he or she has no control  12/31/2021 2211 by Penny Wilburn LPN  Outcome: Ongoing  Note: Patient is unable to verbalize acceptance of life and situations in which she has no control this shift. Problem: Altered Mood, Depressive Behavior:  Goal: Able to verbalize and/or display a decrease in depressive symptoms  Description: Able to verbalize and/or display a decrease in depressive symptoms  12/31/2021 2211 by Penny Wilburn LPN  Outcome: Ongoing  Note: Patient admits to depression and anxiety this shift, stating at a 5 on a scale 0-10. Patient denies suicidal/homicidal ideations and hallucinations at this time. Patient educated and agrees to come to staff if this occurs. Patient monitored every 15 minutes with environmental safety checks. Problem: Altered Mood, Depressive Behavior:  Goal: Able to verbalize support systems  Description: Able to verbalize support systems  12/31/2021 2211 by Penny Wilburn LPN  Outcome: Ongoing  Note: Patient unable to verbalize support system at this time. Problem: Pain:  Goal: Pain level will decrease  Description: Pain level will decrease  12/31/2021 2211 by Penny Wilburn LPN  Outcome: Ongoing     Problem: Pain:  Goal: Control of acute pain  Description: Control of acute pain  12/31/2021 2211 by Penny Wilburn LPN  Outcome: Ongoing     Problem: Pain:  Goal: Control of chronic pain  Description: Control of chronic pain  12/31/2021 2211 by Penny Wilburn LPN  Outcome: Ongoing     Problem: Substance Abuse:  Goal: Absence of drug withdrawal signs and symptoms  Description: Absence of drug withdrawal signs and symptoms  12/31/2021 2211 by Penny Wilburn LPN  Outcome: Ongoing  Note: Patient is having withdrawal symptoms this shift.  Patient educated and agrees to alert staff if symptoms become worse or persists. Patient monitored every 15 minutes with environmental safety checks. Problem: Suicide risk  Goal: Provide patient with safe environment  Description: Provide patient with safe environment  12/31/2021 2211 by Robert Lawson LPN  Outcome: Ongoing  Note: Patient provided safe environment within [facility] milieu. Will continue to monitor and provide q15 min safety checks. Problem: Tobacco Use:  Goal: Inpatient tobacco use cessation counseling participation  Description: Inpatient tobacco use cessation counseling participation  12/31/2021 2211 by Robert Lawson LPN  Outcome: Ongoing  Note: Patient given tobacco quitline number 42087987210 at this time, refusing to call at this time, states \" I just dont want to quit now\"- patient given information as to the dangers of long term tobacco use. Continue to reinforce the importance of tobacco cessation. Vaccine status unknown

## 2025-01-30 NOTE — H&P ADULT - HISTORY OF PRESENT ILLNESS
85 y/o F with PMH of HTN, HLD, afib on Eliquis, CHF. Presents with c/o L hip fracture (found on outpatient xray). Pt reportedly fell while at assisted living home day prior to admission. Pt also with c/o SOB, noted to be hypoxic to 78% on RA. RVP +influenza A. CTA chest with trace b/l pl effusions, interlobar septal thickening, patchy opacities LL. Denies cough, CP, pleuritic CP.

## 2025-01-30 NOTE — CONSULT NOTE ADULT - PROBLEM SELECTOR RECOMMENDATION 4
CTA chest with LL opacities - ?mild volume overload vs. PNA  -WBC normal, procalcitonin normal  -Given degree of hypoxia and increased WOB, consider short course of abx

## 2025-01-30 NOTE — ED PROVIDER NOTE - CARE PLAN
Principal Discharge DX:	Hip fracture   1 Principal Discharge DX:	Hip fracture  Secondary Diagnosis:	Influenza A  Secondary Diagnosis:	Pneumonia

## 2025-01-30 NOTE — H&P ADULT - TIME BILLING
Admission H&P including bedside history , examination , reviewing test results and treatment plan. Orthopedic Consult noted and cardiology consulted .D/W Patient and ACP. Left a message for her  brother .

## 2025-01-30 NOTE — CONSULT NOTE ADULT - SUBJECTIVE AND OBJECTIVE BOX
84yFemale c/o L hip pain s/p mechanical fall last night when trying to stand up from a chair. Unknown if head hit or LOC. Patient denies numbness or tingling in the LLE. Patient denies any other injuries. A&Ox1.    ROS: 10 point review of systems otherwise negative unless noted in HPI    PMH:  Dementia      PSH:    AH:  No Known Allergies    Meds: See med rec    T(C): 36.8 (01-30-25 @ 09:21)  HR: 110 (01-30-25 @ 09:21)  BP: 133/60 (01-30-25 @ 09:21)  RR: 22 (01-30-25 @ 09:21)  SpO2: 98% (01-30-25 @ 09:21)  Wt(kg): --                        11.2   8.66  )-----------( 213      ( 30 Jan 2025 04:11 )             35.2     01-30    144  |  105  |  27[H]  ----------------------------<  145[H]  4.3   |  25  |  0.67    Ca    8.5      30 Jan 2025 04:11  Phos  2.8     01-30  Mg     2.0     01-30    TPro  6.2  /  Alb  3.1[L]  /  TBili  0.4  /  DBili  x   /  AST  24  /  ALT  21  /  AlkPhos  59  01-30    PT/INR - ( 30 Jan 2025 04:11 )   PT: 14.4 sec;   INR: 1.25 ratio         PTT - ( 30 Jan 2025 04:11 )  PTT:35.6 sec  Urinalysis Basic - ( 30 Jan 2025 09:02 )    Color: Yellow / Appearance: Clear / SG: >1.030 / pH: x  Gluc: x / Ketone: Negative mg/dL  / Bili: Negative / Urobili: 0.2 mg/dL   Blood: x / Protein: 30 mg/dL / Nitrite: Negative   Leuk Esterase: Negative / RBC: 14 /HPF / WBC 3 /HPF   Sq Epi: x / Non Sq Epi: 2 /HPF / Bacteria: Negative /HPF        PE  Gen: NAD, A&Ox1  Resp: Unlabored breathing  LLE: Skin intact, no ecchymosis,        SILT DP/SP/ Isak/Saph/Post Tib       +EHL/FHL/TA/Gastroc,        Knee/ankle painless ROM,        hip ROM limited 2/2 pain,       DP+,        soft compartments, no calf ttp,        +log roll.      Secondary:  No TTP over bony landmarks, SILT BL, ROM intact BL, distal pulses palpable.    Imaging:  XR demonstrating L hip IT fracture

## 2025-01-30 NOTE — H&P ADULT - NSHPADDITIONALINFOADULT_GEN_ALL_CORE
Patient is high risk for surgery secondary to continue morbidities including acute Respiratory failure with hypoxia although  medically optimized pending Pulmonary and Cardiology clearance . Patient is high risk for surgery secondary to comorbidities especially acute Respiratory failure with hypoxia although  medically optimized pending Pulmonary and Cardiology clearance . Patient is medically optimized for surgery and GA pending Pulmonary and Cardiology clearance .

## 2025-01-31 ENCOUNTER — TRANSCRIPTION ENCOUNTER (OUTPATIENT)
Age: 85
End: 2025-01-31

## 2025-01-31 LAB
ANION GAP SERPL CALC-SCNC: 12 MMOL/L — SIGNIFICANT CHANGE UP (ref 5–17)
ANION GAP SERPL CALC-SCNC: 14 MMOL/L — SIGNIFICANT CHANGE UP (ref 5–17)
APTT BLD: 30.9 SEC — SIGNIFICANT CHANGE UP (ref 24.5–35.6)
BLD GP AB SCN SERPL QL: NEGATIVE — SIGNIFICANT CHANGE UP
BUN SERPL-MCNC: 19 MG/DL — SIGNIFICANT CHANGE UP (ref 7–23)
BUN SERPL-MCNC: 20 MG/DL — SIGNIFICANT CHANGE UP (ref 7–23)
CALCIUM SERPL-MCNC: 8.2 MG/DL — LOW (ref 8.4–10.5)
CALCIUM SERPL-MCNC: 8.3 MG/DL — LOW (ref 8.4–10.5)
CHLORIDE SERPL-SCNC: 101 MMOL/L — SIGNIFICANT CHANGE UP (ref 96–108)
CHLORIDE SERPL-SCNC: 102 MMOL/L — SIGNIFICANT CHANGE UP (ref 96–108)
CO2 SERPL-SCNC: 25 MMOL/L — SIGNIFICANT CHANGE UP (ref 22–31)
CO2 SERPL-SCNC: 27 MMOL/L — SIGNIFICANT CHANGE UP (ref 22–31)
CREAT SERPL-MCNC: 0.71 MG/DL — SIGNIFICANT CHANGE UP (ref 0.5–1.3)
CREAT SERPL-MCNC: 0.71 MG/DL — SIGNIFICANT CHANGE UP (ref 0.5–1.3)
CULTURE RESULTS: SIGNIFICANT CHANGE UP
EGFR: 84 ML/MIN/1.73M2 — SIGNIFICANT CHANGE UP
EGFR: 84 ML/MIN/1.73M2 — SIGNIFICANT CHANGE UP
FLUAV H3 RNA SPEC QL NAA+PROBE: DETECTED
GLUCOSE BLDC GLUCOMTR-MCNC: 112 MG/DL — HIGH (ref 70–99)
GLUCOSE BLDC GLUCOMTR-MCNC: 116 MG/DL — HIGH (ref 70–99)
GLUCOSE SERPL-MCNC: 113 MG/DL — HIGH (ref 70–99)
GLUCOSE SERPL-MCNC: 139 MG/DL — HIGH (ref 70–99)
HCOV PNL SPEC NAA+PROBE: DETECTED
HCT VFR BLD CALC: 33.2 % — LOW (ref 34.5–45)
HCT VFR BLD CALC: 36 % — SIGNIFICANT CHANGE UP (ref 34.5–45)
HGB BLD-MCNC: 10.6 G/DL — LOW (ref 11.5–15.5)
HGB BLD-MCNC: 11.3 G/DL — LOW (ref 11.5–15.5)
INR BLD: 1.21 RATIO — HIGH (ref 0.85–1.16)
MCHC RBC-ENTMCNC: 27.2 PG — SIGNIFICANT CHANGE UP (ref 27–34)
MCHC RBC-ENTMCNC: 27.5 PG — SIGNIFICANT CHANGE UP (ref 27–34)
MCHC RBC-ENTMCNC: 31.4 G/DL — LOW (ref 32–36)
MCHC RBC-ENTMCNC: 31.9 G/DL — LOW (ref 32–36)
MCV RBC AUTO: 86.2 FL — SIGNIFICANT CHANGE UP (ref 80–100)
MCV RBC AUTO: 86.7 FL — SIGNIFICANT CHANGE UP (ref 80–100)
NRBC # BLD: 0 /100 WBCS — SIGNIFICANT CHANGE UP (ref 0–0)
NRBC # BLD: 0 /100 WBCS — SIGNIFICANT CHANGE UP (ref 0–0)
NRBC BLD-RTO: 0 /100 WBCS — SIGNIFICANT CHANGE UP (ref 0–0)
NRBC BLD-RTO: 0 /100 WBCS — SIGNIFICANT CHANGE UP (ref 0–0)
PLATELET # BLD AUTO: 233 K/UL — SIGNIFICANT CHANGE UP (ref 150–400)
PLATELET # BLD AUTO: 259 K/UL — SIGNIFICANT CHANGE UP (ref 150–400)
POTASSIUM SERPL-MCNC: 3.9 MMOL/L — SIGNIFICANT CHANGE UP (ref 3.5–5.3)
POTASSIUM SERPL-MCNC: 4 MMOL/L — SIGNIFICANT CHANGE UP (ref 3.5–5.3)
POTASSIUM SERPL-SCNC: 3.9 MMOL/L — SIGNIFICANT CHANGE UP (ref 3.5–5.3)
POTASSIUM SERPL-SCNC: 4 MMOL/L — SIGNIFICANT CHANGE UP (ref 3.5–5.3)
PROTHROM AB SERPL-ACNC: 13.8 SEC — HIGH (ref 9.9–13.4)
RAPID RVP RESULT: DETECTED
RBC # BLD: 3.85 M/UL — SIGNIFICANT CHANGE UP (ref 3.8–5.2)
RBC # BLD: 4.15 M/UL — SIGNIFICANT CHANGE UP (ref 3.8–5.2)
RBC # FLD: 14.9 % — HIGH (ref 10.3–14.5)
RBC # FLD: 15.1 % — HIGH (ref 10.3–14.5)
RH IG SCN BLD-IMP: POSITIVE — SIGNIFICANT CHANGE UP
SARS-COV-2 RNA SPEC QL NAA+PROBE: SIGNIFICANT CHANGE UP
SODIUM SERPL-SCNC: 140 MMOL/L — SIGNIFICANT CHANGE UP (ref 135–145)
SODIUM SERPL-SCNC: 141 MMOL/L — SIGNIFICANT CHANGE UP (ref 135–145)
SPECIMEN SOURCE: SIGNIFICANT CHANGE UP
WBC # BLD: 12.94 K/UL — HIGH (ref 3.8–10.5)
WBC # BLD: 9.87 K/UL — SIGNIFICANT CHANGE UP (ref 3.8–10.5)
WBC # FLD AUTO: 12.94 K/UL — HIGH (ref 3.8–10.5)
WBC # FLD AUTO: 9.87 K/UL — SIGNIFICANT CHANGE UP (ref 3.8–10.5)

## 2025-01-31 PROCEDURE — 27245 TREAT THIGH FRACTURE: CPT | Mod: LT

## 2025-01-31 PROCEDURE — 99233 SBSQ HOSP IP/OBS HIGH 50: CPT | Mod: FS,57

## 2025-01-31 DEVICE — IMPLANTABLE DEVICE: Type: IMPLANTABLE DEVICE | Site: LEFT | Status: FUNCTIONAL

## 2025-01-31 DEVICE — PIN GUIDE 3.2X444: Type: IMPLANTABLE DEVICE | Site: LEFT | Status: FUNCTIONAL

## 2025-01-31 DEVICE — SCREW FEM CORT DIST 5X34MM: Type: IMPLANTABLE DEVICE | Site: LEFT | Status: FUNCTIONAL

## 2025-01-31 RX ORDER — PREDNISONE 5 MG/1
20 TABLET ORAL
Refills: 0 | Status: DISCONTINUED | OUTPATIENT
Start: 2025-01-31 | End: 2025-01-31

## 2025-01-31 RX ORDER — SENNOSIDES 8.6 MG
2 TABLET ORAL AT BEDTIME
Refills: 0 | Status: DISCONTINUED | OUTPATIENT
Start: 2025-01-31 | End: 2025-02-05

## 2025-01-31 RX ORDER — PIPERACILLIN SODIUM AND TAZOBACTAM SODIUM 2; 250 G/50ML; MG/50ML
3.38 INJECTION, POWDER, FOR SOLUTION INTRAVENOUS EVERY 8 HOURS
Refills: 0 | Status: DISCONTINUED | OUTPATIENT
Start: 2025-01-31 | End: 2025-02-05

## 2025-01-31 RX ORDER — ACETAMINOPHEN, DIPHENHYDRAMINE HCL, PHENYLEPHRINE HCL 325; 25; 5 MG/1; MG/1; MG/1
3 TABLET ORAL AT BEDTIME
Refills: 0 | Status: DISCONTINUED | OUTPATIENT
Start: 2025-01-31 | End: 2025-02-05

## 2025-01-31 RX ORDER — POTASSIUM CHLORIDE 750 MG/1
10 TABLET, EXTENDED RELEASE ORAL DAILY
Refills: 0 | Status: DISCONTINUED | OUTPATIENT
Start: 2025-01-31 | End: 2025-02-05

## 2025-01-31 RX ORDER — TRAMADOL HYDROCHLORIDE 100 MG/1
25 TABLET, EXTENDED RELEASE ORAL EVERY 4 HOURS
Refills: 0 | Status: DISCONTINUED | OUTPATIENT
Start: 2025-01-31 | End: 2025-02-05

## 2025-01-31 RX ORDER — ACETAMINOPHEN 160 MG/5ML
975 SUSPENSION ORAL EVERY 8 HOURS
Refills: 0 | Status: DISCONTINUED | OUTPATIENT
Start: 2025-01-31 | End: 2025-02-05

## 2025-01-31 RX ORDER — ACETAMINOPHEN 160 MG/5ML
1000 SUSPENSION ORAL ONCE
Refills: 0 | Status: COMPLETED | OUTPATIENT
Start: 2025-01-31 | End: 2025-01-31

## 2025-01-31 RX ORDER — HYDROMORPHONE HYDROCHLORIDE 4 MG/ML
0.12 INJECTION, SOLUTION INTRAMUSCULAR; INTRAVENOUS; SUBCUTANEOUS
Refills: 0 | Status: DISCONTINUED | OUTPATIENT
Start: 2025-01-31 | End: 2025-01-31

## 2025-01-31 RX ORDER — METOPROLOL SUCCINATE 25 MG
25 TABLET, EXTENDED RELEASE 24 HR ORAL
Refills: 0 | Status: DISCONTINUED | OUTPATIENT
Start: 2025-01-31 | End: 2025-02-05

## 2025-01-31 RX ORDER — PANTOPRAZOLE 20 MG/1
40 TABLET, DELAYED RELEASE ORAL
Refills: 0 | Status: DISCONTINUED | OUTPATIENT
Start: 2025-01-31 | End: 2025-02-05

## 2025-01-31 RX ORDER — MORPHINE SULFATE 60 MG/1
0.5 TABLET, FILM COATED, EXTENDED RELEASE ORAL EVERY 4 HOURS
Refills: 0 | Status: DISCONTINUED | OUTPATIENT
Start: 2025-01-31 | End: 2025-01-31

## 2025-01-31 RX ORDER — APIXABAN 5 MG/1
2.5 TABLET, FILM COATED ORAL
Refills: 0 | Status: DISCONTINUED | OUTPATIENT
Start: 2025-02-01 | End: 2025-02-05

## 2025-01-31 RX ORDER — OSELTAMIVIR PHOSPHATE 75 MG/1
75 CAPSULE ORAL
Refills: 0 | Status: COMPLETED | OUTPATIENT
Start: 2025-01-31 | End: 2025-02-05

## 2025-01-31 RX ORDER — OXYCODONE HYDROCHLORIDE 30 MG/1
5 TABLET ORAL EVERY 4 HOURS
Refills: 0 | Status: DISCONTINUED | OUTPATIENT
Start: 2025-01-31 | End: 2025-02-05

## 2025-01-31 RX ORDER — OXYCODONE HYDROCHLORIDE 30 MG/1
2.5 TABLET ORAL EVERY 4 HOURS
Refills: 0 | Status: DISCONTINUED | OUTPATIENT
Start: 2025-01-31 | End: 2025-02-05

## 2025-01-31 RX ORDER — ASPIRIN 81 MG/1
81 TABLET, COATED ORAL DAILY
Refills: 0 | Status: DISCONTINUED | OUTPATIENT
Start: 2025-01-31 | End: 2025-02-05

## 2025-01-31 RX ORDER — ATORVASTATIN CALCIUM 80 MG/1
10 TABLET, FILM COATED ORAL AT BEDTIME
Refills: 0 | Status: DISCONTINUED | OUTPATIENT
Start: 2025-01-31 | End: 2025-02-05

## 2025-01-31 RX ORDER — ACETAMINOPHEN 160 MG/5ML
1000 SUSPENSION ORAL ONCE
Refills: 0 | Status: DISCONTINUED | OUTPATIENT
Start: 2025-01-31 | End: 2025-02-05

## 2025-01-31 RX ORDER — CEFAZOLIN SODIUM IN 0.9 % NACL 2 G/10 ML
2000 SYRINGE (ML) INTRAVENOUS EVERY 8 HOURS
Refills: 0 | Status: COMPLETED | OUTPATIENT
Start: 2025-01-31 | End: 2025-02-01

## 2025-01-31 RX ORDER — CALCIUM CARBONATE/VITAMIN D3 600 MG-10
1 TABLET ORAL DAILY
Refills: 0 | Status: DISCONTINUED | OUTPATIENT
Start: 2025-01-31 | End: 2025-02-05

## 2025-01-31 RX ORDER — POLYETHYLENE GLYCOL 3350 17 G/17G
17 POWDER, FOR SOLUTION ORAL DAILY
Refills: 0 | Status: DISCONTINUED | OUTPATIENT
Start: 2025-01-31 | End: 2025-02-05

## 2025-01-31 RX ADMIN — Medication 2 TABLET(S): at 22:07

## 2025-01-31 RX ADMIN — Medication 100 MILLILITER(S): at 12:05

## 2025-01-31 RX ADMIN — Medication 100 MILLILITER(S): at 00:12

## 2025-01-31 RX ADMIN — PIPERACILLIN SODIUM AND TAZOBACTAM SODIUM 25 GRAM(S): 2; 250 INJECTION, POWDER, FOR SOLUTION INTRAVENOUS at 01:46

## 2025-01-31 RX ADMIN — PIPERACILLIN SODIUM AND TAZOBACTAM SODIUM 25 GRAM(S): 2; 250 INJECTION, POWDER, FOR SOLUTION INTRAVENOUS at 06:11

## 2025-01-31 RX ADMIN — Medication 25 MILLIGRAM(S): at 17:44

## 2025-01-31 RX ADMIN — Medication 25 MILLIGRAM(S): at 06:11

## 2025-01-31 RX ADMIN — ACETAMINOPHEN 400 MILLIGRAM(S): 160 SUSPENSION ORAL at 13:21

## 2025-01-31 RX ADMIN — ATORVASTATIN CALCIUM 10 MILLIGRAM(S): 80 TABLET, FILM COATED ORAL at 22:07

## 2025-01-31 RX ADMIN — ANTISEPTIC SURGICAL SCRUB 1 APPLICATION(S): 0.04 SOLUTION TOPICAL at 12:05

## 2025-01-31 RX ADMIN — Medication 100 MILLIGRAM(S): at 22:07

## 2025-01-31 RX ADMIN — PIPERACILLIN SODIUM AND TAZOBACTAM SODIUM 25 GRAM(S): 2; 250 INJECTION, POWDER, FOR SOLUTION INTRAVENOUS at 13:27

## 2025-01-31 RX ADMIN — OSELTAMIVIR PHOSPHATE 75 MILLIGRAM(S): 75 CAPSULE ORAL at 17:45

## 2025-01-31 RX ADMIN — PIPERACILLIN SODIUM AND TAZOBACTAM SODIUM 25 GRAM(S): 2; 250 INJECTION, POWDER, FOR SOLUTION INTRAVENOUS at 22:06

## 2025-01-31 RX ADMIN — ACETAMINOPHEN 975 MILLIGRAM(S): 160 SUSPENSION ORAL at 23:07

## 2025-01-31 RX ADMIN — OSELTAMIVIR PHOSPHATE 75 MILLIGRAM(S): 75 CAPSULE ORAL at 06:42

## 2025-01-31 RX ADMIN — FAMOTIDINE 20 MILLIGRAM(S): 10 INJECTION INTRAVENOUS at 12:05

## 2025-01-31 RX ADMIN — ACETAMINOPHEN 975 MILLIGRAM(S): 160 SUSPENSION ORAL at 22:07

## 2025-01-31 NOTE — PROGRESS NOTE ADULT - ATTENDING COMMENTS
I agree with the above note and have personally seen and examined this patient. All pertinent films have been reviewed. Please refer to clinical documentation of the history, physical examinations, data summary, and both assessment and plan as documented above and with which I agree.    Jameson Salazar MD  Attending Orthopedic Surgeon

## 2025-01-31 NOTE — PROGRESS NOTE ADULT - SUBJECTIVE AND OBJECTIVE BOX
Follow-up Pulm Progress Note    less labored  feeling better overall  o2 sats 98% on 4LNC  o2 lowered to 2LNC   denies SOB/CP     Medications:  MEDICATIONS  (STANDING):  atorvastatin 10 milliGRAM(s) Oral at bedtime  chlorhexidine 2% Cloths 1 Application(s) Topical daily  famotidine    Tablet 20 milliGRAM(s) Oral daily  metoprolol tartrate 25 milliGRAM(s) Oral two times a day  oseltamivir 75 milliGRAM(s) Oral two times a day  piperacillin/tazobactam IVPB.. 3.375 Gram(s) IV Intermittent every 8 hours  sodium chloride 0.9%. 1000 milliLiter(s) (100 mL/Hr) IV Continuous <Continuous>    MEDICATIONS  (PRN):  acetaminophen     Tablet .. 650 milliGRAM(s) Oral every 6 hours PRN Temp greater or equal to 38.5C (101.3F), Moderate Pain (4 - 6)          Vital Signs Last 24 Hrs  T(C): 36.8 (31 Jan 2025 09:10), Max: 37 (31 Jan 2025 04:42)  T(F): 98.3 (31 Jan 2025 09:10), Max: 98.6 (31 Jan 2025 04:42)  HR: 78 (31 Jan 2025 09:10) (77 - 110)  BP: 111/74 (31 Jan 2025 09:10) (99/60 - 133/60)  BP(mean): 84 (30 Jan 2025 11:52) (84 - 100)  RR: 20 (31 Jan 2025 09:10) (20 - 24)  SpO2: 98% (31 Jan 2025 09:10) (94% - 99%)    Parameters below as of 31 Jan 2025 09:10  Patient On (Oxygen Delivery Method): nasal cannula  O2 Flow (L/min): 4        VBG pH 7.44 01-30 @ 03:55    VBG pCO2 42 01-30 @ 03:55    VBG O2 sat 69.5 01-30 @ 03:55    VBG lactate 1.2 01-30 @ 03:55      01-30 @ 07:01  -  01-31 @ 07:00  --------------------------------------------------------  IN: 0 mL / OUT: 2000 mL / NET: -2000 mL          LABS:                        11.3   9.87  )-----------( 233      ( 31 Jan 2025 01:33 )             36.0     01-31    141  |  102  |  20  ----------------------------<  113[H]  4.0   |  27  |  0.71    Ca    8.3[L]      31 Jan 2025 01:33  Phos  2.8     01-30  Mg     2.0     01-30    TPro  6.2  /  Alb  3.1[L]  /  TBili  0.4  /  DBili  x   /  AST  24  /  ALT  21  /  AlkPhos  59  01-30          CAPILLARY BLOOD GLUCOSE      POCT Blood Glucose.: 116 mg/dL (31 Jan 2025 07:43)    PT/INR - ( 31 Jan 2025 01:33 )   PT: 13.8 sec;   INR: 1.21 ratio         PTT - ( 31 Jan 2025 01:33 )  PTT:30.9 sec  Urinalysis Basic - ( 31 Jan 2025 01:33 )    Color: x / Appearance: x / SG: x / pH: x  Gluc: 113 mg/dL / Ketone: x  / Bili: x / Urobili: x   Blood: x / Protein: x / Nitrite: x   Leuk Esterase: x / RBC: x / WBC x   Sq Epi: x / Non Sq Epi: x / Bacteria: x      Procalcitonin: 0.04 ng/mL (01-30-25 @ 04:11)                  CULTURES:     Culture - Blood (collected 01-30-25 @ 05:25)  Source: .Blood BLOOD  Preliminary Report (01-31-25 @ 11:01):    No growth at 24 hours    Culture - Blood (collected 01-30-25 @ 05:10)  Source: .Blood BLOOD  Preliminary Report (01-31-25 @ 11:01):    No growth at 24 hours                                  Physical Examination:  PULM: trace bibasilar crackles   CVS: S1, S2 heard    RADIOLOGY REVIEWED    CT chest:  < from: CT Angio Chest PE Protocol w/ IV Cont (01.30.25 @ 06:14) >  CHEST:    LUNGS AND AIRWAYS: Patent central airways. Interlobar septal thickening.   Peribronchial thickening/tree-in-bud opacities scattered in both lungs,   especially at the lower lobes. Patchy opacities at bilateral lower lobes,   right upper lobe, right middle lobe and lingula. Calcified granulomata.  PLEURA: No pleural effusion or pneumothorax.  HEART: Borderline heart size. Trace pericardial effusion. Aortic valve,   mitral annular and coronary artery calcification.  VESSELS: Suboptimal contrast opacification of the subsegmental pulmonary   arteries. No obvious embolus to the level of the segmental pulmonary   arteries. Atherosclerosis. Normal caliber of the thoracic aorta.  MEDIASTINUM AND JOSH: Subcentimeter lymph nodes.  CHEST WALL AND LOWER NECK: Thyroid gland degraded by artifact.    ABDOMEN/PELVIS:    LIVER: Unremarkable.  BILE DUCTS/GALLBLADDER: No intrahepatic or extrahepatic biliary   dilatation. No significant gallbladder edema.  PANCREAS: Unremarkable.  SPLEEN: Unremarkable.    ADRENALS: Unremarkable.  KIDNEYS/URETERS: Bilateral perinephric stranding without   hydroureteronephrosis. Bilateral renal cysts, for example, 2.5 x 3.5 cm   on the left. Subcentimeter hypodensities, too small to characterize.  BLADDER: Partially distended. Somewhat asymmetric bladder wall thickening.  REPRODUCTIVE ORGANS: Calcification along the left uterus, suggesting   fibroid.    BOWEL: No bowel obstruction. Unremarkable appendix.  PERITONEUM: No organized fluid collection or free air.  VESSELS: Atherosclerosis. Normal caliber of the abdominal aorta.  RETROPERITONEUM/LYMPH NODE: No lymphadenopathy.Subcentimeter lymph nodes.  ABDOMINAL WALL/SOFT TISSUES: Asymmetric enlargement of the left   pelvic/thigh muscles with stranding, suggesting edema/hematoma. Left hip   joint effusion/hemarthrosis. Small fat-containing umbilical hernia.  BONES: Acute, impacted left femoral intertrochanteric fracture with   superoposterior displacement, anterolateral apex angulation and varus   configuration. Displaced trochanteric fragments. Question fracture   involvement of the lower femoral neck. Rightward curvature and   degenerative changes of the spine. Endplate depression/anterior wedging   of the thoracolumbar spine, notably at L1 > L3, likely chronic. Stable L1   compression deformity.    IMPRESSION:    Suboptimal evaluation of the subsegmental pulmonary arteries. No obvious   embolus to the level of the segmental pulmonary arteries.    No obvious displaced rib fracture.    Acute impacted left femoral intertrochanteric fracture as described.    Pulmonary findings as described, which can be seen in noninfectious and   infectious processes. Recommend clinical correlation and follow-up to   resolution.    Somewhat asymmetric bladder wall thickening. Recommend clinical   correlation to assess UTI. Follow-up may be helpful to exclude potential   underlying lesion/neoplasm.    < end of copied text >

## 2025-01-31 NOTE — PROGRESS NOTE ADULT - SUBJECTIVE AND OBJECTIVE BOX
C A R D I O L O G Y  **********************************     DATE OF SERVICE: 01-31-25    Patient reports dyspnea improved, now down to 2L NC. Laying flat comfortably. denies chest pain or palpitations. Review of systems otherwise negative.  	  MEDICATIONS:  MEDICATIONS  (STANDING):  acetaminophen   IVPB .. 1000 milliGRAM(s) IV Intermittent once  atorvastatin 10 milliGRAM(s) Oral at bedtime  chlorhexidine 2% Cloths 1 Application(s) Topical daily  famotidine    Tablet 20 milliGRAM(s) Oral daily  metoprolol tartrate 25 milliGRAM(s) Oral two times a day  oseltamivir 75 milliGRAM(s) Oral two times a day  piperacillin/tazobactam IVPB.. 3.375 Gram(s) IV Intermittent every 8 hours  sodium chloride 0.9%. 1000 milliLiter(s) (100 mL/Hr) IV Continuous <Continuous>      LABS:	 	    CARDIAC MARKERS:                                11.3   9.87  )-----------( 233      ( 31 Jan 2025 01:33 )             36.0     Hemoglobin: 11.3 g/dL (01-31 @ 01:33)  Hemoglobin: 11.2 g/dL (01-30 @ 04:11)      01-31    141  |  102  |  20  ----------------------------<  113[H]  4.0   |  27  |  0.71    Ca    8.3[L]      31 Jan 2025 01:33  Phos  2.8     01-30  Mg     2.0     01-30    TPro  6.2  /  Alb  3.1[L]  /  TBili  0.4  /  DBili  x   /  AST  24  /  ALT  21  /  AlkPhos  59  01-30    Creatinine Trend: 0.71<--, 0.67<--    COAGS:   PT/INR - ( 31 Jan 2025 01:33 )   PT: 13.8 sec;   INR: 1.21 ratio         PTT - ( 31 Jan 2025 01:33 )  PTT:30.9 sec    proBNP:   Lipid Profile:   HgA1c:   TSH:       PHYSICAL EXAM:  T(C): 37 (01-31-25 @ 11:22), Max: 37 (01-31-25 @ 04:42)  HR: 70 (01-31-25 @ 11:22) (70 - 87)  BP: 107/78 (01-31-25 @ 11:22) (99/60 - 121/77)  RR: 20 (01-31-25 @ 11:22) (20 - 22)  SpO2: 95% (01-31-25 @ 11:22) (94% - 98%)  Wt(kg): --  I&O's Summary    30 Jan 2025 07:01  -  31 Jan 2025 07:00  --------------------------------------------------------  IN: 0 mL / OUT: 2000 mL / NET: -2000 mL    31 Jan 2025 07:01  -  31 Jan 2025 13:04  --------------------------------------------------------  IN: 0 mL / OUT: 0 mL / NET: 0 mL          Gen: NAD  HEENT:  (-)icterus (-)pallor  CV: N S1 S2 1/6 YOSELIN (+)2 Pulses B/l  Resp:  Clear to auscultation B/L, normal effort  GI: (+) BS Soft, NT, ND  Lymph:  (-)Edema, (-)obvious lymphadenopathy  Skin: Warm to touch, Normal turgor  Psych: Appropriate mood and affect      TELEMETRY: AF 	      < from: TTE W or WO Ultrasound Enhancing Agent (01.30.25 @ 17:08) >  CONCLUSIONS:      1. Left ventricular cavity is small. Left ventricular wall thickness is normal. Left ventricular systolic function is moderately decreased with an ejection fraction of 40 % bySimpson's method of disks. Regional wall motion abnormalities present.   2. Basal and mid anterior septum and basal and mid inferior septum are abnormal.   3. There is moderate posterior calcification of the mitral valve annulus.   4. There is severe(grade 3) left ventricular diastolic dysfunction, with elevated left ventricular filling pressure.   5. Normal right ventricular cavity size, with normal wall thickness, and normal right ventricular systolic function.   6. Estimated pulmonary artery systolic pressure is 51 mmHg, consistent with moderate pulmonary hypertension.   7. No pericardial effusion seen.    < end of copied text >      ASSESSMENT/PLAN: Patient is an 85 y/o Female with PMH of HTN, HLD, persistent afib on Eliquis, and CHF (HFpEF?) who presented with L hip fracture seen on outpatient xray s/p mechanical fall found have SOB/hypoxia and Influenza A. Cardiology consulted for preop clearance.    #Acute Hypoxic Respiratory Failure  - Presentation appears more consistent with primarily PNA/Influenza driven rather than CHF  - CTA chest with no obvious PE, interlobar septal thickening and opacities noted  - Negative troponin noted  - Abx and Tamiflu per pulm  - s/p IV lasix 20mgx1, on PO lasix at home. Ok to keep net negative as tolerated if pulm suspects component of pulm edema on CT  - TTE reviewed. Poor quality study suspect more mild LV dysfunction in setting of Afib RVR and infection. Recommend repeat TTE with definity prior to discharge (not needed prior to OR)    #L Hip Fracture  - Ortho consult noted - pending OR today  - Would treat as high risk but no obvious cardiac contraindication to proceeding with nonelective orthopedic procedure, consider anesthesia eval given O2 requirements however now weaned to 2L NC. Pulm eval noted.    #Persistent Afib  - Continue rate control with metoprolol 25mg BID  - Eliquis held for surgery    #HLD  - Continue Lipitor    Seth Olson PA-C  Pager: [phone]

## 2025-01-31 NOTE — CHART NOTE - NSCHARTNOTEFT_GEN_A_CORE
Resting without complaints.  No Chest Pain, SOB, N/V.    T(C): 36.6 (01-31-25 @ 16:30), Max: 37.1 (01-31-25 @ 13:35)  HR: 115 (01-31-25 @ 16:45) (70 - 125)  BP: 100/56 (01-31-25 @ 16:45) (93/55 - 121/77)  RR: 15 (01-31-25 @ 16:45) (15 - 22)  SpO2: 100% (01-31-25 @ 16:45) (94% - 100%)  Wt(kg): --    Exam:  Alert and [place], No Acute Distress  Card: +S1/S2, RRR  Pulm: CTAB  Laterality: L Hip  Dressing: Gauze and surgical film x 2 c/d/i  Calves soft, non-tender bilaterally  +PF/DF/EHL/FHL  SILT  + DP pulse    Xray: Intra-op Fluoroscopy: S/P L Intertrochanteric Femur Fx IM Nail ORIF.  Hardware in place and intact with no signs new Fx.                          10.6   12.94 )-----------( 259      ( 31 Jan 2025 16:07 )             33.2    01-31    140  |  101  |  19  ----------------------------<  139[H]  3.9   |  25  |  0.71    Ca    8.2[L]      31 Jan 2025 16:07  Phos  2.8     01-30  Mg     2.0     01-30    TPro  6.2  /  Alb  3.1[L]  /  TBili  0.4  /  DBili  x   /  AST  24  /  ALT  21  /  AlkPhos  59  01-30      A/P: 84y Female S/p L Intertrochanteric Femur Fx IM Nail ORIF. VSS. NAD  -PT/OT-WBAT LLE  -FU AM labs tomorrow  -IS  -DVT PPx: Resume ELiquis 2.5mg PO BID tomorrow POD#1.  SCDs and early OOB and ambulation.  -Pain Control  -Continue Current Tx as per primary team (Medicine)  -Dispo planning PACU to Telemetry    David Clancy PA-C  Orthopedic Surgery Team  Team Pager #[phone]

## 2025-01-31 NOTE — PROGRESS NOTE ADULT - SUBJECTIVE AND OBJECTIVE BOX
Date of Service  : 01-31-25     INTERVAL HPI/OVERNIGHT EVENTS: Now on NC Oxygen and doing fine.   Vital Signs Last 24 Hrs  T(C): 36.8 (31 Jan 2025 09:10), Max: 37 (31 Jan 2025 04:42)  T(F): 98.3 (31 Jan 2025 09:10), Max: 98.6 (31 Jan 2025 04:42)  HR: 78 (31 Jan 2025 09:10) (77 - 110)  BP: 111/74 (31 Jan 2025 09:10) (99/60 - 133/60)  BP(mean): 84 (30 Jan 2025 11:52) (84 - 100)  RR: 20 (31 Jan 2025 09:10) (20 - 24)  SpO2: 98% (31 Jan 2025 09:10) (94% - 99%)    Parameters below as of 31 Jan 2025 09:10  Patient On (Oxygen Delivery Method): nasal cannula  O2 Flow (L/min): 4    I&O's Summary    30 Jan 2025 07:01  -  31 Jan 2025 07:00  --------------------------------------------------------  IN: 0 mL / OUT: 2000 mL / NET: -2000 mL      MEDICATIONS  (STANDING):  atorvastatin 10 milliGRAM(s) Oral at bedtime  chlorhexidine 2% Cloths 1 Application(s) Topical daily  famotidine    Tablet 20 milliGRAM(s) Oral daily  metoprolol tartrate 25 milliGRAM(s) Oral two times a day  oseltamivir 75 milliGRAM(s) Oral two times a day  piperacillin/tazobactam IVPB.. 3.375 Gram(s) IV Intermittent every 8 hours  sodium chloride 0.9%. 1000 milliLiter(s) (100 mL/Hr) IV Continuous <Continuous>    MEDICATIONS  (PRN):  acetaminophen     Tablet .. 650 milliGRAM(s) Oral every 6 hours PRN Temp greater or equal to 38.5C (101.3F), Moderate Pain (4 - 6)    LABS:                        11.3   9.87  )-----------( 233      ( 31 Jan 2025 01:33 )             36.0     01-31    141  |  102  |  20  ----------------------------<  113[H]  4.0   |  27  |  0.71    Ca    8.3[L]      31 Jan 2025 01:33  Phos  2.8     01-30  Mg     2.0     01-30    TPro  6.2  /  Alb  3.1[L]  /  TBili  0.4  /  DBili  x   /  AST  24  /  ALT  21  /  AlkPhos  59  01-30    PT/INR - ( 31 Jan 2025 01:33 )   PT: 13.8 sec;   INR: 1.21 ratio         PTT - ( 31 Jan 2025 01:33 )  PTT:30.9 sec  Urinalysis Basic - ( 31 Jan 2025 01:33 )    Color: x / Appearance: x / SG: x / pH: x  Gluc: 113 mg/dL / Ketone: x  / Bili: x / Urobili: x   Blood: x / Protein: x / Nitrite: x   Leuk Esterase: x / RBC: x / WBC x   Sq Epi: x / Non Sq Epi: x / Bacteria: x      CAPILLARY BLOOD GLUCOSE      POCT Blood Glucose.: 116 mg/dL (31 Jan 2025 07:43)        Urinalysis Basic - ( 31 Jan 2025 01:33 )    Color: x / Appearance: x / SG: x / pH: x  Gluc: 113 mg/dL / Ketone: x  / Bili: x / Urobili: x   Blood: x / Protein: x / Nitrite: x   Leuk Esterase: x / RBC: x / WBC x   Sq Epi: x / Non Sq Epi: x / Bacteria: x      REVIEW OF SYSTEMS:  CONSTITUTIONAL: No fever, weight loss, or fatigue  EYES: No eye pain, visual disturbances, or discharge  ENMT:  No difficulty hearing, tinnitus, vertigo; No sinus or throat pain  NECK: No pain or stiffness  RESPIRATORY: No cough, wheezing, chills or hemoptysis; No shortness of breath  CARDIOVASCULAR: No chest pain, palpitations, dizziness, or leg swelling  GASTROINTESTINAL: No abdominal or epigastric pain. No nausea, vomiting, or hematemesis; No diarrhea or constipation. No melena or hematochezia.  GENITOURINARY: No dysuria, frequency, hematuria, or incontinence  NEUROLOGICAL: No headaches, memory loss, loss of strength, numbness, or tremors      Consultant(s) Notes Reviewed:  [x ] YES  [ ] NO    PHYSICAL EXAM:  GENERAL: NAD, well-groomed, well-developed,not in any distress ,NC Oxygen   HEAD:  Atraumatic, Normocephalic  NECK: Supple, No JVD, Normal thyroid  NERVOUS SYSTEM:  Alert & Oriented X3, No focal deficit   CHEST/LUNG: Good air entry bilateral with no  rales, rhonchi, wheezing, or rubs  HEART: Regular rate and rhythm; No murmurs, rubs, or gallops  ABDOMEN: Soft, Nontender, Nondistended; Bowel sounds present  EXTREMITIES:  2+ Peripheral Pulses, No clubbing, cyanosis, or edema    Care Discussed with Consultants/Other Providers [ x] YES  [ ] NO

## 2025-01-31 NOTE — PRE-ANESTHESIA EVALUATION ADULT - NSANTHPMHFT_GEN_ALL_CORE
chart and consultant notes reviewed. dementia, hx obtained from chart/brother. pt apparently ambulated prior to injury without cp. no signs active cad/decompensated chf. recent tte notable for ef 40% + mod pah. p/w hypoxia and flu+, spo2 in high 70's with high o2 requirements. ct consistent with pna. progressive improvement in respiratory parameters following tamiflu/zosyn/diuresis. per pulm and cv, no further intervention or w/u needed prior to OR. pt also takes AC for afib- d/w dr crane defering surgery for 24hr to enable satisfactory anticoagulant status for spinal anesthesia. surgeon states surgery is urgent and cannot wait 24 hr to optimize anesthesia modality

## 2025-01-31 NOTE — PRE-ANESTHESIA EVALUATION ADULT - NSANTHADDINFOFT_GEN_ALL_CORE
extensive rba dw brother sammi via phone, including elevated risk yoni-op morbidity/mortality given medical condition- agrees with plan

## 2025-01-31 NOTE — PRE PROCEDURE NOTE - PRE PROCEDURE EVALUATION
Preop Dx: L hip IT fx  Surgeon: Marie  Procedure: L hip IMN    Vital Signs Last 24 Hrs  T(C): 36.4 (30 Jan 2025 22:15), Max: 37.8 (30 Jan 2025 04:57)  T(F): 97.5 (30 Jan 2025 22:15), Max: 100.1 (30 Jan 2025 04:57)  HR: 77 (30 Jan 2025 22:15) (77 - 130)  BP: 103/67 (30 Jan 2025 22:15) (99/60 - 144/89)  BP(mean): 84 (30 Jan 2025 11:52) (75 - 108)  RR: 22 (30 Jan 2025 22:15) (20 - 40)  SpO2: 95% (30 Jan 2025 22:15) (90% - 100%)    Parameters below as of 30 Jan 2025 22:15  Patient On (Oxygen Delivery Method): nasal cannula  O2 Flow (L/min): 4                          11.3   9.87  )-----------( 233      ( 31 Jan 2025 01:33 )             36.0     01-31    141  |  102  |  20  ----------------------------<  113[H]  4.0   |  27  |  0.71    Ca    8.3[L]      31 Jan 2025 01:33  Phos  2.8     01-30  Mg     2.0     01-30    TPro  6.2  /  Alb  3.1[L]  /  TBili  0.4  /  DBili  x   /  AST  24  /  ALT  21  /  AlkPhos  59  01-30    PT/INR - ( 31 Jan 2025 01:33 )   PT: 13.8 sec;   INR: 1.21 ratio         PTT - ( 31 Jan 2025 01:33 )  PTT:30.9 sec  Daily     Daily   ABO Interpretation: A (01-30-25 @ 08:57)      EKG/CXR in chart    Plan:  - OR today 1/31  - NPO/IVF  - Consent done  - Medical/cards/pulm clearance documented for OR

## 2025-01-31 NOTE — PROGRESS NOTE ADULT - SUBJECTIVE AND OBJECTIVE BOX
Patient seen and examined at bedside.  No acute complaints at this time. Pain well controlled. Denies chest pain, shortness of breath, nausea or vomiting.     LABS:                        11.3   9.87  )-----------( 233      ( 31 Jan 2025 01:33 )             36.0     01-31    141  |  102  |  20  ----------------------------<  113[H]  4.0   |  27  |  0.71    Ca    8.3[L]      31 Jan 2025 01:33  Phos  2.8     01-30  Mg     2.0     01-30    TPro  6.2  /  Alb  3.1[L]  /  TBili  0.4  /  DBili  x   /  AST  24  /  ALT  21  /  AlkPhos  59  01-30    PT/INR - ( 31 Jan 2025 01:33 )   PT: 13.8 sec;   INR: 1.21 ratio         PTT - ( 31 Jan 2025 01:33 )  PTT:30.9 sec      Physical Exam:  T(C): 37 (01-31-25 @ 04:42), Max: 37 (01-31-25 @ 04:42)  HR: 84 (01-31-25 @ 04:42) (77 - 118)  BP: 121/77 (01-31-25 @ 04:42) (99/60 - 135/79)  RR: 20 (01-31-25 @ 04:42) (20 - 24)  SpO2: 94% (01-31-25 @ 04:42) (94% - 100%)    General: NAD, resting comfortably in bed  LLE  SCDs present bilaterally  Compartments soft and compressible  No calf tenderness bilaterally  Motor: +TA/EHL/FHL/GSC  Sensory: +SILT SPN/DPN/CLAUDIA/SAPH/TIB  2+ DP    84yFemale with Left Intertrochanteric Fracture    Plan:  - Plan for Left Hip IMN today with Dr. Salazra   - Keep NPO for OR today   - NWB LLE  - Pain control  - hold dvt ppx ahead of OR  - Medically Optimized for OR today  - Pulm and Cardiology documented medical optimization     To discuss with Dr. Salazar and provide further recommendations as needed.

## 2025-01-31 NOTE — PRE-OP CHECKLIST - AS BP NONINV METHOD
electronic
electronic

## 2025-02-01 LAB
ANION GAP SERPL CALC-SCNC: 13 MMOL/L — SIGNIFICANT CHANGE UP (ref 5–17)
BUN SERPL-MCNC: 21 MG/DL — SIGNIFICANT CHANGE UP (ref 7–23)
CALCIUM SERPL-MCNC: 8.2 MG/DL — LOW (ref 8.4–10.5)
CHLORIDE SERPL-SCNC: 102 MMOL/L — SIGNIFICANT CHANGE UP (ref 96–108)
CO2 SERPL-SCNC: 27 MMOL/L — SIGNIFICANT CHANGE UP (ref 22–31)
CREAT SERPL-MCNC: 0.8 MG/DL — SIGNIFICANT CHANGE UP (ref 0.5–1.3)
EGFR: 73 ML/MIN/1.73M2 — SIGNIFICANT CHANGE UP
GLUCOSE SERPL-MCNC: 146 MG/DL — HIGH (ref 70–99)
HCT VFR BLD CALC: 33.3 % — LOW (ref 34.5–45)
HGB BLD-MCNC: 10.4 G/DL — LOW (ref 11.5–15.5)
MCHC RBC-ENTMCNC: 27.3 PG — SIGNIFICANT CHANGE UP (ref 27–34)
MCHC RBC-ENTMCNC: 31.2 G/DL — LOW (ref 32–36)
MCV RBC AUTO: 87.4 FL — SIGNIFICANT CHANGE UP (ref 80–100)
NRBC # BLD: 0 /100 WBCS — SIGNIFICANT CHANGE UP (ref 0–0)
NRBC BLD-RTO: 0 /100 WBCS — SIGNIFICANT CHANGE UP (ref 0–0)
PLATELET # BLD AUTO: 266 K/UL — SIGNIFICANT CHANGE UP (ref 150–400)
POTASSIUM SERPL-MCNC: 3.9 MMOL/L — SIGNIFICANT CHANGE UP (ref 3.5–5.3)
POTASSIUM SERPL-SCNC: 3.9 MMOL/L — SIGNIFICANT CHANGE UP (ref 3.5–5.3)
RBC # BLD: 3.81 M/UL — SIGNIFICANT CHANGE UP (ref 3.8–5.2)
RBC # FLD: 15 % — HIGH (ref 10.3–14.5)
SODIUM SERPL-SCNC: 142 MMOL/L — SIGNIFICANT CHANGE UP (ref 135–145)
WBC # BLD: 10.42 K/UL — SIGNIFICANT CHANGE UP (ref 3.8–10.5)
WBC # FLD AUTO: 10.42 K/UL — SIGNIFICANT CHANGE UP (ref 3.8–10.5)

## 2025-02-01 RX ORDER — ANTISEPTIC SURGICAL SCRUB 0.04 MG/ML
1 SOLUTION TOPICAL DAILY
Refills: 0 | Status: DISCONTINUED | OUTPATIENT
Start: 2025-02-01 | End: 2025-02-05

## 2025-02-01 RX ADMIN — Medication 25 MILLIGRAM(S): at 05:28

## 2025-02-01 RX ADMIN — Medication 50000 UNIT(S): at 11:17

## 2025-02-01 RX ADMIN — POLYETHYLENE GLYCOL 3350 17 GRAM(S): 17 POWDER, FOR SOLUTION ORAL at 11:17

## 2025-02-01 RX ADMIN — POTASSIUM CHLORIDE 10 MILLIEQUIVALENT(S): 750 TABLET, EXTENDED RELEASE ORAL at 11:17

## 2025-02-01 RX ADMIN — ACETAMINOPHEN 975 MILLIGRAM(S): 160 SUSPENSION ORAL at 14:54

## 2025-02-01 RX ADMIN — PANTOPRAZOLE 40 MILLIGRAM(S): 20 TABLET, DELAYED RELEASE ORAL at 05:28

## 2025-02-01 RX ADMIN — PIPERACILLIN SODIUM AND TAZOBACTAM SODIUM 25 GRAM(S): 2; 250 INJECTION, POWDER, FOR SOLUTION INTRAVENOUS at 22:10

## 2025-02-01 RX ADMIN — ACETAMINOPHEN 975 MILLIGRAM(S): 160 SUSPENSION ORAL at 15:47

## 2025-02-01 RX ADMIN — ACETAMINOPHEN 975 MILLIGRAM(S): 160 SUSPENSION ORAL at 06:29

## 2025-02-01 RX ADMIN — OSELTAMIVIR PHOSPHATE 75 MILLIGRAM(S): 75 CAPSULE ORAL at 17:37

## 2025-02-01 RX ADMIN — ACETAMINOPHEN 975 MILLIGRAM(S): 160 SUSPENSION ORAL at 05:29

## 2025-02-01 RX ADMIN — PIPERACILLIN SODIUM AND TAZOBACTAM SODIUM 25 GRAM(S): 2; 250 INJECTION, POWDER, FOR SOLUTION INTRAVENOUS at 15:04

## 2025-02-01 RX ADMIN — Medication 25 MILLIGRAM(S): at 17:37

## 2025-02-01 RX ADMIN — APIXABAN 2.5 MILLIGRAM(S): 5 TABLET, FILM COATED ORAL at 17:37

## 2025-02-01 RX ADMIN — APIXABAN 2.5 MILLIGRAM(S): 5 TABLET, FILM COATED ORAL at 05:29

## 2025-02-01 RX ADMIN — Medication 100 MILLIGRAM(S): at 05:29

## 2025-02-01 RX ADMIN — OSELTAMIVIR PHOSPHATE 75 MILLIGRAM(S): 75 CAPSULE ORAL at 05:28

## 2025-02-01 RX ADMIN — ACETAMINOPHEN 975 MILLIGRAM(S): 160 SUSPENSION ORAL at 22:11

## 2025-02-01 RX ADMIN — Medication 1 TABLET(S): at 11:17

## 2025-02-01 RX ADMIN — ATORVASTATIN CALCIUM 10 MILLIGRAM(S): 80 TABLET, FILM COATED ORAL at 22:11

## 2025-02-01 RX ADMIN — Medication 20 MILLIGRAM(S): at 05:28

## 2025-02-01 RX ADMIN — PIPERACILLIN SODIUM AND TAZOBACTAM SODIUM 25 GRAM(S): 2; 250 INJECTION, POWDER, FOR SOLUTION INTRAVENOUS at 05:29

## 2025-02-01 RX ADMIN — ACETAMINOPHEN 975 MILLIGRAM(S): 160 SUSPENSION ORAL at 23:11

## 2025-02-01 RX ADMIN — Medication 2 TABLET(S): at 22:11

## 2025-02-01 RX ADMIN — ASPIRIN 81 MILLIGRAM(S): 81 TABLET, COATED ORAL at 11:17

## 2025-02-01 NOTE — PHYSICAL THERAPY INITIAL EVALUATION ADULT - PERTINENT HX OF CURRENT PROBLEM, REHAB EVAL
85 y/o Female with PMH of HTN, HLD, persistent afib on Eliquis, and CHF (HFpEF?) who presented with L hip fracture seen on outpatient xray s/p mechanical fall found have SOB/hypoxia and Influenza A.   S/p L Intertrochanteric Femur Fx IM Nail ORIF 1/31/25.

## 2025-02-01 NOTE — PROGRESS NOTE ADULT - SUBJECTIVE AND OBJECTIVE BOX
Date of Service: 02-01-25 @ 15:00    Patient is a 84y old  Female who presents with a chief complaint of Fall & Hypoxic (01 Feb 2025 12:47)      Any change in ROS: seems pretty good:  no sob:       MEDICATIONS  (STANDING):  acetaminophen     Tablet .. 975 milliGRAM(s) Oral every 8 hours  acetaminophen   IVPB .. 1000 milliGRAM(s) IV Intermittent once  apixaban 2.5 milliGRAM(s) Oral two times a day  aspirin enteric coated 81 milliGRAM(s) Oral daily  atorvastatin 10 milliGRAM(s) Oral at bedtime  calcium carbonate 1250 mG  + Vitamin D (OsCal 500 + D) 1 Tablet(s) Oral daily  chlorhexidine 2% Cloths 1 Application(s) Topical daily  ergocalciferol 81119 Unit(s) Oral every week  furosemide    Tablet 20 milliGRAM(s) Oral daily  metoprolol tartrate 25 milliGRAM(s) Oral two times a day  oseltamivir 75 milliGRAM(s) Oral two times a day  pantoprazole    Tablet 40 milliGRAM(s) Oral before breakfast  piperacillin/tazobactam IVPB.. 3.375 Gram(s) IV Intermittent every 8 hours  polyethylene glycol 3350 17 Gram(s) Oral daily  potassium chloride    Tablet ER 10 milliEquivalent(s) Oral daily  senna 2 Tablet(s) Oral at bedtime    MEDICATIONS  (PRN):  melatonin 3 milliGRAM(s) Oral at bedtime PRN Insomnia  oxyCODONE    IR 2.5 milliGRAM(s) Oral every 4 hours PRN Moderate Pain (4 - 6)  oxyCODONE    IR 5 milliGRAM(s) Oral every 4 hours PRN Severe Pain (7 - 10)  traMADol 25 milliGRAM(s) Oral every 4 hours PRN Mild Pain (1 - 3)    Vital Signs Last 24 Hrs  T(C): 36.5 (01 Feb 2025 11:38), Max: 36.9 (01 Feb 2025 04:28)  T(F): 97.7 (01 Feb 2025 11:38), Max: 98.5 (01 Feb 2025 04:28)  HR: 90 (01 Feb 2025 11:38) (86 - 125)  BP: 97/60 (01 Feb 2025 11:38) (93/55 - 120/51)  BP(mean): 73 (31 Jan 2025 16:45) (68 - 75)  RR: 18 (01 Feb 2025 11:38) (15 - 18)  SpO2: 95% (01 Feb 2025 11:38) (93% - 100%)    Parameters below as of 01 Feb 2025 11:38  Patient On (Oxygen Delivery Method): nasal cannula  O2 Flow (L/min): 4      I&O's Summary    31 Jan 2025 07:01  -  01 Feb 2025 07:00  --------------------------------------------------------  IN: 250 mL / OUT: 700 mL / NET: -450 mL    01 Feb 2025 07:01  -  01 Feb 2025 15:00  --------------------------------------------------------  IN: 480 mL / OUT: 300 mL / NET: 180 mL          Physical Exam:   GENERAL: NAD, well-groomed, well-developed  HEENT: SHARONDA/   Atraumatic, Normocephalic  ENMT: No tonsillar erythema, exudates, or enlargement; Moist mucous membranes, Good dentition, No lesions  NECK: Supple, No JVD, Normal thyroid  CHEST/LUNG: Clear to auscultaion-  CVS: Regular rate and rhythm; No murmurs, rubs, or gallops  GI: : Soft, Nontender, Nondistended; Bowel sounds present  NERVOUS SYSTEM:  Alert & Oriented X3  EXTREMITIES:  -edema  LYMPH: No lymphadenopathy noted  SKIN: No rashes or lesions  ENDOCRINOLOGY: No Thyromegaly  PSYCH: Appropriate    Labs:  28                            10.4   10.42 )-----------( 266      ( 01 Feb 2025 06:23 )             33.3                         10.6   12.94 )-----------( 259      ( 31 Jan 2025 16:07 )             33.2                         11.3   9.87  )-----------( 233      ( 31 Jan 2025 01:33 )             36.0                         11.2   8.66  )-----------( 213      ( 30 Jan 2025 04:11 )             35.2     02-01    142  |  102  |  21  ----------------------------<  146[H]  3.9   |  27  |  0.80  01-31    140  |  101  |  19  ----------------------------<  139[H]  3.9   |  25  |  0.71  01-31    141  |  102  |  20  ----------------------------<  113[H]  4.0   |  27  |  0.71  01-30    144  |  105  |  27[H]  ----------------------------<  145[H]  4.3   |  25  |  0.67    Ca    8.2[L]      01 Feb 2025 06:23  Ca    8.2[L]      31 Jan 2025 16:07  Ca    8.3[L]      31 Jan 2025 01:33    TPro  6.2  /  Alb  3.1[L]  /  TBili  0.4  /  DBili  x   /  AST  24  /  ALT  21  /  AlkPhos  59  01-30    CAPILLARY BLOOD GLUCOSE            PT/INR - ( 31 Jan 2025 01:33 )   PT: 13.8 sec;   INR: 1.21 ratio         PTT - ( 31 Jan 2025 01:33 )  PTT:30.9 sec  Urinalysis Basic - ( 01 Feb 2025 06:23 )    Color: x / Appearance: x / SG: x / pH: x  Gluc: 146 mg/dL / Ketone: x  / Bili: x / Urobili: x   Blood: x / Protein: x / Nitrite: x   Leuk Esterase: x / RBC: x / WBC x   Sq Epi: x / Non Sq Epi: x / Bacteria: x      Procalcitonin: 0.04 ng/mL (01-30 @ 04:11)        RECENT CULTURES:  01-30 @ 09:02 Clean Catch Clean Catch (Midstream)         rad< from: Xray Chest 1 View AP/PA (01.30.25 @ 10:36) >  INTERPRETATION:  CLINICAL INFORMATION: Pain after mechanical fall    TECHNIQUE: Frontal view(s) of the chest.    COMPARISON: Chest radiograph 2/2/2022 and CT chest 1/30/2025    FINDINGS:    The heart size cannot be accurately assessed on this projection. Mitral   annular calcifications.  No focal consolidation, pleural effusion or pneumothorax.  Visualized osseous structures are unremarkable for the patient's age.    IMPRESSION:  Clear lungs.    --- End of Report ---           BARRERA MIX DO; Resident Radiologist  This document has been electronically signed.  ESTEPHANIA CEJA MD; Attending Radiologist  This document has been electronically signed. Jan 30 2025 10:56AM    < end of copied text >         <10,000 CFU/mL Normal Urogenital Ericka    01-30 @ 05:25 .Blood BLOOD                No growth at 48 Hours    01-30 @ 05:10 .Blood BLOOD                No growth at 48 Hours          RESPIRATORY CULTURES:          Studies  Chest X-RAY  CT SCAN Chest   Venous Dopplers: LE:   CT Abdomen  Others

## 2025-02-01 NOTE — PROGRESS NOTE ADULT - PROBLEM SELECTOR PLAN 3
-Plans for OR today  -No pulmonary contraindication to surgery.    1/3: Procedure  PROCEDURES:  Intramedullary nailing of left femur 31-Jan-2025 15:47:30  Marcelino Mcnair  Post-Op Diagnosis  POST-OP DIAGNOSIS:  Intertrochanteric fracture of left hip 31-Jan-2025 15:47:45  Marcelino Mcnair

## 2025-02-01 NOTE — PROGRESS NOTE ADULT - SUBJECTIVE AND OBJECTIVE BOX
C A R D I O L O G Y  **********************************     DATE OF SERVICE: 02-1-25      pt resting in bed, offers no complaints   S/p L Intertrochanteric Femur Fx IM Nail ORIF    acetaminophen     Tablet .. 975 milliGRAM(s) Oral every 8 hours  acetaminophen   IVPB .. 1000 milliGRAM(s) IV Intermittent once  apixaban 2.5 milliGRAM(s) Oral two times a day  aspirin enteric coated 81 milliGRAM(s) Oral daily  atorvastatin 10 milliGRAM(s) Oral at bedtime  calcium carbonate 1250 mG  + Vitamin D (OsCal 500 + D) 1 Tablet(s) Oral daily  ergocalciferol 30439 Unit(s) Oral every week  furosemide    Tablet 20 milliGRAM(s) Oral daily  melatonin 3 milliGRAM(s) Oral at bedtime PRN  metoprolol tartrate 25 milliGRAM(s) Oral two times a day  oseltamivir 75 milliGRAM(s) Oral two times a day  oxyCODONE    IR 2.5 milliGRAM(s) Oral every 4 hours PRN  oxyCODONE    IR 5 milliGRAM(s) Oral every 4 hours PRN  pantoprazole    Tablet 40 milliGRAM(s) Oral before breakfast  piperacillin/tazobactam IVPB.. 3.375 Gram(s) IV Intermittent every 8 hours  polyethylene glycol 3350 17 Gram(s) Oral daily  potassium chloride    Tablet ER 10 milliEquivalent(s) Oral daily  senna 2 Tablet(s) Oral at bedtime  traMADol 25 milliGRAM(s) Oral every 4 hours PRN                            10.4   10.42 )-----------( 266      ( 01 Feb 2025 06:23 )             33.3       Hemoglobin: 10.4 g/dL (02-01 @ 06:23)  Hemoglobin: 10.6 g/dL (01-31 @ 16:07)  Hemoglobin: 11.3 g/dL (01-31 @ 01:33)  Hemoglobin: 11.2 g/dL (01-30 @ 04:11)      02-01    142  |  102  |  21  ----------------------------<  146[H]  3.9   |  27  |  0.80    Ca    8.2[L]      01 Feb 2025 06:23      Creatinine Trend: 0.80<--, 0.71<--, 0.71<--, 0.67<--    COAGS:           T(C): 36.9 (02-01-25 @ 04:28), Max: 37.1 (01-31-25 @ 13:35)  HR: 96 (02-01-25 @ 04:28) (70 - 125)  BP: 102/67 (02-01-25 @ 04:28) (93/55 - 120/51)  RR: 18 (02-01-25 @ 04:28) (15 - 20)  SpO2: 94% (02-01-25 @ 04:28) (93% - 100%)  Wt(kg): --    I&O's Summary    31 Jan 2025 07:01  -  01 Feb 2025 07:00  --------------------------------------------------------  IN: 250 mL / OUT: 700 mL / NET: -450 mL      Gen: NAD  HEENT:  (-)icterus (-)pallor  CV: N S1 S2 1/6 YOSELIN (+)2 Pulses B/l  Resp:  Clear to auscultation B/L, normal effort  GI: (+) BS Soft, NT, ND  Lymph:  (-)Edema, (-)obvious lymphadenopathy  Skin: Warm to touch, Normal turgor  Psych: Appropriate mood and affect      TELEMETRY: AF 	      < from: TTE W or WO Ultrasound Enhancing Agent (01.30.25 @ 17:08) >  CONCLUSIONS:      1. Left ventricular cavity is small. Left ventricular wall thickness is normal. Left ventricular systolic function is moderately decreased with an ejection fraction of 40 % bySimpson's method of disks. Regional wall motion abnormalities present.   2. Basal and mid anterior septum and basal and mid inferior septum are abnormal.   3. There is moderate posterior calcification of the mitral valve annulus.   4. There is severe(grade 3) left ventricular diastolic dysfunction, with elevated left ventricular filling pressure.   5. Normal right ventricular cavity size, with normal wall thickness, and normal right ventricular systolic function.   6. Estimated pulmonary artery systolic pressure is 51 mmHg, consistent with moderate pulmonary hypertension.   7. No pericardial effusion seen.    < end of copied text >      ASSESSMENT/PLAN: Patient is an 85 y/o Female with PMH of HTN, HLD, persistent afib on Eliquis, and CHF (HFpEF?) who presented with L hip fracture seen on outpatient xray s/p mechanical fall found have SOB/hypoxia and Influenza A. Cardiology consulted for preop clearance.  S/p L Intertrochanteric Femur Fx IM Nail ORIF      #Acute Hypoxic Respiratory Failure  - Presentation appears more consistent with primarily PNA/Influenza driven rather than CHF  - CTA chest with no obvious PE, interlobar septal thickening and opacities noted  - Negative troponin noted  - Abx and Tamiflu per pulm  - s/p IV lasix 20mgx1, on PO lasix at home. Ok to keep net negative as tolerated if pulm suspects component of pulm edema on CT  - TTE reviewed. Poor quality study suspect more mild LV dysfunction in setting of Afib RVR and infection. Recommend repeat TTE with definity prior to discharge (not needed prior to OR)    #L Hip Fracture  - Ortho  S/p L Intertrochanteric Femur Fx IM Nail ORIF  - Would treat as high risk but no obvious cardiac contraindication to proceeding with nonelective orthopedic procedure, consider anesthesia eval given O2 requirements however now weaned to 2L NC. Pulm eval noted.    #Persistent Afib  - Continue rate control with metoprolol 25mg BID  - Eliquis  restarted     #HLD  - Continue Lipitor

## 2025-02-01 NOTE — OCCUPATIONAL THERAPY INITIAL EVALUATION ADULT - BALANCE TRAINING, PT EVAL
GOAL: pt will increase dynamic sitting balance by 1 grade in order to increase independence with dressing tasks while seated EOB; GOAL: pt will increase dynamic standing balance by 1 grade in order to increase independence with functional mobility and transfers

## 2025-02-01 NOTE — OCCUPATIONAL THERAPY INITIAL EVALUATION ADULT - UPPER BODY DRESSING, PREVIOUS LEVEL OF FUNCTION, OT EVAL
independent
per parents pt with 4 days vomiting, c/o upper abdominal pain. today c/o HA. PMH neurofibromatosis, recent spinal surgery, arrived in C collar. awake alert neuro intact. -fevers. tylenol 2pm. -allergies VUTD

## 2025-02-01 NOTE — PROGRESS NOTE ADULT - SUBJECTIVE AND OBJECTIVE BOX
Pt seen and examined at bedside. Doing well with no significant complaints. Endorses pain is well controlled, denies numbness/tingling, fevers, chills.    Vital Signs (24 Hrs):  T(C): 36.9 (02-01-25 @ 04:28), Max: 37.1 (01-31-25 @ 13:35)  HR: 96 (02-01-25 @ 04:28) (70 - 125)  BP: 102/67 (02-01-25 @ 04:28) (93/55 - 120/51)  RR: 18 (02-01-25 @ 04:28) (15 - 20)  SpO2: 94% (02-01-25 @ 04:28) (93% - 100%)  Wt(kg): --    LABS:                          10.4   10.42 )-----------( 266      ( 01 Feb 2025 06:23 )             33.3     02-01    142  |  102  |  21  ----------------------------<  146[H]  3.9   |  27  |  0.80    Ca    8.2[L]      01 Feb 2025 06:23        PT/INR - ( 31 Jan 2025 01:33 )   PT: 13.8 sec;   INR: 1.21 ratio         PTT - ( 31 Jan 2025 01:33 )  PTT:30.9 sec    Exam:  Alert and [place], No Acute Distress  Card: +S1/S2, RRR  Pulm: CTAB  Laterality: L Hip  Dressing: Gauze and surgical film x 2 c/d/i  Calves soft, non-tender bilaterally  +PF/DF/EHL/FHL  SILT  + DP pulse        A/P: 84y Female S/p L Intertrochanteric Femur Fx IM Nail ORIF. VSS. NAD    -PT/OT-WBAT LLE  -FU AM labs tomorrow  -IS  -DVT PPx: Resume ELiquis 2.5mg PO BID.  SCDs and early OOB and ambulation.  -Pain Control  -Continue Current Tx as per primary team (Medicine)  -Dispo pending PT eval

## 2025-02-01 NOTE — PHYSICAL THERAPY INITIAL EVALUATION ADULT - RANGE OF MOTION EXAMINATION, REHAB EVAL
LLE limited 2/2 pain/surgery/bilateral upper extremity ROM was WFL (within functional limits)/bilateral lower extremity ROM was WFL (within functional limits)

## 2025-02-01 NOTE — OCCUPATIONAL THERAPY INITIAL EVALUATION ADULT - DIAGNOSIS, OT EVAL
Pt presenting with decreased strength, balance, postural control, weight shifting impacting functional performance.

## 2025-02-01 NOTE — OCCUPATIONAL THERAPY INITIAL EVALUATION ADULT - PERTINENT HX OF CURRENT PROBLEM, REHAB EVAL
85 y/o F with PMH of HTN, HLD, afib on Eliquis, CHF. Presents with c/o L hip fracture (found on outpatient xray). Pt reportedly fell while at assisted living home day prior to admission. Pt also with c/o SOB, noted to be hypoxic to 78% on RA. RVP +influenza A. CTA chest with trace b/l pl effusions, interlobar septal thickening, patchy opacities LL. Denies cough, CP, pleuritic CP. Now s/p left IMN performed on 1/31/25     Head CT: No obvious acute intracranial hemorrhage or displaced skull fracture.   Cervical spine CT: No obvious displaced fracture or traumatic malalignment in the cervical spine.  X-ray femur (1/30) Acute proximal left femoral varus impacted intertrochanteric fracture with lesser trochanter avulsion.

## 2025-02-01 NOTE — PROGRESS NOTE ADULT - SUBJECTIVE AND OBJECTIVE BOX
Date of Service  : 02-01-25     INTERVAL HPI/OVERNIGHT EVENTS: Doing well .   Vital Signs Last 24 Hrs  T(C): 36.5 (01 Feb 2025 11:38), Max: 37.1 (31 Jan 2025 13:35)  T(F): 97.7 (01 Feb 2025 11:38), Max: 98.7 (31 Jan 2025 13:35)  HR: 90 (01 Feb 2025 11:38) (75 - 125)  BP: 97/60 (01 Feb 2025 11:38) (93/55 - 120/51)  BP(mean): 73 (31 Jan 2025 16:45) (68 - 100)  RR: 18 (01 Feb 2025 11:38) (15 - 20)  SpO2: 95% (01 Feb 2025 11:38) (93% - 100%)    Parameters below as of 01 Feb 2025 11:38  Patient On (Oxygen Delivery Method): nasal cannula  O2 Flow (L/min): 4    I&O's Summary    31 Jan 2025 07:01  -  01 Feb 2025 07:00  --------------------------------------------------------  IN: 250 mL / OUT: 700 mL / NET: -450 mL      MEDICATIONS  (STANDING):  acetaminophen     Tablet .. 975 milliGRAM(s) Oral every 8 hours  acetaminophen   IVPB .. 1000 milliGRAM(s) IV Intermittent once  apixaban 2.5 milliGRAM(s) Oral two times a day  aspirin enteric coated 81 milliGRAM(s) Oral daily  atorvastatin 10 milliGRAM(s) Oral at bedtime  calcium carbonate 1250 mG  + Vitamin D (OsCal 500 + D) 1 Tablet(s) Oral daily  ergocalciferol 54831 Unit(s) Oral every week  furosemide    Tablet 20 milliGRAM(s) Oral daily  metoprolol tartrate 25 milliGRAM(s) Oral two times a day  oseltamivir 75 milliGRAM(s) Oral two times a day  pantoprazole    Tablet 40 milliGRAM(s) Oral before breakfast  piperacillin/tazobactam IVPB.. 3.375 Gram(s) IV Intermittent every 8 hours  polyethylene glycol 3350 17 Gram(s) Oral daily  potassium chloride    Tablet ER 10 milliEquivalent(s) Oral daily  senna 2 Tablet(s) Oral at bedtime    MEDICATIONS  (PRN):  melatonin 3 milliGRAM(s) Oral at bedtime PRN Insomnia  oxyCODONE    IR 2.5 milliGRAM(s) Oral every 4 hours PRN Moderate Pain (4 - 6)  oxyCODONE    IR 5 milliGRAM(s) Oral every 4 hours PRN Severe Pain (7 - 10)  traMADol 25 milliGRAM(s) Oral every 4 hours PRN Mild Pain (1 - 3)    LABS:                        10.4   10.42 )-----------( 266      ( 01 Feb 2025 06:23 )             33.3     02-01    142  |  102  |  21  ----------------------------<  146[H]  3.9   |  27  |  0.80    Ca    8.2[L]      01 Feb 2025 06:23      PT/INR - ( 31 Jan 2025 01:33 )   PT: 13.8 sec;   INR: 1.21 ratio         PTT - ( 31 Jan 2025 01:33 )  PTT:30.9 sec  Urinalysis Basic - ( 01 Feb 2025 06:23 )    Color: x / Appearance: x / SG: x / pH: x  Gluc: 146 mg/dL / Ketone: x  / Bili: x / Urobili: x   Blood: x / Protein: x / Nitrite: x   Leuk Esterase: x / RBC: x / WBC x   Sq Epi: x / Non Sq Epi: x / Bacteria: x      CAPILLARY BLOOD GLUCOSE            Urinalysis Basic - ( 01 Feb 2025 06:23 )    Color: x / Appearance: x / SG: x / pH: x  Gluc: 146 mg/dL / Ketone: x  / Bili: x / Urobili: x   Blood: x / Protein: x / Nitrite: x   Leuk Esterase: x / RBC: x / WBC x   Sq Epi: x / Non Sq Epi: x / Bacteria: x      REVIEW OF SYSTEMS:  CONSTITUTIONAL: No fever, weight loss, or fatigue  EYES: No eye pain, visual disturbances, or discharge  ENMT:  No difficulty hearing, tinnitus, vertigo; No sinus or throat pain  NECK: No pain or stiffness  RESPIRATORY: No cough, wheezing, chills or hemoptysis; No shortness of breath  CARDIOVASCULAR: No chest pain, palpitations, dizziness, or leg swelling  GASTROINTESTINAL: No abdominal or epigastric pain. No nausea, vomiting, or hematemesis; No diarrhea or constipation. No melena or hematochezia.    Consultant(s) Notes Reviewed:  [x ] YES  [ ] NO    PHYSICAL EXAM:  GENERAL: NAD, well-groomed, well-developed,not in any distress ,  HEAD:  Atraumatic, Normocephalic  NECK: Supple, No JVD, Normal thyroid  NERVOUS SYSTEM:  Alert & Oriented X3, No focal deficit   CHEST/LUNG: Good air entry bilateral with no  rales, rhonchi, wheezing, or rubs  HEART: Regular rate and rhythm; No murmurs, rubs, or gallops  ABDOMEN: Soft, Nontender, Nondistended; Bowel sounds present  EXTREMITIES:  2+ Peripheral Pulses, No clubbing, cyanosis, or edema    Care Discussed with Consultants/Other Providers [ x] YES  [ ] NO

## 2025-02-01 NOTE — CHART NOTE - NSCHARTNOTEFT_GEN_A_CORE
Nutrition Services    Consult received for "MST Score >2" with "Unsure" criteria. Upon chart review, patient with stable weight, does not currently meet criteria for Protein-Calorie Malnutrition per MST, RD remains available for assessment per protocol or as needed.     Verito Villalta RDN, CDN / TEAMS

## 2025-02-01 NOTE — OCCUPATIONAL THERAPY INITIAL EVALUATION ADULT - ADDITIONAL COMMENTS
Per pt report independent with ADLs prior to admission. Pt poor historian PLF unclear. Per chart notes pt admitted from assisted living facility.

## 2025-02-02 LAB
ADD ON TEST-SPECIMEN IN LAB: SIGNIFICANT CHANGE UP
ANION GAP SERPL CALC-SCNC: 15 MMOL/L — SIGNIFICANT CHANGE UP (ref 5–17)
BUN SERPL-MCNC: 14 MG/DL — SIGNIFICANT CHANGE UP (ref 7–23)
CALCIUM SERPL-MCNC: 9.6 MG/DL — SIGNIFICANT CHANGE UP (ref 8.4–10.5)
CHLORIDE SERPL-SCNC: 100 MMOL/L — SIGNIFICANT CHANGE UP (ref 96–108)
CO2 SERPL-SCNC: 26 MMOL/L — SIGNIFICANT CHANGE UP (ref 22–31)
CREAT SERPL-MCNC: 0.69 MG/DL — SIGNIFICANT CHANGE UP (ref 0.5–1.3)
EGFR: 86 ML/MIN/1.73M2 — SIGNIFICANT CHANGE UP
GLUCOSE BLDC GLUCOMTR-MCNC: 121 MG/DL — HIGH (ref 70–99)
GLUCOSE BLDC GLUCOMTR-MCNC: 135 MG/DL — HIGH (ref 70–99)
GLUCOSE SERPL-MCNC: 140 MG/DL — HIGH (ref 70–99)
HCT VFR BLD CALC: 44.1 % — SIGNIFICANT CHANGE UP (ref 34.5–45)
HGB BLD-MCNC: 14.5 G/DL — SIGNIFICANT CHANGE UP (ref 11.5–15.5)
MAGNESIUM SERPL-MCNC: 1.8 MG/DL — SIGNIFICANT CHANGE UP (ref 1.6–2.6)
MCHC RBC-ENTMCNC: 27.4 PG — SIGNIFICANT CHANGE UP (ref 27–34)
MCHC RBC-ENTMCNC: 32.9 G/DL — SIGNIFICANT CHANGE UP (ref 32–36)
MCV RBC AUTO: 83.4 FL — SIGNIFICANT CHANGE UP (ref 80–100)
MRSA PCR RESULT.: SIGNIFICANT CHANGE UP
NRBC # BLD: 0 /100 WBCS — SIGNIFICANT CHANGE UP (ref 0–0)
NRBC BLD-RTO: 0 /100 WBCS — SIGNIFICANT CHANGE UP (ref 0–0)
PHOSPHATE SERPL-MCNC: 2.7 MG/DL — SIGNIFICANT CHANGE UP (ref 2.5–4.5)
PLATELET # BLD AUTO: 258 K/UL — SIGNIFICANT CHANGE UP (ref 150–400)
POTASSIUM SERPL-MCNC: 3.5 MMOL/L — SIGNIFICANT CHANGE UP (ref 3.5–5.3)
POTASSIUM SERPL-SCNC: 3.5 MMOL/L — SIGNIFICANT CHANGE UP (ref 3.5–5.3)
RBC # BLD: 5.29 M/UL — HIGH (ref 3.8–5.2)
RBC # FLD: 13.5 % — SIGNIFICANT CHANGE UP (ref 10.3–14.5)
S AUREUS DNA NOSE QL NAA+PROBE: SIGNIFICANT CHANGE UP
SODIUM SERPL-SCNC: 141 MMOL/L — SIGNIFICANT CHANGE UP (ref 135–145)
WBC # BLD: 5.88 K/UL — SIGNIFICANT CHANGE UP (ref 3.8–10.5)
WBC # FLD AUTO: 5.88 K/UL — SIGNIFICANT CHANGE UP (ref 3.8–10.5)

## 2025-02-02 RX ORDER — IPRATROPIUM BROMIDE AND ALBUTEROL SULFATE .5; 2.5 MG/3ML; MG/3ML
3 SOLUTION RESPIRATORY (INHALATION) ONCE
Refills: 0 | Status: COMPLETED | OUTPATIENT
Start: 2025-02-02 | End: 2025-02-03

## 2025-02-02 RX ADMIN — ASPIRIN 81 MILLIGRAM(S): 81 TABLET, COATED ORAL at 12:36

## 2025-02-02 RX ADMIN — PANTOPRAZOLE 40 MILLIGRAM(S): 20 TABLET, DELAYED RELEASE ORAL at 05:33

## 2025-02-02 RX ADMIN — POTASSIUM CHLORIDE 10 MILLIEQUIVALENT(S): 750 TABLET, EXTENDED RELEASE ORAL at 12:37

## 2025-02-02 RX ADMIN — APIXABAN 2.5 MILLIGRAM(S): 5 TABLET, FILM COATED ORAL at 05:33

## 2025-02-02 RX ADMIN — Medication 2 TABLET(S): at 21:33

## 2025-02-02 RX ADMIN — PIPERACILLIN SODIUM AND TAZOBACTAM SODIUM 25 GRAM(S): 2; 250 INJECTION, POWDER, FOR SOLUTION INTRAVENOUS at 05:34

## 2025-02-02 RX ADMIN — Medication 20 MILLIGRAM(S): at 05:34

## 2025-02-02 RX ADMIN — ACETAMINOPHEN 975 MILLIGRAM(S): 160 SUSPENSION ORAL at 12:36

## 2025-02-02 RX ADMIN — ACETAMINOPHEN 975 MILLIGRAM(S): 160 SUSPENSION ORAL at 21:33

## 2025-02-02 RX ADMIN — Medication 1 TABLET(S): at 12:36

## 2025-02-02 RX ADMIN — PIPERACILLIN SODIUM AND TAZOBACTAM SODIUM 25 GRAM(S): 2; 250 INJECTION, POWDER, FOR SOLUTION INTRAVENOUS at 12:36

## 2025-02-02 RX ADMIN — OSELTAMIVIR PHOSPHATE 75 MILLIGRAM(S): 75 CAPSULE ORAL at 17:27

## 2025-02-02 RX ADMIN — OSELTAMIVIR PHOSPHATE 75 MILLIGRAM(S): 75 CAPSULE ORAL at 05:33

## 2025-02-02 RX ADMIN — ACETAMINOPHEN 975 MILLIGRAM(S): 160 SUSPENSION ORAL at 13:06

## 2025-02-02 RX ADMIN — PIPERACILLIN SODIUM AND TAZOBACTAM SODIUM 25 GRAM(S): 2; 250 INJECTION, POWDER, FOR SOLUTION INTRAVENOUS at 21:33

## 2025-02-02 RX ADMIN — POLYETHYLENE GLYCOL 3350 17 GRAM(S): 17 POWDER, FOR SOLUTION ORAL at 12:37

## 2025-02-02 RX ADMIN — ACETAMINOPHEN 975 MILLIGRAM(S): 160 SUSPENSION ORAL at 05:33

## 2025-02-02 RX ADMIN — ACETAMINOPHEN 975 MILLIGRAM(S): 160 SUSPENSION ORAL at 06:33

## 2025-02-02 RX ADMIN — ANTISEPTIC SURGICAL SCRUB 1 APPLICATION(S): 0.04 SOLUTION TOPICAL at 12:37

## 2025-02-02 RX ADMIN — Medication 25 MILLIGRAM(S): at 17:28

## 2025-02-02 RX ADMIN — ATORVASTATIN CALCIUM 10 MILLIGRAM(S): 80 TABLET, FILM COATED ORAL at 21:33

## 2025-02-02 RX ADMIN — APIXABAN 2.5 MILLIGRAM(S): 5 TABLET, FILM COATED ORAL at 17:28

## 2025-02-02 RX ADMIN — Medication 25 MILLIGRAM(S): at 05:34

## 2025-02-02 NOTE — PROGRESS NOTE ADULT - SUBJECTIVE AND OBJECTIVE BOX
C A R D I O L O G Y  **********************************     DATE OF SERVICE: 02-2-25        pt is resting in bed, no events overnight       acetaminophen     Tablet .. 975 milliGRAM(s) Oral every 8 hours  acetaminophen   IVPB .. 1000 milliGRAM(s) IV Intermittent once  apixaban 2.5 milliGRAM(s) Oral two times a day  aspirin enteric coated 81 milliGRAM(s) Oral daily  atorvastatin 10 milliGRAM(s) Oral at bedtime  calcium carbonate 1250 mG  + Vitamin D (OsCal 500 + D) 1 Tablet(s) Oral daily  chlorhexidine 2% Cloths 1 Application(s) Topical daily  ergocalciferol 80483 Unit(s) Oral every week  furosemide    Tablet 20 milliGRAM(s) Oral daily  melatonin 3 milliGRAM(s) Oral at bedtime PRN  metoprolol tartrate 25 milliGRAM(s) Oral two times a day  oseltamivir 75 milliGRAM(s) Oral two times a day  oxyCODONE    IR 2.5 milliGRAM(s) Oral every 4 hours PRN  oxyCODONE    IR 5 milliGRAM(s) Oral every 4 hours PRN  pantoprazole    Tablet 40 milliGRAM(s) Oral before breakfast  piperacillin/tazobactam IVPB.. 3.375 Gram(s) IV Intermittent every 8 hours  polyethylene glycol 3350 17 Gram(s) Oral daily  potassium chloride    Tablet ER 10 milliEquivalent(s) Oral daily  senna 2 Tablet(s) Oral at bedtime  traMADol 25 milliGRAM(s) Oral every 4 hours PRN                            14.5   5.88  )-----------( 258      ( 02 Feb 2025 05:19 )             44.1       Hemoglobin: 14.5 g/dL (02-02 @ 05:19)  Hemoglobin: 10.4 g/dL (02-01 @ 06:23)  Hemoglobin: 10.6 g/dL (01-31 @ 16:07)  Hemoglobin: 11.3 g/dL (01-31 @ 01:33)  Hemoglobin: 11.2 g/dL (01-30 @ 04:11)      02-02    141  |  100  |  14  ----------------------------<  140[H]  3.5   |  26  |  0.69    Ca    9.6      02 Feb 2025 05:19  Phos  2.7     02-02  Mg     1.8     02-02      Creatinine Trend: 0.69<--, 0.80<--, 0.71<--, 0.71<--, 0.67<--    COAGS:           T(C): 36.4 (02-02-25 @ 04:00), Max: 36.6 (02-01-25 @ 20:49)  HR: 100 (02-02-25 @ 04:00) (81 - 100)  BP: 129/82 (02-02-25 @ 04:00) (96/62 - 129/82)  RR: 17 (02-02-25 @ 04:00) (17 - 18)  SpO2: 95% (02-02-25 @ 04:00) (95% - 100%)  Wt(kg): --    I&O's Summary    01 Feb 2025 07:01  -  02 Feb 2025 07:00  --------------------------------------------------------  IN: 655 mL / OUT: 550 mL / NET: 105 mL        Gen: NAD  HEENT:  (-)icterus (-)pallor  CV: N S1 S2 1/6 YOSELIN (+)2 Pulses B/l  Resp:  Clear to auscultation B/L, normal effort  GI: (+) BS Soft, NT, ND  Lymph:  (-)Edema, (-)obvious lymphadenopathy  Skin: Warm to touch, Normal turgor  Psych: Appropriate mood and affect      TELEMETRY: AF 	      < from: TTE W or WO Ultrasound Enhancing Agent (01.30.25 @ 17:08) >  CONCLUSIONS:      1. Left ventricular cavity is small. Left ventricular wall thickness is normal. Left ventricular systolic function is moderately decreased with an ejection fraction of 40 % bySimpson's method of disks. Regional wall motion abnormalities present.   2. Basal and mid anterior septum and basal and mid inferior septum are abnormal.   3. There is moderate posterior calcification of the mitral valve annulus.   4. There is severe(grade 3) left ventricular diastolic dysfunction, with elevated left ventricular filling pressure.   5. Normal right ventricular cavity size, with normal wall thickness, and normal right ventricular systolic function.   6. Estimated pulmonary artery systolic pressure is 51 mmHg, consistent with moderate pulmonary hypertension.   7. No pericardial effusion seen.    < end of copied text >      ASSESSMENT/PLAN: Patient is an 83 y/o Female with PMH of HTN, HLD, persistent afib on Eliquis, and CHF (HFpEF?) who presented with L hip fracture seen on outpatient xray s/p mechanical fall found have SOB/hypoxia and Influenza A. Cardiology consulted for preop clearance.  S/p L Intertrochanteric Femur Fx IM Nail ORIF      #Acute Hypoxic Respiratory Failure  - Presentation appears more consistent with primarily PNA/Influenza driven rather than CHF  - CTA chest with no obvious PE, interlobar septal thickening and opacities noted  - Negative troponin noted  - Abx and Tamiflu per pulm  - s/p IV lasix 20mgx1, on PO lasix at home. Ok to keep net negative as tolerated if pulm suspects component of pulm edema on CT  - TTE reviewed. Poor quality study suspect more mild LV dysfunction in setting of Afib RVR and infection. Recommend repeat TTE with definity prior to discharge (not needed prior to OR)    #L Hip Fracture  - Ortho  S/p L Intertrochanteric Femur Fx IM Nail ORIF  - Would treat as high risk but no obvious cardiac contraindication to proceeding with nonelective orthopedic procedure, consider anesthesia eval given O2 requirements however now weaned to 2L NC. Pulm eval noted.    #Persistent Afib  - Continue rate control with metoprolol 25mg BID  - Eliquis  restarted     #HLD  - Continue Lipitor

## 2025-02-02 NOTE — PROGRESS NOTE ADULT - SUBJECTIVE AND OBJECTIVE BOX
Date of Service  : 02-02-25     INTERVAL HPI/OVERNIGHT EVENTS: No new concerns.   Vital Signs Last 24 Hrs  T(C): 36.7 (02 Feb 2025 12:07), Max: 36.7 (02 Feb 2025 12:07)  T(F): 98.1 (02 Feb 2025 12:07), Max: 98.1 (02 Feb 2025 12:07)  HR: 99 (02 Feb 2025 12:07) (99 - 100)  BP: 128/75 (02 Feb 2025 12:07) (96/62 - 129/82)  BP(mean): --  RR: 18 (02 Feb 2025 12:07) (17 - 18)  SpO2: 95% (02 Feb 2025 12:07) (95% - 95%)    Parameters below as of 02 Feb 2025 12:07  Patient On (Oxygen Delivery Method): nasal cannula      I&O's Summary    01 Feb 2025 07:01  -  02 Feb 2025 07:00  --------------------------------------------------------  IN: 655 mL / OUT: 550 mL / NET: 105 mL    02 Feb 2025 07:01  -  02 Feb 2025 20:50  --------------------------------------------------------  IN: 240 mL / OUT: 100 mL / NET: 140 mL      MEDICATIONS  (STANDING):  acetaminophen     Tablet .. 975 milliGRAM(s) Oral every 8 hours  acetaminophen   IVPB .. 1000 milliGRAM(s) IV Intermittent once  apixaban 2.5 milliGRAM(s) Oral two times a day  aspirin enteric coated 81 milliGRAM(s) Oral daily  atorvastatin 10 milliGRAM(s) Oral at bedtime  calcium carbonate 1250 mG  + Vitamin D (OsCal 500 + D) 1 Tablet(s) Oral daily  chlorhexidine 2% Cloths 1 Application(s) Topical daily  ergocalciferol 54007 Unit(s) Oral every week  furosemide    Tablet 20 milliGRAM(s) Oral daily  metoprolol tartrate 25 milliGRAM(s) Oral two times a day  oseltamivir 75 milliGRAM(s) Oral two times a day  pantoprazole    Tablet 40 milliGRAM(s) Oral before breakfast  piperacillin/tazobactam IVPB.. 3.375 Gram(s) IV Intermittent every 8 hours  polyethylene glycol 3350 17 Gram(s) Oral daily  potassium chloride    Tablet ER 10 milliEquivalent(s) Oral daily  senna 2 Tablet(s) Oral at bedtime    MEDICATIONS  (PRN):  melatonin 3 milliGRAM(s) Oral at bedtime PRN Insomnia  oxyCODONE    IR 2.5 milliGRAM(s) Oral every 4 hours PRN Moderate Pain (4 - 6)  oxyCODONE    IR 5 milliGRAM(s) Oral every 4 hours PRN Severe Pain (7 - 10)  traMADol 25 milliGRAM(s) Oral every 4 hours PRN Mild Pain (1 - 3)    LABS:                        14.5   5.88  )-----------( 258      ( 02 Feb 2025 05:19 )             44.1     02-02    141  |  100  |  14  ----------------------------<  140[H]  3.5   |  26  |  0.69    Ca    9.6      02 Feb 2025 05:19  Phos  2.7     02-02  Mg     1.8     02-02        Urinalysis Basic - ( 02 Feb 2025 05:19 )    Color: x / Appearance: x / SG: x / pH: x  Gluc: 140 mg/dL / Ketone: x  / Bili: x / Urobili: x   Blood: x / Protein: x / Nitrite: x   Leuk Esterase: x / RBC: x / WBC x   Sq Epi: x / Non Sq Epi: x / Bacteria: x      CAPILLARY BLOOD GLUCOSE      POCT Blood Glucose.: 121 mg/dL (02 Feb 2025 12:33)  POCT Blood Glucose.: 135 mg/dL (02 Feb 2025 11:53)        Urinalysis Basic - ( 02 Feb 2025 05:19 )    Color: x / Appearance: x / SG: x / pH: x  Gluc: 140 mg/dL / Ketone: x  / Bili: x / Urobili: x   Blood: x / Protein: x / Nitrite: x   Leuk Esterase: x / RBC: x / WBC x   Sq Epi: x / Non Sq Epi: x / Bacteria: x      REVIEW OF SYSTEMS:  CONSTITUTIONAL: No fever, weight loss, or fatigue  EYES: No eye pain, visual disturbances, or discharge  ENMT:  No difficulty hearing, tinnitus, vertigo; No sinus or throat pain  NECK: No pain or stiffness  RESPIRATORY: No cough, wheezing, chills or hemoptysis; No shortness of breath  CARDIOVASCULAR: No chest pain, palpitations, dizziness, or leg swelling  GASTROINTESTINAL: No abdominal or epigastric pain. No nausea, vomiting, or hematemesis; No diarrhea or constipation. No melena or hematochezia.  GENITOURINARY: No dysuria, frequency, hematuria, or incontinence  NEUROLOGICAL: No headaches, memory loss, loss of strength, numbness, or tremors    Consultant(s) Notes Reviewed:  [x ] YES  [ ] NO    PHYSICAL EXAM:  GENERAL: NAD, well-groomed, well-developed,not in any distress ,  HEAD:  Atraumatic, Normocephalic  NECK: Supple, No JVD, Normal thyroid  NERVOUS SYSTEM:  Alert & Oriented X3, No focal deficit   CHEST/LUNG: Good air entry bilateral with no  rales, rhonchi, wheezing, or rubs  HEART: Regular rate and rhythm; No murmurs, rubs, or gallops  ABDOMEN: Soft, Nontender, Nondistended; Bowel sounds present  EXTREMITIES:  2+ Peripheral Pulses, No clubbing, cyanosis, or edema    Care Discussed with Consultants/Other Providers [ x] YES  [ ] NO

## 2025-02-02 NOTE — PROGRESS NOTE ADULT - SUBJECTIVE AND OBJECTIVE BOX
Pt seen and examined at bedside. Doing well with no significant complaints. Endorses pain is well controlled, denies numbness/tingling, fevers, chills.    LABS:                        10.4   10.42 )-----------( 266      ( 01 Feb 2025 06:23 )             33.3     02-01    142  |  102  |  21  ----------------------------<  146[H]  3.9   |  27  |  0.80    Ca    8.2[L]      01 Feb 2025 06:23        Urinalysis Basic - ( 01 Feb 2025 06:23 )    Color: x / Appearance: x / SG: x / pH: x  Gluc: 146 mg/dL / Ketone: x  / Bili: x / Urobili: x   Blood: x / Protein: x / Nitrite: x   Leuk Esterase: x / RBC: x / WBC x   Sq Epi: x / Non Sq Epi: x / Bacteria: x        VITAL SIGNS:  T(C): 36.4 (02-02-25 @ 04:00), Max: 36.6 (02-01-25 @ 20:49)  HR: 100 (02-02-25 @ 04:00) (81 - 100)  BP: 129/82 (02-02-25 @ 04:00) (96/62 - 129/82)  RR: 17 (02-02-25 @ 04:00) (17 - 18)  SpO2: 95% (02-02-25 @ 04:00) (95% - 100%)    Exam:  Alert and [place], No Acute Distress  Laterality: L Hip  Dressing: Gauze and surgical film x 2 c/d/i  Calves soft, non-tender bilaterally  +PF/DF/EHL/FHL  SILT  + DP pulse      A/P: 84y Female S/p L Intertrochanteric Femur Fx IM Nail on 1/30/25    -PT/OT-WBAT LLE  -FU AM labs tomorrow  -Dressings changed on 2/2  -IS  -DVT PPx: Resume ELiquis 2.5mg PO BID.  SCDs and early OOB and ambulation.  -Pain Control  -Continue Current Tx as per primary team (Medicine)  -PT rec SAMREEN     To discuss with Dr. Salazar and provide further recommendations as needed

## 2025-02-03 LAB
ANION GAP SERPL CALC-SCNC: 10 MMOL/L — SIGNIFICANT CHANGE UP (ref 5–17)
BUN SERPL-MCNC: 24 MG/DL — HIGH (ref 7–23)
CALCIUM SERPL-MCNC: 8.7 MG/DL — SIGNIFICANT CHANGE UP (ref 8.4–10.5)
CHLORIDE SERPL-SCNC: 101 MMOL/L — SIGNIFICANT CHANGE UP (ref 96–108)
CO2 SERPL-SCNC: 29 MMOL/L — SIGNIFICANT CHANGE UP (ref 22–31)
CREAT SERPL-MCNC: 0.69 MG/DL — SIGNIFICANT CHANGE UP (ref 0.5–1.3)
EGFR: 86 ML/MIN/1.73M2 — SIGNIFICANT CHANGE UP
GLUCOSE SERPL-MCNC: 113 MG/DL — HIGH (ref 70–99)
HCT VFR BLD CALC: 32.2 % — LOW (ref 34.5–45)
HGB BLD-MCNC: 10 G/DL — LOW (ref 11.5–15.5)
MAGNESIUM SERPL-MCNC: 2.2 MG/DL — SIGNIFICANT CHANGE UP (ref 1.6–2.6)
MCHC RBC-ENTMCNC: 28 PG — SIGNIFICANT CHANGE UP (ref 27–34)
MCHC RBC-ENTMCNC: 31.1 G/DL — LOW (ref 32–36)
MCV RBC AUTO: 90.2 FL — SIGNIFICANT CHANGE UP (ref 80–100)
NRBC # BLD: 0 /100 WBCS — SIGNIFICANT CHANGE UP (ref 0–0)
NRBC BLD-RTO: 0 /100 WBCS — SIGNIFICANT CHANGE UP (ref 0–0)
PHOSPHATE SERPL-MCNC: 1.9 MG/DL — LOW (ref 2.5–4.5)
PLATELET # BLD AUTO: 357 K/UL — SIGNIFICANT CHANGE UP (ref 150–400)
POTASSIUM SERPL-MCNC: 4.4 MMOL/L — SIGNIFICANT CHANGE UP (ref 3.5–5.3)
POTASSIUM SERPL-SCNC: 4.4 MMOL/L — SIGNIFICANT CHANGE UP (ref 3.5–5.3)
RBC # BLD: 3.57 M/UL — LOW (ref 3.8–5.2)
RBC # FLD: 14.7 % — HIGH (ref 10.3–14.5)
SODIUM SERPL-SCNC: 140 MMOL/L — SIGNIFICANT CHANGE UP (ref 135–145)
WBC # BLD: 13.65 K/UL — HIGH (ref 3.8–10.5)
WBC # FLD AUTO: 13.65 K/UL — HIGH (ref 3.8–10.5)

## 2025-02-03 PROCEDURE — 71045 X-RAY EXAM CHEST 1 VIEW: CPT | Mod: 26

## 2025-02-03 RX ADMIN — ATORVASTATIN CALCIUM 10 MILLIGRAM(S): 80 TABLET, FILM COATED ORAL at 21:40

## 2025-02-03 RX ADMIN — ANTISEPTIC SURGICAL SCRUB 1 APPLICATION(S): 0.04 SOLUTION TOPICAL at 12:56

## 2025-02-03 RX ADMIN — Medication 25 MILLIGRAM(S): at 05:16

## 2025-02-03 RX ADMIN — ASPIRIN 81 MILLIGRAM(S): 81 TABLET, COATED ORAL at 12:56

## 2025-02-03 RX ADMIN — Medication 20 MILLIGRAM(S): at 12:55

## 2025-02-03 RX ADMIN — POLYETHYLENE GLYCOL 3350 17 GRAM(S): 17 POWDER, FOR SOLUTION ORAL at 12:55

## 2025-02-03 RX ADMIN — APIXABAN 2.5 MILLIGRAM(S): 5 TABLET, FILM COATED ORAL at 17:28

## 2025-02-03 RX ADMIN — Medication 2 TABLET(S): at 21:38

## 2025-02-03 RX ADMIN — OSELTAMIVIR PHOSPHATE 75 MILLIGRAM(S): 75 CAPSULE ORAL at 05:16

## 2025-02-03 RX ADMIN — ACETAMINOPHEN 975 MILLIGRAM(S): 160 SUSPENSION ORAL at 21:38

## 2025-02-03 RX ADMIN — PIPERACILLIN SODIUM AND TAZOBACTAM SODIUM 25 GRAM(S): 2; 250 INJECTION, POWDER, FOR SOLUTION INTRAVENOUS at 21:38

## 2025-02-03 RX ADMIN — PIPERACILLIN SODIUM AND TAZOBACTAM SODIUM 25 GRAM(S): 2; 250 INJECTION, POWDER, FOR SOLUTION INTRAVENOUS at 05:17

## 2025-02-03 RX ADMIN — PANTOPRAZOLE 40 MILLIGRAM(S): 20 TABLET, DELAYED RELEASE ORAL at 05:19

## 2025-02-03 RX ADMIN — Medication 20 MILLIGRAM(S): at 06:36

## 2025-02-03 RX ADMIN — IPRATROPIUM BROMIDE AND ALBUTEROL SULFATE 3 MILLILITER(S): .5; 2.5 SOLUTION RESPIRATORY (INHALATION) at 05:16

## 2025-02-03 RX ADMIN — ACETAMINOPHEN 975 MILLIGRAM(S): 160 SUSPENSION ORAL at 13:40

## 2025-02-03 RX ADMIN — Medication 1 TABLET(S): at 12:56

## 2025-02-03 RX ADMIN — OSELTAMIVIR PHOSPHATE 75 MILLIGRAM(S): 75 CAPSULE ORAL at 17:27

## 2025-02-03 RX ADMIN — ACETAMINOPHEN 975 MILLIGRAM(S): 160 SUSPENSION ORAL at 05:16

## 2025-02-03 RX ADMIN — ACETAMINOPHEN 975 MILLIGRAM(S): 160 SUSPENSION ORAL at 13:10

## 2025-02-03 RX ADMIN — Medication 25 MILLIGRAM(S): at 17:27

## 2025-02-03 RX ADMIN — PIPERACILLIN SODIUM AND TAZOBACTAM SODIUM 25 GRAM(S): 2; 250 INJECTION, POWDER, FOR SOLUTION INTRAVENOUS at 13:10

## 2025-02-03 RX ADMIN — POTASSIUM CHLORIDE 10 MILLIEQUIVALENT(S): 750 TABLET, EXTENDED RELEASE ORAL at 12:56

## 2025-02-03 RX ADMIN — APIXABAN 2.5 MILLIGRAM(S): 5 TABLET, FILM COATED ORAL at 05:16

## 2025-02-03 NOTE — SWALLOW BEDSIDE ASSESSMENT ADULT - ADDITIONAL RECOMMENDATIONS
long term goal: the patient will tolerate the least restrictive PO diet without showing overt s/s of aspiration    this service to f/u 1x for diet monitoring as schedule permits    maintain good oral hygiene

## 2025-02-03 NOTE — PROGRESS NOTE ADULT - SUBJECTIVE AND OBJECTIVE BOX
C A R D I O L O G Y  **********************************    DATE OF SERVICE: 02-03-25    Patient denies chest pain or shortness of breath however remains on nasal cannula.   Review of symptoms otherwise negative.    MEDICATIONS:  acetaminophen     Tablet .. 975 milliGRAM(s) Oral every 8 hours  acetaminophen   IVPB .. 1000 milliGRAM(s) IV Intermittent once  apixaban 2.5 milliGRAM(s) Oral two times a day  aspirin enteric coated 81 milliGRAM(s) Oral daily  atorvastatin 10 milliGRAM(s) Oral at bedtime  calcium carbonate 1250 mG  + Vitamin D (OsCal 500 + D) 1 Tablet(s) Oral daily  chlorhexidine 2% Cloths 1 Application(s) Topical daily  ergocalciferol 55475 Unit(s) Oral every week  furosemide    Tablet 20 milliGRAM(s) Oral daily  furosemide   Injectable 20 milliGRAM(s) IV Push once  melatonin 3 milliGRAM(s) Oral at bedtime PRN  metoprolol tartrate 25 milliGRAM(s) Oral two times a day  oseltamivir 75 milliGRAM(s) Oral two times a day  oxyCODONE    IR 2.5 milliGRAM(s) Oral every 4 hours PRN  oxyCODONE    IR 5 milliGRAM(s) Oral every 4 hours PRN  pantoprazole    Tablet 40 milliGRAM(s) Oral before breakfast  piperacillin/tazobactam IVPB.. 3.375 Gram(s) IV Intermittent every 8 hours  polyethylene glycol 3350 17 Gram(s) Oral daily  potassium chloride    Tablet ER 10 milliEquivalent(s) Oral daily  senna 2 Tablet(s) Oral at bedtime  traMADol 25 milliGRAM(s) Oral every 4 hours PRN      LABS:                        10.0   13.65 )-----------( 357      ( 03 Feb 2025 05:12 )             32.2       Hemoglobin: 10.0 g/dL (02-03 @ 05:12)  Hemoglobin: 14.5 g/dL (02-02 @ 05:19)  Hemoglobin: 10.4 g/dL (02-01 @ 06:23)  Hemoglobin: 10.6 g/dL (01-31 @ 16:07)  Hemoglobin: 11.3 g/dL (01-31 @ 01:33)      02-03    140  |  101  |  24[H]  ----------------------------<  113[H]  4.4   |  29  |  0.69    Ca    8.7      03 Feb 2025 05:13  Phos  1.9     02-03  Mg     2.2     02-03      Creatinine Trend: 0.69<--, 0.69<--, 0.80<--, 0.71<--, 0.71<--, 0.67<--    COAGS:           PHYSICAL EXAM:  T(C): 36.7 (02-03-25 @ 04:00), Max: 36.7 (02-03-25 @ 04:00)  HR: 97 (02-03-25 @ 06:34) (92 - 105)  BP: 104/67 (02-03-25 @ 06:34) (101/60 - 105/54)  RR: 20 (02-03-25 @ 04:00) (18 - 20)  SpO2: 91% (02-03-25 @ 04:00) (91% - 95%)  Wt(kg): --    I&O's Summary    02 Feb 2025 07:01  -  03 Feb 2025 07:00  --------------------------------------------------------  IN: 240 mL / OUT: 200 mL / NET: 40 mL        Gen: NAD  HEENT:  (-)icterus (-)pallor  CV: N S1 S2 1/6 YOSELIN (+)2 Pulses B/l  Resp:  Clear to auscultation B/L, normal effort  GI: (+) BS Soft, NT, ND  Lymph:  (-)Edema, (-)obvious lymphadenopathy  Skin: Warm to touch, Normal turgor  Psych: Appropriate mood and affect      TELEMETRY: AF 70-100s, 20 beats WCT      < from: TTE W or WO Ultrasound Enhancing Agent (01.30.25 @ 17:08) >  CONCLUSIONS:      1. Left ventricular cavity is small. Left ventricular wall thickness is normal. Left ventricular systolic function is moderately decreased with an ejection fraction of 40 % bySimpson's method of disks. Regional wall motion abnormalities present.   2. Basal and mid anterior septum and basal and mid inferior septum are abnormal.   3. There is moderate posterior calcification of the mitral valve annulus.   4. There is severe(grade 3) left ventricular diastolic dysfunction, with elevated left ventricular filling pressure.   5. Normal right ventricular cavity size, with normal wall thickness, and normal right ventricular systolic function.   6. Estimated pulmonary artery systolic pressure is 51 mmHg, consistent with moderate pulmonary hypertension.   7. No pericardial effusion seen.    < end of copied text >      ASSESSMENT/PLAN: Patient is an 83 y/o Female with PMH of HTN, HLD, persistent afib on Eliquis, and CHF (HFpEF?) who presented with L hip fracture seen on outpatient xray s/p mechanical fall found have SOB/hypoxia and Influenza A. Cardiology consulted for preop clearance.    #Acute Hypoxic Respiratory Failure  - Presentation appears more consistent with primarily PNA/Influenza driven rather than CHF  - CTA chest with no obvious PE, interlobar septal thickening and opacities noted  - Negative troponin noted  - Abx and Tamiflu per pulm  - Continue PO lasix 20mg daily. Additional IV lasix being given for mild congestion on CXR today per pulm  - TTE reviewed. Poor quality study suspect more mild LV dysfunction in setting of Afib RVR and infection. Recommend repeat TTE with definity prior to discharge    #L Hip Fracture  - Ortho f/u - S/p L Intertrochanteric Femur Fx IM Nail on 1/30  - Tolerated procedure well from CV perspective    #Persistent Afib  - Continue rate control with metoprolol 25mg BID  - Continue Eliquis 2.5mg BID for stroke prevention  - Keep K>4, Mg>2    #HLD  - Continue Lipitor     Seth Olson PA-C  Pager: [phone]   C A R D I O L O G Y  **********************************    DATE OF SERVICE: 02-03-25    Patient denies chest pain or shortness of breath however remains on nasal cannula.   Review of symptoms otherwise negative.    MEDICATIONS:  acetaminophen     Tablet .. 975 milliGRAM(s) Oral every 8 hours  acetaminophen   IVPB .. 1000 milliGRAM(s) IV Intermittent once  apixaban 2.5 milliGRAM(s) Oral two times a day  aspirin enteric coated 81 milliGRAM(s) Oral daily  atorvastatin 10 milliGRAM(s) Oral at bedtime  calcium carbonate 1250 mG  + Vitamin D (OsCal 500 + D) 1 Tablet(s) Oral daily  chlorhexidine 2% Cloths 1 Application(s) Topical daily  ergocalciferol 62601 Unit(s) Oral every week  furosemide    Tablet 20 milliGRAM(s) Oral daily  furosemide   Injectable 20 milliGRAM(s) IV Push once  melatonin 3 milliGRAM(s) Oral at bedtime PRN  metoprolol tartrate 25 milliGRAM(s) Oral two times a day  oseltamivir 75 milliGRAM(s) Oral two times a day  oxyCODONE    IR 2.5 milliGRAM(s) Oral every 4 hours PRN  oxyCODONE    IR 5 milliGRAM(s) Oral every 4 hours PRN  pantoprazole    Tablet 40 milliGRAM(s) Oral before breakfast  piperacillin/tazobactam IVPB.. 3.375 Gram(s) IV Intermittent every 8 hours  polyethylene glycol 3350 17 Gram(s) Oral daily  potassium chloride    Tablet ER 10 milliEquivalent(s) Oral daily  senna 2 Tablet(s) Oral at bedtime  traMADol 25 milliGRAM(s) Oral every 4 hours PRN      LABS:                        10.0   13.65 )-----------( 357      ( 03 Feb 2025 05:12 )             32.2       Hemoglobin: 10.0 g/dL (02-03 @ 05:12)  Hemoglobin: 14.5 g/dL (02-02 @ 05:19)  Hemoglobin: 10.4 g/dL (02-01 @ 06:23)  Hemoglobin: 10.6 g/dL (01-31 @ 16:07)  Hemoglobin: 11.3 g/dL (01-31 @ 01:33)      02-03    140  |  101  |  24[H]  ----------------------------<  113[H]  4.4   |  29  |  0.69    Ca    8.7      03 Feb 2025 05:13  Phos  1.9     02-03  Mg     2.2     02-03      Creatinine Trend: 0.69<--, 0.69<--, 0.80<--, 0.71<--, 0.71<--, 0.67<--    COAGS:           PHYSICAL EXAM:  T(C): 36.7 (02-03-25 @ 04:00), Max: 36.7 (02-03-25 @ 04:00)  HR: 97 (02-03-25 @ 06:34) (92 - 105)  BP: 104/67 (02-03-25 @ 06:34) (101/60 - 105/54)  RR: 20 (02-03-25 @ 04:00) (18 - 20)  SpO2: 91% (02-03-25 @ 04:00) (91% - 95%)  Wt(kg): --    I&O's Summary    02 Feb 2025 07:01  -  03 Feb 2025 07:00  --------------------------------------------------------  IN: 240 mL / OUT: 200 mL / NET: 40 mL        Gen: NAD  HEENT:  (-)icterus (-)pallor  CV: N S1 S2 1/6 YOSELIN (+)2 Pulses B/l  Resp:  Clear to auscultation B/L, normal effort  GI: (+) BS Soft, NT, ND  Lymph:  (-)Edema, (-)obvious lymphadenopathy  Skin: Warm to touch, Normal turgor  Psych: Appropriate mood and affect      TELEMETRY: AF 70-100s, brief NSVT    < from: TTE W or WO Ultrasound Enhancing Agent (01.30.25 @ 17:08) >  CONCLUSIONS:      1. Left ventricular cavity is small. Left ventricular wall thickness is normal. Left ventricular systolic function is moderately decreased with an ejection fraction of 40 % bySimpson's method of disks. Regional wall motion abnormalities present.   2. Basal and mid anterior septum and basal and mid inferior septum are abnormal.   3. There is moderate posterior calcification of the mitral valve annulus.   4. There is severe(grade 3) left ventricular diastolic dysfunction, with elevated left ventricular filling pressure.   5. Normal right ventricular cavity size, with normal wall thickness, and normal right ventricular systolic function.   6. Estimated pulmonary artery systolic pressure is 51 mmHg, consistent with moderate pulmonary hypertension.   7. No pericardial effusion seen.    < end of copied text >      ASSESSMENT/PLAN: Patient is an 83 y/o Female with PMH of HTN, HLD, persistent afib on Eliquis, and CHF (HFpEF?) who presented with L hip fracture seen on outpatient xray s/p mechanical fall found have SOB/hypoxia and Influenza A. Cardiology consulted for preop clearance.    #Acute Hypoxic Respiratory Failure  - Presentation appears more consistent with primarily PNA/Influenza driven rather than CHF  - CTA chest with no obvious PE, interlobar septal thickening and opacities noted  - Negative troponin noted  - Abx and Tamiflu per pulm  - Continue PO lasix 20mg daily. Additional IV lasix being given for mild congestion on CXR today per pulm  - TTE reviewed. Poor quality study suspect more mild LV dysfunction in setting of Afib RVR and infection. Recommend repeat TTE with definity prior to discharge    #L Hip Fracture  - Ortho f/u - S/p L Intertrochanteric Femur Fx IM Nail on 1/30  - Tolerated procedure well from CV perspective    #Persistent Afib  - Continue rate control with metoprolol 25mg BID  - Continue Eliquis 2.5mg BID for stroke prevention  - Asymptomatic NSVT noted, continue beta blockers. Keep K>4, Mg>2    #HLD  - Continue Lipitor     Seth Olson PA-C  Pager: [phone]

## 2025-02-03 NOTE — SWALLOW BEDSIDE ASSESSMENT ADULT - ORAL PHASE
suspect premature spillover prolonged/effortful mastication with notable oral residue post-intake prolonged yet functional mastication

## 2025-02-03 NOTE — SWALLOW BEDSIDE ASSESSMENT ADULT - PHARYNGEAL PHASE
no overt s/s of aspiration/Delayed pharyngeal swallow no overt s/s of aspiration observed/Delayed pharyngeal swallow

## 2025-02-03 NOTE — PROGRESS NOTE ADULT - SUBJECTIVE AND OBJECTIVE BOX
Date of Service  : 02-03-25     INTERVAL HPI/OVERNIGHT EVENTS: No new concerns.   Vital Signs Last 24 Hrs  T(C): 36.6 (03 Feb 2025 20:36), Max: 36.7 (03 Feb 2025 04:00)  T(F): 97.8 (03 Feb 2025 20:36), Max: 98.1 (03 Feb 2025 04:00)  HR: 80 (03 Feb 2025 20:36) (80 - 105)  BP: 103/68 (03 Feb 2025 20:36) (100/61 - 105/54)  BP(mean): --  RR: 18 (03 Feb 2025 20:36) (18 - 20)  SpO2: 96% (03 Feb 2025 20:36) (91% - 96%)    Parameters below as of 03 Feb 2025 20:36  Patient On (Oxygen Delivery Method): nasal cannula  O2 Flow (L/min): 4    I&O's Summary    02 Feb 2025 07:01  -  03 Feb 2025 07:00  --------------------------------------------------------  IN: 240 mL / OUT: 200 mL / NET: 40 mL    03 Feb 2025 07:01  -  03 Feb 2025 22:12  --------------------------------------------------------  IN: 480 mL / OUT: 300 mL / NET: 180 mL      MEDICATIONS  (STANDING):  acetaminophen     Tablet .. 975 milliGRAM(s) Oral every 8 hours  acetaminophen   IVPB .. 1000 milliGRAM(s) IV Intermittent once  apixaban 2.5 milliGRAM(s) Oral two times a day  aspirin enteric coated 81 milliGRAM(s) Oral daily  atorvastatin 10 milliGRAM(s) Oral at bedtime  calcium carbonate 1250 mG  + Vitamin D (OsCal 500 + D) 1 Tablet(s) Oral daily  chlorhexidine 2% Cloths 1 Application(s) Topical daily  ergocalciferol 12351 Unit(s) Oral every week  furosemide    Tablet 20 milliGRAM(s) Oral daily  metoprolol tartrate 25 milliGRAM(s) Oral two times a day  oseltamivir 75 milliGRAM(s) Oral two times a day  pantoprazole    Tablet 40 milliGRAM(s) Oral before breakfast  piperacillin/tazobactam IVPB.. 3.375 Gram(s) IV Intermittent every 8 hours  polyethylene glycol 3350 17 Gram(s) Oral daily  potassium chloride    Tablet ER 10 milliEquivalent(s) Oral daily  senna 2 Tablet(s) Oral at bedtime    MEDICATIONS  (PRN):  melatonin 3 milliGRAM(s) Oral at bedtime PRN Insomnia  oxyCODONE    IR 2.5 milliGRAM(s) Oral every 4 hours PRN Moderate Pain (4 - 6)  oxyCODONE    IR 5 milliGRAM(s) Oral every 4 hours PRN Severe Pain (7 - 10)  traMADol 25 milliGRAM(s) Oral every 4 hours PRN Mild Pain (1 - 3)    LABS:                        10.0   13.65 )-----------( 357      ( 03 Feb 2025 05:12 )             32.2     02-03    140  |  101  |  24[H]  ----------------------------<  113[H]  4.4   |  29  |  0.69    Ca    8.7      03 Feb 2025 05:13  Phos  1.9     02-03  Mg     2.2     02-03        Urinalysis Basic - ( 03 Feb 2025 05:13 )    Color: x / Appearance: x / SG: x / pH: x  Gluc: 113 mg/dL / Ketone: x  / Bili: x / Urobili: x   Blood: x / Protein: x / Nitrite: x   Leuk Esterase: x / RBC: x / WBC x   Sq Epi: x / Non Sq Epi: x / Bacteria: x      CAPILLARY BLOOD GLUCOSE            Urinalysis Basic - ( 03 Feb 2025 05:13 )    Color: x / Appearance: x / SG: x / pH: x  Gluc: 113 mg/dL / Ketone: x  / Bili: x / Urobili: x   Blood: x / Protein: x / Nitrite: x   Leuk Esterase: x / RBC: x / WBC x   Sq Epi: x / Non Sq Epi: x / Bacteria: x      REVIEW OF SYSTEMS:  CONSTITUTIONAL: No fever, weight loss, or fatigue  EYES: No eye pain, visual disturbances, or discharge  ENMT:  No difficulty hearing, tinnitus, vertigo; No sinus or throat pain  NECK: No pain or stiffness  RESPIRATORY: No cough, wheezing, chills or hemoptysis; No shortness of breath  CARDIOVASCULAR: No chest pain, palpitations, dizziness, or leg swelling  GASTROINTESTINAL: No abdominal or epigastric pain. No nausea, vomiting, or hematemesis; No diarrhea or constipation. No melena or hematochezia.  GENITOURINARY: No dysuria, frequency, hematuria, or incontinence  NEUROLOGICAL: No headaches, memory loss, loss of strength, numbness, or tremors    RADIOLOGY & ADDITIONAL TESTS:    Consultant(s) Notes Reviewed:  [x ] YES  [ ] NO    PHYSICAL EXAM:  GENERAL: NAD, well-groomed, well-developed,not in any distress ,  HEAD:  Atraumatic, Normocephalic  EYES: EOMI, PERRLA, conjunctiva and sclera clear  ENMT: No tonsillar erythema, exudates, or enlargement; Moist mucous membranes, Good dentition, No lesions  NECK: Supple, No JVD, Normal thyroid  NERVOUS SYSTEM:  Alert & Oriented X3, No focal deficit   CHEST/LUNG: Good air entry bilateral with no  rales, rhonchi, wheezing, or rubs  HEART: Regular rate and rhythm; No murmurs, rubs, or gallops  ABDOMEN: Soft, Nontender, Nondistended; Bowel sounds present  EXTREMITIES:  2+ Peripheral Pulses, No clubbing, cyanosis, or edema    Care Discussed with Consultants/Other Providers [ x] YES  [ ] NO

## 2025-02-03 NOTE — SWALLOW BEDSIDE ASSESSMENT ADULT - SLP GENERAL OBSERVATIONS
Patient received sleeping; 4L O2 via NC (satting 94-96%); rousable to verbal/tactile stimuli; AAOx2 (self, place); +baseline nonproductive cough;  pleasant and agreeable to evaluation.

## 2025-02-03 NOTE — SWALLOW BEDSIDE ASSESSMENT ADULT - SWALLOW EVAL: DIAGNOSIS
Patient is an 84 year old female with pmhx HTN, HLD, afib on Eliquis, CHF. Admitted with SOB, noted to be hypoxic to 78% on RA. RVP +influenza A. Patient placed on full liquid diet due to procedure; This SLP d/w team and received clearance to trial advanced textures/consistencies for the purpose of this assessment. Patient now presents with evidence of an oropharyngeal dysphagia. Swallow sequence is marked by reduced biting/tearing of hard solids, prolonged yet functional mastication and transit, suspect delay in pharyngeal swallow trigger, palpable hyolaryngeal excursion. Notable oral residue noted post-intake of easy to chew/regular solids. No overt s/s of aspiration observed across trials. Recommend a soft and bite sized diet with thin liquids. This service to f/u 1x for diet monitoring as schedule permits.

## 2025-02-03 NOTE — SWALLOW BEDSIDE ASSESSMENT ADULT - COMMENTS
IMAGING: CT Head:  No obvious acute intracranial hemorrhage or displaced skull  fracture. CXR: clear lungs.      *Swallow Tx: patient is new to this service. Placed on full liquid diet for procedure; This SLP d/w team and received clearance to trial advanced textures/consistencies for the purpose of this assessment.

## 2025-02-03 NOTE — PROGRESS NOTE ADULT - SUBJECTIVE AND OBJECTIVE BOX
Follow-up Pulm Progress Note    No new respiratory events overnight.  Denies SOB/CP.   o2 sats low 90s on 3-4LNC     Medications:  MEDICATIONS  (STANDING):  acetaminophen     Tablet .. 975 milliGRAM(s) Oral every 8 hours  acetaminophen   IVPB .. 1000 milliGRAM(s) IV Intermittent once  apixaban 2.5 milliGRAM(s) Oral two times a day  aspirin enteric coated 81 milliGRAM(s) Oral daily  atorvastatin 10 milliGRAM(s) Oral at bedtime  calcium carbonate 1250 mG  + Vitamin D (OsCal 500 + D) 1 Tablet(s) Oral daily  chlorhexidine 2% Cloths 1 Application(s) Topical daily  ergocalciferol 87148 Unit(s) Oral every week  furosemide    Tablet 20 milliGRAM(s) Oral daily  metoprolol tartrate 25 milliGRAM(s) Oral two times a day  oseltamivir 75 milliGRAM(s) Oral two times a day  pantoprazole    Tablet 40 milliGRAM(s) Oral before breakfast  piperacillin/tazobactam IVPB.. 3.375 Gram(s) IV Intermittent every 8 hours  polyethylene glycol 3350 17 Gram(s) Oral daily  potassium chloride    Tablet ER 10 milliEquivalent(s) Oral daily  senna 2 Tablet(s) Oral at bedtime    MEDICATIONS  (PRN):  melatonin 3 milliGRAM(s) Oral at bedtime PRN Insomnia  oxyCODONE    IR 2.5 milliGRAM(s) Oral every 4 hours PRN Moderate Pain (4 - 6)  oxyCODONE    IR 5 milliGRAM(s) Oral every 4 hours PRN Severe Pain (7 - 10)  traMADol 25 milliGRAM(s) Oral every 4 hours PRN Mild Pain (1 - 3)          Vital Signs Last 24 Hrs  T(C): 36.7 (03 Feb 2025 04:00), Max: 36.7 (02 Feb 2025 12:07)  T(F): 98.1 (03 Feb 2025 04:00), Max: 98.1 (02 Feb 2025 12:07)  HR: 97 (03 Feb 2025 06:34) (92 - 105)  BP: 104/67 (03 Feb 2025 06:34) (101/60 - 128/75)  BP(mean): --  RR: 20 (03 Feb 2025 04:00) (18 - 20)  SpO2: 91% (03 Feb 2025 04:00) (91% - 95%)    Parameters below as of 03 Feb 2025 04:00  Patient On (Oxygen Delivery Method): nasal cannula  O2 Flow (L/min): 4            02-02 @ 07:01  -  02-03 @ 07:00  --------------------------------------------------------  IN: 240 mL / OUT: 200 mL / NET: 40 mL          LABS:                        10.0   13.65 )-----------( 357      ( 03 Feb 2025 05:12 )             32.2     02-03    140  |  101  |  24[H]  ----------------------------<  113[H]  4.4   |  29  |  0.69    Ca    8.7      03 Feb 2025 05:13  Phos  1.9     02-03  Mg     2.2     02-03            CAPILLARY BLOOD GLUCOSE      POCT Blood Glucose.: 121 mg/dL (02 Feb 2025 12:33)      Urinalysis Basic - ( 03 Feb 2025 05:13 )    Color: x / Appearance: x / SG: x / pH: x  Gluc: 113 mg/dL / Ketone: x  / Bili: x / Urobili: x   Blood: x / Protein: x / Nitrite: x   Leuk Esterase: x / RBC: x / WBC x   Sq Epi: x / Non Sq Epi: x / Bacteria: x                      CULTURES:     Culture - Blood (collected 01-30-25 @ 05:25)  Source: .Blood BLOOD  Preliminary Report (02-02-25 @ 11:00):    No growth at 72 Hours    Culture - Blood (collected 01-30-25 @ 05:10)  Source: .Blood BLOOD  Preliminary Report (02-02-25 @ 11:00):    No growth at 72 Hours        Culture - Urine (collected 01-30-25 @ 09:02)  Source: Clean Catch Clean Catch (Midstream)  Final Report (01-31-25 @ 12:04):    <10,000 CFU/mL Normal Urogenital Ericka              Physical Examination:  PULM: trace bibasilar crackles   CVS: S1, S2 heard    RADIOLOGY REVIEWED    CT chest:  < from: CT Angio Chest PE Protocol w/ IV Cont (01.30.25 @ 06:14) >  CHEST:    LUNGS AND AIRWAYS: Patent central airways. Interlobar septal thickening.   Peribronchial thickening/tree-in-bud opacities scattered in both lungs,   especially at the lower lobes. Patchy opacities at bilateral lower lobes,   right upper lobe, right middle lobe and lingula. Calcified granulomata.  PLEURA: No pleural effusion or pneumothorax.  HEART: Borderline heart size. Trace pericardial effusion. Aortic valve,   mitral annular and coronary artery calcification.  VESSELS: Suboptimal contrast opacification of the subsegmental pulmonary   arteries. No obvious embolus to the level of the segmental pulmonary   arteries. Atherosclerosis. Normal caliber of the thoracic aorta.  MEDIASTINUM AND JOSH: Subcentimeter lymph nodes.  CHEST WALL AND LOWER NECK: Thyroid gland degraded by artifact.    ABDOMEN/PELVIS:    LIVER: Unremarkable.  BILE DUCTS/GALLBLADDER: No intrahepatic or extrahepatic biliary   dilatation. No significant gallbladder edema.  PANCREAS: Unremarkable.  SPLEEN: Unremarkable.    ADRENALS: Unremarkable.  KIDNEYS/URETERS: Bilateral perinephric stranding without   hydroureteronephrosis. Bilateral renal cysts, for example, 2.5 x 3.5 cm   on the left. Subcentimeter hypodensities, too small to characterize.  BLADDER: Partially distended. Somewhat asymmetric bladder wall thickening.  REPRODUCTIVE ORGANS: Calcification along the left uterus, suggesting   fibroid.    BOWEL: No bowel obstruction. Unremarkable appendix.  PERITONEUM: No organized fluid collection or free air.  VESSELS: Atherosclerosis. Normal caliber of the abdominal aorta.  RETROPERITONEUM/LYMPH NODE: No lymphadenopathy.Subcentimeter lymph nodes.  ABDOMINAL WALL/SOFT TISSUES: Asymmetric enlargement of the left   pelvic/thigh muscles with stranding, suggesting edema/hematoma. Left hip   joint effusion/hemarthrosis. Small fat-containing umbilical hernia.  BONES: Acute, impacted left femoral intertrochanteric fracture with   superoposterior displacement, anterolateral apex angulation and varus   configuration. Displaced trochanteric fragments. Question fracture   involvement of the lower femoral neck. Rightward curvature and   degenerative changes of the spine. Endplate depression/anterior wedging   of the thoracolumbar spine, notably at L1 > L3, likely chronic. Stable L1   compression deformity.    IMPRESSION:    Suboptimal evaluation of the subsegmental pulmonary arteries. No obvious   embolus to the level of the segmental pulmonary arteries.    No obvious displaced rib fracture.    Acute impacted left femoral intertrochanteric fracture as described.    Pulmonary findings as described, which can be seen in noninfectious and   infectious processes. Recommend clinical correlation and follow-up to   resolution.    Somewhat asymmetric bladder wall thickening. Recommend clinical   correlation to assess UTI. Follow-up may be helpful to exclude potential   underlying lesion/neoplasm.    < end of copied text >

## 2025-02-03 NOTE — PROGRESS NOTE ADULT - SUBJECTIVE AND OBJECTIVE BOX
Pt seen and examined at bedside. Doing well with no significant complaints. On 4L NC. Endorses pain is well controlled, denies numbness/tingling, fevers, chills.    LABS:           LABS  CBC 02-03-25 @ 05:12                        10.0   13.65 )-----------( 357                   32.2     Hgb trend: 10.0 <-- , 14.5 <-- , 10.4 <-- , 10.6 <--   WBC trend: 13.65 <-- , 5.88 <-- , 10.42 <-- , 12.94 <--         CMP 02-03-25 @ 05:13    140  |  101  |  24[H]  ----------------------------<  113[H]  4.4   |  29  |  0.69    Ca    8.7      02-03-25 @ 05:13  Phos  1.9     02-03  Mg     2.2     02-03      Serum Cr (eGFR) trend: 0.69 (86) <-- , 0.69 (86) <-- , 0.80 (73) <-- , 0.71 (84) <--         Urinalysis Basic - ( 03 Feb 2025 05:13 )    Color: x / Appearance: x / SG: x / pH: x  Gluc: 113 mg/dL / Ketone: x  / Bili: x / Urobili: x   Blood: x / Protein: x / Nitrite: x   Leuk Esterase: x / RBC: x / WBC x   Sq Epi: x / Non Sq Epi: x / Bacteria: x            VITAL SIGNS:  Vital Signs Last 24 Hrs  T(C): 36.7 (03 Feb 2025 04:00), Max: 36.7 (02 Feb 2025 12:07)  T(F): 98.1 (03 Feb 2025 04:00), Max: 98.1 (02 Feb 2025 12:07)  HR: 105 (03 Feb 2025 04:00) (92 - 105)  BP: 105/54 (03 Feb 2025 04:52) (101/60 - 128/75)  BP(mean): --  RR: 20 (03 Feb 2025 04:00) (18 - 20)  SpO2: 91% (03 Feb 2025 04:00) (91% - 95%)      Exam:  Alert and [place], No Acute Distress  LLE: Dressing c/d/i  Calves soft, non-tender bilaterally  +PF/DF/EHL/FHL  SILT  + DP pulse      A/P: 84y Female S/p L Intertrochanteric Femur Fx IM Nail on 1/30/25    -PT/OT-WBAT LLE  -IS  -DVT PPx: Resume ELiquis 2.5mg PO BID.  SCDs and early OOB and ambulation.  -Pain Control  -Continue Current Tx as per primary team (Medicine)  -Dispo: [facility]     To discuss with Dr. Salazar and provide further recommendations as needed

## 2025-02-03 NOTE — PROGRESS NOTE ADULT - NS ATTEND AMEND GEN_ALL_CORE FT
Patient seen and examined. Agree with plan as detailed in PA/NP Note.       Stephanie Gotti MD  Pager: [phone]
Patient seen and examined. Agree with plan as detailed in PA/NP Note.     Stephanie Gotti MD  Pager: [phone]
had a ~20 beat run of nonsustained VTach, asymptomatic.  recommend to start beta blockers: metoprolol tartrate 25mg BID.  on discharge, plan to transition to ToprolXL.  will follow with you.
cxr mild increase congestion  suggest lasix and o>I as tolerated  wean fio2 and keep sat>90%

## 2025-02-03 NOTE — SWALLOW BEDSIDE ASSESSMENT ADULT - H & P REVIEW
yes 85 y/o F with PMH of HTN, HLD, afib on Eliquis, CHF. Presents with c/o L hip fracture (found on outpatient xray). Pt reportedly fell while at assisted living home day prior to admission. Pt also with c/o SOB, noted to be hypoxic to 78% on RA. RVP +influenza A. CTA chest with trace b/l pl effusions, interlobar septal thickening, patchy opacities LL - ?mild volume overload vs. PNA -WBC normal, procalcitonin normal/yes

## 2025-02-04 ENCOUNTER — RESULT REVIEW (OUTPATIENT)
Age: 85
End: 2025-02-04

## 2025-02-04 LAB
ANION GAP SERPL CALC-SCNC: 12 MMOL/L — SIGNIFICANT CHANGE UP (ref 5–17)
BUN SERPL-MCNC: 26 MG/DL — HIGH (ref 7–23)
CALCIUM SERPL-MCNC: 8.7 MG/DL — SIGNIFICANT CHANGE UP (ref 8.4–10.5)
CHLORIDE SERPL-SCNC: 100 MMOL/L — SIGNIFICANT CHANGE UP (ref 96–108)
CO2 SERPL-SCNC: 29 MMOL/L — SIGNIFICANT CHANGE UP (ref 22–31)
CREAT SERPL-MCNC: 0.81 MG/DL — SIGNIFICANT CHANGE UP (ref 0.5–1.3)
CULTURE RESULTS: SIGNIFICANT CHANGE UP
CULTURE RESULTS: SIGNIFICANT CHANGE UP
EGFR: 72 ML/MIN/1.73M2 — SIGNIFICANT CHANGE UP
GLUCOSE SERPL-MCNC: 143 MG/DL — HIGH (ref 70–99)
HCT VFR BLD CALC: 33.6 % — LOW (ref 34.5–45)
HGB BLD-MCNC: 10.5 G/DL — LOW (ref 11.5–15.5)
MAGNESIUM SERPL-MCNC: 2.1 MG/DL — SIGNIFICANT CHANGE UP (ref 1.6–2.6)
MCHC RBC-ENTMCNC: 28 PG — SIGNIFICANT CHANGE UP (ref 27–34)
MCHC RBC-ENTMCNC: 31.3 G/DL — LOW (ref 32–36)
MCV RBC AUTO: 89.6 FL — SIGNIFICANT CHANGE UP (ref 80–100)
NRBC # BLD: 0 /100 WBCS — SIGNIFICANT CHANGE UP (ref 0–0)
NRBC BLD-RTO: 0 /100 WBCS — SIGNIFICANT CHANGE UP (ref 0–0)
PHOSPHATE SERPL-MCNC: 2.6 MG/DL — SIGNIFICANT CHANGE UP (ref 2.5–4.5)
PLATELET # BLD AUTO: 409 K/UL — HIGH (ref 150–400)
POTASSIUM SERPL-MCNC: 4.2 MMOL/L — SIGNIFICANT CHANGE UP (ref 3.5–5.3)
POTASSIUM SERPL-SCNC: 4.2 MMOL/L — SIGNIFICANT CHANGE UP (ref 3.5–5.3)
RBC # BLD: 3.75 M/UL — LOW (ref 3.8–5.2)
RBC # FLD: 14.9 % — HIGH (ref 10.3–14.5)
SODIUM SERPL-SCNC: 141 MMOL/L — SIGNIFICANT CHANGE UP (ref 135–145)
SPECIMEN SOURCE: SIGNIFICANT CHANGE UP
SPECIMEN SOURCE: SIGNIFICANT CHANGE UP
WBC # BLD: 11.6 K/UL — HIGH (ref 3.8–10.5)
WBC # FLD AUTO: 11.6 K/UL — HIGH (ref 3.8–10.5)

## 2025-02-04 PROCEDURE — 93308 TTE F-UP OR LMTD: CPT | Mod: 26

## 2025-02-04 RX ADMIN — PANTOPRAZOLE 40 MILLIGRAM(S): 20 TABLET, DELAYED RELEASE ORAL at 05:20

## 2025-02-04 RX ADMIN — ACETAMINOPHEN 975 MILLIGRAM(S): 160 SUSPENSION ORAL at 13:28

## 2025-02-04 RX ADMIN — OSELTAMIVIR PHOSPHATE 75 MILLIGRAM(S): 75 CAPSULE ORAL at 05:20

## 2025-02-04 RX ADMIN — POTASSIUM CHLORIDE 10 MILLIEQUIVALENT(S): 750 TABLET, EXTENDED RELEASE ORAL at 13:27

## 2025-02-04 RX ADMIN — APIXABAN 2.5 MILLIGRAM(S): 5 TABLET, FILM COATED ORAL at 17:50

## 2025-02-04 RX ADMIN — PIPERACILLIN SODIUM AND TAZOBACTAM SODIUM 25 GRAM(S): 2; 250 INJECTION, POWDER, FOR SOLUTION INTRAVENOUS at 13:30

## 2025-02-04 RX ADMIN — Medication 25 MILLIGRAM(S): at 05:19

## 2025-02-04 RX ADMIN — ANTISEPTIC SURGICAL SCRUB 1 APPLICATION(S): 0.04 SOLUTION TOPICAL at 13:27

## 2025-02-04 RX ADMIN — Medication 20 MILLIGRAM(S): at 05:20

## 2025-02-04 RX ADMIN — Medication 25 MILLIGRAM(S): at 17:50

## 2025-02-04 RX ADMIN — ACETAMINOPHEN 975 MILLIGRAM(S): 160 SUSPENSION ORAL at 22:17

## 2025-02-04 RX ADMIN — Medication 20 MILLIGRAM(S): at 14:10

## 2025-02-04 RX ADMIN — Medication 1 TABLET(S): at 13:27

## 2025-02-04 RX ADMIN — ATORVASTATIN CALCIUM 10 MILLIGRAM(S): 80 TABLET, FILM COATED ORAL at 21:47

## 2025-02-04 RX ADMIN — APIXABAN 2.5 MILLIGRAM(S): 5 TABLET, FILM COATED ORAL at 05:20

## 2025-02-04 RX ADMIN — ACETAMINOPHEN 975 MILLIGRAM(S): 160 SUSPENSION ORAL at 05:19

## 2025-02-04 RX ADMIN — OSELTAMIVIR PHOSPHATE 75 MILLIGRAM(S): 75 CAPSULE ORAL at 17:50

## 2025-02-04 RX ADMIN — PIPERACILLIN SODIUM AND TAZOBACTAM SODIUM 25 GRAM(S): 2; 250 INJECTION, POWDER, FOR SOLUTION INTRAVENOUS at 05:20

## 2025-02-04 RX ADMIN — PIPERACILLIN SODIUM AND TAZOBACTAM SODIUM 25 GRAM(S): 2; 250 INJECTION, POWDER, FOR SOLUTION INTRAVENOUS at 21:47

## 2025-02-04 RX ADMIN — ACETAMINOPHEN 975 MILLIGRAM(S): 160 SUSPENSION ORAL at 21:47

## 2025-02-04 RX ADMIN — ASPIRIN 81 MILLIGRAM(S): 81 TABLET, COATED ORAL at 13:26

## 2025-02-04 NOTE — PROGRESS NOTE ADULT - SUBJECTIVE AND OBJECTIVE BOX
Date of Service  : 02-04-25     INTERVAL HPI/OVERNIGHT EVENTS: I feel fine. Still SOB.   Vital Signs Last 24 Hrs  T(C): 37.2 (04 Feb 2025 12:31), Max: 37.2 (04 Feb 2025 12:31)  T(F): 98.9 (04 Feb 2025 12:31), Max: 98.9 (04 Feb 2025 12:31)  HR: 54 (04 Feb 2025 12:31) (54 - 88)  BP: 108/64 (04 Feb 2025 12:31) (100/61 - 108/64)  BP(mean): --  RR: 18 (04 Feb 2025 12:31) (18 - 18)  SpO2: 98% (04 Feb 2025 12:31) (93% - 98%)    Parameters below as of 04 Feb 2025 12:31  Patient On (Oxygen Delivery Method): nasal cannula  O2 Flow (L/min): 3    I&O's Summary    03 Feb 2025 07:01  -  04 Feb 2025 07:00  --------------------------------------------------------  IN: 480 mL / OUT: 300 mL / NET: 180 mL    04 Feb 2025 07:01  -  04 Feb 2025 16:29  --------------------------------------------------------  IN: 240 mL / OUT: 1450 mL / NET: -1210 mL      MEDICATIONS  (STANDING):  acetaminophen     Tablet .. 975 milliGRAM(s) Oral every 8 hours  acetaminophen   IVPB .. 1000 milliGRAM(s) IV Intermittent once  apixaban 2.5 milliGRAM(s) Oral two times a day  aspirin enteric coated 81 milliGRAM(s) Oral daily  atorvastatin 10 milliGRAM(s) Oral at bedtime  calcium carbonate 1250 mG  + Vitamin D (OsCal 500 + D) 1 Tablet(s) Oral daily  chlorhexidine 2% Cloths 1 Application(s) Topical daily  ergocalciferol 86552 Unit(s) Oral every week  furosemide    Tablet 20 milliGRAM(s) Oral daily  metoprolol tartrate 25 milliGRAM(s) Oral two times a day  oseltamivir 75 milliGRAM(s) Oral two times a day  pantoprazole    Tablet 40 milliGRAM(s) Oral before breakfast  piperacillin/tazobactam IVPB.. 3.375 Gram(s) IV Intermittent every 8 hours  polyethylene glycol 3350 17 Gram(s) Oral daily  potassium chloride    Tablet ER 10 milliEquivalent(s) Oral daily  senna 2 Tablet(s) Oral at bedtime    MEDICATIONS  (PRN):  melatonin 3 milliGRAM(s) Oral at bedtime PRN Insomnia  oxyCODONE    IR 2.5 milliGRAM(s) Oral every 4 hours PRN Moderate Pain (4 - 6)  oxyCODONE    IR 5 milliGRAM(s) Oral every 4 hours PRN Severe Pain (7 - 10)  traMADol 25 milliGRAM(s) Oral every 4 hours PRN Mild Pain (1 - 3)    LABS:                        10.5   11.60 )-----------( 409      ( 04 Feb 2025 09:59 )             33.6     02-04    141  |  100  |  26[H]  ----------------------------<  143[H]  4.2   |  29  |  0.81    Ca    8.7      04 Feb 2025 09:59  Phos  2.6     02-04  Mg     2.1     02-04        Urinalysis Basic - ( 04 Feb 2025 09:59 )    Color: x / Appearance: x / SG: x / pH: x  Gluc: 143 mg/dL / Ketone: x  / Bili: x / Urobili: x   Blood: x / Protein: x / Nitrite: x   Leuk Esterase: x / RBC: x / WBC x   Sq Epi: x / Non Sq Epi: x / Bacteria: x      CAPILLARY BLOOD GLUCOSE            Urinalysis Basic - ( 04 Feb 2025 09:59 )    Color: x / Appearance: x / SG: x / pH: x  Gluc: 143 mg/dL / Ketone: x  / Bili: x / Urobili: x   Blood: x / Protein: x / Nitrite: x   Leuk Esterase: x / RBC: x / WBC x   Sq Epi: x / Non Sq Epi: x / Bacteria: x      REVIEW OF SYSTEMS:  CONSTITUTIONAL: No fever, weight loss, or fatigue  EYES: No eye pain, visual disturbances, or discharge  ENMT:  No difficulty hearing, tinnitus, vertigo; No sinus or throat pain  NECK: No pain or stiffness  RESPIRATORY: No cough, wheezing, chills or hemoptysis; No shortness of breath  CARDIOVASCULAR: No chest pain, palpitations, dizziness, or leg swelling  GASTROINTESTINAL: No abdominal or epigastric pain. No nausea, vomiting, or hematemesis; No diarrhea or constipation. No melena or hematochezia.  GENITOURINARY: No dysuria, frequency, hematuria, or incontinence  NEUROLOGICAL: No headaches, memory loss, loss of strength, numbness, or tremors    Consultant(s) Notes Reviewed:  [x ] YES  [ ] NO    PHYSICAL EXAM:  GENERAL: NAD, well-groomed, well-developed,not in any distress ,NC Oxygen   HEAD:  Atraumatic, Normocephalic  EYES: EOMI, PERRLA, conjunctiva and sclera clear  ENMT: No tonsillar erythema, exudates, or enlargement; Moist mucous membranes, Good dentition, No lesions  NECK: Supple, No JVD, Normal thyroid  NERVOUS SYSTEM:  Alert & Oriented X3, No focal deficit   CHEST/LUNG: Good air entry bilateral except the bases   HEART: Regular rate and rhythm; No murmurs, rubs, or gallops  ABDOMEN: Soft, Nontender, Nondistended; Bowel sounds present  EXTREMITIES:  2+ Peripheral Pulses, No clubbing, cyanosis, or edema      Care Discussed with Consultants/Other Providers [ x] YES  [ ] NO

## 2025-02-04 NOTE — PROGRESS NOTE ADULT - SUBJECTIVE AND OBJECTIVE BOX
Follow-up Pulm Progress Note    No new respiratory events overnight.  Denies SOB/CP.   O2 sats 95% on 3LNC     Medications:  MEDICATIONS  (STANDING):  acetaminophen     Tablet .. 975 milliGRAM(s) Oral every 8 hours  acetaminophen   IVPB .. 1000 milliGRAM(s) IV Intermittent once  apixaban 2.5 milliGRAM(s) Oral two times a day  aspirin enteric coated 81 milliGRAM(s) Oral daily  atorvastatin 10 milliGRAM(s) Oral at bedtime  calcium carbonate 1250 mG  + Vitamin D (OsCal 500 + D) 1 Tablet(s) Oral daily  chlorhexidine 2% Cloths 1 Application(s) Topical daily  ergocalciferol 95158 Unit(s) Oral every week  furosemide    Tablet 20 milliGRAM(s) Oral daily  metoprolol tartrate 25 milliGRAM(s) Oral two times a day  oseltamivir 75 milliGRAM(s) Oral two times a day  pantoprazole    Tablet 40 milliGRAM(s) Oral before breakfast  piperacillin/tazobactam IVPB.. 3.375 Gram(s) IV Intermittent every 8 hours  polyethylene glycol 3350 17 Gram(s) Oral daily  potassium chloride    Tablet ER 10 milliEquivalent(s) Oral daily  senna 2 Tablet(s) Oral at bedtime    MEDICATIONS  (PRN):  melatonin 3 milliGRAM(s) Oral at bedtime PRN Insomnia  oxyCODONE    IR 2.5 milliGRAM(s) Oral every 4 hours PRN Moderate Pain (4 - 6)  oxyCODONE    IR 5 milliGRAM(s) Oral every 4 hours PRN Severe Pain (7 - 10)  traMADol 25 milliGRAM(s) Oral every 4 hours PRN Mild Pain (1 - 3)          Vital Signs Last 24 Hrs  T(C): 36.8 (04 Feb 2025 04:54), Max: 36.8 (04 Feb 2025 04:54)  T(F): 98.3 (04 Feb 2025 04:54), Max: 98.3 (04 Feb 2025 04:54)  HR: 83 (04 Feb 2025 04:54) (80 - 88)  BP: 102/60 (04 Feb 2025 04:54) (100/61 - 103/68)  BP(mean): --  RR: 18 (04 Feb 2025 04:54) (18 - 18)  SpO2: 97% (04 Feb 2025 04:54) (93% - 97%)    Parameters below as of 04 Feb 2025 04:54  Patient On (Oxygen Delivery Method): nasal cannula  O2 Flow (L/min): 4            02-03 @ 07:01  -  02-04 @ 07:00  --------------------------------------------------------  IN: 480 mL / OUT: 300 mL / NET: 180 mL          LABS:                        10.0   13.65 )-----------( 357      ( 03 Feb 2025 05:12 )             32.2     02-03    140  |  101  |  24[H]  ----------------------------<  113[H]  4.4   |  29  |  0.69    Ca    8.7      03 Feb 2025 05:13  Phos  1.9     02-03  Mg     2.2     02-03            CAPILLARY BLOOD GLUCOSE      POCT Blood Glucose.: 121 mg/dL (02 Feb 2025 12:33)      Urinalysis Basic - ( 03 Feb 2025 05:13 )    Color: x / Appearance: x / SG: x / pH: x  Gluc: 113 mg/dL / Ketone: x  / Bili: x / Urobili: x   Blood: x / Protein: x / Nitrite: x   Leuk Esterase: x / RBC: x / WBC x   Sq Epi: x / Non Sq Epi: x / Bacteria: x                      CULTURES: (if applicable)    Culture - Blood (collected 01-30-25 @ 05:25)  Source: .Blood BLOOD  Preliminary Report (02-03-25 @ 11:01):    No growth at 4 days    Culture - Blood (collected 01-30-25 @ 05:10)  Source: .Blood BLOOD  Preliminary Report (02-03-25 @ 11:01):    No growth at 4 days        Culture - Urine (collected 01-30-25 @ 09:02)  Source: Clean Catch Clean Catch (Midstream)  Final Report (01-31-25 @ 12:04):    <10,000 CFU/mL Normal Urogenital Ericka        Physical Examination:  PULM: trace bibasilar crackles   CVS: S1, S2 heard    RADIOLOGY REVIEWED  CXR 2/3: mild congestion    CT chest:  < from: CT Angio Chest PE Protocol w/ IV Cont (01.30.25 @ 06:14) >  CHEST:    LUNGS AND AIRWAYS: Patent central airways. Interlobar septal thickening.   Peribronchial thickening/tree-in-bud opacities scattered in both lungs,   especially at the lower lobes. Patchy opacities at bilateral lower lobes,   right upper lobe, right middle lobe and lingula. Calcified granulomata.  PLEURA: No pleural effusion or pneumothorax.  HEART: Borderline heart size. Trace pericardial effusion. Aortic valve,   mitral annular and coronary artery calcification.  VESSELS: Suboptimal contrast opacification of the subsegmental pulmonary   arteries. No obvious embolus to the level of the segmental pulmonary   arteries. Atherosclerosis. Normal caliber of the thoracic aorta.  MEDIASTINUM AND JOSH: Subcentimeter lymph nodes.  CHEST WALL AND LOWER NECK: Thyroid gland degraded by artifact.    ABDOMEN/PELVIS:    LIVER: Unremarkable.  BILE DUCTS/GALLBLADDER: No intrahepatic or extrahepatic biliary   dilatation. No significant gallbladder edema.  PANCREAS: Unremarkable.  SPLEEN: Unremarkable.    ADRENALS: Unremarkable.  KIDNEYS/URETERS: Bilateral perinephric stranding without   hydroureteronephrosis. Bilateral renal cysts, for example, 2.5 x 3.5 cm   on the left. Subcentimeter hypodensities, too small to characterize.  BLADDER: Partially distended. Somewhat asymmetric bladder wall thickening.  REPRODUCTIVE ORGANS: Calcification along the left uterus, suggesting   fibroid.    BOWEL: No bowel obstruction. Unremarkable appendix.  PERITONEUM: No organized fluid collection or free air.  VESSELS: Atherosclerosis. Normal caliber of the abdominal aorta.  RETROPERITONEUM/LYMPH NODE: No lymphadenopathy.Subcentimeter lymph nodes.  ABDOMINAL WALL/SOFT TISSUES: Asymmetric enlargement of the left   pelvic/thigh muscles with stranding, suggesting edema/hematoma. Left hip   joint effusion/hemarthrosis. Small fat-containing umbilical hernia.  BONES: Acute, impacted left femoral intertrochanteric fracture with   superoposterior displacement, anterolateral apex angulation and varus   configuration. Displaced trochanteric fragments. Question fracture   involvement of the lower femoral neck. Rightward curvature and   degenerative changes of the spine. Endplate depression/anterior wedging   of the thoracolumbar spine, notably at L1 > L3, likely chronic. Stable L1   compression deformity.    IMPRESSION:    Suboptimal evaluation of the subsegmental pulmonary arteries. No obvious   embolus to the level of the segmental pulmonary arteries.    No obvious displaced rib fracture.    Acute impacted left femoral intertrochanteric fracture as described.    Pulmonary findings as described, which can be seen in noninfectious and   infectious processes. Recommend clinical correlation and follow-up to   resolution.    Somewhat asymmetric bladder wall thickening. Recommend clinical   correlation to assess UTI. Follow-up may be helpful to exclude potential   underlying lesion/neoplasm.    < end of copied text >

## 2025-02-04 NOTE — PROVIDER CONTACT NOTE (CHANGE IN STATUS NOTIFICATION) - BACKGROUND
70 yo M admitted for AHRF, intubated/extubated and reintubated 1/19 iso PEA arrest. Extubated 1/22. LHC w/ SUSHANT x 3. VT arrest.  1/31 AICD.

## 2025-02-04 NOTE — PROGRESS NOTE ADULT - SUBJECTIVE AND OBJECTIVE BOX
C A R D I O L O G Y  **********************************    DATE OF SERVICE: 02-04-25    Patient denies chest pain or shortness of breath on nasal cannula.   Review of symptoms otherwise negative.    MEDICATIONS:  acetaminophen     Tablet .. 975 milliGRAM(s) Oral every 8 hours  acetaminophen   IVPB .. 1000 milliGRAM(s) IV Intermittent once  apixaban 2.5 milliGRAM(s) Oral two times a day  aspirin enteric coated 81 milliGRAM(s) Oral daily  atorvastatin 10 milliGRAM(s) Oral at bedtime  calcium carbonate 1250 mG  + Vitamin D (OsCal 500 + D) 1 Tablet(s) Oral daily  chlorhexidine 2% Cloths 1 Application(s) Topical daily  ergocalciferol 90120 Unit(s) Oral every week  furosemide    Tablet 20 milliGRAM(s) Oral daily  melatonin 3 milliGRAM(s) Oral at bedtime PRN  metoprolol tartrate 25 milliGRAM(s) Oral two times a day  oseltamivir 75 milliGRAM(s) Oral two times a day  oxyCODONE    IR 2.5 milliGRAM(s) Oral every 4 hours PRN  oxyCODONE    IR 5 milliGRAM(s) Oral every 4 hours PRN  pantoprazole    Tablet 40 milliGRAM(s) Oral before breakfast  piperacillin/tazobactam IVPB.. 3.375 Gram(s) IV Intermittent every 8 hours  polyethylene glycol 3350 17 Gram(s) Oral daily  potassium chloride    Tablet ER 10 milliEquivalent(s) Oral daily  senna 2 Tablet(s) Oral at bedtime  traMADol 25 milliGRAM(s) Oral every 4 hours PRN      LABS:                        10.5   11.60 )-----------( 409      ( 04 Feb 2025 09:59 )             33.6       Hemoglobin: 10.5 g/dL (02-04 @ 09:59)  Hemoglobin: 10.0 g/dL (02-03 @ 05:12)  Hemoglobin: 14.5 g/dL (02-02 @ 05:19)  Hemoglobin: 10.4 g/dL (02-01 @ 06:23)  Hemoglobin: 10.6 g/dL (01-31 @ 16:07)      02-04    141  |  100  |  26[H]  ----------------------------<  143[H]  4.2   |  29  |  0.81    Ca    8.7      04 Feb 2025 09:59  Phos  2.6     02-04  Mg     2.1     02-04      Creatinine Trend: 0.81<--, 0.69<--, 0.69<--, 0.80<--, 0.71<--, 0.71<--    COAGS:           PHYSICAL EXAM:  T(C): 37.2 (02-04-25 @ 12:31), Max: 37.2 (02-04-25 @ 12:31)  HR: 54 (02-04-25 @ 12:31) (54 - 88)  BP: 108/64 (02-04-25 @ 12:31) (100/61 - 108/64)  RR: 18 (02-04-25 @ 12:31) (18 - 18)  SpO2: 98% (02-04-25 @ 12:31) (93% - 98%)  Wt(kg): --    I&O's Summary    03 Feb 2025 07:01  -  04 Feb 2025 07:00  --------------------------------------------------------  IN: 480 mL / OUT: 300 mL / NET: 180 mL    04 Feb 2025 07:01  -  04 Feb 2025 14:48  --------------------------------------------------------  IN: 240 mL / OUT: 550 mL / NET: -310 mL          Gen: NAD  HEENT:  (-)icterus (-)pallor  CV: N S1 S2 1/6 YOSELIN (+)2 Pulses B/l  Resp:  Clear to auscultation B/L, normal effort  GI: (+) BS Soft, NT, ND  Lymph:  (-)Edema, (-)obvious lymphadenopathy  Skin: Warm to touch, Normal turgor  Psych: Appropriate mood and affect      TELEMETRY: AF     < from: TTE W or WO Ultrasound Enhancing Agent (01.30.25 @ 17:08) >  CONCLUSIONS:      1. Left ventricular cavity is small. Left ventricular wall thickness is normal. Left ventricular systolic function is moderately decreased with an ejection fraction of 40 % bySimpson's method of disks. Regional wall motion abnormalities present.   2. Basal and mid anterior septum and basal and mid inferior septum are abnormal.   3. There is moderate posterior calcification of the mitral valve annulus.   4. There is severe(grade 3) left ventricular diastolic dysfunction, with elevated left ventricular filling pressure.   5. Normal right ventricular cavity size, with normal wall thickness, and normal right ventricular systolic function.   6. Estimated pulmonary artery systolic pressure is 51 mmHg, consistent with moderate pulmonary hypertension.   7. No pericardial effusion seen.    < end of copied text >      ASSESSMENT/PLAN: Patient is an 83 y/o Female with PMH of HTN, HLD, persistent afib on Eliquis, and CHF (HFpEF?) who presented with L hip fracture seen on outpatient xray s/p mechanical fall found have SOB/hypoxia and Influenza A. Cardiology consulted for preop clearance.    #Acute Hypoxic Respiratory Failure  - Presentation appears more consistent with primarily PNA/Influenza driven rather than CHF  - CTA chest with no obvious PE, interlobar septal thickening and opacities noted  - Negative troponin noted  - Abx and Tamiflu per pulm  - Continue PO lasix 20mg daily. s/p Additional IV lasix yesterday per pulm  - TTE reviewed. Poor quality study suspect more mild LV dysfunction in setting of Afib RVR and infection. F/u repeat TTE with definity results    #L Hip Fracture  - Ortho f/u - S/p L Intertrochanteric Femur Fx IM Nail on 1/30  - Tolerated procedure well from CV perspective    #Persistent Afib  - Continue rate control with metoprolol 25mg BID  - Continue Eliquis 2.5mg BID for stroke prevention  - Asymptomatic NSVT noted, continue beta blockers. Keep K>4, Mg>2    #HLD  - Continue Lipitor     Seth Olson PA-C  Pager: [phone]

## 2025-02-04 NOTE — PROGRESS NOTE ADULT - SUBJECTIVE AND OBJECTIVE BOX
Pt seen and examined at bedside. Doing well with no significant complaints. On 4L NC. Endorses pain is well controlled, denies numbness/tingling, fevers, chills.    LABS:                        10.0   13.65 )-----------( 357      ( 03 Feb 2025 05:12 )             32.2     02-03    140  |  101  |  24[H]  ----------------------------<  113[H]  4.4   |  29  |  0.69    Ca    8.7      03 Feb 2025 05:13  Phos  1.9     02-03  Mg     2.2     02-03        Urinalysis Basic - ( 03 Feb 2025 05:13 )    Color: x / Appearance: x / SG: x / pH: x  Gluc: 113 mg/dL / Ketone: x  / Bili: x / Urobili: x   Blood: x / Protein: x / Nitrite: x   Leuk Esterase: x / RBC: x / WBC x   Sq Epi: x / Non Sq Epi: x / Bacteria: x        VITAL SIGNS:  T(C): 36.6 (02-03-25 @ 20:36), Max: 36.6 (02-03-25 @ 20:36)  HR: 80 (02-03-25 @ 20:36) (80 - 97)  BP: 103/68 (02-03-25 @ 20:36) (100/61 - 105/54)  RR: 18 (02-03-25 @ 20:36) (18 - 18)  SpO2: 96% (02-03-25 @ 20:36) (93% - 96%)    Exam:  Alert and [place], No Acute Distress  LLE: Dressing c/d/i  Calves soft, non-tender bilaterally  Motor: +EHL/FHL/GSC/TA  Sensory: +SILT SPN/DPN/CLAUDIA/SAPH  + DP pulse      A/P: 84y Female S/p L Intertrochanteric Femur Fx IM Nail on 1/30/25    -PT/OT-WBAT LLE  -IS  -DVT PPx: Resume ELiquis 2.5mg PO BID.  SCDs and early OOB and ambulation.  -Pain Control  -Continue Current Tx as per primary team (Medicine)  -Dispo: [facility]     To discuss with Dr. Salazar and provide further recommendations as needed

## 2025-02-05 ENCOUNTER — TRANSCRIPTION ENCOUNTER (OUTPATIENT)
Age: 85
End: 2025-02-05

## 2025-02-05 VITALS
DIASTOLIC BLOOD PRESSURE: 68 MMHG | SYSTOLIC BLOOD PRESSURE: 102 MMHG | RESPIRATION RATE: 18 BRPM | OXYGEN SATURATION: 95 % | HEART RATE: 94 BPM | TEMPERATURE: 98 F

## 2025-02-05 PROCEDURE — 73502 X-RAY EXAM HIP UNI 2-3 VIEWS: CPT

## 2025-02-05 PROCEDURE — 84132 ASSAY OF SERUM POTASSIUM: CPT

## 2025-02-05 PROCEDURE — 74177 CT ABD & PELVIS W/CONTRAST: CPT | Mod: MC

## 2025-02-05 PROCEDURE — 92610 EVALUATE SWALLOWING FUNCTION: CPT

## 2025-02-05 PROCEDURE — 83605 ASSAY OF LACTIC ACID: CPT

## 2025-02-05 PROCEDURE — 87040 BLOOD CULTURE FOR BACTERIA: CPT

## 2025-02-05 PROCEDURE — 87641 MR-STAPH DNA AMP PROBE: CPT

## 2025-02-05 PROCEDURE — 82947 ASSAY GLUCOSE BLOOD QUANT: CPT

## 2025-02-05 PROCEDURE — 36415 COLL VENOUS BLD VENIPUNCTURE: CPT

## 2025-02-05 PROCEDURE — 0225U NFCT DS DNA&RNA 21 SARSCOV2: CPT

## 2025-02-05 PROCEDURE — 81001 URINALYSIS AUTO W/SCOPE: CPT

## 2025-02-05 PROCEDURE — 99285 EMERGENCY DEPT VISIT HI MDM: CPT | Mod: 25

## 2025-02-05 PROCEDURE — 85730 THROMBOPLASTIN TIME PARTIAL: CPT

## 2025-02-05 PROCEDURE — 86850 RBC ANTIBODY SCREEN: CPT

## 2025-02-05 PROCEDURE — 97116 GAIT TRAINING THERAPY: CPT

## 2025-02-05 PROCEDURE — 96374 THER/PROPH/DIAG INJ IV PUSH: CPT

## 2025-02-05 PROCEDURE — 97110 THERAPEUTIC EXERCISES: CPT

## 2025-02-05 PROCEDURE — 70450 CT HEAD/BRAIN W/O DYE: CPT | Mod: MC

## 2025-02-05 PROCEDURE — 85025 COMPLETE CBC W/AUTO DIFF WBC: CPT

## 2025-02-05 PROCEDURE — 84145 PROCALCITONIN (PCT): CPT

## 2025-02-05 PROCEDURE — 73562 X-RAY EXAM OF KNEE 3: CPT

## 2025-02-05 PROCEDURE — 96375 TX/PRO/DX INJ NEW DRUG ADDON: CPT

## 2025-02-05 PROCEDURE — 80048 BASIC METABOLIC PNL TOTAL CA: CPT

## 2025-02-05 PROCEDURE — 86900 BLOOD TYPING SEROLOGIC ABO: CPT

## 2025-02-05 PROCEDURE — 80053 COMPREHEN METABOLIC PANEL: CPT

## 2025-02-05 PROCEDURE — C1713: CPT

## 2025-02-05 PROCEDURE — 71275 CT ANGIOGRAPHY CHEST: CPT | Mod: MC

## 2025-02-05 PROCEDURE — 72170 X-RAY EXAM OF PELVIS: CPT

## 2025-02-05 PROCEDURE — 86901 BLOOD TYPING SEROLOGIC RH(D): CPT

## 2025-02-05 PROCEDURE — 72125 CT NECK SPINE W/O DYE: CPT | Mod: MC

## 2025-02-05 PROCEDURE — 76000 FLUOROSCOPY <1 HR PHYS/QHP: CPT

## 2025-02-05 PROCEDURE — 85018 HEMOGLOBIN: CPT

## 2025-02-05 PROCEDURE — 84295 ASSAY OF SERUM SODIUM: CPT

## 2025-02-05 PROCEDURE — 85027 COMPLETE CBC AUTOMATED: CPT

## 2025-02-05 PROCEDURE — 92526 ORAL FUNCTION THERAPY: CPT

## 2025-02-05 PROCEDURE — 82330 ASSAY OF CALCIUM: CPT

## 2025-02-05 PROCEDURE — 82803 BLOOD GASES ANY COMBINATION: CPT

## 2025-02-05 PROCEDURE — 82435 ASSAY OF BLOOD CHLORIDE: CPT

## 2025-02-05 PROCEDURE — 82962 GLUCOSE BLOOD TEST: CPT

## 2025-02-05 PROCEDURE — 85610 PROTHROMBIN TIME: CPT

## 2025-02-05 PROCEDURE — 87086 URINE CULTURE/COLONY COUNT: CPT

## 2025-02-05 PROCEDURE — 93306 TTE W/DOPPLER COMPLETE: CPT

## 2025-02-05 PROCEDURE — 97530 THERAPEUTIC ACTIVITIES: CPT

## 2025-02-05 PROCEDURE — 87640 STAPH A DNA AMP PROBE: CPT

## 2025-02-05 PROCEDURE — 84484 ASSAY OF TROPONIN QUANT: CPT

## 2025-02-05 PROCEDURE — C8924: CPT

## 2025-02-05 PROCEDURE — 83880 ASSAY OF NATRIURETIC PEPTIDE: CPT

## 2025-02-05 PROCEDURE — 76942 ECHO GUIDE FOR BIOPSY: CPT

## 2025-02-05 PROCEDURE — 73552 X-RAY EXAM OF FEMUR 2/>: CPT

## 2025-02-05 PROCEDURE — 87637 SARSCOV2&INF A&B&RSV AMP PRB: CPT

## 2025-02-05 PROCEDURE — 71045 X-RAY EXAM CHEST 1 VIEW: CPT

## 2025-02-05 PROCEDURE — 83690 ASSAY OF LIPASE: CPT

## 2025-02-05 PROCEDURE — 83735 ASSAY OF MAGNESIUM: CPT

## 2025-02-05 PROCEDURE — 84100 ASSAY OF PHOSPHORUS: CPT

## 2025-02-05 PROCEDURE — 97165 OT EVAL LOW COMPLEX 30 MIN: CPT

## 2025-02-05 PROCEDURE — 94640 AIRWAY INHALATION TREATMENT: CPT

## 2025-02-05 PROCEDURE — 97161 PT EVAL LOW COMPLEX 20 MIN: CPT

## 2025-02-05 PROCEDURE — 85014 HEMATOCRIT: CPT

## 2025-02-05 RX ORDER — TRAMADOL HYDROCHLORIDE 100 MG/1
0.5 TABLET, EXTENDED RELEASE ORAL
Qty: 0 | Refills: 0 | DISCHARGE
Start: 2025-02-05

## 2025-02-05 RX ORDER — ACETAMINOPHEN 160 MG/5ML
3 SUSPENSION ORAL
Qty: 0 | Refills: 0 | DISCHARGE
Start: 2025-02-05

## 2025-02-05 RX ORDER — PANTOPRAZOLE 20 MG/1
1 TABLET, DELAYED RELEASE ORAL
Qty: 0 | Refills: 0 | DISCHARGE
Start: 2025-02-05

## 2025-02-05 RX ORDER — POLYETHYLENE GLYCOL 3350 17 G/17G
17 POWDER, FOR SOLUTION ORAL
Qty: 0 | Refills: 0 | DISCHARGE
Start: 2025-02-05

## 2025-02-05 RX ORDER — CALCIUM CARBONATE/VITAMIN D3 600 MG-10
1 TABLET ORAL
Qty: 0 | Refills: 0 | DISCHARGE
Start: 2025-02-05

## 2025-02-05 RX ORDER — OXYCODONE HYDROCHLORIDE 30 MG/1
1 TABLET ORAL
Qty: 0 | Refills: 0 | DISCHARGE

## 2025-02-05 RX ORDER — SENNOSIDES 8.6 MG
2 TABLET ORAL
Qty: 0 | Refills: 0 | DISCHARGE
Start: 2025-02-05

## 2025-02-05 RX ADMIN — ANTISEPTIC SURGICAL SCRUB 1 APPLICATION(S): 0.04 SOLUTION TOPICAL at 11:21

## 2025-02-05 RX ADMIN — Medication 1 TABLET(S): at 11:21

## 2025-02-05 RX ADMIN — PIPERACILLIN SODIUM AND TAZOBACTAM SODIUM 25 GRAM(S): 2; 250 INJECTION, POWDER, FOR SOLUTION INTRAVENOUS at 05:32

## 2025-02-05 RX ADMIN — OSELTAMIVIR PHOSPHATE 75 MILLIGRAM(S): 75 CAPSULE ORAL at 05:31

## 2025-02-05 RX ADMIN — APIXABAN 2.5 MILLIGRAM(S): 5 TABLET, FILM COATED ORAL at 17:36

## 2025-02-05 RX ADMIN — ACETAMINOPHEN 975 MILLIGRAM(S): 160 SUSPENSION ORAL at 15:40

## 2025-02-05 RX ADMIN — POTASSIUM CHLORIDE 10 MILLIEQUIVALENT(S): 750 TABLET, EXTENDED RELEASE ORAL at 11:21

## 2025-02-05 RX ADMIN — PANTOPRAZOLE 40 MILLIGRAM(S): 20 TABLET, DELAYED RELEASE ORAL at 05:31

## 2025-02-05 RX ADMIN — ACETAMINOPHEN 975 MILLIGRAM(S): 160 SUSPENSION ORAL at 06:00

## 2025-02-05 RX ADMIN — Medication 25 MILLIGRAM(S): at 05:31

## 2025-02-05 RX ADMIN — ACETAMINOPHEN 975 MILLIGRAM(S): 160 SUSPENSION ORAL at 05:30

## 2025-02-05 RX ADMIN — APIXABAN 2.5 MILLIGRAM(S): 5 TABLET, FILM COATED ORAL at 05:30

## 2025-02-05 RX ADMIN — ASPIRIN 81 MILLIGRAM(S): 81 TABLET, COATED ORAL at 11:20

## 2025-02-05 RX ADMIN — Medication 20 MILLIGRAM(S): at 05:31

## 2025-02-05 RX ADMIN — ACETAMINOPHEN 975 MILLIGRAM(S): 160 SUSPENSION ORAL at 14:40

## 2025-02-05 RX ADMIN — Medication 25 MILLIGRAM(S): at 17:36

## 2025-02-05 NOTE — PROVIDER CONTACT NOTE (OTHER) - SITUATION
Patient had 4 beats of WCT on tele
Pt had 5 beats WCT
pt has productive cough
5 beats WCT
pt had 20 beats WCT
Patient had 5 beats WCT at 2233 2/1 notified by tele at 00:59 2/2

## 2025-02-05 NOTE — PROVIDER CONTACT NOTE (OTHER) - BACKGROUND
dx Fracture of unspecified part of neck of unspecified femur, initial encounter for closed fracture
Dx Fracture of unspecified part of neck of unspecified femur, initial encounter for closed fracture
70 yo M admitted for AHR
Patient admitted for fracture of unspecified part of neck of unspecified femur  PMH HTN, HLD, afib on eliquis, flu A, CTA chest with trace b/l pleural effusions
Dx Fracture of unspecified part of neck of unspecified femur, initial encounter for closed fracture
Patient admitted for Patient admitted for hip fx, CHF,HTN, PAF, PNA.

## 2025-02-05 NOTE — DISCHARGE NOTE PROVIDER - NSDCMRMEDTOKEN_GEN_ALL_CORE_FT
acetaminophen 325 mg oral tablet: 3 tab(s) orally every 8 hours  apixaban 2.5 mg oral tablet: 1 tab(s) orally every 12 hours **On hold from 1/29/25 11:20 to 2/1/25 11:19**  aspirin 81 mg oral delayed release tablet: 1 tab(s) orally once a day **On hold from 1/29/25 11:21 to 2/1/25 11:20**  atorvastatin 10 mg oral tablet: 1 tab(s) orally once a day  ergocalciferol 1.25 mg (50,000 intl units) oral capsule: 1 cap(s) orally every 7 days  furosemide 20 mg oral tablet: 1 tab(s) orally once a day  ipratropium-albuterol 0.5 mg-2.5 mg/3 mL inhalation solution: 3 milliliter(s) by nebulizer every 6 hours for 5 days  metoprolol tartrate 50 mg oral tablet: 0.5 tab(s) orally 2 times a day  oxyCODONE 5 mg oral tablet: 1 tab(s) orally 2 times a day as needed for  severe pain  Pepcid 40 mg oral tablet: 1 tab(s) orally once a day (in the morning)  polyethylene glycol 3350 oral powder for reconstitution: 17 gram(s) orally once a day  potassium chloride 10 mEq oral tablet, extended release: 1 tab(s) orally once a day while on Lasix  senna leaf extract oral tablet: 2 tab(s) orally once a day (at bedtime)  traMADol 50 mg oral tablet: 0.5 tab(s) orally every 4 hours As needed Mild Pain (1 - 3)  vitamin D-calcium (as carbonate) 5 mcg-500 mg oral tablet: 1 tab(s) orally once a day

## 2025-02-05 NOTE — PROGRESS NOTE ADULT - SUBJECTIVE AND OBJECTIVE BOX
Follow-up Pulm Progress Note    No new respiratory events overnight.  Denies SOB/CP.   o2 sats 99% on 3LNC o2 lowered to 1LNC     Medications:  MEDICATIONS  (STANDING):  acetaminophen     Tablet .. 975 milliGRAM(s) Oral every 8 hours  acetaminophen   IVPB .. 1000 milliGRAM(s) IV Intermittent once  apixaban 2.5 milliGRAM(s) Oral two times a day  aspirin enteric coated 81 milliGRAM(s) Oral daily  atorvastatin 10 milliGRAM(s) Oral at bedtime  calcium carbonate 1250 mG  + Vitamin D (OsCal 500 + D) 1 Tablet(s) Oral daily  chlorhexidine 2% Cloths 1 Application(s) Topical daily  ergocalciferol 74899 Unit(s) Oral every week  furosemide    Tablet 20 milliGRAM(s) Oral daily  metoprolol tartrate 25 milliGRAM(s) Oral two times a day  pantoprazole    Tablet 40 milliGRAM(s) Oral before breakfast  piperacillin/tazobactam IVPB.. 3.375 Gram(s) IV Intermittent every 8 hours  polyethylene glycol 3350 17 Gram(s) Oral daily  potassium chloride    Tablet ER 10 milliEquivalent(s) Oral daily  senna 2 Tablet(s) Oral at bedtime    MEDICATIONS  (PRN):  melatonin 3 milliGRAM(s) Oral at bedtime PRN Insomnia  oxyCODONE    IR 2.5 milliGRAM(s) Oral every 4 hours PRN Moderate Pain (4 - 6)  oxyCODONE    IR 5 milliGRAM(s) Oral every 4 hours PRN Severe Pain (7 - 10)  traMADol 25 milliGRAM(s) Oral every 4 hours PRN Mild Pain (1 - 3)          Vital Signs Last 24 Hrs  T(C): 36.7 (05 Feb 2025 04:35), Max: 37.2 (04 Feb 2025 12:31)  T(F): 98 (05 Feb 2025 04:35), Max: 98.9 (04 Feb 2025 12:31)  HR: 88 (05 Feb 2025 09:06) (54 - 100)  BP: 109/61 (05 Feb 2025 09:06) (100/63 - 122/62)  BP(mean): --  RR: 18 (05 Feb 2025 09:06) (18 - 18)  SpO2: 98% (05 Feb 2025 09:06) (98% - 99%)    Parameters below as of 05 Feb 2025 09:06  Patient On (Oxygen Delivery Method): nasal cannula  O2 Flow (L/min): 3            02-04 @ 07:01  -  02-05 @ 07:00  --------------------------------------------------------  IN: 240 mL / OUT: 1450 mL / NET: -1210 mL          LABS:                        10.5   11.60 )-----------( 409      ( 04 Feb 2025 09:59 )             33.6     02-04    141  |  100  |  26[H]  ----------------------------<  143[H]  4.2   |  29  |  0.81    Ca    8.7      04 Feb 2025 09:59  Phos  2.6     02-04  Mg     2.1     02-04            CAPILLARY BLOOD GLUCOSE          Urinalysis Basic - ( 04 Feb 2025 09:59 )    Color: x / Appearance: x / SG: x / pH: x  Gluc: 143 mg/dL / Ketone: x  / Bili: x / Urobili: x   Blood: x / Protein: x / Nitrite: x   Leuk Esterase: x / RBC: x / WBC x   Sq Epi: x / Non Sq Epi: x / Bacteria: x                      CULTURES:     Culture - Blood (collected 01-30-25 @ 05:25)  Source: .Blood BLOOD  Final Report (02-04-25 @ 11:01):    No growth at 5 days    Culture - Blood (collected 01-30-25 @ 05:10)  Source: .Blood BLOOD  Final Report (02-04-25 @ 11:01):    No growth at 5 days        Culture - Urine (collected 01-30-25 @ 09:02)  Source: Clean Catch Clean Catch (Midstream)  Final Report (01-31-25 @ 12:04):    <10,000 CFU/mL Normal Urogenital Ericka              Physical Examination:  PULM: trace bibasilar crackles   CVS: S1, S2 heard    RADIOLOGY REVIEWED  CXR 2/3: mild congestion    CT chest:  < from: CT Angio Chest PE Protocol w/ IV Cont (01.30.25 @ 06:14) >  CHEST:    LUNGS AND AIRWAYS: Patent central airways. Interlobar septal thickening.   Peribronchial thickening/tree-in-bud opacities scattered in both lungs,   especially at the lower lobes. Patchy opacities at bilateral lower lobes,   right upper lobe, right middle lobe and lingula. Calcified granulomata.  PLEURA: No pleural effusion or pneumothorax.  HEART: Borderline heart size. Trace pericardial effusion. Aortic valve,   mitral annular and coronary artery calcification.  VESSELS: Suboptimal contrast opacification of the subsegmental pulmonary   arteries. No obvious embolus to the level of the segmental pulmonary   arteries. Atherosclerosis. Normal caliber of the thoracic aorta.  MEDIASTINUM AND JOSH: Subcentimeter lymph nodes.  CHEST WALL AND LOWER NECK: Thyroid gland degraded by artifact.    ABDOMEN/PELVIS:    LIVER: Unremarkable.  BILE DUCTS/GALLBLADDER: No intrahepatic or extrahepatic biliary   dilatation. No significant gallbladder edema.  PANCREAS: Unremarkable.  SPLEEN: Unremarkable.    ADRENALS: Unremarkable.  KIDNEYS/URETERS: Bilateral perinephric stranding without   hydroureteronephrosis. Bilateral renal cysts, for example, 2.5 x 3.5 cm   on the left. Subcentimeter hypodensities, too small to characterize.  BLADDER: Partially distended. Somewhat asymmetric bladder wall thickening.  REPRODUCTIVE ORGANS: Calcification along the left uterus, suggesting   fibroid.    BOWEL: No bowel obstruction. Unremarkable appendix.  PERITONEUM: No organized fluid collection or free air.  VESSELS: Atherosclerosis. Normal caliber of the abdominal aorta.  RETROPERITONEUM/LYMPH NODE: No lymphadenopathy.Subcentimeter lymph nodes.  ABDOMINAL WALL/SOFT TISSUES: Asymmetric enlargement of the left   pelvic/thigh muscles with stranding, suggesting edema/hematoma. Left hip   joint effusion/hemarthrosis. Small fat-containing umbilical hernia.  BONES: Acute, impacted left femoral intertrochanteric fracture with   superoposterior displacement, anterolateral apex angulation and varus   configuration. Displaced trochanteric fragments. Question fracture   involvement of the lower femoral neck. Rightward curvature and   degenerative changes of the spine. Endplate depression/anterior wedging   of the thoracolumbar spine, notably at L1 > L3, likely chronic. Stable L1   compression deformity.    IMPRESSION:    Suboptimal evaluation of the subsegmental pulmonary arteries. No obvious   embolus to the level of the segmental pulmonary arteries.    No obvious displaced rib fracture.    Acute impacted left femoral intertrochanteric fracture as described.    Pulmonary findings as described, which can be seen in noninfectious and   infectious processes. Recommend clinical correlation and follow-up to   resolution.    Somewhat asymmetric bladder wall thickening. Recommend clinical   correlation to assess UTI. Follow-up may be helpful to exclude potential   underlying lesion/neoplasm.    < end of copied text >

## 2025-02-05 NOTE — PROVIDER CONTACT NOTE (OTHER) - REASON
pt had 20 beats WCT
pt has productive cough
5 beats WCT
Patient had 4 beats of WCT on tele
Patient had 5 beats WCT asymptomatic
Pt had 5 beats WCT

## 2025-02-05 NOTE — PROGRESS NOTE ADULT - ASSESSMENT
83 y/o F with PMH of HTN, HLD, afib on Eliquis, CHF. Presents with c/o L hip fracture (found on outpatient xray). Pt reportedly fell while at assisted living home day prior to admission. Pt also with c/o SOB, noted to be hypoxic to 78% on RA. RVP +influenza A. CTA chest with trace b/l pl effusions, interlobar septal thickening, patchy opacities LL. Denies cough, CP, pleuritic CP.        Problem/Plan - 1:  ·  Problem: Acute respiratory failure with hypoxia.   ·  Plan: S/P  HF Oxygen and now on NC .   Pulmonary help appreciated.     Problem/Plan - 2:  ·  Problem: Influenza A.   ·  Plan: Tamiflu started.     Problem/Plan - 3:  ·  Problem: Hip fracture, left.   ·  Plan: Ortho input appreciated and S/P Intramedullary nailing of left femur 31-Jan-2025.     Problem/Plan - 4:  ·  Problem: PAF (paroxysmal atrial fibrillation).   ·  Plan: On BB and holding AC per Ortho .   TTE noted.  Cardiology help appreciated.     Problem/Plan - 5:  ·  Problem: HTN (hypertension).   ·  Plan: BP medications with hold parameters.     Problem/Plan - 6:  ·  Problem: Systolic CHF, chronic.   ·  Plan: Holding Lasix as Euvolemic .  Cards to follow.  < from: TTE W or WO Ultrasound Enhancing Agent (01.30.25 @ 17:08) >     CONCLUSIONS:      1. Left ventricular cavity is small. Left ventricular wall thickness is normal. Left ventricular systolic function is moderately decreased with an ejection fraction of 40 % bySimpson's method of disks. Regional wall motion abnormalities present.   2. Basal and mid anterior septum and basal and mid inferior septum are abnormal.   3. There is moderate posterior calcification of the mitral valve annulus.   4. There is severe(grade 3) left ventricular diastolic dysfunction, with elevated left ventricular filling pressure.   5. Normal right ventricular cavity size, with normal wall thickness, and normal right ventricular systolic function.   6. Estimated pulmonary artery systolic pressure is 51 mmHg, consistent with moderate pulmonary hypertension.   7. No pericardial effusion seen.         Problem/Plan - 7:  ·  Problem: HLD (hyperlipidemia).   ·  Plan: Continuing Lipitor.      Dispo : DC planning to [facility].    
83 y/o F with PMH of HTN, HLD, afib on Eliquis, CHF. Presents with c/o L hip fracture (found on outpatient xray). Pt reportedly fell while at assisted living home day prior to admission. Pt also with c/o SOB, noted to be hypoxic to 78% on RA. RVP +influenza A. CTA chest with trace b/l pl effusions, interlobar septal thickening, patchy opacities LL. Denies cough, CP, pleuritic CP.        Problem/Plan - 1:  ·  Problem: Acute respiratory failure with hypoxia.   ·  Plan: S/P  HF Oxygen and now on NC .   Pulmonary help appreciated.     Problem/Plan - 2:  ·  Problem: Influenza A.   ·  Plan: Tamiflu started.     Problem/Plan - 3:  ·  Problem: Hip fracture, left.   ·  Plan: Ortho input appreciated and S/P Intramedullary nailing of left femur 31-Jan-2025.     Problem/Plan - 4:  ·  Problem: PAF (paroxysmal atrial fibrillation).   ·  Plan: On BB and holding AC per Ortho .   TTE noted.  Cardiology help appreciated.     Problem/Plan - 5:  ·  Problem: HTN (hypertension).   ·  Plan: BP medications with hold parameters.     Problem/Plan - 6:  ·  Problem: Systolic CHF, chronic.   ·  Plan: Holding Lasix as Euvolemic .  Cards to follow.  < from: TTE W or WO Ultrasound Enhancing Agent (01.30.25 @ 17:08) >     CONCLUSIONS:      1. Left ventricular cavity is small. Left ventricular wall thickness is normal. Left ventricular systolic function is moderately decreased with an ejection fraction of 40 % bySimpson's method of disks. Regional wall motion abnormalities present.   2. Basal and mid anterior septum and basal and mid inferior septum are abnormal.   3. There is moderate posterior calcification of the mitral valve annulus.   4. There is severe(grade 3) left ventricular diastolic dysfunction, with elevated left ventricular filling pressure.   5. Normal right ventricular cavity size, with normal wall thickness, and normal right ventricular systolic function.   6. Estimated pulmonary artery systolic pressure is 51 mmHg, consistent with moderate pulmonary hypertension.   7. No pericardial effusion seen.    < end of copied text >       Problem/Plan - 7:  ·  Problem: HLD (hyperlipidemia).   ·  Plan: Continuing Lipitor.      Dispo : DC planning to [facility].      
83 y/o F with PMH of HTN, HLD, afib on Eliquis, CHF. Presents with c/o L hip fracture (found on outpatient xray). Pt reportedly fell while at assisted living home day prior to admission. Pt also with c/o SOB, noted to be hypoxic to 78% on RA. RVP +influenza A. CTA chest with trace b/l pl effusions, interlobar septal thickening, patchy opacities LL. Denies cough, CP, pleuritic CP.        Problem/Plan - 1:  ·  Problem: Acute respiratory failure with hypoxia.   ·  Plan: S/P  HF Oxygen and now on NC .   Pulmonary help appreciated.     Problem/Plan - 2:  ·  Problem: Influenza A.   ·  Plan: Tamiflu started.     Problem/Plan - 3:  ·  Problem: Hip fracture, left.   ·  Plan: Ortho input appreciated and S/P Intramedullary nailing of left femur 31-Jan-2025.     Problem/Plan - 4:  ·  Problem: PAF (paroxysmal atrial fibrillation).   ·  Plan: On BB and holding AC per Ortho .   TTE noted.  Cardiology help appreciated.     Problem/Plan - 5:  ·  Problem: HTN (hypertension).   ·  Plan: BP medications with hold parameters.     Problem/Plan - 6:  ·  Problem: Systolic CHF, chronic.   ·  Plan: Holding Lasix as Euvolemic .  Cards to follow.  < from: TTE W or WO Ultrasound Enhancing Agent (01.30.25 @ 17:08) >     CONCLUSIONS:      1. Left ventricular cavity is small. Left ventricular wall thickness is normal. Left ventricular systolic function is moderately decreased with an ejection fraction of 40 % bySimpson's method of disks. Regional wall motion abnormalities present.   2. Basal and mid anterior septum and basal and mid inferior septum are abnormal.   3. There is moderate posterior calcification of the mitral valve annulus.   4. There is severe(grade 3) left ventricular diastolic dysfunction, with elevated left ventricular filling pressure.   5. Normal right ventricular cavity size, with normal wall thickness, and normal right ventricular systolic function.   6. Estimated pulmonary artery systolic pressure is 51 mmHg, consistent with moderate pulmonary hypertension.   7. No pericardial effusion seen.    < end of copied text >       Problem/Plan - 7:  ·  Problem: HLD (hyperlipidemia).   ·  Plan: Continuing Lipitor.      Dispo : DC planning to [facility].  
83 y/o F with PMH of HTN, HLD, afib on Eliquis, CHF. Presents with c/o L hip fracture (found on outpatient xray). Pt reportedly fell while at assisted living home day prior to admission. Pt also with c/o SOB, noted to be hypoxic to 78% on RA. RVP +influenza A. CTA chest with trace b/l pl effusions, interlobar septal thickening, patchy opacities LL. Denies cough, CP, pleuritic CP.        Problem/Plan - 1:  ·  Problem: Acute respiratory failure with hypoxia.   ·  Plan: S/P  HF Oxygen and now on NC .   Pulmonary help appreciated.     Problem/Plan - 2:  ·  Problem: Influenza A.   ·  Plan: Tamiflu started.     Problem/Plan - 3:  ·  Problem: Hip fracture, left.   ·  Plan: Ortho input appreciated and planning surgery as early as tomorrow AM.     Problem/Plan - 4:  ·  Problem: PAF (paroxysmal atrial fibrillation).   ·  Plan: On BB and holding AC per Ortho .   TTE noted.  Cardiology help appreciated.     Problem/Plan - 5:  ·  Problem: HTN (hypertension).   ·  Plan: BP medications with hold parameters.     Problem/Plan - 6:  ·  Problem: Systolic CHF, chronic.   ·  Plan: Holding Lasix as Euvolemic .  Cards to follow.  < from: TTE W or WO Ultrasound Enhancing Agent (01.30.25 @ 17:08) >     CONCLUSIONS:      1. Left ventricular cavity is small. Left ventricular wall thickness is normal. Left ventricular systolic function is moderately decreased with an ejection fraction of 40 % bySimpson's method of disks. Regional wall motion abnormalities present.   2. Basal and mid anterior septum and basal and mid inferior septum are abnormal.   3. There is moderate posterior calcification of the mitral valve annulus.   4. There is severe(grade 3) left ventricular diastolic dysfunction, with elevated left ventricular filling pressure.   5. Normal right ventricular cavity size, with normal wall thickness, and normal right ventricular systolic function.   6. Estimated pulmonary artery systolic pressure is 51 mmHg, consistent with moderate pulmonary hypertension.   7. No pericardial effusion seen.    < end of copied text >       Problem/Plan - 7:  ·  Problem: HLD (hyperlipidemia).   ·  Plan: Continuing Lipitor.    Medically optimized for surgery and GA. 
83 y/o F with PMH of HTN, HLD, afib on Eliquis, CHF. Presents with c/o L hip fracture (found on outpatient xray). Pt reportedly fell while at assisted living home day prior to admission. Pt also with c/o SOB, noted to be hypoxic to 78% on RA. RVP +influenza A. CTA chest with trace b/l pl effusions, interlobar septal thickening, patchy opacities LL. Denies cough, CP, pleuritic CP.        Problem/Plan - 1:  ·  Problem: Acute respiratory failure with hypoxia.   ·  Plan: S/P  HF Oxygen and now on NC .   Pulmonary help appreciated.    < from: CT Angio Chest PE Protocol w/ IV Cont (01.30.25 @ 06:14) >  IMPRESSION:    Suboptimal evaluation of the subsegmental pulmonary arteries. No obvious   embolus to the level of the segmental pulmonary arteries.    No obvious displaced rib fracture.    Acute impacted left femoral intertrochanteric fracture as described.    Pulmonary findings as described, which can be seen in noninfectious and   infectious processes. Recommend clinical correlation and follow-up to   resolution.    Somewhat asymmetric bladder wall thickening. Recommend clinical   correlation to assess UTI. Follow-up may be helpful to exclude potential   underlying lesion/neoplasm.    < end of copied text >   Problem/Plan - 2:  ·  Problem: Influenza A.   ·  Plan: Tamiflu started.     Problem/Plan - 3:  ·  Problem: Hip fracture, left.   ·  Plan: Ortho input appreciated and S/P Intramedullary nailing of left femur 31-Jan-2025.     Problem/Plan - 4:  ·  Problem: PAF (paroxysmal atrial fibrillation).   ·  Plan: On BB and on AC  .   TTE noted.  Cardiology help appreciated.     Problem/Plan - 5:  ·  Problem: HTN (hypertension).   ·  Plan: BP medications with hold parameters.     Problem/Plan - 6:  ·  Problem: Systolic CHF, Acute on  chronic.   ·  Plan: Lasix PO and PRN IV  .  Cards helping.   < from: TTE W or WO Ultrasound Enhancing Agent (01.30.25 @ 17:08) >     CONCLUSIONS:      1. Left ventricular cavity is small. Left ventricular wall thickness is normal. Left ventricular systolic function is moderately decreased with an ejection fraction of 40 % bySimpson's method of disks. Regional wall motion abnormalities present.   2. Basal and mid anterior septum and basal and mid inferior septum are abnormal.   3. There is moderate posterior calcification of the mitral valve annulus.   4. There is severe(grade 3) left ventricular diastolic dysfunction, with elevated left ventricular filling pressure.   5. Normal right ventricular cavity size, with normal wall thickness, and normal right ventricular systolic function.   6. Estimated pulmonary artery systolic pressure is 51 mmHg, consistent with moderate pulmonary hypertension.   7. No pericardial effusion seen.     Problem/Plan - 7:  ·  Problem: HLD (hyperlipidemia).   ·  Plan: Continuing Lipitor.    Dispo : DC planning to [facility] pending above .    
A/P: 84y Female S/p L Intertrochanteric Femur Fx IM Nail on 1/30/25. recovering well on the floor    -PT/OT-WBAT LLE  -IS  -DVT PPx: ELiquis 2.5mg PO BID.  SCDs and early OOB and ambulation.  -Pain Control  -Continue Current Tx as per primary team (Medicine)  -Dispo: [facility]     For all questions related to patient care, please reach out to the on-call team via the pager.     Whit James, PGY 3  Orthopaedic Surgery  [facility] [phone]  [facility] [phone]  [facility] [phone]    
83 y/o F with PMH of HTN, HLD, afib on Eliquis, CHF. Presents with c/o L hip fracture (found on outpatient xray). Pt reportedly fell while at assisted living home day prior to admission. Pt also with c/o SOB, noted to be hypoxic to 78% on RA. RVP +influenza A. CTA chest with trace b/l pl effusions, interlobar septal thickening, patchy opacities LL.
83 y/o F with PMH of HTN, HLD, afib on Eliquis, CHF. Presents with c/o L hip fracture (found on outpatient xray). Pt reportedly fell while at assisted living home day prior to admission. Pt also with c/o SOB, noted to be hypoxic to 78% on RA. RVP +influenza A. CTA chest with trace b/l pl effusions, interlobar septal thickening, patchy opacities LL. Denies cough, CP, pleuritic CP.        Problem/Plan - 1:  ·  Problem: Acute respiratory failure with hypoxia.   ·  Plan: S/P  HF Oxygen and now on NC .   Pulmonary help appreciated.    < from: CT Angio Chest PE Protocol w/ IV Cont (01.30.25 @ 06:14) >  IMPRESSION:    Suboptimal evaluation of the subsegmental pulmonary arteries. No obvious   embolus to the level of the segmental pulmonary arteries.    No obvious displaced rib fracture.    Acute impacted left femoral intertrochanteric fracture as described.    Pulmonary findings as described, which can be seen in noninfectious and   infectious processes. Recommend clinical correlation and follow-up to   resolution.    Somewhat asymmetric bladder wall thickening. Recommend clinical   correlation to assess UTI. Follow-up may be helpful to exclude potential   underlying lesion/neoplasm.    < end of copied text >   Problem/Plan - 2:  ·  Problem: Influenza A.   ·  Plan: Tamiflu started.     Problem/Plan - 3:  ·  Problem: Hip fracture, left.   ·  Plan: Ortho input appreciated and S/P Intramedullary nailing of left femur 31-Jan-2025.     Problem/Plan - 4:  ·  Problem: PAF (paroxysmal atrial fibrillation).   ·  Plan: On BB and on AC  .   TTE noted.  Cardiology help appreciated.     Problem/Plan - 5:  ·  Problem: HTN (hypertension).   ·  Plan: BP medications with hold parameters.     Problem/Plan - 6:  ·  Problem: Systolic CHF, Acute on  chronic.   ·  Plan: Lasix PO and PRN IV  .  Cards helping.   < from: TTE W or WO Ultrasound Enhancing Agent (01.30.25 @ 17:08) >     CONCLUSIONS:      1. Left ventricular cavity is small. Left ventricular wall thickness is normal. Left ventricular systolic function is moderately decreased with an ejection fraction of 40 % bySimpson's method of disks. Regional wall motion abnormalities present.   2. Basal and mid anterior septum and basal and mid inferior septum are abnormal.   3. There is moderate posterior calcification of the mitral valve annulus.   4. There is severe(grade 3) left ventricular diastolic dysfunction, with elevated left ventricular filling pressure.   5. Normal right ventricular cavity size, with normal wall thickness, and normal right ventricular systolic function.   6. Estimated pulmonary artery systolic pressure is 51 mmHg, consistent with moderate pulmonary hypertension.   7. No pericardial effusion seen.     Problem/Plan - 7:  ·  Problem: HLD (hyperlipidemia).   ·  Plan: Continuing Lipitor.    Dispo : DC planning to [facility] today .  
Agree with above assessment and plan as outlined above.    - Echo reviewed, no convincing segmentality  to LV dysfx mildy reduced EF in the setting of FLu and rapid afib  - Plan to repeat echo with definity post op      Pepe Carlson MD, [facility]  BEEPER [phone]  
Agree with above assessment and plan as outlined above.    - No need for further inpatient cardiac work up.    Pepe Carlson MD, [facility]  BEEPER [phone]  
Patient seen and examined, agree with above assessment and plan as transcribed above.    CARDIOLOGY ATTENDING ADDENDUM  - repeat echo with normal EF and no wall motion abnormalities    Pepe Carlson MD, [facility]  BEEPER [phone]  
83 y/o F with PMH of HTN, HLD, afib on Eliquis, CHF. Presents with c/o L hip fracture (found on outpatient xray). Pt reportedly fell while at assisted living home day prior to admission. Pt also with c/o SOB, noted to be hypoxic to 78% on RA. RVP +influenza A. CTA chest with trace b/l pl effusions, interlobar septal thickening, patchy opacities LL.
85 y/o F with PMH of HTN, HLD, afib on Eliquis, CHF. Presents with c/o L hip fracture (found on outpatient xray). Pt reportedly fell while at assisted living home day prior to admission. Pt also with c/o SOB, noted to be hypoxic to 78% on RA. RVP +influenza A. CTA chest with trace b/l pl effusions, interlobar septal thickening, patchy opacities LL.

## 2025-02-05 NOTE — PROVIDER CONTACT NOTE (OTHER) - ASSESSMENT
Patient is A&Ox2, VSS. Denies any chest pain, SOB, palpitations
PAtient A&Ox3, VSS, no c/o chest pain, SOB, palpitations. VSS  .
pt ano2, no c/o chest pain
pt has productive cough
pt has no c/o chest pain/palpitations
pt is A&O x 2. VSS as per flowhseets. Pt denies cp, SOB, dizzines, palpitations. pt is asymptomatic.

## 2025-02-05 NOTE — DISCHARGE NOTE PROVIDER - PROVIDER TOKENS
PROVIDER:[TOKEN:[[id number]],FOLLOWUP:[1 month]],PROVIDER:[TOKEN:[[id number]],FOLLOWUP:[2 weeks]],PROVIDER:[TOKEN:[[id number]],FOLLOWUP:[Routine]]

## 2025-02-05 NOTE — DISCHARGE NOTE NURSING/CASE MANAGEMENT/SOCIAL WORK - NSDCPEFALRISK_GEN_ALL_CORE
For information on Fall & Injury Prevention, visit: [email or web address] OR  [email or web address] OR  https://www.cdc.gov/steadi/patient.html

## 2025-02-05 NOTE — PROVIDER CONTACT NOTE (OTHER) - DATE AND TIME:
02-Feb-2025 01:05
03-Feb-2025 22:07
02-Feb-2025 23:07
04-Feb-2025 01:52
05-Feb-2025 04:22
05-Feb-2025 09:22

## 2025-02-05 NOTE — DISCHARGE NOTE NURSING/CASE MANAGEMENT/SOCIAL WORK - FINANCIAL ASSISTANCE
[facility] provides services at a reduced cost to those who are determined to be eligible through [facility]’s financial assistance program. Information regarding [facility]’s financial assistance program can be found by going to [email or web address] or by calling [phone].

## 2025-02-05 NOTE — DISCHARGE NOTE PROVIDER - NSDCCPCAREPLAN_GEN_ALL_CORE_FT
PRINCIPAL DISCHARGE DIAGNOSIS  Diagnosis: Hip fracture  Assessment and Plan of Treatment: S/p L Intertrochanteric Femur Fx IM Nail on 1/30/25  weight bearing Left LE  pain meds as needed  discharge to [facility]  outpatient follow up with orthopedic surgery      SECONDARY DISCHARGE DIAGNOSES  Diagnosis: Influenza A  Assessment and Plan of Treatment: complete course of Tamiflu    Diagnosis: Pneumonia  Assessment and Plan of Treatment: CTA chest with LL opacities  completed course of IV antibiotic  continue  Duo nebs    Diagnosis: PAF (paroxysmal atrial fibrillation)  Assessment and Plan of Treatment: Continue Apixaban  Continue Metoprolol    Diagnosis: HTN (hypertension)  Assessment and Plan of Treatment: Follow up with your medical doctor to establish long term blood pressure treatment goals.  continue metoprolol       Diagnosis: Acute respiratory failure with hypoxia  Assessment and Plan of Treatment: Acute respiratory failure with hypoxia.    likely in setting of influenza, mild volume overload +/- PNA  VBG with no CO2 retention   S/p Lasix 20mg IVP x 1 on 1/30 and 2/3  No longer requiring HFNC as of 1/30  ncentive spirometry   Keep O>I as tolerated.  Hypoxia resolved    Diagnosis: CHF, chronic  Assessment and Plan of Treatment: Weigh yourself daily.  If you gain 3lbs in 3 days, or 5lbs in a week call your Health Care Provider.  Do not eat or drink foods containing more than 2000mg of salt (sodium) in your diet every day.  Call your Health Care Provider if you have any swelling or increased swelling in your feet, ankles, and/or stomach.  Take all of your medication as directed.  If you become dizzy call your Health Care Provider.  Continue Lasix

## 2025-02-05 NOTE — PROVIDER CONTACT NOTE (OTHER) - RECOMMENDATIONS
Notify JESSIKA Voss
Provider notified
provider notified and aware
provider notified and aware
Notify YUKI Suazo
provider notified and aware

## 2025-02-05 NOTE — DISCHARGE NOTE NURSING/CASE MANAGEMENT/SOCIAL WORK - PATIENT PORTAL LINK FT
You can access the FollowMyHealth Patient Portal offered by  by registering at the following website: [email or web address]. By joining [facility]’s FollowMyHealth portal, you will also be able to view your health information using other applications (apps) compatible with our system.

## 2025-02-05 NOTE — PROGRESS NOTE ADULT - SUBJECTIVE AND OBJECTIVE BOX
C A R D I O L O G Y  **********************************    DATE OF SERVICE: 02-05-25    Patient denies chest pain or shortness of breath on nasal cannula.   Review of symptoms otherwise negative.    MEDICATIONS:  acetaminophen     Tablet .. 975 milliGRAM(s) Oral every 8 hours  acetaminophen   IVPB .. 1000 milliGRAM(s) IV Intermittent once  apixaban 2.5 milliGRAM(s) Oral two times a day  aspirin enteric coated 81 milliGRAM(s) Oral daily  atorvastatin 10 milliGRAM(s) Oral at bedtime  calcium carbonate 1250 mG  + Vitamin D (OsCal 500 + D) 1 Tablet(s) Oral daily  chlorhexidine 2% Cloths 1 Application(s) Topical daily  ergocalciferol 08477 Unit(s) Oral every week  furosemide    Tablet 20 milliGRAM(s) Oral daily  melatonin 3 milliGRAM(s) Oral at bedtime PRN  metoprolol tartrate 25 milliGRAM(s) Oral two times a day  oxyCODONE    IR 2.5 milliGRAM(s) Oral every 4 hours PRN  oxyCODONE    IR 5 milliGRAM(s) Oral every 4 hours PRN  pantoprazole    Tablet 40 milliGRAM(s) Oral before breakfast  polyethylene glycol 3350 17 Gram(s) Oral daily  potassium chloride    Tablet ER 10 milliEquivalent(s) Oral daily  senna 2 Tablet(s) Oral at bedtime  traMADol 25 milliGRAM(s) Oral every 4 hours PRN      LABS:                        10.5   11.60 )-----------( 409      ( 04 Feb 2025 09:59 )             33.6       Hemoglobin: 10.5 g/dL (02-04 @ 09:59)  Hemoglobin: 10.0 g/dL (02-03 @ 05:12)  Hemoglobin: 14.5 g/dL (02-02 @ 05:19)  Hemoglobin: 10.4 g/dL (02-01 @ 06:23)  Hemoglobin: 10.6 g/dL (01-31 @ 16:07)      02-04    141  |  100  |  26[H]  ----------------------------<  143[H]  4.2   |  29  |  0.81    Ca    8.7      04 Feb 2025 09:59  Phos  2.6     02-04  Mg     2.1     02-04      Creatinine Trend: 0.81<--, 0.69<--, 0.69<--, 0.80<--, 0.71<--, 0.71<--    COAGS:           PHYSICAL EXAM:  T(C): 36.7 (02-05-25 @ 11:45), Max: 36.7 (02-05-25 @ 04:35)  HR: 93 (02-05-25 @ 12:39) (82 - 100)  BP: 128/87 (02-05-25 @ 12:39) (97/51 - 128/87)  RR: 18 (02-05-25 @ 12:56) (18 - 18)  SpO2: 87% (02-05-25 @ 12:56) (87% - 99%)  Wt(kg): --    I&O's Summary    04 Feb 2025 07:01  -  05 Feb 2025 07:00  --------------------------------------------------------  IN: 240 mL / OUT: 1450 mL / NET: -1210 mL    05 Feb 2025 07:01  -  05 Feb 2025 12:59  --------------------------------------------------------  IN: 480 mL / OUT: 0 mL / NET: 480 mL        Gen: NAD  HEENT:  (-)icterus (-)pallor  CV: N S1 S2 1/6 YOSELIN (+)2 Pulses B/l  Resp:  Clear to auscultation B/L, normal effort  GI: (+) BS Soft, NT, ND  Lymph:  (-)Edema, (-)obvious lymphadenopathy  Skin: Warm to touch, Normal turgor  Psych: Appropriate mood and affect      TELEMETRY: AF , brief NSVT    < from: TTE W or WO Ultrasound Enhancing Agent (01.30.25 @ 17:08) >  CONCLUSIONS:      1. Left ventricular cavity is small. Left ventricular wall thickness is normal. Left ventricular systolic function is moderately decreased with an ejection fraction of 40 % bySimpson's method of disks. Regional wall motion abnormalities present.   2. Basal and mid anterior septum and basal and mid inferior septum are abnormal.   3. There is moderate posterior calcification of the mitral valve annulus.   4. There is severe(grade 3) left ventricular diastolic dysfunction, with elevated left ventricular filling pressure.   5. Normal right ventricular cavity size, with normal wall thickness, and normal right ventricular systolic function.   6. Estimated pulmonary artery systolic pressure is 51 mmHg, consistent with moderate pulmonary hypertension.   7. No pericardial effusion seen.    < end of copied text >    < from: TTE W or WO Ultrasound Enhancing Agent (02.04.25 @ 13:28) >  CONCLUSIONS:      1. Limited TTE to reassess LV and RV function.   2. Left ventricular wall thickness is moderately increased. Left ventricular systolic function is normal with an ejection fraction of 60 % by Frey's method of disks. There are no regional wall motion abnormalities seen.   3. Normal right ventricular cavity size and normal right ventricular systolic function.    < end of copied text >      ASSESSMENT/PLAN: Patient is an 85 y/o Female with PMH of HTN, HLD, persistent afib on Eliquis, and HFpEF who presented with L hip fracture seen on outpatient xray s/p mechanical fall found have SOB/hypoxia and Influenza A. Cardiology consulted for preop clearance.    #Acute Hypoxic Respiratory Failure  - Presentation appears more consistent with primarily PNA/Influenza driven rather than CHF  - CTA chest with no obvious PE, interlobar septal thickening and opacities noted  - Negative troponin noted  - Abx and Tamiflu per pulm  - Continue PO lasix 20mg daily  - Initial TTE reviewed. Poor quality study suspect more mild LV dysfunction in setting of Afib RVR and infection  - Repeat TTE w/Definity noted with normal LV function, no RWMA    #L Hip Fracture  - Ortho f/u - S/p L Intertrochanteric Femur Fx IM Nail on 1/30  - Tolerated procedure well from CV perspective    #Persistent Afib  - Continue rate control with metoprolol 25mg BID  - Continue Eliquis 2.5mg BID for stroke prevention  - Asymptomatic NSVT noted in setting of normal LVEF, continue beta blockers. Keep K>4, Mg>2    #HLD  - Continue Lipitor     - No further inpatient cardiac w/u planned    Seth Olson PA-C  Pager: [phone]

## 2025-02-05 NOTE — DISCHARGE NOTE PROVIDER - CARE PROVIDERS DIRECT ADDRESSES
,[email or web address],DirectAddress_Unknown,DirectAddress_Unknown

## 2025-02-05 NOTE — PROGRESS NOTE ADULT - SUBJECTIVE AND OBJECTIVE BOX
ORTHOPEDIC PROGRESS NOTE    POD #5    Overnight events: None    SUBJECTIVE: Pt seen and examined at bedside. Patient is doing well, no acute complaints this AM. Pain is controlled with medication      OBJECTIVE:  Vital Signs Last 24 Hrs  T(C): 36.7 (05 Feb 2025 04:35), Max: 37.2 (04 Feb 2025 12:31)  T(F): 98 (05 Feb 2025 04:35), Max: 98.9 (04 Feb 2025 12:31)  HR: 88 (05 Feb 2025 04:35) (54 - 100)  BP: 122/62 (05 Feb 2025 04:35) (100/63 - 122/62)  BP(mean): --  RR: 18 (05 Feb 2025 04:35) (18 - 18)  SpO2: 98% (05 Feb 2025 04:35) (98% - 99%)    Parameters below as of 05 Feb 2025 04:35  Patient On (Oxygen Delivery Method): nasal cannula  O2 Flow (L/min): 3        02-03-25 @ 07:01  -  02-04-25 @ 07:00  --------------------------------------------------------  IN: 480 mL / OUT: 300 mL / NET: 180 mL    02-04-25 @ 07:01  -  02-05-25 @ 06:54  --------------------------------------------------------  IN: 240 mL / OUT: 1450 mL / NET: -1210 mL        Physical Examination:  GEN: NAD, resting quietly  PULM: symmetric chest rise bilaterally, no increased WOB  ABD: nondistended  EXTR:     Left Lower Extremity:  Dressing changed 2/2 saturation, incisions clean, dry intact  5/5 EHL/FHL/TA/GS   SILT Lomeli/Saph/Tib/DP/SP  +DP/PT Pulses  Compartments soft  No calf TTP B/L      LABS:                        10.5   11.60 )-----------( 409      ( 04 Feb 2025 09:59 )             33.6       02-04    141  |  100  |  26[H]  ----------------------------<  143[H]  4.2   |  29  |  0.81    Ca    8.7      04 Feb 2025 09:59  Phos  2.6     02-04  Mg     2.1     02-04

## 2025-02-05 NOTE — PROGRESS NOTE ADULT - SUBJECTIVE AND OBJECTIVE BOX
Date of Service  : 02-05-25 @ 13:39    INTERVAL HPI/OVERNIGHT EVENTS:  Vital Signs Last 24 Hrs  T(C): 36.7 (05 Feb 2025 11:45), Max: 36.7 (05 Feb 2025 04:35)  T(F): 98 (05 Feb 2025 11:45), Max: 98 (05 Feb 2025 04:35)  HR: 93 (05 Feb 2025 12:39) (82 - 100)  BP: 128/87 (05 Feb 2025 12:39) (97/51 - 128/87)  BP(mean): --  RR: 18 (05 Feb 2025 13:05) (18 - 18)  SpO2: 94% (05 Feb 2025 13:05) (87% - 99%)    Parameters below as of 05 Feb 2025 13:05  Patient On (Oxygen Delivery Method): nasal cannula  O2 Flow (L/min): 1    I&O's Summary    04 Feb 2025 07:01  -  05 Feb 2025 07:00  --------------------------------------------------------  IN: 240 mL / OUT: 1450 mL / NET: -1210 mL    05 Feb 2025 07:01  -  05 Feb 2025 13:39  --------------------------------------------------------  IN: 480 mL / OUT: 0 mL / NET: 480 mL      MEDICATIONS  (STANDING):  acetaminophen     Tablet .. 975 milliGRAM(s) Oral every 8 hours  acetaminophen   IVPB .. 1000 milliGRAM(s) IV Intermittent once  apixaban 2.5 milliGRAM(s) Oral two times a day  aspirin enteric coated 81 milliGRAM(s) Oral daily  atorvastatin 10 milliGRAM(s) Oral at bedtime  calcium carbonate 1250 mG  + Vitamin D (OsCal 500 + D) 1 Tablet(s) Oral daily  chlorhexidine 2% Cloths 1 Application(s) Topical daily  ergocalciferol 26567 Unit(s) Oral every week  furosemide    Tablet 20 milliGRAM(s) Oral daily  metoprolol tartrate 25 milliGRAM(s) Oral two times a day  pantoprazole    Tablet 40 milliGRAM(s) Oral before breakfast  polyethylene glycol 3350 17 Gram(s) Oral daily  potassium chloride    Tablet ER 10 milliEquivalent(s) Oral daily  senna 2 Tablet(s) Oral at bedtime    MEDICATIONS  (PRN):  melatonin 3 milliGRAM(s) Oral at bedtime PRN Insomnia  oxyCODONE    IR 2.5 milliGRAM(s) Oral every 4 hours PRN Moderate Pain (4 - 6)  oxyCODONE    IR 5 milliGRAM(s) Oral every 4 hours PRN Severe Pain (7 - 10)  traMADol 25 milliGRAM(s) Oral every 4 hours PRN Mild Pain (1 - 3)    LABS:                        10.5   11.60 )-----------( 409      ( 04 Feb 2025 09:59 )             33.6     02-04    141  |  100  |  26[H]  ----------------------------<  143[H]  4.2   |  29  |  0.81    Ca    8.7      04 Feb 2025 09:59  Phos  2.6     02-04  Mg     2.1     02-04        Urinalysis Basic - ( 04 Feb 2025 09:59 )    Color: x / Appearance: x / SG: x / pH: x  Gluc: 143 mg/dL / Ketone: x  / Bili: x / Urobili: x   Blood: x / Protein: x / Nitrite: x   Leuk Esterase: x / RBC: x / WBC x   Sq Epi: x / Non Sq Epi: x / Bacteria: x      CAPILLARY BLOOD GLUCOSE            Urinalysis Basic - ( 04 Feb 2025 09:59 )    Color: x / Appearance: x / SG: x / pH: x  Gluc: 143 mg/dL / Ketone: x  / Bili: x / Urobili: x   Blood: x / Protein: x / Nitrite: x   Leuk Esterase: x / RBC: x / WBC x   Sq Epi: x / Non Sq Epi: x / Bacteria: x      REVIEW OF SYSTEMS:  CONSTITUTIONAL: No fever, weight loss, or fatigue  EYES: No eye pain, visual disturbances, or discharge  ENMT:  No difficulty hearing, tinnitus, vertigo; No sinus or throat pain  NECK: No pain or stiffness  RESPIRATORY: No cough, wheezing, chills or hemoptysis; No shortness of breath  CARDIOVASCULAR: No chest pain, palpitations, dizziness, or leg swelling  GASTROINTESTINAL: No abdominal or epigastric pain. No nausea, vomiting, or hematemesis; No diarrhea or constipation. No melena or hematochezia.  GENITOURINARY: No dysuria, frequency, hematuria, or incontinence  NEUROLOGICAL: No headaches, memory loss, loss of strength, numbness, or tremors    Consultant(s) Notes Reviewed:  [x ] YES  [ ] NO    PHYSICAL EXAM:  GENERAL: NAD, well-groomed, well-developed,not in any distress ,NC Oxygen   HEAD:  Atraumatic, Normocephalic  EYES: EOMI, PERRLA, conjunctiva and sclera clear  ENMT: No tonsillar erythema, exudates, or enlargement; Moist mucous membranes, Good dentition, No lesions  NECK: Supple, No JVD, Normal thyroid  NERVOUS SYSTEM:  Alert & Oriented X3, No focal deficit   CHEST/LUNG: Good air entry bilateral with no  rales, rhonchi, wheezing, or rubs  HEART: Regular rate and rhythm; No murmurs, rubs, or gallops  ABDOMEN: Soft, Nontender, Nondistended; Bowel sounds present  EXTREMITIES:  2+ Peripheral Pulses, No clubbing, cyanosis, or edema    Care Discussed with Consultants/Other Providers [ x] YES  [ ] NO

## 2025-02-05 NOTE — PROGRESS NOTE ADULT - REASON FOR ADMISSION
Fall & Hypoxic

## 2025-02-05 NOTE — DISCHARGE NOTE PROVIDER - NSDCFUADDAPPT_GEN_ALL_CORE_FT
APPTS ARE READY TO BE MADE: [x] YES    Best Family or Patient Contact (if needed):    Additional Information about above appointments (if needed):    1:   2:   3:     Other comments or requests:    APPTS ARE READY TO BE MADE: [x] YES    Best Family or Patient Contact (if needed):    Additional Information about above appointments (if needed):    1:   2:   3:     Other comments or requests:     Patient is being discharged to [facility]. Caregiver will arrange follow up.

## 2025-02-05 NOTE — PROGRESS NOTE ADULT - PROVIDER SPECIALTY LIST ADULT
Cardiology
Internal Medicine
Orthopedics
Orthopedics
Cardiology
Internal Medicine
Internal Medicine
Orthopedics
Orthopedics
Pulmonology
Cardiology
Cardiology
Internal Medicine
Orthopedics
Pulmonology
Internal Medicine
Internal Medicine
Orthopedics
Pulmonology

## 2025-02-05 NOTE — PROGRESS NOTE ADULT - PROBLEM SELECTOR PLAN 1
likely in setting of influenza, mild volume overload +/- PNA  -VBG with no CO2 retention   -S/p Lasix 20mg IVP x 1 on 1/30. Keep O>I as tolerated.  -No longer requiring HFNC as of 1/30  -Continue to wean o2 as tolerated, keep sats >90% (currently 3-4LNC)  -Start incentive spirometry   -Check CXR (ordered).
likely in setting of influenza, mild volume overload +/- PNA  -VBG with no CO2 retention   -S/p Lasix 20mg IVP x 1 on 1/30. Keep O>I as tolerated.  -No longer requiring HFNC as of 1/30  -Hypoxia continue to improve. o2 lowered to 2LNC. Continue to wean o2 as tolerated, keep sats >90%.
likely in setting of influenza, mild volume overload +/- PNA  -VBG with no CO2 retention   -S/p Lasix 20mg IVP x 1 on 1/30  -No longer requiring HFNC as of 1/30  -Start incentive spirometry   -CXR 2/3 with mild increase in congestion, s/p Lasix 20 mg IVP x1  -Keep O>I as tolerated. May need PRN doses of Lasix   -Continue to wean o2 as tolerated, keep sats >90% (currently 3LNC).
likely in setting of influenza, mild volume overload +/- PNA  -VBG with no CO2 retention   -S/p Lasix 20mg IVP x 1 on 1/30 and 2/3  -No longer requiring HFNC as of 1/30  -Incentive spirometry   -Keep O>I as tolerated.  -Hypoxia improving. o2 lowered from 3LNC to 1LNC. Trial pt on RA this afternoon. Keep sats >90% with o2 PRN.   -OOB to chair
likely in setting of influenza, mild volume overload +/- PNA  -VBG with no CO2 retention   -S/p Lasix 20mg IVP x 1 on 1/30. Keep O>I as tolerated.  -No longer requiring HFNC as of 1/30  -Hypoxia continue to improve. o2 lowered to 2LNC. Continue to wean o2 as tolerated, keep sats >90%.    2/1: she is on 4 L o f oxygen today : was previously on 2 L:  s/p surgery  try to wean oxygen down if possible

## 2025-02-05 NOTE — DISCHARGE NOTE PROVIDER - HOSPITAL COURSE
HPI:  83 y/o F with PMH of HTN, HLD, afib on Eliquis, CHF. Presents with c/o L hip fracture (found on outpatient xray). Pt reportedly fell while at assisted living home day prior to admission. Pt also with c/o SOB, noted to be hypoxic to 78% on RA. RVP +influenza A. CTA chest with trace b/l pl effusions, interlobar septal thickening, patchy opacities LL. Denies cough, CP, pleuritic CP.  (30 Jan 2025 11:52)    Hospital Course:  84-year-old female with a past medical history of hypertension, hyperlipidemia, persistent atrial fibrillation on Eliquis, and heart failure with preserved ejection fraction (HFpEF) was admitted to the hospital following a mechanical fall at her assisted living facility, resulting in a left hip fracture identified via outpatient X-ray. In addition to her orthopedic injury, the patient presented with shortness of breath and hypoxia, and was found to have Influenza A. Cardiology was consulted for preoperative clearance in anticipation of surgical intervention.    Her acute hypoxic respiratory failure was deemed to be primarily driven by pneumonia/Influenza rather than congestive heart failure (CHF). A CT angiography of the chest showed no obvious pulmonary embolism but revealed interlobar septal thickening and opacities. Negative troponin levels were noted, and the patient was treated with antibiotics and Tamiflu as directed by the pulmonary team, while continuing on oral Lasix 20mg daily. An initial transthoracic echocardiogram (TTE) was of poor quality, but indicated possible mild left ventricular dysfunction in the context of atrial fibrillation with rapid ventricular rate and infection. A repeat TTE with Definity demonstrated normal left ventricular function with no regional wall motion abnormalities.    The patient underwent a successful left intertrochanteric femur fracture repair with an intramedullary nail on January 30th, without cardiovascular complications during the procedure. Postoperatively, she was managed according to orthopedic recommendations.    In terms of her persistent atrial fibrillation, rate control was maintained with metoprolol 25mg twice daily, and Eliquis 2.5mg twice daily was continued for stroke prevention. Asymptomatic non-sustained ventricular tachycardia was noted in the setting of normal left ventricular ejection fraction, and it was advised to keep potassium levels greater than 4 and magnesium levels greater than 2, while continuing beta blockers.    Her hyperlipidemia management involved ongoing Lipitor therapy. No further inpatient cardiac work-up was planned at the time of discharge.    During her admission, the patient received a course of Zosyn due to the concerns of pneumonia, with a normal white blood cell count and procalcitonin level. There was also mention of possible mild volume overload contributing to respiratory distress. Patient is on Lasix     Upon discharge, she was to continue physical and occupational therapy with weight-bearing as tolerated on her left lower extremity, use incentive spirometry, and follow DVT prophylaxis with Eliquis, sequential compression devices, and early ambulation. Pain control was managed according to the primary medical team, with plans for placement in a skilled nursing facility for continued rehabilitation.    Overall, the patient tolerated her hospital care well and showed improvement in her hypoxia, with a reduction in supplemental oxygen requirements and stabilization of her medical conditions.      Important Medication Changes and Reason:  Pain medication with Tramadol 25 mg po mild to moderate pain   and Oxycodone 5 mg po for severe pan     Active or Pending Issues Requiring Follow-up:  Outpatient follow with orthopedic surgery   outpatient follow up with Cardiology     Advanced Directives:   [ ] Full code  [x ] DNR  [ ] Hospice    Discharge Diagnoses:  Left hip fracture s/p ORIF with PIN  Influenza A  PNA  Hypoxic Respiratory failure  HTN  Afib

## 2025-02-05 NOTE — PROGRESS NOTE ADULT - PROBLEM SELECTOR PLAN 4
CTA chest with LL opacities - ?mild volume overload vs. PNA  -WBC normal, procalcitonin normal  -Given degree of hypoxia on admission and increased WOB, continue short course of Zosyn (5 days)
CTA chest with LL opacities - ?mild volume overload vs. PNA  -WBC normal, procalcitonin normal  -Given degree of hypoxia on admission and increased WOB, continue short course of Zosyn
CTA chest with LL opacities - ?mild volume overload vs. PNA  -WBC normal, procalcitonin normal  -Given degree of hypoxia on admission and increased WOB, continue short course of Zosyn

## 2025-02-05 NOTE — PROGRESS NOTE ADULT - PROBLEM SELECTOR PLAN 2
RVP +influenza A  -Tamiflu x 5 days.
RVP +influenza A  -S/p Tamiflu x 5 days.
RVP +influenza A  -Tamiflu x 5 days.

## 2025-02-05 NOTE — DISCHARGE NOTE PROVIDER - CARE PROVIDER_API CALL
Jameson Salazar  Orthopaedic Trauma  [address] [place] [address]  Phone: [phone]  Fax: [phone]  Follow Up Time: 1 month    Raghavendra Begum  Pulmonary Disease  [address] [place] [address]  Phone: [phone]  Fax: [phone]  Follow Up Time: 2 weeks    Pepe Carlson  Cardiovascular Disease  [address] [place] [address], [address]  [place] [address]  Phone: [phone]  Fax: [phone]  Follow Up Time: Routine

## 2025-02-05 NOTE — PROVIDER CONTACT NOTE (OTHER) - ACTION/TREATMENT ORDERED:
pierre schafer
ACP LUCIO Suazo notified, no new orders at this time. Care ongoing.
mag + phos w/ morning labs
PA Susy Voss aware. No further interventions at this time per PA.
Provider aware, telemetry monitoring ongoing. Morning labs to be drawn as ordered. Plan of care ongoing.
no intervention at this time

## 2025-04-22 ENCOUNTER — APPOINTMENT (OUTPATIENT)
Dept: ORTHOPEDIC SURGERY | Facility: CLINIC | Age: 85
End: 2025-04-22

## 2025-04-22 PROCEDURE — 99024 POSTOP FOLLOW-UP VISIT: CPT

## 2025-04-22 PROCEDURE — 73552 X-RAY EXAM OF FEMUR 2/>: CPT | Mod: LT

## 2025-05-15 PROBLEM — S72.142D CLOSED DISPLACED INTERTROCHANTERIC FRACTURE OF LEFT FEMUR WITH ROUTINE HEALING, SUBSEQUENT ENCOUNTER: Status: ACTIVE | Noted: 2025-05-15

## 2025-07-22 ENCOUNTER — APPOINTMENT (OUTPATIENT)
Dept: ORTHOPEDIC SURGERY | Facility: CLINIC | Age: 85
End: 2025-07-22

## (undated) DEVICE — POSITIONER FOAM HEADREST (PINK)

## (undated) DEVICE — DRAPE C ARM C-ARMOUR

## (undated) DEVICE — SUT POLYSORB 0 30" GS-21 UNDYED

## (undated) DEVICE — SPECIMEN CONTAINER 100ML

## (undated) DEVICE — VENODYNE/SCD SLEEVE CALF MEDIUM

## (undated) DEVICE — SOL IRR POUR NS 0.9% 500ML

## (undated) DEVICE — SUT POLYSORB 2-0 30" GS-21 UNDYED

## (undated) DEVICE — POSITIONER FOAM EGG CRATE ULNAR 2PCS (PINK)

## (undated) DEVICE — WARMING BLANKET UPPER ADULT

## (undated) DEVICE — DRSG AQUACEL 3.5 X 6"

## (undated) DEVICE — DRAPE U (BLUE) 60 X 60"

## (undated) DEVICE — LINER TRACTION BOOT MIZUHO

## (undated) DEVICE — DRAPE MAYO STAND 30"

## (undated) DEVICE — GLV 7.5 PROTEXIS (WHITE)

## (undated) DEVICE — DRSG TEGADERM 4 X 4.75"

## (undated) DEVICE — SOL IRR POUR H2O 250ML

## (undated) DEVICE — GLV 8 PROTEXIS (BLUE)

## (undated) DEVICE — DRSG CURITY GAUZE SPONGE 4 X 4" 12-PLY

## (undated) DEVICE — DRILL BIT BIOMET GRADUATED LONG 4.3MM

## (undated) DEVICE — DRSG DERMABOND PRINEO 60CM

## (undated) DEVICE — SUT MONOCRYL 3-0 18" PS-2 UNDYED

## (undated) DEVICE — DRILL ANTI ROTATION

## (undated) DEVICE — DRAPE SHOWER CURTAIN ISOLATION

## (undated) DEVICE — DRAPE 3/4 SHEET W REINFORCEMENT 56X77"

## (undated) DEVICE — GLV 8 PROTEXIS (WHITE)

## (undated) DEVICE — GLV 8.5 PROTEXIS (WHITE)

## (undated) DEVICE — DRAPE C ARM UNIVERSAL